# Patient Record
Sex: FEMALE | Race: WHITE | NOT HISPANIC OR LATINO | Employment: OTHER | URBAN - METROPOLITAN AREA
[De-identification: names, ages, dates, MRNs, and addresses within clinical notes are randomized per-mention and may not be internally consistent; named-entity substitution may affect disease eponyms.]

---

## 2017-03-31 ENCOUNTER — CLINICAL SUPPORT (OUTPATIENT)
Dept: SMOKING CESSATION | Facility: CLINIC | Age: 62
End: 2017-03-31
Payer: COMMERCIAL

## 2017-03-31 DIAGNOSIS — F17.200 NICOTINE DEPENDENCE: Primary | ICD-10-CM

## 2017-03-31 PROCEDURE — 99407 BEHAV CHNG SMOKING > 10 MIN: CPT | Mod: S$GLB,,,

## 2017-09-26 ENCOUNTER — TELEPHONE (OUTPATIENT)
Dept: PRIMARY CARE CLINIC | Facility: CLINIC | Age: 62
End: 2017-09-26

## 2017-09-26 NOTE — TELEPHONE ENCOUNTER
----- Message from Justus Santa sent at 9/26/2017  8:13 AM CDT -----  Contact: Magdalena Nichols would like to be seen today for congestion and cough. She has been treating with over the counter medication since last Monday. Please call [phone] (home)   Thanks!

## 2017-09-26 NOTE — TELEPHONE ENCOUNTER
Spoke with patient, states she has had cold symptoms for 1 week and has been treating it with OTC meds. Appointment made for 9/27.

## 2017-10-05 ENCOUNTER — CLINICAL SUPPORT (OUTPATIENT)
Dept: SMOKING CESSATION | Facility: CLINIC | Age: 62
End: 2017-10-05
Payer: COMMERCIAL

## 2017-10-05 ENCOUNTER — OFFICE VISIT (OUTPATIENT)
Dept: PRIMARY CARE CLINIC | Facility: CLINIC | Age: 62
End: 2017-10-05
Payer: COMMERCIAL

## 2017-10-05 VITALS
HEIGHT: 63 IN | DIASTOLIC BLOOD PRESSURE: 89 MMHG | BODY MASS INDEX: 38.09 KG/M2 | WEIGHT: 215 LBS | SYSTOLIC BLOOD PRESSURE: 150 MMHG | HEART RATE: 75 BPM | TEMPERATURE: 98 F | OXYGEN SATURATION: 96 % | RESPIRATION RATE: 18 BRPM

## 2017-10-05 DIAGNOSIS — E78.5 HYPERLIPIDEMIA, UNSPECIFIED HYPERLIPIDEMIA TYPE: ICD-10-CM

## 2017-10-05 DIAGNOSIS — G54.2 CERVICAL NEUROPATHY: ICD-10-CM

## 2017-10-05 DIAGNOSIS — L40.9 PSORIASIS: ICD-10-CM

## 2017-10-05 DIAGNOSIS — J01.00 ACUTE MAXILLARY SINUSITIS, RECURRENCE NOT SPECIFIED: Primary | ICD-10-CM

## 2017-10-05 DIAGNOSIS — J40 BRONCHITIS: ICD-10-CM

## 2017-10-05 DIAGNOSIS — I10 HYPERTENSION, UNSPECIFIED TYPE: ICD-10-CM

## 2017-10-05 DIAGNOSIS — F17.200 NICOTINE DEPENDENCE: Primary | ICD-10-CM

## 2017-10-05 DIAGNOSIS — E66.9 OBESITY, UNSPECIFIED CLASSIFICATION, UNSPECIFIED OBESITY TYPE, UNSPECIFIED WHETHER SERIOUS COMORBIDITY PRESENT: ICD-10-CM

## 2017-10-05 DIAGNOSIS — J44.9 CHRONIC OBSTRUCTIVE PULMONARY DISEASE, UNSPECIFIED COPD TYPE: ICD-10-CM

## 2017-10-05 PROCEDURE — 99213 OFFICE O/P EST LOW 20 MIN: CPT | Mod: 25,S$GLB,, | Performed by: FAMILY MEDICINE

## 2017-10-05 PROCEDURE — 96372 THER/PROPH/DIAG INJ SC/IM: CPT | Mod: S$GLB,,, | Performed by: FAMILY MEDICINE

## 2017-10-05 PROCEDURE — 99999 PR PBB SHADOW E&M-EST. PATIENT-LVL IV: CPT | Mod: PBBFAC,,, | Performed by: FAMILY MEDICINE

## 2017-10-05 PROCEDURE — 99407 BEHAV CHNG SMOKING > 10 MIN: CPT | Mod: S$GLB,,, | Performed by: INTERNAL MEDICINE

## 2017-10-05 RX ORDER — BUDESONIDE AND FORMOTEROL FUMARATE DIHYDRATE 160; 4.5 UG/1; UG/1
2 AEROSOL RESPIRATORY (INHALATION) 2 TIMES DAILY
Qty: 1 INHALER | Refills: 5 | Status: SHIPPED | OUTPATIENT
Start: 2017-10-05 | End: 2017-10-13

## 2017-10-05 RX ORDER — PROMETHAZINE HYDROCHLORIDE AND CODEINE PHOSPHATE 6.25; 1 MG/5ML; MG/5ML
5 SOLUTION ORAL EVERY 6 HOURS PRN
Qty: 180 ML | Refills: 0 | Status: SHIPPED | OUTPATIENT
Start: 2017-10-05 | End: 2017-10-13

## 2017-10-05 RX ORDER — METHYLPREDNISOLONE 4 MG/1
TABLET ORAL
Qty: 1 PACKAGE | Refills: 0 | Status: SHIPPED | OUTPATIENT
Start: 2017-10-05 | End: 2017-10-13

## 2017-10-05 RX ORDER — PROMETHAZINE HYDROCHLORIDE AND CODEINE PHOSPHATE 6.25; 1 MG/5ML; MG/5ML
5 SOLUTION ORAL EVERY 6 HOURS PRN
Qty: 180 ML | Refills: 0 | Status: SHIPPED | OUTPATIENT
Start: 2017-10-05 | End: 2017-10-05 | Stop reason: SDUPTHER

## 2017-10-05 RX ORDER — LISINOPRIL 10 MG/1
10 TABLET ORAL DAILY
Qty: 90 TABLET | Refills: 1 | Status: SHIPPED | OUTPATIENT
Start: 2017-10-05 | End: 2018-05-14 | Stop reason: SDUPTHER

## 2017-10-05 RX ORDER — LEVOFLOXACIN 500 MG/1
500 TABLET, FILM COATED ORAL DAILY
Qty: 10 TABLET | Refills: 0 | Status: SHIPPED | OUTPATIENT
Start: 2017-10-05 | End: 2017-10-13

## 2017-10-05 RX ORDER — AMLODIPINE BESYLATE 5 MG/1
5 TABLET ORAL DAILY
Qty: 90 TABLET | Refills: 1 | Status: SHIPPED | OUTPATIENT
Start: 2017-10-05 | End: 2018-05-14 | Stop reason: SDUPTHER

## 2017-10-05 RX ORDER — BETAMETHASONE SODIUM PHOSPHATE AND BETAMETHASONE ACETATE 3; 3 MG/ML; MG/ML
12 INJECTION, SUSPENSION INTRA-ARTICULAR; INTRALESIONAL; INTRAMUSCULAR; SOFT TISSUE
Status: COMPLETED | OUTPATIENT
Start: 2017-10-05 | End: 2017-10-05

## 2017-10-05 RX ADMIN — BETAMETHASONE SODIUM PHOSPHATE AND BETAMETHASONE ACETATE 12 MG: 3; 3 INJECTION, SUSPENSION INTRA-ARTICULAR; INTRALESIONAL; INTRAMUSCULAR; SOFT TISSUE at 04:10

## 2017-10-05 NOTE — PROGRESS NOTES
Subjective:       Patient ID: Magdalena Acevedo is a 62 y.o. female.    Chief Complaint: No chief complaint on file.    HPI: 61 yo WF in for cold no fever, +runny/stuffy nose, + sore th, + cough, + phlegm green. + ashtma , No P, Tb. + wheezing has Pro Air. No smoking since Aug Wheezing gets deep occas so bad like the death rattle . Worse as lays down at night . + COPD   ROS:  Skin: + psoriasis,no  eczema, skin cancer  HEENT: No headache, ocular pain, blurred vision, diplopia, epistaxis, +hoarseness +change in voice, no  thyroid trouble  Lung: No pneumonia, +asthma, no Tb, +wheezing,+ SOB,  Heart: No chest pain, ankle edema, palpitations, MI, mendy murmur,+hypertension,no  hyperlipidemia  Abdomen: No nausea, vomiting, diarrhea, constipation, ulcers, hepatitis, gallbladder disease, melena, hematochezia, hematemesis  : no UTI, renal disease, stones  GYN last PAP 43 yrs ago Last mammogram age 43   MS: no fractures, O/A, lupus, rheumatoid, gout  Neuro: No dizziness, LOC, seizures   No diabetes, no anemia, no anxiety, no depression   , one child Work retired Lives alone     Objective:   Physical Exam:  General: Well nourished, well developed, no acute distress  Skin: No lesions  HEENT: Eyes PERRLA, EOM intact, nose cl d/c, throat + eryuth +1/4   NECK: Supple, no bruits, No JVD, no nodes  Lungs: coarse cough  no rales, +rhonchi, +wheezing  Heart: Regular rate and rhythm, no murmurs, gallops, or rubs  Abdomen: flat, bowel sounds positive, no tenderness, or organomegaly  MS: Range of motion and muscle strength intact  Neuro: Alert, CN intact, oriented X 3  Extremities: No cyanosis, clubbing, or edema         Assessment:       1. Acute maxillary sinusitis, recurrence not specified    2. Bronchitis    3. Chronic obstructive pulmonary disease, unspecified COPD type    4. Hypertension, unspecified type    5. Obesity, unspecified classification, unspecified obesity type, unspecified whether serious comorbidity present     6. Psoriasis    7. Hyperlipidemia, unspecified hyperlipidemia type    8. Cervical neuropathy        Plan:       Acute maxillary sinusitis, recurrence not specified    Bronchitis  -     X-Ray Chest PA And Lateral; Future; Expected date: 10/05/2017    Chronic obstructive pulmonary disease, unspecified COPD type    Hypertension, unspecified type    Obesity, unspecified classification, unspecified obesity type, unspecified whether serious comorbidity present    Psoriasis    Hyperlipidemia, unspecified hyperlipidemia type    Cervical neuropathy  -     X-Ray Cervical Spine Complete 5 view; Future; Expected date: 10/05/2017    Other orders  -     amlodipine (NORVASC) 5 MG tablet; Take 1 tablet (5 mg total) by mouth once daily.  Dispense: 90 tablet; Refill: 1  -     lisinopril 10 MG tablet; Take 1 tablet (10 mg total) by mouth once daily.  Dispense: 90 tablet; Refill: 1  -     betamethasone acetate-betamethasone sodium phosphate injection 12 mg; Inject 2 mLs (12 mg total) into the muscle one time.  -     levoFLOXacin (LEVAQUIN) 500 MG tablet; Take 1 tablet (500 mg total) by mouth once daily.  Dispense: 10 tablet; Refill: 0  -     methylPREDNISolone (MEDROL DOSEPACK) 4 mg tablet; use as directed  Dispense: 1 Package; Refill: 0  -     Discontinue: promethazine-codeine 6.25-10 mg/5 ml (PHENERGAN WITH CODEINE) 6.25-10 mg/5 mL syrup; Take 5 mLs by mouth every 6 (six) hours as needed for Cough.  Dispense: 180 mL; Refill: 0  -     budesonide-formoterol 160-4.5 mcg (SYMBICORT) 160-4.5 mcg/actuation HFAA; Inhale 2 puffs into the lungs 2 (two) times daily. Controller  Dispense: 1 Inhaler; Refill: 5  -     promethazine-codeine 6.25-10 mg/5 ml (PHENERGAN WITH CODEINE) 6.25-10 mg/5 mL syrup; Take 5 mLs by mouth every 6 (six) hours as needed for Cough.  Dispense: 180 mL; Refill: 0         71

## 2017-10-05 NOTE — PROGRESS NOTES
Celestone 12mg IM given left gluteal w/aseptic technique. No adverse reaction noted.  Voiced no complaints.

## 2017-10-06 ENCOUNTER — TELEPHONE (OUTPATIENT)
Dept: PRIMARY CARE CLINIC | Facility: CLINIC | Age: 62
End: 2017-10-06

## 2017-10-06 ENCOUNTER — NURSE TRIAGE (OUTPATIENT)
Dept: ADMINISTRATIVE | Facility: CLINIC | Age: 62
End: 2017-10-06

## 2017-10-06 NOTE — TELEPHONE ENCOUNTER
2nd call today  ED not an option today no one to bring her and nothing will be done. pt concerned about weather. MD rec to go to ED. MD thought it was not related to steroids. HA starts at back base of neck to top of skull. Rated HA 10. Taken aleve no help. BP checked lower than yest. 145/78. Denies stroke sx. Reiterated need for ED due to severe HA. Pt states ED not an option and thanks anyway. Declined ambulance. Pt notified of on call MDs not prescribing pain meds. Spoke with Stewart NOLEN states that pt may need CT of head/ x ray of neck. Steroid shot would not cause level 10 HA or pain from base of neck to top of head. Reiterate need for ED. Pt notified. Call back with questions.     Reason for Disposition   Patient sounds very sick or weak to the triager    Protocols used: ST HEADACHE-A-AH

## 2017-10-06 NOTE — TELEPHONE ENCOUNTER
"Steroid injection yest in behind for sinus infection. severe constant HA since - started about an hour after OV. Tried aleve with no help.     Reason for Disposition   [1] SEVERE headache (e.g., excruciating) AND [2] "worst headache" of life    Answer Assessment - Initial Assessment Questions  1. LOCATION: "Where does it hurt?"      HA rated 20, /? ,   2. ONSET: "When did the headache start?" (Minutes, hours or days)      yest afternoon   3. PATTERN: "Does the pain come and go, or has it been constant since it started?"     Steady   4. SEVERITY: "How bad is the pain?" and "What does it keep you from doing?"  (e.g., Scale 1-10; mild, moderate, or severe)    - MILD (1-3): doesn't interfere with normal activities     - MODERATE (4-7): interferes with normal activities or awakens from sleep     - SEVERE (8-10): excruciating pain, unable to do any normal activities       20   5. RECURRENT SYMPTOM: "Have you ever had headaches before?" If so, ask: "When was the last time?" and "What happened that time?"     No   6. CAUSE: "What do you think is causing the headache?"     Steroid med   7. MIGRAINE: "Have you been diagnosed with migraine headaches?" If so, ask: "Is this headache similar?"      No   8. HEAD INJURY: "Has there been any recent injury to the head?"      No   9. OTHER SYMPTOMS: "Do you have any other symptoms?" (fever, stiff neck, eye pain, sore throat, cold symptoms)     No    Protocols used: ST HEADACHE-A-AH    Urgent message sent to office to call pt. rec ED due to severe pain per protocol. Call back with questions.   "

## 2017-10-06 NOTE — TELEPHONE ENCOUNTER
Spoke with patient regarding her headache, stated it started shortly after getting her steroid shot. Notes indicate that she was triaged by nurse and was recommended to go to Er. Spoke with MD regarding patient and condition. Instructed that patient should go to ER as instructed. Patient informed and understands the information provided. No further issues discussed.

## 2017-10-06 NOTE — TELEPHONE ENCOUNTER
----- Message from Ruby Ramires sent at 10/6/2017  3:25 PM CDT -----  Contact: Patient  Magdalena, patient [phone], Calling about have a headache after getting the steriod injection in the office yesterday, headache came about an hour later and has not gone away. IM not showing nurse on line. Transferred patient to [facility] on Call Nurse. Please advise. Thanks.

## 2017-10-13 ENCOUNTER — OFFICE VISIT (OUTPATIENT)
Dept: PRIMARY CARE CLINIC | Facility: CLINIC | Age: 62
End: 2017-10-13
Payer: COMMERCIAL

## 2017-10-13 VITALS
SYSTOLIC BLOOD PRESSURE: 129 MMHG | OXYGEN SATURATION: 98 % | HEART RATE: 82 BPM | BODY MASS INDEX: 37.74 KG/M2 | TEMPERATURE: 98 F | WEIGHT: 213 LBS | DIASTOLIC BLOOD PRESSURE: 79 MMHG | HEIGHT: 63 IN | RESPIRATION RATE: 18 BRPM

## 2017-10-13 DIAGNOSIS — R51.9 NONINTRACTABLE HEADACHE, UNSPECIFIED CHRONICITY PATTERN, UNSPECIFIED HEADACHE TYPE: ICD-10-CM

## 2017-10-13 DIAGNOSIS — L40.9 PSORIASIS: ICD-10-CM

## 2017-10-13 DIAGNOSIS — M50.30 DDD (DEGENERATIVE DISC DISEASE), CERVICAL: Primary | ICD-10-CM

## 2017-10-13 DIAGNOSIS — E66.9 OBESITY, UNSPECIFIED CLASSIFICATION, UNSPECIFIED OBESITY TYPE, UNSPECIFIED WHETHER SERIOUS COMORBIDITY PRESENT: ICD-10-CM

## 2017-10-13 DIAGNOSIS — I10 HYPERTENSION, UNSPECIFIED TYPE: ICD-10-CM

## 2017-10-13 PROCEDURE — 99213 OFFICE O/P EST LOW 20 MIN: CPT | Mod: S$GLB,,, | Performed by: FAMILY MEDICINE

## 2017-10-13 PROCEDURE — 99999 PR PBB SHADOW E&M-EST. PATIENT-LVL IV: CPT | Mod: PBBFAC,,, | Performed by: FAMILY MEDICINE

## 2017-10-13 RX ORDER — DICLOFENAC SODIUM 75 MG/1
TABLET, DELAYED RELEASE ORAL
Qty: 60 TABLET | Refills: 5 | Status: SHIPPED | OUTPATIENT
Start: 2017-10-13 | End: 2018-05-14

## 2017-10-13 RX ORDER — BUTALBITAL, ACETAMINOPHEN AND CAFFEINE 50; 325; 40 MG/1; MG/1; MG/1
TABLET ORAL
Qty: 30 TABLET | Refills: 0 | Status: SHIPPED | OUTPATIENT
Start: 2017-10-13 | End: 2018-05-14

## 2017-10-13 NOTE — PROGRESS NOTES
Subjective:       Patient ID: Magdalena Acevedo is a 62 y.o. female.    Chief Complaint: Follow-up    HPI: 63 yo WF in for follow up had cold still with congestion -- bit better Pharmacy would not fill inhalers-- due inhaler. Psoriasis better steroids help. Has not seen Derm x 15 yrs tried enbral only steroid pack helped.        Pt with HA back neck to top of head--was suppose go ER HA so bad --no ride. At night if lays on left side pain right neck if on right side no pain    [id number] - [place], [place] - [address]  [address] [facility] [place] [address]  Phone: [phone] Fax: [phone]   ROS:  Skin:+ psoriasis, eczema, skin cancer  HEENT: + headache, ocular pain, blurred vision, diplopia, epistaxis, hoarseness change in voice, thyroid trouble  Lung: No pneumonia, asthma, Tb, wheezing, SOB,+ copd  Heart: No chest pain, ankle edema, palpitations, MI, mendy murmur, +hypertension, no hyperlipidemia  Abdomen: No nausea, vomiting, diarrhea, constipation, ulcers, hepatitis, gallbladder disease, melena, hematochezia, hematemesis  : no UTI, renal disease, stones  MS: no fractures, O/A, lupus, rheumatoid, gout  Neuro: No dizziness, LOC, seizures   No diabetes, no anemia, no  anxiety, no depression     Objective:   Physical Exam:  General: Well nourished, well developed, no acute distress Overweight   Skin: No lesions  HEENT: Eyes PERRLA, EOM intact, nose patent, throat non-erythematous   NECK: Supple, no bruits, No JVD, no nodes  Lungs: Clear, no rales, rhonchi, wheezing  Heart: Regular rate and rhythm, no murmurs, gallops, or rubs  Abdomen: flat, bowel sounds positive, no tenderness, or organomegaly  MS: Range of motion and muscle strength intact  Neuro: Alert, CN intact, oriented X 3  Extremities: No cyanosis, clubbing, or edema         Assessment:       1. DDD (degenerative disc disease), cervical    2. Nonintractable headache, unspecified chronicity pattern, unspecified headache type    3. Psoriasis     4. Obesity, unspecified classification, unspecified obesity type, unspecified whether serious comorbidity present    5. Hypertension, unspecified type        Plan:       DDD (degenerative disc disease), cervical    Nonintractable headache, unspecified chronicity pattern, unspecified headache type    Psoriasis    Obesity, unspecified classification, unspecified obesity type, unspecified whether serious comorbidity present    Hypertension, unspecified type

## 2017-10-13 NOTE — PATIENT INSTRUCTIONS
MRI cervical spine due DDD c4/5,C5-6, C6-7  Moist heat ,theragesic, ROM exercises  Fioricet fpor HA   Diclofenac for neck pain 75 mg bid,   See DR Davila or Dr Alvarez   Exercise decrease weight   Do HA diary , if HA stays or worse MRI brain

## 2017-11-03 ENCOUNTER — TELEPHONE (OUTPATIENT)
Dept: FAMILY MEDICINE | Facility: CLINIC | Age: 62
End: 2017-11-03

## 2017-11-03 ENCOUNTER — TELEPHONE (OUTPATIENT)
Dept: PRIMARY CARE CLINIC | Facility: CLINIC | Age: 62
End: 2017-11-03

## 2017-11-03 NOTE — TELEPHONE ENCOUNTER
Phone room transferred patient to me explaining she has been trying to get her MRI results since last Thursday. Called naz at [facility] explained the situation. Patient was ask to give the number where she can be reached and I would have the nurse over at [facility] to call her back with he results today. The number  [phone] was the number patient left for a return call. Naz Johansen LPN was notified of patient's concerns and will be giving her a call today with results. Patient has verbal understanding of this.

## 2017-11-03 NOTE — TELEPHONE ENCOUNTER
----- Message from Berta Prescott sent at 11/1/2017  9:38 AM CDT -----  Contact: Patient  Magdalena, patient [phone], Fourth time calling for MRI results, done on 10/23/2017. Please advise. Thanks.

## 2017-11-08 ENCOUNTER — TELEPHONE (OUTPATIENT)
Dept: PRIMARY CARE CLINIC | Facility: CLINIC | Age: 62
End: 2017-11-08

## 2017-11-08 NOTE — TELEPHONE ENCOUNTER
----- Message from Aakash Orozco sent at 11/8/2017 12:00 PM CST -----  Contact: Self [phone]  Patient needs to be seen today for headache, neck pain,fever. Thanks!

## 2017-11-09 ENCOUNTER — OFFICE VISIT (OUTPATIENT)
Dept: PRIMARY CARE CLINIC | Facility: CLINIC | Age: 62
End: 2017-11-09
Payer: COMMERCIAL

## 2017-11-09 VITALS
WEIGHT: 215 LBS | HEIGHT: 63 IN | SYSTOLIC BLOOD PRESSURE: 126 MMHG | TEMPERATURE: 99 F | HEART RATE: 97 BPM | OXYGEN SATURATION: 97 % | BODY MASS INDEX: 38.09 KG/M2 | RESPIRATION RATE: 18 BRPM | DIASTOLIC BLOOD PRESSURE: 80 MMHG

## 2017-11-09 DIAGNOSIS — J40 BRONCHITIS: ICD-10-CM

## 2017-11-09 DIAGNOSIS — L40.9 PSORIASIS: ICD-10-CM

## 2017-11-09 DIAGNOSIS — I10 HYPERTENSION, UNSPECIFIED TYPE: ICD-10-CM

## 2017-11-09 DIAGNOSIS — R51.9 CHRONIC INTRACTABLE HEADACHE, UNSPECIFIED HEADACHE TYPE: Primary | ICD-10-CM

## 2017-11-09 DIAGNOSIS — M50.30 DDD (DEGENERATIVE DISC DISEASE), CERVICAL: ICD-10-CM

## 2017-11-09 DIAGNOSIS — E66.9 OBESITY, UNSPECIFIED CLASSIFICATION, UNSPECIFIED OBESITY TYPE, UNSPECIFIED WHETHER SERIOUS COMORBIDITY PRESENT: ICD-10-CM

## 2017-11-09 DIAGNOSIS — G89.29 CHRONIC INTRACTABLE HEADACHE, UNSPECIFIED HEADACHE TYPE: Primary | ICD-10-CM

## 2017-11-09 DIAGNOSIS — R30.0 DYSURIA: ICD-10-CM

## 2017-11-09 LAB
BILIRUB SERPL-MCNC: ABNORMAL MG/DL
BLOOD URINE, POC: 50
COLOR, POC UA: ABNORMAL
GLUCOSE UR QL STRIP: ABNORMAL
KETONES UR QL STRIP: ABNORMAL
LEUKOCYTE ESTERASE URINE, POC: 2
NITRITE, POC UA: ABNORMAL
PH, POC UA: 5
PROTEIN, POC: 2
SPECIFIC GRAVITY, POC UA: 1.02
UROBILINOGEN, POC UA: ABNORMAL

## 2017-11-09 PROCEDURE — 99999 PR PBB SHADOW E&M-EST. PATIENT-LVL IV: CPT | Mod: PBBFAC,,, | Performed by: FAMILY MEDICINE

## 2017-11-09 PROCEDURE — 81002 URINALYSIS NONAUTO W/O SCOPE: CPT | Mod: S$GLB,,, | Performed by: FAMILY MEDICINE

## 2017-11-09 PROCEDURE — 99213 OFFICE O/P EST LOW 20 MIN: CPT | Mod: 25,S$GLB,, | Performed by: FAMILY MEDICINE

## 2017-11-09 RX ORDER — LEVOFLOXACIN 500 MG/1
500 TABLET, FILM COATED ORAL DAILY
Qty: 7 TABLET | Refills: 0 | Status: SHIPPED | OUTPATIENT
Start: 2017-11-09 | End: 2017-11-16

## 2017-11-09 RX ORDER — ALBUTEROL SULFATE 90 UG/1
2 AEROSOL, METERED RESPIRATORY (INHALATION) EVERY 6 HOURS PRN
COMMUNITY
End: 2018-05-14

## 2017-11-09 RX ORDER — HYDROCODONE BITARTRATE AND ACETAMINOPHEN 5; 325 MG/1; MG/1
1 TABLET ORAL EVERY 6 HOURS PRN
Qty: 30 TABLET | Refills: 0 | Status: SHIPPED | OUTPATIENT
Start: 2017-11-09 | End: 2018-05-14

## 2017-11-09 RX ORDER — PROMETHAZINE HYDROCHLORIDE AND CODEINE PHOSPHATE 6.25; 1 MG/5ML; MG/5ML
5 SOLUTION ORAL EVERY 6 HOURS PRN
Qty: 180 ML | Refills: 0 | Status: SHIPPED | OUTPATIENT
Start: 2017-11-09 | End: 2018-05-14

## 2017-11-09 NOTE — PROGRESS NOTES
Subjective:       Patient ID: Magdalena Acevedo is a 62 y.o. female.    Chief Complaint: Headache and Urinary Tract Infection    HPI: 62-year-old white female --complaining of headache, neck pain,, left shoulder pain to the left deltoid area--- headache never leave him back of neck to the top of the head.  Has appointment Dr. Scherer neurologist on 11-20-17 .  Quality of pain throbbing , severity when severe 7-8 out of 10, if not severe 4-5 out of 10 never goes away.  Tried different pillows.  If his head up and down her side to side pain only in the left cervical area and left upper trapezius does radiate into the entire head       Patient feels like UTI coming on--+ frequency + urgency + retention, sample looks like some blood, + odor in a.m.  Last UTI few months ROS:  Skin: +sporiasis, noeczema, skin cancer  HEENT: + headache, + glasses-- ocular pain, blurred vision, diplopia, occasional epistaxis with blowing no,no hoarseness change in voice, thyroid trouble  Lung: No pneumonia, asthma, Tb, wheezing, SOB, no smoking  Heart: No chest pain, ankle edema, palpitations, MI, mendy murmur, +hypertension blood pressure running okay at home, no  hyperlipidemia  Abdomen: No nausea, vomiting, diarrhea, constipation, ulcers, hepatitis, melena, hematochezia, hematemesis status post cholecystectomy   : no UTI, renal disease, stones   MS: no fractures, O/A, lupus, rheumatoid, gout  Neuro: No dizziness, LOC, seizures   No diabetes, no anemia, no anxiety, no depression     Objective:   Physical Exam:  General: Well nourished, well developed, no acute distress + obese   Skin: Large number of psoriatic lesions   HEENT: Eyes PERRLA, EOM intact, nose patent, throat non-erythematous ears TM clear NECK: Supple, no bruits, No JVD;, no nodes  Lungs: Clear, no rales, rhonchi, wheezing coarse cough with green phlegm   Heart: Regular rate and rhythm, no murmurs, gallops, or rubs  Abdomen: flat, bowel sounds positive, no tenderness,  or organomegaly  MS: Pain left side of the neck see one through C7 left upper trapezius to the left shoulder to the end of the deltoid radiates to the top of the head --frontal area.  Neuro: Alert, CN intact, oriented X 3 slight swaying on Romberg , swaying on heel toe   Extremities: No cyanosis, clubbing, or edema         Assessment:       1. Chronic intractable headache, unspecified headache type    2. Hypertension, unspecified type    3. Psoriasis    4. Obesity, unspecified classification, unspecified obesity type, unspecified whether serious comorbidity present    5. DDD (degenerative disc disease), cervical    6. Bronchitis    7. Dysuria        Plan:       Chronic intractable headache, unspecified headache type    Hypertension, unspecified type    Psoriasis    Obesity, unspecified classification, unspecified obesity type, unspecified whether serious comorbidity present    DDD (degenerative disc disease), cervical    Bronchitis    Dysuria  -     POCT URINE DIPSTICK WITHOUT MICROSCOPE

## 2017-11-09 NOTE — PATIENT INSTRUCTIONS
Patient with headache appears to be musculoskeletal--starts in the neck cervical spine radiates up the back of the head to the floor frontal area bilaterally in the left shoulder into the upper trapezius and deltoid area MRI of the cervical spine done on diclofenac no relief Complaining of intractable headaches constant but waxes and wanes on Fioricet no relief  UA  Bronchitis coughed up green phlegm during exam  Due to appendectomy staxis/bronchitis/possible UTI-Levaquin 500 daily ×10 days Norco for pain Phenergan and codeine for cough

## 2018-05-14 ENCOUNTER — OFFICE VISIT (OUTPATIENT)
Dept: PRIMARY CARE CLINIC | Facility: CLINIC | Age: 63
End: 2018-05-14
Payer: COMMERCIAL

## 2018-05-14 VITALS
RESPIRATION RATE: 18 BRPM | OXYGEN SATURATION: 92 % | HEIGHT: 63 IN | DIASTOLIC BLOOD PRESSURE: 83 MMHG | BODY MASS INDEX: 37.8 KG/M2 | HEART RATE: 83 BPM | SYSTOLIC BLOOD PRESSURE: 138 MMHG | WEIGHT: 213.31 LBS | TEMPERATURE: 99 F

## 2018-05-14 DIAGNOSIS — R51.9 CHRONIC INTRACTABLE HEADACHE, UNSPECIFIED HEADACHE TYPE: ICD-10-CM

## 2018-05-14 DIAGNOSIS — L40.9 PSORIASIS: ICD-10-CM

## 2018-05-14 DIAGNOSIS — I10 HYPERTENSION, UNSPECIFIED TYPE: Primary | ICD-10-CM

## 2018-05-14 DIAGNOSIS — J32.9 SINUSITIS, UNSPECIFIED CHRONICITY, UNSPECIFIED LOCATION: ICD-10-CM

## 2018-05-14 DIAGNOSIS — R45.4 ANGER: ICD-10-CM

## 2018-05-14 DIAGNOSIS — G89.29 CHRONIC INTRACTABLE HEADACHE, UNSPECIFIED HEADACHE TYPE: ICD-10-CM

## 2018-05-14 DIAGNOSIS — E66.3 OVERWEIGHT: ICD-10-CM

## 2018-05-14 DIAGNOSIS — J40 BRONCHITIS: ICD-10-CM

## 2018-05-14 DIAGNOSIS — R32 URINARY INCONTINENCE, UNSPECIFIED TYPE: ICD-10-CM

## 2018-05-14 DIAGNOSIS — Z12.31 VISIT FOR SCREENING MAMMOGRAM: ICD-10-CM

## 2018-05-14 DIAGNOSIS — M50.30 DDD (DEGENERATIVE DISC DISEASE), CERVICAL: ICD-10-CM

## 2018-05-14 PROBLEM — Z86.718 HISTORY OF DVT (DEEP VEIN THROMBOSIS): Status: RESOLVED | Noted: 2018-05-14 | Resolved: 2018-05-14

## 2018-05-14 PROBLEM — E11.9 TYPE 2 DIABETES MELLITUS WITHOUT COMPLICATION, WITHOUT LONG-TERM CURRENT USE OF INSULIN: Status: RESOLVED | Noted: 2018-05-14 | Resolved: 2018-05-14

## 2018-05-14 PROBLEM — E66.9 OBESITY: Status: RESOLVED | Noted: 2017-10-13 | Resolved: 2018-05-14

## 2018-05-14 PROBLEM — E11.9 TYPE 2 DIABETES MELLITUS WITHOUT COMPLICATION, WITHOUT LONG-TERM CURRENT USE OF INSULIN: Status: ACTIVE | Noted: 2018-05-14

## 2018-05-14 PROBLEM — Z86.718 HISTORY OF DVT (DEEP VEIN THROMBOSIS): Status: ACTIVE | Noted: 2018-05-14

## 2018-05-14 PROCEDURE — 96372 THER/PROPH/DIAG INJ SC/IM: CPT | Mod: S$GLB,,, | Performed by: FAMILY MEDICINE

## 2018-05-14 PROCEDURE — 99999 PR PBB SHADOW E&M-EST. PATIENT-LVL V: CPT | Mod: PBBFAC,,, | Performed by: FAMILY MEDICINE

## 2018-05-14 PROCEDURE — 99213 OFFICE O/P EST LOW 20 MIN: CPT | Mod: 25,S$GLB,, | Performed by: FAMILY MEDICINE

## 2018-05-14 RX ORDER — LISINOPRIL 10 MG/1
10 TABLET ORAL DAILY
Qty: 90 TABLET | Refills: 3 | Status: SHIPPED | OUTPATIENT
Start: 2018-05-14 | End: 2018-10-16 | Stop reason: SDUPTHER

## 2018-05-14 RX ORDER — AMLODIPINE BESYLATE 5 MG/1
5 TABLET ORAL DAILY
Qty: 90 TABLET | Refills: 3 | Status: SHIPPED | OUTPATIENT
Start: 2018-05-14 | End: 2018-10-16 | Stop reason: SDUPTHER

## 2018-05-14 RX ORDER — CEFTRIAXONE 1 G/1
1 INJECTION, POWDER, FOR SOLUTION INTRAMUSCULAR; INTRAVENOUS
Status: CANCELLED | OUTPATIENT
Start: 2018-05-14 | End: 2018-05-14

## 2018-05-14 RX ADMIN — CEFTRIAXONE 1 G: 1 INJECTION, POWDER, FOR SOLUTION INTRAMUSCULAR; INTRAVENOUS at 06:05

## 2018-05-14 RX ADMIN — BETAMETHASONE SODIUM PHOSPHATE AND BETAMETHASONE ACETATE 12 MG: 3; 3 INJECTION, SUSPENSION INTRA-ARTICULAR; INTRALESIONAL; INTRAMUSCULAR; SOFT TISSUE at 06:05

## 2018-05-14 NOTE — PROGRESS NOTES
Patient ID by name and . Allergies verified. Celestone 12 mg IM injection given in right ventrogluteal and Ceftriaxone 1 gram IM injection given in left ventrogluteal using aseptic technique. Aspirated with no blood noted. Patient tolerated well. Given per physicians order. No adverse reactions noted.

## 2018-05-14 NOTE — PROGRESS NOTES
Subjective:       Patient ID: Magdalena Acevedo is a 63 y.o. female.    Chief Complaint: URI and Medication Refill    HPI: 63-year-old white female sick for 2 weeks.  No fever, + runny nose, no sore throat, + cough, + green phlegm.  No pneumonia asthma TB.  No smoking    ROS:  Skin: + psoriasis, no eczema, skin cancer  HEENT: No headache--was getting headaches states that they're better, ocular pain, blurred vision, diplopia, epistaxis, hoarseness change in voice, thyroid trouble  Lung: No pneumonia, asthma, Tb, wheezing, SOB, no smoking history of bronchitis  Heart: No chest pain, ankle edema, palpitations, MI, mendy murmur, +hypertension,no  hyperlipidemia  Abdomen: No nausea, vomiting, diarrhea, constipation, ulcers, hepatitis, gallbladder disease, melena, hematochezia, hematemesis had loose bowel movements for 3 days last week  : no UTI, renal disease, stones + urinary incontinence with coughing  GYN is been years since last Pap smear mammogram  MS: no fractures, O/A, lupus, rheumatoid, gout + DDD cervical spine  Neuro: No dizziness, LOC, seizures   No diabetes, no anemia, no anxiety, no depression upset scans angry easily    Objective:   Physical Exam:  General: Well nourished, well developed, no acute distress + overweight  Skin: + Dry scaly skin especially on the legs scalp arms and back-psoriasis  HEENT: Eyes PERRLA, EOM intact, nose clear discharge, throat erythema +1 over 4  NECK: Supple, no bruits, No JVD, no nodes  Lungs: Clear, no rales, rhonchi, wheezing + coarse cough + wheezing  Heart: Regular rate and rhythm, no murmurs, gallops, or rubs  Abdomen: flat, bowel sounds positive, no tenderness, or organomegaly  MS: Range of motion and muscle strength intact  Neuro: Alert, CN intact, oriented X 3  Extremities: No cyanosis, clubbing, or edema         Assessment:       1. Hypertension, unspecified type    2. Bronchitis    3. Psoriasis    4. Overweight    5. Chronic intractable headache, unspecified  headache type    6. Sinusitis, unspecified chronicity, unspecified location    7. Anger    8. Urinary incontinence, unspecified type    9. DDD (degenerative disc disease), cervical    10. Visit for screening mammogram        Plan:       Hypertension, unspecified type  -     Cancel: CBC auto differential; Future; Expected date: 05/14/2018  -     Cancel: Comprehensive metabolic panel; Future; Expected date: 05/14/2018  -     Cancel: Lipid panel; Future; Expected date: 05/14/2018  -     Cancel: POCT EKG 12-LEAD (NOT FOR [facility] USE)  -     Cancel: POCT occult blood stool  -     X-Ray Chest PA And Lateral; Future; Expected date: 05/14/2018  -     Sedimentation rate, manual; Future; Expected date: 05/14/2018  -     POCT urine dipstick without microscope  -     T4, free; Future; Expected date: 05/14/2018  -     TSH; Future; Expected date: 05/14/2018  -     CBC auto differential; Future; Expected date: 05/15/2018  -     Comprehensive metabolic panel; Future; Expected date: 05/15/2018  -     Lipid panel; Future; Expected date: 05/15/2018  -     POCT EKG 12-LEAD (NOT FOR [facility] USE)  -     POCT occult blood stool    Bronchitis    Psoriasis  -     Ambulatory referral to Dermatology    Overweight    Chronic intractable headache, unspecified headache type    Sinusitis, unspecified chronicity, unspecified location    Anger  -     Ambulatory referral to Psychiatry    Urinary incontinence, unspecified type    DDD (degenerative disc disease), cervical    Visit for screening mammogram  -     Mammo Digital Screening Bilat without CA; Future; Expected date: 05/29/2018    Other orders  -     amLODIPine (NORVASC) 5 MG tablet; Take 1 tablet (5 mg total) by mouth once daily.  Dispense: 90 tablet; Refill: 3  -     lisinopril 10 MG tablet; Take 1 tablet (10 mg total) by mouth once daily.  Dispense: 90 tablet; Refill: 3  -     Cancel: Hemoglobin A1c; Future; Expected date: 05/14/2018  -     Cancel: cefTRIAXone injection 1 g; Inject 1 g into  the muscle one time.  -     betamethasone acetate-betamethasone sodium phosphate injection 12 mg; Inject 2 mLs (12 mg total) into the muscle one time.  -     betamethasone acetate-betamethasone sodium phosphate injection 12 mg; Inject 2 mLs (12 mg total) into the muscle one time.  -     levoFLOXacin (LEVAQUIN) 500 MG tablet; Take 1 tablet (500 mg total) by mouth once daily.  Dispense: 10 tablet; Refill: 0  -     methylPREDNISolone (MEDROL DOSEPACK) 4 mg tablet; use as directed  Dispense: 1 Package; Refill: 0  -     promethazine-codeine 6.25-10 mg/5 ml (PHENERGAN WITH CODEINE) 6.25-10 mg/5 mL syrup; Take 5 mLs by mouth every 6 (six) hours as needed for Cough.  Dispense: 180 mL; Refill: 0  -     cefTRIAXone injection 1 g; Inject 1 g into the muscle one time.  -     sertraline (ZOLOFT) 25 MG tablet; Take 1 tablet (25 mg total) by mouth once daily.  Dispense: 30 tablet; Refill: 5  -     LORazepam (ATIVAN) 1 MG tablet; 1/2 po bid or 1 po q d prn pain  Dispense: 30 tablet; Refill: 1      patient needs routine labs CBC CMP lipid T4 TSH UA chest x-ray EKG and stool guaiac  Needs mammogram and Pap smear  Treated cold with Levaquin/Celestone/Medrol/Phenergan With Codeine/Rocephin--patient had abdominal cramps with Augmentin no ALLERGIC reaction has taken Keflex before--- then 1 inhaler  Due to being angry treated with Zoloft 25 daily Ativan half by mouth twice a day or 1 by mouth daily, [facility] due to anger management  needs to see Dr. Alvarez for psoriasis  Has urinary incontinence may need Ditropan but at this point we'll treat him for situations first and see how patient does

## 2018-05-15 PROBLEM — R45.4 ANGER: Status: ACTIVE | Noted: 2018-05-15

## 2018-05-15 PROBLEM — R32 URINARY INCONTINENCE: Status: ACTIVE | Noted: 2018-05-15

## 2018-05-15 RX ORDER — CEFTRIAXONE 1 G/1
1 INJECTION, POWDER, FOR SOLUTION INTRAMUSCULAR; INTRAVENOUS
Status: COMPLETED | OUTPATIENT
Start: 2018-05-15 | End: 2018-05-14

## 2018-05-15 RX ORDER — PROMETHAZINE HYDROCHLORIDE AND CODEINE PHOSPHATE 6.25; 1 MG/5ML; MG/5ML
5 SOLUTION ORAL EVERY 6 HOURS PRN
Qty: 180 ML | Refills: 0 | Status: SHIPPED | OUTPATIENT
Start: 2018-05-15 | End: 2018-10-16

## 2018-05-15 RX ORDER — METHYLPREDNISOLONE 4 MG/1
TABLET ORAL
Qty: 1 PACKAGE | Refills: 0 | Status: SHIPPED | OUTPATIENT
Start: 2018-05-15 | End: 2018-06-05

## 2018-05-15 RX ORDER — LORAZEPAM 1 MG/1
TABLET ORAL
Qty: 30 TABLET | Refills: 1 | Status: SHIPPED | OUTPATIENT
Start: 2018-05-15 | End: 2018-10-16 | Stop reason: SDUPTHER

## 2018-05-15 RX ORDER — LEVOFLOXACIN 500 MG/1
500 TABLET, FILM COATED ORAL DAILY
Qty: 10 TABLET | Refills: 0 | Status: SHIPPED | OUTPATIENT
Start: 2018-05-15 | End: 2018-05-25

## 2018-05-15 RX ORDER — BETAMETHASONE SODIUM PHOSPHATE AND BETAMETHASONE ACETATE 3; 3 MG/ML; MG/ML
12 INJECTION, SUSPENSION INTRA-ARTICULAR; INTRALESIONAL; INTRAMUSCULAR; SOFT TISSUE
Status: DISCONTINUED | OUTPATIENT
Start: 2018-05-15 | End: 2018-10-16

## 2018-05-15 RX ORDER — BETAMETHASONE SODIUM PHOSPHATE AND BETAMETHASONE ACETATE 3; 3 MG/ML; MG/ML
12 INJECTION, SUSPENSION INTRA-ARTICULAR; INTRALESIONAL; INTRAMUSCULAR; SOFT TISSUE
Status: COMPLETED | OUTPATIENT
Start: 2018-05-15 | End: 2018-05-14

## 2018-05-15 RX ORDER — SERTRALINE HYDROCHLORIDE 25 MG/1
25 TABLET, FILM COATED ORAL DAILY
Qty: 30 TABLET | Refills: 5 | Status: SHIPPED | OUTPATIENT
Start: 2018-05-15 | End: 2018-10-16 | Stop reason: SDUPTHER

## 2018-05-16 ENCOUNTER — TELEPHONE (OUTPATIENT)
Dept: PRIMARY CARE CLINIC | Facility: CLINIC | Age: 63
End: 2018-05-16

## 2018-05-16 NOTE — TELEPHONE ENCOUNTER
----- Message from Dereje BRAXTON Frisard sent at 5/16/2018 10:50 AM CDT -----  Contact: same  Patient called in and stated she spoke to nurse yesterday 5/15/18 and she was supposed to send over lab orders to the [facility] in [place] & was even going to call them.  Patient is at [facility] location but no orders have been received.    Patient call back number is [phone].  Fax number for [facility] is [phone]

## 2018-05-17 LAB
ALBUMIN SERPL-MCNC: 4.5 G/DL (ref 3.6–5.1)
ALBUMIN/GLOB SERPL: 1.7 (CALC) (ref 1–2.5)
ALP SERPL-CCNC: 95 U/L (ref 33–130)
ALT SERPL-CCNC: 19 U/L (ref 6–29)
AST SERPL-CCNC: 12 U/L (ref 10–35)
BASOPHILS # BLD AUTO: 111 CELLS/UL (ref 0–200)
BASOPHILS NFR BLD AUTO: 0.6 %
BILIRUB SERPL-MCNC: 0.6 MG/DL (ref 0.2–1.2)
BUN SERPL-MCNC: 20 MG/DL (ref 7–25)
BUN/CREAT SERPL: ABNORMAL (CALC) (ref 6–22)
CALCIUM SERPL-MCNC: 9.6 MG/DL (ref 8.6–10.4)
CHLORIDE SERPL-SCNC: 105 MMOL/L (ref 98–110)
CHOLEST SERPL-MCNC: 235 MG/DL
CHOLEST/HDLC SERPL: 5.7 (CALC)
CO2 SERPL-SCNC: 29 MMOL/L (ref 20–31)
CREAT SERPL-MCNC: 0.8 MG/DL (ref 0.5–0.99)
EOSINOPHIL # BLD AUTO: 19 CELLS/UL (ref 15–500)
EOSINOPHIL NFR BLD AUTO: 0.1 %
ERYTHROCYTE [DISTWIDTH] IN BLOOD BY AUTOMATED COUNT: 14.2 % (ref 11–15)
ERYTHROCYTE [SEDIMENTATION RATE] IN BLOOD BY WESTERGREN METHOD: 41 MM/H
GFR SERPL CREATININE-BSD FRML MDRD: 78 ML/MIN/1.73M2
GLOBULIN SER CALC-MCNC: 2.7 G/DL (CALC) (ref 1.9–3.7)
GLUCOSE SERPL-MCNC: 116 MG/DL (ref 65–99)
HCT VFR BLD AUTO: 45.7 % (ref 35–45)
HDLC SERPL-MCNC: 41 MG/DL
HGB BLD-MCNC: 16 G/DL (ref 11.7–15.5)
LDLC SERPL CALC-MCNC: 161 MG/DL (CALC)
LYMPHOCYTES # BLD AUTO: 2239 CELLS/UL (ref 850–3900)
LYMPHOCYTES NFR BLD AUTO: 12.1 %
MCH RBC QN AUTO: 31.7 PG (ref 27–33)
MCHC RBC AUTO-ENTMCNC: 35 G/DL (ref 32–36)
MCV RBC AUTO: 90.7 FL (ref 80–100)
MONOCYTES # BLD AUTO: 870 CELLS/UL (ref 200–950)
MONOCYTES NFR BLD AUTO: 4.7 %
NEUTROPHILS # BLD AUTO: ABNORMAL CELLS/UL (ref 1500–7800)
NEUTROPHILS NFR BLD AUTO: 82.5 %
NONHDLC SERPL-MCNC: 194 MG/DL (CALC)
PLATELET # BLD AUTO: 341 THOUSAND/UL (ref 140–400)
PMV BLD REES-ECKER: 10.5 FL (ref 7.5–12.5)
POTASSIUM SERPL-SCNC: 4.5 MMOL/L (ref 3.5–5.3)
PROT SERPL-MCNC: 7.2 G/DL (ref 6.1–8.1)
RBC # BLD AUTO: 5.04 MILLION/UL (ref 3.8–5.1)
SODIUM SERPL-SCNC: 141 MMOL/L (ref 135–146)
T4 FREE SERPL-MCNC: 1.1 NG/DL (ref 0.8–1.8)
TRIGL SERPL-MCNC: 173 MG/DL
TSH SERPL-ACNC: 0.33 MIU/L (ref 0.4–4.5)
WBC # BLD AUTO: 18.5 THOUSAND/UL (ref 3.8–10.8)

## 2018-05-22 DIAGNOSIS — Z00.00 ROUTINE ADULT HEALTH MAINTENANCE: Primary | ICD-10-CM

## 2018-05-22 PROBLEM — E78.49 OTHER HYPERLIPIDEMIA: Status: ACTIVE | Noted: 2018-05-22

## 2018-05-22 PROBLEM — R73.03 BORDERLINE DIABETES: Status: ACTIVE | Noted: 2018-05-22

## 2018-05-22 RX ORDER — ATORVASTATIN CALCIUM 10 MG/1
10 TABLET, FILM COATED ORAL NIGHTLY
Qty: 90 TABLET | Status: SHIPPED | OUTPATIENT
Start: 2018-05-22 | End: 2018-05-24 | Stop reason: SDUPTHER

## 2018-05-22 NOTE — TELEPHONE ENCOUNTER
Spoke with pt and discussed results. Pt verbalized understanding. Pended Rx to Dr William to send.

## 2018-05-22 NOTE — TELEPHONE ENCOUNTER
----- Message from Jaime William MD sent at 5/22/2018  1:28 PM CDT -----  Call pt tell TSH decreased 0.33 needs to do T4 TSH in 3 months--WBC 18.5 probably secondary to steroids--- glucose 116 borderline--cholesterol 235 better 180  better 100 triglyceride 173 better 150 HDL 41 increase with exercise start atorvastatin 10 mg 1 by mouth every afternoon stop if develops nausea or muscle cramps redo lipid in 3 months son 3 months patient needs to redo T4 TSH CBC glucose add a hemoglobin A1c and lipids see me 2 weeks later

## 2018-05-24 NOTE — TELEPHONE ENCOUNTER
----- Message from Berta Prescott sent at 5/24/2018 10:18 AM CDT -----  Contact: Patient  Type: Needs Medical Advice    Who Called:  Magdalena, patient  Symptoms (please be specific):  N/A  How long has patient had these symptoms:  N/A  Pharmacy name and phone #:    [id number] [address] [address]  Phone: [phone] Fax: [phone]    Best Call Back Number: [phone]  Additional Information: Calling because she was told some prescriptions were going to be called in for her. She has never received them. Please call her. Thanks.

## 2018-05-30 ENCOUNTER — TELEPHONE (OUTPATIENT)
Dept: PRIMARY CARE CLINIC | Facility: CLINIC | Age: 63
End: 2018-05-30

## 2018-05-30 NOTE — TELEPHONE ENCOUNTER
Spoke with patient, verbalized to patient per PCP's note patient needs to start atorvastatin 10 mg PO 1 tab q PM #30 5 refills and will monitor thyroid for now. No medication for that. Verbalized to patient that I will call in medication to pharmacy [facility]. Patient states understanding.

## 2018-05-30 NOTE — TELEPHONE ENCOUNTER
----- Message from Yas Mckeon sent at 5/30/2018 10:01 AM CDT -----  Contact: Patient  Magdalena, patient [phone] calling to speak with nurse in office, states that cholesterol and thyroid medications were supposed to be sent over to the pharmacy but they have not received anything. Please advise. Thanks.    [id number] [address] [address]  Phone: [phone] Fax: [phone]

## 2018-05-31 RX ORDER — ATORVASTATIN CALCIUM 10 MG/1
10 TABLET, FILM COATED ORAL NIGHTLY
Qty: 90 TABLET | Refills: 1 | Status: SHIPPED | OUTPATIENT
Start: 2018-05-31 | End: 2018-10-16 | Stop reason: SDUPTHER

## 2018-10-16 ENCOUNTER — OFFICE VISIT (OUTPATIENT)
Dept: PRIMARY CARE CLINIC | Facility: CLINIC | Age: 63
End: 2018-10-16
Payer: COMMERCIAL

## 2018-10-16 VITALS
RESPIRATION RATE: 18 BRPM | WEIGHT: 217 LBS | HEIGHT: 63 IN | SYSTOLIC BLOOD PRESSURE: 143 MMHG | OXYGEN SATURATION: 94 % | HEART RATE: 82 BPM | DIASTOLIC BLOOD PRESSURE: 83 MMHG | BODY MASS INDEX: 38.45 KG/M2

## 2018-10-16 DIAGNOSIS — S16.1XXD STRAIN OF NECK MUSCLE, SUBSEQUENT ENCOUNTER: Primary | ICD-10-CM

## 2018-10-16 DIAGNOSIS — L40.9 PSORIASIS: ICD-10-CM

## 2018-10-16 DIAGNOSIS — I10 HYPERTENSION, UNSPECIFIED TYPE: ICD-10-CM

## 2018-10-16 DIAGNOSIS — G89.29 CHRONIC INTRACTABLE HEADACHE, UNSPECIFIED HEADACHE TYPE: ICD-10-CM

## 2018-10-16 DIAGNOSIS — M25.511 ACUTE PAIN OF RIGHT SHOULDER: ICD-10-CM

## 2018-10-16 DIAGNOSIS — M50.30 DDD (DEGENERATIVE DISC DISEASE), CERVICAL: ICD-10-CM

## 2018-10-16 DIAGNOSIS — E66.3 OVERWEIGHT: ICD-10-CM

## 2018-10-16 DIAGNOSIS — R51.9 CHRONIC INTRACTABLE HEADACHE, UNSPECIFIED HEADACHE TYPE: ICD-10-CM

## 2018-10-16 DIAGNOSIS — R32 URINARY INCONTINENCE, UNSPECIFIED TYPE: ICD-10-CM

## 2018-10-16 DIAGNOSIS — J40 BRONCHITIS: ICD-10-CM

## 2018-10-16 DIAGNOSIS — R73.03 BORDERLINE DIABETES: ICD-10-CM

## 2018-10-16 DIAGNOSIS — R45.4 ANGER: ICD-10-CM

## 2018-10-16 PROCEDURE — 99213 OFFICE O/P EST LOW 20 MIN: CPT | Mod: S$GLB,,, | Performed by: FAMILY MEDICINE

## 2018-10-16 PROCEDURE — 99999 PR PBB SHADOW E&M-EST. PATIENT-LVL III: CPT | Mod: PBBFAC,,, | Performed by: FAMILY MEDICINE

## 2018-10-16 RX ORDER — SERTRALINE HYDROCHLORIDE 25 MG/1
25 TABLET, FILM COATED ORAL DAILY
Qty: 30 TABLET | Refills: 5 | Status: SHIPPED | OUTPATIENT
Start: 2018-10-16 | End: 2019-04-22 | Stop reason: SDUPTHER

## 2018-10-16 RX ORDER — LORAZEPAM 1 MG/1
TABLET ORAL
Qty: 30 TABLET | Refills: 1 | Status: SHIPPED | OUTPATIENT
Start: 2018-10-16 | End: 2019-07-25

## 2018-10-16 RX ORDER — PROMETHAZINE HYDROCHLORIDE AND CODEINE PHOSPHATE 6.25; 1 MG/5ML; MG/5ML
5 SOLUTION ORAL EVERY 6 HOURS PRN
Qty: 180 ML | Refills: 0 | Status: SHIPPED | OUTPATIENT
Start: 2018-10-16 | End: 2019-03-18

## 2018-10-16 RX ORDER — BETAMETHASONE SODIUM PHOSPHATE AND BETAMETHASONE ACETATE 3; 3 MG/ML; MG/ML
12 INJECTION, SUSPENSION INTRA-ARTICULAR; INTRALESIONAL; INTRAMUSCULAR; SOFT TISSUE
Status: DISCONTINUED | OUTPATIENT
Start: 2018-10-16 | End: 2019-03-18

## 2018-10-16 RX ORDER — DICLOFENAC SODIUM 75 MG/1
75 TABLET, DELAYED RELEASE ORAL 2 TIMES DAILY
Qty: 60 TABLET | Refills: 5 | Status: SHIPPED | OUTPATIENT
Start: 2018-10-16 | End: 2019-04-22 | Stop reason: SDUPTHER

## 2018-10-16 RX ORDER — AZITHROMYCIN 250 MG/1
TABLET, FILM COATED ORAL
Qty: 6 TABLET | Refills: 0 | Status: SHIPPED | OUTPATIENT
Start: 2018-10-16 | End: 2018-10-20

## 2018-10-16 RX ORDER — DICLOFENAC SODIUM 75 MG/1
75 TABLET, DELAYED RELEASE ORAL 2 TIMES DAILY
COMMUNITY
End: 2018-10-16 | Stop reason: SDUPTHER

## 2018-10-16 RX ORDER — TRAMADOL HYDROCHLORIDE 50 MG/1
50 TABLET ORAL EVERY 6 HOURS PRN
Qty: 30 TABLET | Refills: 0 | Status: SHIPPED | OUTPATIENT
Start: 2018-10-16 | End: 2018-10-26

## 2018-10-16 RX ORDER — ATORVASTATIN CALCIUM 10 MG/1
10 TABLET, FILM COATED ORAL NIGHTLY
Qty: 90 TABLET | Refills: 1 | Status: SHIPPED | OUTPATIENT
Start: 2018-10-16 | End: 2019-04-23 | Stop reason: SDUPTHER

## 2018-10-16 RX ORDER — METHYLPREDNISOLONE 4 MG/1
TABLET ORAL
Qty: 1 PACKAGE | Refills: 0 | Status: SHIPPED | OUTPATIENT
Start: 2018-10-16 | End: 2019-03-18

## 2018-10-16 RX ORDER — OXYBUTYNIN CHLORIDE 5 MG/1
TABLET, EXTENDED RELEASE ORAL
Qty: 30 TABLET | Refills: 5 | Status: SHIPPED | OUTPATIENT
Start: 2018-10-16 | End: 2019-03-18

## 2018-10-16 RX ORDER — AMLODIPINE BESYLATE 5 MG/1
5 TABLET ORAL DAILY
Qty: 90 TABLET | Refills: 3 | Status: SHIPPED | OUTPATIENT
Start: 2018-10-16 | End: 2019-04-22 | Stop reason: SDUPTHER

## 2018-10-16 RX ORDER — LISINOPRIL 10 MG/1
10 TABLET ORAL DAILY
Qty: 90 TABLET | Refills: 3 | Status: SHIPPED | OUTPATIENT
Start: 2018-10-16 | End: 2019-04-22 | Stop reason: SDUPTHER

## 2018-10-16 NOTE — PROGRESS NOTES
Subjective:       Patient ID: Magdalena Acevedo is a 63 y.o. female.    Chief Complaint: Medication Refill    HPI: 63-year-old white female in for medication refill--having pain in the right side of the neck and right shoulder--patient ran out of NSAIDs has been using over-the-counter Advil.  Pain when not taking her medication.  Pain with turning head side to side or up and down--shoulder hurts especially with raising arm overhead or reaching up.  No trauma, no excessive activity.  Unemployed.  No lupus rheumatoid gout.  Patient has a history of DDD cervical spine  ROS:  Skin: + psoriasis--not as bad is normal at this time, no eczema, skin cancer  HEENT: No headache,  ocular pain, blurred vision, diplopia, epistaxis, hoarseness change in voice, thyroid trouble  Lung: No pneumonia, asthma, Tb, wheezing, SOB, no smoking history of bronchitis  Heart: No chest pain, ankle edema, palpitations, MI, mendy murmur, +hypertension,no  Hyperlipidemia--has arm cuff at home but not taking it very often  Abdomen: No nausea, vomiting, diarrhea, constipation, ulcers, hepatitis, gallbladder disease, melena, hematochezia, hematemesis   : no UTI, renal disease, stones + urinary incontinence with coughing  GYN is been years since last Pap smear mammogram  MS: no fractures, O/A, lupus, rheumatoid, gout + DDD cervical spine --see history of present illness  Neuro: No dizziness, LOC, seizures   No diabetes, no anemia, no anxiety, no depression upset gets angry patient is on Zoloft but not using lorazepam or Ativan  [place], 1 child, unemployed,, grandson stays with patient in every other week great grand children stay with patient    Objective:   Physical Exam:  General: Well nourished, well developed, no acute distress + overweight  Skin: + Dry scaly skin especially on the legs scalp arms and back-psoriasis  HEENT: Eyes PERRLA, EOM intact, nose clear discharge, throat erythema +1 over 4  NECK: Supple, no bruits, No JVD, no  nodes  Lungs: Clear, no rales, rhonchi, wheezing + coarse cough + wheezing  Heart: Regular rate and rhythm, no murmurs, gallops, or rubs  Abdomen: flat, bowel sounds positive, no tenderness, or organomegaly  MS: Range of motion and muscle strength intact  Neuro: Alert, CN intact, oriented X 3  Extremities: No cyanosis, clubbing, or edema         Assessment:       1. Strain of neck muscle, subsequent encounter    2. Acute pain of right shoulder    3. Urinary incontinence, unspecified type    4. Overweight    5. Borderline diabetes    6. Bronchitis    7. Hypertension, unspecified type    8. Psoriasis    9. Anger    10. Chronic intractable headache, unspecified headache type    11. DDD (degenerative disc disease), cervical        Plan:       Strain of neck muscle, subsequent encounter    Acute pain of right shoulder    Urinary incontinence, unspecified type    Overweight    Borderline diabetes    Bronchitis    Hypertension, unspecified type    Psoriasis    Anger    Chronic intractable headache, unspecified headache type    DDD (degenerative disc disease), cervical    Other orders  -     amLODIPine (NORVASC) 5 MG tablet; Take 1 tablet (5 mg total) by mouth once daily.  Dispense: 90 tablet; Refill: 3  -     atorvastatin (LIPITOR) 10 MG tablet; Take 1 tablet (10 mg total) by mouth every evening.  Dispense: 90 tablet; Refill: 1  -     diclofenac (VOLTAREN) 75 MG EC tablet; Take 1 tablet (75 mg total) by mouth 2 (two) times daily.  Dispense: 60 tablet; Refill: 5  -     lisinopril 10 MG tablet; Take 1 tablet (10 mg total) by mouth once daily.  Dispense: 90 tablet; Refill: 3  -     LORazepam (ATIVAN) 1 MG tablet; 1/2 po bid or 1 po q d prn pain  Dispense: 30 tablet; Refill: 1  -     sertraline (ZOLOFT) 25 MG tablet; Take 1 tablet (25 mg total) by mouth once daily.  Dispense: 30 tablet; Refill: 5  -     betamethasone acetate-betamethasone sodium phosphate injection 12 mg; Inject 2 mLs (12 mg total) into the muscle one time.  -      methylPREDNISolone (MEDROL DOSEPACK) 4 mg tablet; use as directed  Dispense: 1 Package; Refill: 0  -     traMADol (ULTRAM) 50 mg tablet; Take 1 tablet (50 mg total) by mouth every 6 (six) hours as needed.  Dispense: 30 tablet; Refill: 0      Moist heat/range of motion/Theragesic  Celestone, Medrol then back to diclofenac 75 b.i.d.   Needs mammogram and Pap smear  Ultram for brakethur pain  Zithromax for sinus with celestone and medrol and phenergan with codiene   Ditropan for urinary incont   Doing fairly well with depression with Zoloft takes Ativan once in a blue moon for anxiety  Urinary incontinence trial of Ditropan

## 2019-03-18 ENCOUNTER — OFFICE VISIT (OUTPATIENT)
Dept: PRIMARY CARE CLINIC | Facility: CLINIC | Age: 64
End: 2019-03-18
Payer: COMMERCIAL

## 2019-03-18 VITALS
RESPIRATION RATE: 18 BRPM | DIASTOLIC BLOOD PRESSURE: 87 MMHG | HEART RATE: 87 BPM | WEIGHT: 220 LBS | OXYGEN SATURATION: 95 % | BODY MASS INDEX: 38.98 KG/M2 | SYSTOLIC BLOOD PRESSURE: 134 MMHG | TEMPERATURE: 99 F | HEIGHT: 63 IN

## 2019-03-18 DIAGNOSIS — M50.30 DDD (DEGENERATIVE DISC DISEASE), CERVICAL: ICD-10-CM

## 2019-03-18 DIAGNOSIS — Z11.59 ENCOUNTER FOR HEPATITIS C SCREENING TEST FOR LOW RISK PATIENT: ICD-10-CM

## 2019-03-18 DIAGNOSIS — J32.9 SINUSITIS, UNSPECIFIED CHRONICITY, UNSPECIFIED LOCATION: Primary | ICD-10-CM

## 2019-03-18 DIAGNOSIS — R45.4 ANGER: ICD-10-CM

## 2019-03-18 DIAGNOSIS — J40 BRONCHITIS: ICD-10-CM

## 2019-03-18 DIAGNOSIS — L40.9 PSORIASIS: ICD-10-CM

## 2019-03-18 DIAGNOSIS — J98.01 BRONCHOSPASM: ICD-10-CM

## 2019-03-18 DIAGNOSIS — I10 HYPERTENSION, UNSPECIFIED TYPE: ICD-10-CM

## 2019-03-18 DIAGNOSIS — R32 URINARY INCONTINENCE, UNSPECIFIED TYPE: ICD-10-CM

## 2019-03-18 DIAGNOSIS — R73.03 BORDERLINE DIABETES: ICD-10-CM

## 2019-03-18 DIAGNOSIS — E78.49 OTHER HYPERLIPIDEMIA: ICD-10-CM

## 2019-03-18 DIAGNOSIS — E66.3 OVERWEIGHT: ICD-10-CM

## 2019-03-18 PROCEDURE — 96372 THER/PROPH/DIAG INJ SC/IM: CPT | Mod: S$GLB,,, | Performed by: FAMILY MEDICINE

## 2019-03-18 PROCEDURE — 99214 OFFICE O/P EST MOD 30 MIN: CPT | Mod: 25,S$GLB,, | Performed by: FAMILY MEDICINE

## 2019-03-18 PROCEDURE — 99214 PR OFFICE/OUTPT VISIT, EST, LEVL IV, 30-39 MIN: ICD-10-PCS | Mod: 25,S$GLB,, | Performed by: FAMILY MEDICINE

## 2019-03-18 PROCEDURE — 94640 PR INHAL RX, AIRWAY OBST/DX SPUTUM INDUCT: ICD-10-PCS | Mod: 59,S$GLB,, | Performed by: FAMILY MEDICINE

## 2019-03-18 PROCEDURE — 96372 PR INJECTION,THERAP/PROPH/DIAG2ST, IM OR SUBCUT: ICD-10-PCS | Mod: S$GLB,,, | Performed by: FAMILY MEDICINE

## 2019-03-18 PROCEDURE — 94640 AIRWAY INHALATION TREATMENT: CPT | Mod: 59,S$GLB,, | Performed by: FAMILY MEDICINE

## 2019-03-18 PROCEDURE — 99999 PR PBB SHADOW E&M-EST. PATIENT-LVL IV: CPT | Mod: PBBFAC,,, | Performed by: FAMILY MEDICINE

## 2019-03-18 PROCEDURE — 99999 PR PBB SHADOW E&M-EST. PATIENT-LVL IV: ICD-10-PCS | Mod: PBBFAC,,, | Performed by: FAMILY MEDICINE

## 2019-03-18 RX ORDER — PROMETHAZINE HYDROCHLORIDE AND CODEINE PHOSPHATE 6.25; 1 MG/5ML; MG/5ML
5 SOLUTION ORAL EVERY 6 HOURS PRN
Qty: 180 ML | Refills: 0 | Status: SHIPPED | OUTPATIENT
Start: 2019-03-18 | End: 2019-07-25 | Stop reason: ALTCHOICE

## 2019-03-18 RX ORDER — METHYLPREDNISOLONE 4 MG/1
TABLET ORAL
Qty: 1 PACKAGE | Refills: 0 | Status: SHIPPED | OUTPATIENT
Start: 2019-03-18 | End: 2019-07-25 | Stop reason: ALTCHOICE

## 2019-03-18 RX ORDER — TRIAMCINOLONE ACETONIDE 40 MG/ML
40 INJECTION, SUSPENSION INTRA-ARTICULAR; INTRAMUSCULAR
Status: COMPLETED | OUTPATIENT
Start: 2019-03-18 | End: 2019-03-18

## 2019-03-18 RX ORDER — LEVOFLOXACIN 500 MG/1
500 TABLET, FILM COATED ORAL DAILY
Qty: 10 TABLET | Refills: 0 | Status: SHIPPED | OUTPATIENT
Start: 2019-03-18 | End: 2019-03-28

## 2019-03-18 RX ORDER — LINCOMYCIN HYDROCHLORIDE 300 MG/ML
600 INJECTION, SOLUTION INTRAMUSCULAR; INTRAVENOUS; SUBCONJUNCTIVAL
Status: COMPLETED | OUTPATIENT
Start: 2019-03-18 | End: 2019-03-18

## 2019-03-18 RX ORDER — IPRATROPIUM BROMIDE AND ALBUTEROL SULFATE 2.5; .5 MG/3ML; MG/3ML
3 SOLUTION RESPIRATORY (INHALATION)
Status: COMPLETED | OUTPATIENT
Start: 2019-03-18 | End: 2019-03-18

## 2019-03-18 RX ADMIN — LINCOMYCIN HYDROCHLORIDE 600 MG: 300 INJECTION, SOLUTION INTRAMUSCULAR; INTRAVENOUS; SUBCONJUNCTIVAL at 01:03

## 2019-03-18 RX ADMIN — IPRATROPIUM BROMIDE AND ALBUTEROL SULFATE 3 ML: 2.5; .5 SOLUTION RESPIRATORY (INHALATION) at 01:03

## 2019-03-18 RX ADMIN — TRIAMCINOLONE ACETONIDE 40 MG: 40 INJECTION, SUSPENSION INTRA-ARTICULAR; INTRAMUSCULAR at 01:03

## 2019-03-18 NOTE — PROGRESS NOTES
Subjective:       Patient ID: Magdalena Acevedo is a 64 y.o. female.    Chief Complaint: Cough (congestion wheezing )    HPI:  64-year-old white female in for cold-cough congestion wheezing x1 week---+low-grade fever, +runny nose, no sore throat, +cough, +phlegm-green--copious, +wheeze no history pneumonia TB +asthma history of bronchitis no smoking   ROS:  Skin: + psoriasis--okay right now usually on the ankles and feet legs, no eczema, skin cancer  HEENT: No headache,  ocular pain, blurred vision, diplopia, epistaxis, +hoarseness + change in voice,  No thyroid trouble  Lung: No pneumonia, asthma, Tb, wheezing, SOB, no smoking history of bronchitis  Heart: No chest pain, ankle edema, palpitations, MI, mendy murmur, +hypertension,+  Hyperlipidemia--has arm cuff at home but not taking it very often--no stent bypass arrhythmia  Abdomen: No nausea, vomiting, diarrhea, constipation, ulcers, hepatitis, gallbladder disease, melena, hematochezia, hematemesis   : no UTI, renal disease, stones + urinary incontinence with coughing  GYN last mammogram over 20 years Pap smear over 20 years  MS: no fractures, O/A, lupus, rheumatoid, gout + DDD cervical spine OK now as long as on diclofenaci  Neuro: No dizziness, LOC, seizures   No diabetes, no anemia, no anxiety, no depression upset gets angry patient is on Zoloft but not using lorazepam or Ativan  [place], 1 child, unemployed,, grandson stays with patient in every other week great grand children stay with patient    Objective:   Physical Exam:  General: Well nourished, well developed, no acute distress + overweight  Skin: + Dry scaly skin especially on the legs scalp arms and back-psoriasis  HEENT: Eyes PERRLA, EOM intact, nose clear discharge, throat erythema +1/ 4 ears TMs clear  NECK: Supple, no bruits, No JVD, no nodes  Lungs: Clear, no rales, rhonchi, wheezing + coarse cough + wheezing slight rales at the bases bilaterally-mi clear with coughing  Heart: Regular rate  and rhythm, no murmurs, gallops, or rubs  Abdomen: flat, bowel sounds positive, no tenderness, or organomegaly  MS: Range of motion and muscle strength intact  Neuro: Alert, CN intact, oriented X 3  Extremities: No cyanosis, clubbing, or edema         Assessment:       1. Sinusitis, unspecified chronicity, unspecified location    2. Bronchitis    3. Bronchospasm    4. Hypertension, unspecified type    5. DDD (degenerative disc disease), cervical    6. Anger    7. Psoriasis    8. Other hyperlipidemia    9. Urinary incontinence, unspecified type    10. Overweight    11. Borderline diabetes        Plan:       Sinusitis, unspecified chronicity, unspecified location    Bronchitis    Bronchospasm    Hypertension, unspecified type    DDD (degenerative disc disease), cervical    Anger    Psoriasis    Other hyperlipidemia    Urinary incontinence, unspecified type    Overweight    Borderline diabetes      sinusitis/bronchitis/bronchospasm/early pneumonitis--length of/Kenalog/Levaquin/Medrol/Phenergan with codeine/albuterol nebulizer/Symbicort or Breo are Advair   Chest x-ray knee now rule out pneumonia  DuoNeb nebulizer treatment now  If wheezing shortness of breath get worse go to the emergency room for possible admission treatment with Zofran  Once well -- Needs routine labs 2 weeks after office steroids CBCs CMP lipids T4 TSH hepatitis CU/  A EKG  Needs mammogram and Pap smear--does not want this time  Needs colonoscopy tetanus shot once well  Doing fairly well with depression with Zoloft takes Ativan once in a blue moon for anxiety

## 2019-03-18 NOTE — PROGRESS NOTES
2 patient identifiers used, Lincocin 600mg IM administered to right hip using aseptic technique, no bleeding to site, tolerated well, bandage applied. Kenalog 40mg IM administered to left hip using aseptic techinque, no bleeding to site, tolerated well, bandage applied. Duoneb 1 vial via nebulzer completed, auscultated expiratory wheezing to all lung fields, SpO2 96%, results reported to Dr William.

## 2019-03-22 RX ORDER — ALBUTEROL SULFATE 0.63 MG/3ML
0.63 SOLUTION RESPIRATORY (INHALATION) EVERY 6 HOURS PRN
Qty: 1 BOX | Refills: 0 | Status: SHIPPED | OUTPATIENT
Start: 2019-03-22 | End: 2019-10-22 | Stop reason: SDUPTHER

## 2019-04-22 NOTE — TELEPHONE ENCOUNTER
----- Message from Berta Prescott sent at 4/22/2019 10:46 AM CDT -----  Contact: Patient  Type:  RX Refill Request     Who Called:  Magdalena, patient  Refill or New Rx:  Refill  RX Name and Strength:  diclofenac (VOLTAREN) 75 MG EC tablet  How is the patient currently taking it? (ex. 1XDay):  Take 1 tablet (75 mg total) by mouth 2 (two) times daily  Is this a 30 day or 90 day RX:  30  Preferred Pharmacy with phone number:    [id number] - [facility] [address]  [address] [facility] [address]  Phone: [phone] Fax: [phone]  Local or Mail Order:  Local  Ordering Provider:  Dr Stewart Kelly Call Back Number:  [phone]  Additional Information:  Please see second request.    Type:  RX Refill Request    Refill or New Rx:  Refill  RX Name and Strength:  lisinopril 10 MG tablet  How is the patient currently taking it? (ex. 1XDay):  Take 1 tablet (10 mg total) by mouth once daily.  Is this a 30 day or 90 day RX:  90  Additional Information:  Please see third request.    Type:  RX Refill Request    Refill or New Rx:  Refill  RX Name and Strength:  amLODIPine (NORVASC) 5 MG tablet  How is the patient currently taking it? (ex. 1XDay):  Take 1 tablet (5 mg total) by mouth once daily  Is this a 30 day or 90 day RX:  90  Additional Information:  Please see fourth request.    Type:  RX Refill Request    Refill or New Rx:  Refill  RX Name and Strength:  sertraline (ZOLOFT) 25 MG tablet  How is the patient currently taking it? (ex. 1XDay):   Take 1 tablet (25 mg total) by mouth once daily  Is this a 30 day or 90 day RX:  320  Additional Information:  Please advise. Thanks.

## 2019-04-23 NOTE — TELEPHONE ENCOUNTER
----- Message from Alec Pantoja sent at 4/23/2019  8:58 AM CDT -----  Type:  RX Refill Request    Who Called:  Patient  Refill or New Rx:  Refill  RX Name and Strength:  atorvastatin (LIPITOR) 10 MG tablet       How is the patient currently taking it? (ex. 1XDay):  1XDay  Is this a 30 day or 90 day RX:  90  Preferred Pharmacy with phone number:    [id number] [address] [address]  Phone: [phone] Fax: [phone]      Local or Mail Order:  Local   Ordering Provider:  Stewart Kelly Call Back Number:  [phone]  Additional Information:

## 2019-04-24 RX ORDER — LISINOPRIL 10 MG/1
10 TABLET ORAL DAILY
Qty: 90 TABLET | Refills: 3 | Status: SHIPPED | OUTPATIENT
Start: 2019-04-24 | End: 2019-07-25 | Stop reason: SDUPTHER

## 2019-04-24 RX ORDER — ATORVASTATIN CALCIUM 10 MG/1
10 TABLET, FILM COATED ORAL NIGHTLY
Qty: 90 TABLET | Refills: 0 | Status: SHIPPED | OUTPATIENT
Start: 2019-04-24 | End: 2019-07-25 | Stop reason: SDUPTHER

## 2019-04-24 RX ORDER — DICLOFENAC SODIUM 75 MG/1
75 TABLET, DELAYED RELEASE ORAL 2 TIMES DAILY
Qty: 60 TABLET | Refills: 2 | Status: SHIPPED | OUTPATIENT
Start: 2019-04-24 | End: 2019-07-25 | Stop reason: SDUPTHER

## 2019-04-24 RX ORDER — SERTRALINE HYDROCHLORIDE 25 MG/1
25 TABLET, FILM COATED ORAL DAILY
Qty: 30 TABLET | Refills: 2 | Status: SHIPPED | OUTPATIENT
Start: 2019-04-24 | End: 2019-07-25 | Stop reason: SDUPTHER

## 2019-04-24 RX ORDER — AMLODIPINE BESYLATE 5 MG/1
5 TABLET ORAL DAILY
Qty: 90 TABLET | Refills: 0 | Status: SHIPPED | OUTPATIENT
Start: 2019-04-24 | End: 2019-07-25 | Stop reason: SDUPTHER

## 2019-04-24 NOTE — TELEPHONE ENCOUNTER
Influenza (Flu) Vaccine: Care Instructions  Your Care Instructions  Influenza (flu) is an infection in the lungs and breathing passages. It is caused by the influenza virus. There are different strains, or types, of the flu virus every year. The flu comes on quickly. It can cause a cough, stuffy nose, fever, chills, tiredness, and aches and pains. These symptoms may last up to 10 days. The flu can make you feel very sick, but most of the time it doesn't lead to other problems. But it can cause serious problems in people who are older or who have a long-term illness, such as heart disease or diabetes. You can help prevent the flu by getting a flu vaccine every year, as soon as it is available. You cannot get the flu from the vaccine. The vaccine prevents most cases of the flu. But even when the vaccine doesn't prevent the flu, it can make symptoms less severe and reduce the chance of problems from the flu. Sometimes, young children and people who have an immune system problem may have a slight fever or muscle aches or pains 6 to 12 hours after getting the shot. These symptoms usually last 1 or 2 days. Follow-up care is a key part of your treatment and safety. Be sure to make and go to all appointments, and call your doctor if you are having problems. It's also a good idea to know your test results and keep a list of the medicines you take. Who should get the flu vaccine? Everyone age 7 months or older should get a flu vaccine each year. It lowers the chance of getting and spreading the flu. The vaccine is very important for people who are at high risk for getting other health problems from the flu. This includes:  · Anyone 48years of age or older. · People who live in a long-term care center, such as a nursing home. · All children 6 months through 25years of age. · Adults and children 6 months and older who have long-term heart or lung problems, such as asthma.   · Adults and children 6 months and older Patient with hyperlipidemia borderline diabetes Needs lab q.6 months last lab May 2018 almost 1 year ago needs CBCs CMP lipid hemoglobin A1c stool guaiac UA chest x-ray EKG next month see me 2 weeks later OK 3 months refills give time to come and get seen and get lab   who needed medical care or were in a hospital during the past year because of diabetes, chronic kidney disease, or a weak immune system (including HIV or AIDS). · Women who will be pregnant during the flu season. · People who have any condition that can make it hard to breathe or swallow (such as a brain injury or muscle disorders). · People who can give the flu to others who are at high risk for problems from the flu. This includes all health care workers and close contacts of people age 72 or older. Who should not get the flu vaccine? The person who gives the vaccine may tell you not to get it if you:  · Have a severe allergy to eggs or any part of the vaccine. · Have had a severe reaction to a flu vaccine in the past.  · Have had Guillain-Barré syndrome (GBS). · Are sick with a fever. (Get the vaccine when symptoms are gone.)  How can you care for yourself at home? · If you or your child has a sore arm or a slight fever after the shot, take an over-the-counter pain medicine, such as acetaminophen (Tylenol) or ibuprofen (Advil, Motrin). Read and follow all instructions on the label. Do not give aspirin to anyone younger than 20. It has been linked to Reye syndrome, a serious illness. · Do not take two or more pain medicines at the same time unless the doctor told you to. Many pain medicines have acetaminophen, which is Tylenol. Too much acetaminophen (Tylenol) can be harmful. When should you call for help? Call 911 anytime you think you may need emergency care. For example, call if after getting the flu vaccine:  · You have symptoms of a severe reaction to the flu vaccine. Symptoms of a severe reaction may include:  ¨ Severe difficulty breathing. ¨ Sudden raised, red areas (hives) all over your body. ¨ Severe lightheadedness. Call your doctor now or seek immediate medical care if after getting the flu vaccine:  · You think you are having a reaction to the flu vaccine, such as a new fever.   Watch closely for changes in your health, and be sure to contact your doctor if you have any problems. Where can you learn more? Go to http://brittany-reyna.info/. Enter D602 in the search box to learn more about \"Influenza (Flu) Vaccine: Care Instructions. \"  Current as of: August 1, 2016  Content Version: 11.3  © 6692-1043 [employer]. Care instructions adapted under license by [employer] (which disclaims liability or warranty for this information). If you have questions about a medical condition or this instruction, always ask your healthcare professional. [address] any warranty or liability for your use of this information. If you have any questions regarding [employer], you may call [employer] support at [phone].

## 2019-05-31 DIAGNOSIS — Z12.11 COLON CANCER SCREENING: ICD-10-CM

## 2019-05-31 DIAGNOSIS — Z12.39 BREAST CANCER SCREENING: ICD-10-CM

## 2019-07-25 ENCOUNTER — OFFICE VISIT (OUTPATIENT)
Dept: PRIMARY CARE CLINIC | Facility: CLINIC | Age: 64
End: 2019-07-25
Payer: COMMERCIAL

## 2019-07-25 VITALS
WEIGHT: 227 LBS | TEMPERATURE: 97 F | DIASTOLIC BLOOD PRESSURE: 83 MMHG | HEIGHT: 63 IN | HEART RATE: 85 BPM | RESPIRATION RATE: 19 BRPM | BODY MASS INDEX: 40.22 KG/M2 | SYSTOLIC BLOOD PRESSURE: 140 MMHG | OXYGEN SATURATION: 92 %

## 2019-07-25 DIAGNOSIS — M50.30 DDD (DEGENERATIVE DISC DISEASE), CERVICAL: ICD-10-CM

## 2019-07-25 DIAGNOSIS — J40 BRONCHITIS: ICD-10-CM

## 2019-07-25 DIAGNOSIS — L40.9 PSORIASIS: ICD-10-CM

## 2019-07-25 DIAGNOSIS — G89.29 CHRONIC INTRACTABLE HEADACHE, UNSPECIFIED HEADACHE TYPE: ICD-10-CM

## 2019-07-25 DIAGNOSIS — R73.03 BORDERLINE DIABETES: ICD-10-CM

## 2019-07-25 DIAGNOSIS — I10 HYPERTENSION, UNSPECIFIED TYPE: ICD-10-CM

## 2019-07-25 DIAGNOSIS — E66.3 OVERWEIGHT: ICD-10-CM

## 2019-07-25 DIAGNOSIS — S46.912A STRAIN OF LEFT SHOULDER, INITIAL ENCOUNTER: Primary | ICD-10-CM

## 2019-07-25 DIAGNOSIS — E78.49 OTHER HYPERLIPIDEMIA: ICD-10-CM

## 2019-07-25 DIAGNOSIS — M62.9 MUSCULOSKELETAL DISORDER INVOLVING UPPER TRAPEZIUS MUSCLE: ICD-10-CM

## 2019-07-25 DIAGNOSIS — R51.9 CHRONIC INTRACTABLE HEADACHE, UNSPECIFIED HEADACHE TYPE: ICD-10-CM

## 2019-07-25 DIAGNOSIS — R32 URINARY INCONTINENCE, UNSPECIFIED TYPE: ICD-10-CM

## 2019-07-25 PROCEDURE — 90714 TD VACC NO PRESV 7 YRS+ IM: CPT | Mod: S$GLB,,, | Performed by: FAMILY MEDICINE

## 2019-07-25 PROCEDURE — 90471 IMMUNIZATION ADMIN: CPT | Mod: S$GLB,,, | Performed by: FAMILY MEDICINE

## 2019-07-25 PROCEDURE — 99999 PR PBB SHADOW E&M-EST. PATIENT-LVL IV: ICD-10-PCS | Mod: PBBFAC,,, | Performed by: FAMILY MEDICINE

## 2019-07-25 PROCEDURE — 96372 THER/PROPH/DIAG INJ SC/IM: CPT | Mod: 59,S$GLB,, | Performed by: FAMILY MEDICINE

## 2019-07-25 PROCEDURE — 90471 TD VACCINE GREATER THAN OR EQUAL TO 7YO PRESERVATIVE FREE IM: ICD-10-PCS | Mod: S$GLB,,, | Performed by: FAMILY MEDICINE

## 2019-07-25 PROCEDURE — 90714 TD VACCINE GREATER THAN OR EQUAL TO 7YO PRESERVATIVE FREE IM: ICD-10-PCS | Mod: S$GLB,,, | Performed by: FAMILY MEDICINE

## 2019-07-25 PROCEDURE — 99999 PR PBB SHADOW E&M-EST. PATIENT-LVL IV: CPT | Mod: PBBFAC,,, | Performed by: FAMILY MEDICINE

## 2019-07-25 PROCEDURE — 99213 PR OFFICE/OUTPT VISIT, EST, LEVL III, 20-29 MIN: ICD-10-PCS | Mod: 25,S$GLB,, | Performed by: FAMILY MEDICINE

## 2019-07-25 PROCEDURE — 99213 OFFICE O/P EST LOW 20 MIN: CPT | Mod: 25,S$GLB,, | Performed by: FAMILY MEDICINE

## 2019-07-25 PROCEDURE — 96372 PR INJECTION,THERAP/PROPH/DIAG2ST, IM OR SUBCUT: ICD-10-PCS | Mod: 59,S$GLB,, | Performed by: FAMILY MEDICINE

## 2019-07-25 RX ORDER — SERTRALINE HYDROCHLORIDE 25 MG/1
25 TABLET, FILM COATED ORAL DAILY
Qty: 90 TABLET | Refills: 1 | Status: SHIPPED | OUTPATIENT
Start: 2019-07-25 | End: 2020-02-04 | Stop reason: SDUPTHER

## 2019-07-25 RX ORDER — DICLOFENAC SODIUM 75 MG/1
75 TABLET, DELAYED RELEASE ORAL 2 TIMES DAILY
Qty: 180 TABLET | Refills: 1 | Status: SHIPPED | OUTPATIENT
Start: 2019-07-25 | End: 2020-02-04 | Stop reason: SDUPTHER

## 2019-07-25 RX ORDER — AMLODIPINE BESYLATE 5 MG/1
5 TABLET ORAL DAILY
Qty: 90 TABLET | Refills: 1 | Status: SHIPPED | OUTPATIENT
Start: 2019-07-25 | End: 2020-02-04 | Stop reason: SDUPTHER

## 2019-07-25 RX ORDER — METHYLPREDNISOLONE 4 MG/1
TABLET ORAL
Qty: 1 PACKAGE | Refills: 0 | Status: SHIPPED | OUTPATIENT
Start: 2019-07-25 | End: 2019-11-18

## 2019-07-25 RX ORDER — ATORVASTATIN CALCIUM 10 MG/1
10 TABLET, FILM COATED ORAL NIGHTLY
Qty: 90 TABLET | Refills: 1 | Status: SHIPPED | OUTPATIENT
Start: 2019-07-25 | End: 2020-02-04 | Stop reason: SDUPTHER

## 2019-07-25 RX ORDER — LISINOPRIL 10 MG/1
10 TABLET ORAL DAILY
Qty: 90 TABLET | Refills: 1 | Status: SHIPPED | OUTPATIENT
Start: 2019-07-25 | End: 2020-02-04 | Stop reason: SDUPTHER

## 2019-07-25 RX ORDER — TRIAMCINOLONE ACETONIDE 40 MG/ML
40 INJECTION, SUSPENSION INTRA-ARTICULAR; INTRAMUSCULAR ONCE
Status: COMPLETED | OUTPATIENT
Start: 2019-07-25 | End: 2019-07-25

## 2019-07-25 RX ORDER — HYDROCODONE BITARTRATE AND ACETAMINOPHEN 5; 325 MG/1; MG/1
1 TABLET ORAL EVERY 6 HOURS PRN
Qty: 30 TABLET | Refills: 0 | Status: ON HOLD | OUTPATIENT
Start: 2019-07-25 | End: 2020-03-10 | Stop reason: HOSPADM

## 2019-07-25 RX ORDER — IBUPROFEN 600 MG/1
TABLET ORAL
Qty: 100 TABLET | Refills: 5 | Status: SHIPPED | OUTPATIENT
Start: 2019-07-25 | End: 2019-11-18 | Stop reason: SDUPTHER

## 2019-07-25 RX ADMIN — TRIAMCINOLONE ACETONIDE 40 MG: 40 INJECTION, SUSPENSION INTRA-ARTICULAR; INTRAMUSCULAR at 11:07

## 2019-07-25 NOTE — PROGRESS NOTES
Subjective:       Patient ID: Magdalena Acevedo is a 64 y.o. female.    Chief Complaint: Shoulder Pain (left) and Medication Refill    HPI:  64-year-old white female in for left shoulder pain and refills       Left shoulder pain started 3 weeks ago--no trauma--no excessive activity--no prior shoulder problems--no lupus rheumatoid gout--no fracture--osteoarthritis.  Shoulder hurts all the time--pain we gets the shoulder to about 90° able raise it totally overhead but extremely painful, pain with rotation--pain at the left side of the neck to the upper trapezius to the left scapula to the deltoid area.      ROS:  Skin: + psoriasis--okay right now usually on the ankles and feet legs, no eczema, skin cancer  HEENT: + headache, no  ocular pain, blurred vision, diplopia, epistaxis,hoarseness change in voice,  No thyroid trouble  Lung: No pneumonia, asthma, Tb, wheezing, SOB, no smoking history of bronchitis/COPD  Heart: No chest pain, ankle edema, palpitations, MI, mendy murmur, +hypertension blood pressure 140/83,+  Hyperlipidemia---no stent bypass arrhythmia  Abdomen: No nausea, vomiting, diarrhea, constipation, ulcers, hepatitis, gallbladder disease, melena, hematochezia, hematemesis   : no UTI, renal disease, stones + urinary incontinence with coughing  GYN last mammogram over 20 years Pap smear over 20 years  MS: no fractures, O/A, lupus, rheumatoid, gout left shoulder pain see history of present illness + DDD cervical spine OK   Neuro: No dizziness, LOC, seizures   No diabetes, no anemia, no anxiety, no depression upset gets angry patient is on Zoloft but not using lorazepam or Ativan  [place], 1 child, unemployed,, grandson stays with patient in every other week great grand children stay with patient    Objective:   Physical Exam:  General: Well nourished, well developed, no acute distress + overweight  Skin: + Dry scaly skin especially on the legs scalp arms and back-psoriasis  HEENT: Eyes PERRLA, EOM intact,  nose clear , throat nonerythema ears TMs clear  NECK: Supple, no bruits, No JVD, no nodes  Lungs: Clear, no rales, rhonchi, wheezing    Heart: Regular rate and rhythm, no murmurs, gallops, or rubs  Abdomen: flat, bowel sounds positive, no tenderness, or organomegaly  MS:  Tenderness especially left acromioclavicular joint and especially the left upper trapezius just distal to the cervical spine in the upper trapezius area does involve some of the upper scapular area and deltoid areas but mainly the left AC joint and upper trapezius extremely tender on palpation pain with abduction abduction of the arm pain with raising arm to 90° extremely painful raising arm overhead from 90°.  Pain with opposition forearm flexion extension but in the shoulder area hand grasp intact opposition thumb index thumb 5th digit-    Neuro: Alert, CN intact, oriented X 3  Extremities: No cyanosis, clubbing, or edema         Assessment:       1. Strain of left shoulder, initial encounter    2. Musculoskeletal disorder involving upper trapezius muscle    3. Psoriasis    4. Bronchitis    5. Hypertension, unspecified type    6. Other hyperlipidemia    7. Urinary incontinence, unspecified type    8. Overweight    9. Borderline diabetes    10. Chronic intractable headache, unspecified headache type    11. DDD (degenerative disc disease), cervical        Plan:       Strain of left shoulder, initial encounter  -     Ambulatory consult to Physical Therapy  -     X-Ray Shoulder Trauma 3 view Left; Future; Expected date: 07/25/2019    Musculoskeletal disorder involving upper trapezius muscle  -     Ambulatory consult to Physical Therapy  -     X-Ray Shoulder Trauma 3 view Left; Future; Expected date: 07/25/2019    Psoriasis    Bronchitis    Hypertension, unspecified type    Other hyperlipidemia    Urinary incontinence, unspecified type    Overweight    Borderline diabetes    Chronic intractable headache, unspecified headache type    DDD (degenerative  disc disease), cervical    Other orders  -     amLODIPine (NORVASC) 5 MG tablet; Take 1 tablet (5 mg total) by mouth once daily.  Dispense: 90 tablet; Refill: 1  -     atorvastatin (LIPITOR) 10 MG tablet; Take 1 tablet (10 mg total) by mouth every evening.  Dispense: 90 tablet; Refill: 1  -     diclofenac (VOLTAREN) 75 MG EC tablet; Take 1 tablet (75 mg total) by mouth 2 (two) times daily.  Dispense: 180 tablet; Refill: 1  -     lisinopril 10 MG tablet; Take 1 tablet (10 mg total) by mouth once daily.  Dispense: 90 tablet; Refill: 1  -     sertraline (ZOLOFT) 25 MG tablet; Take 1 tablet (25 mg total) by mouth once daily.  Dispense: 90 tablet; Refill: 1  -     (In Office Administered) Td Vaccine - Preservative Free  -     triamcinolone acetonide injection 40 mg  -     methylPREDNISolone (MEDROL DOSEPACK) 4 mg tablet; use as directed  Dispense: 1 Package; Refill: 0  -     ibuprofen (ADVIL,MOTRIN) 600 MG tablet; After Medrol Dosepak complete start ibuprofen 600 mg 1 p.o. Q 6-8 hours p.r.n. pain no other NSAIDs be sure on no blood thinners  Dispense: 100 tablet; Refill: 5  -     HYDROcodone-acetaminophen (NORCO) 5-325 mg per tablet; Take 1 tablet by mouth every 6 (six) hours as needed.  Dispense: 30 tablet; Refill: 0      left shoulder pain--especially sore in the left acromioclavicular joint left upper trapezius--but also extends to the supraspinatus area of the scapula and deltoid area of the left upper arm---Kenalog/Medrol--ibuprofen/Flexeril/Norco  Physical therapy--ultrasonic treatment/Moist he/range of motion exercise  Moist heat/theragesic or Tiger balm/range of motion exercise  Once well patient needs exercise decrease weight  Once well -- Needs routine labs 2 weeks after office steroids CBCs CMP lipids T4 TSH hepatitis C U/a stool guaiac  Health maintenance needs hepatitis C/tetanus/mammogram/Pap smea/colonoscopy

## 2019-07-25 NOTE — PROGRESS NOTES
Verified pt ID using name and . Verified allergies. Administered TD in right deltoid and 40 mg kenalog in left VG per physician order using aseptic technique. Aspirated and no blood return noted. Pt tolerated well with no adverse reactions noted.

## 2019-10-22 RX ORDER — ALBUTEROL SULFATE 0.63 MG/3ML
0.63 SOLUTION RESPIRATORY (INHALATION) EVERY 6 HOURS PRN
Qty: 1 BOX | Refills: 2 | Status: SHIPPED | OUTPATIENT
Start: 2019-10-22 | End: 2020-02-04 | Stop reason: SDUPTHER

## 2019-10-22 NOTE — TELEPHONE ENCOUNTER
----- Message from Berta Prescott sent at 10/22/2019  8:35 AM CDT -----  Contact: Patient  Type:  RX Refill Request    Who Called:  Magdalena, patient  Refill or New Rx:  Refill  RX Name and Strength:  albuterol (PROAIR HFA) 90 mcg/actuation inhaler   How is the patient currently taking it? (ex. 1XDay):  Inhale 2 puffs into the lungs every 6 (six) hours as needed for Wheezing.  Is this a 30 day or 90 day RX:  ?  Preferred Pharmacy with phone number:    [id number] [address] [address]  Phone: [phone] Fax: [phone]  Local or Mail Order:  Local  Ordering Provider:  Dr Stewart Kelly Call Back Number:  [phone]  Additional Information:  Please advise. Thanks.

## 2019-10-23 NOTE — TELEPHONE ENCOUNTER
Call tell pt last lab May 2018 -- with HLP and borderline DM needs lab q 6 mo Needs lab CBC,CMP,lipid, HgBA1C T4,TSH, U/A if over 2-3 yrs add Chest Xray and EKG see me 2 weekslater

## 2019-10-23 NOTE — TELEPHONE ENCOUNTER
Spoke with patient, requesting a RX refill on her albuterol ProAir 90 mcg inhaler. Looks like prescription for Albuterol nebulizer solution was sent to patient's pharmacy. Will call in inhaler. Patient notified and verbalized understanding.

## 2019-10-23 NOTE — TELEPHONE ENCOUNTER
RX for Albuterol (ProAir) inhaler 90 mcg 2 puffs q 4-6 hours PRN wheezing or SOB disp 1 inhaler with 5 refills called into [facility].

## 2019-10-23 NOTE — TELEPHONE ENCOUNTER
----- Message from Lu Wheeler sent at 10/23/2019 12:09 PM CDT -----  Contact: Patient [phone]  Patient is requesting a call about getting a Rx for her inhaler.    Please call and advise.    Thank You

## 2019-10-24 ENCOUNTER — TELEPHONE (OUTPATIENT)
Dept: PRIMARY CARE CLINIC | Facility: CLINIC | Age: 64
End: 2019-10-24

## 2019-10-24 NOTE — TELEPHONE ENCOUNTER
----- Message from Prem Baires sent at 10/24/2019 12:10 PM CDT -----  Contact: Self [phone]  Patient is calling to follow up medicaiton

## 2019-10-24 NOTE — TELEPHONE ENCOUNTER
Called patient states never received inhaler called pharmacy states now has order off vm and are filling the rx called patient notified states understanding

## 2019-11-18 ENCOUNTER — OFFICE VISIT (OUTPATIENT)
Dept: PRIMARY CARE CLINIC | Facility: CLINIC | Age: 64
End: 2019-11-18
Payer: COMMERCIAL

## 2019-11-18 VITALS
RESPIRATION RATE: 16 BRPM | HEIGHT: 63 IN | OXYGEN SATURATION: 97 % | SYSTOLIC BLOOD PRESSURE: 138 MMHG | TEMPERATURE: 98 F | WEIGHT: 235 LBS | BODY MASS INDEX: 41.64 KG/M2 | DIASTOLIC BLOOD PRESSURE: 86 MMHG | HEART RATE: 89 BPM

## 2019-11-18 DIAGNOSIS — R32 URINARY INCONTINENCE, UNSPECIFIED TYPE: ICD-10-CM

## 2019-11-18 DIAGNOSIS — E66.3 OVERWEIGHT: ICD-10-CM

## 2019-11-18 DIAGNOSIS — E78.49 OTHER HYPERLIPIDEMIA: ICD-10-CM

## 2019-11-18 DIAGNOSIS — S46.912D STRAIN OF LEFT SHOULDER, SUBSEQUENT ENCOUNTER: ICD-10-CM

## 2019-11-18 DIAGNOSIS — I10 HYPERTENSION, UNSPECIFIED TYPE: ICD-10-CM

## 2019-11-18 DIAGNOSIS — R73.03 BORDERLINE DIABETES: ICD-10-CM

## 2019-11-18 DIAGNOSIS — M77.12 LEFT TENNIS ELBOW: ICD-10-CM

## 2019-11-18 DIAGNOSIS — M50.30 DDD (DEGENERATIVE DISC DISEASE), CERVICAL: Primary | ICD-10-CM

## 2019-11-18 DIAGNOSIS — L40.9 PSORIASIS: ICD-10-CM

## 2019-11-18 PROBLEM — S46.912A LEFT SHOULDER STRAIN: Status: ACTIVE | Noted: 2019-11-18

## 2019-11-18 PROCEDURE — 96372 THER/PROPH/DIAG INJ SC/IM: CPT | Mod: S$GLB,,, | Performed by: FAMILY MEDICINE

## 2019-11-18 PROCEDURE — 99213 PR OFFICE/OUTPT VISIT, EST, LEVL III, 20-29 MIN: ICD-10-PCS | Mod: 25,S$GLB,, | Performed by: FAMILY MEDICINE

## 2019-11-18 PROCEDURE — 99999 PR PBB SHADOW E&M-EST. PATIENT-LVL IV: CPT | Mod: PBBFAC,,, | Performed by: FAMILY MEDICINE

## 2019-11-18 PROCEDURE — 96372 PR INJECTION,THERAP/PROPH/DIAG2ST, IM OR SUBCUT: ICD-10-PCS | Mod: S$GLB,,, | Performed by: FAMILY MEDICINE

## 2019-11-18 PROCEDURE — 99999 PR PBB SHADOW E&M-EST. PATIENT-LVL IV: ICD-10-PCS | Mod: PBBFAC,,, | Performed by: FAMILY MEDICINE

## 2019-11-18 PROCEDURE — 99213 OFFICE O/P EST LOW 20 MIN: CPT | Mod: 25,S$GLB,, | Performed by: FAMILY MEDICINE

## 2019-11-18 RX ORDER — CYCLOBENZAPRINE HCL 10 MG
10 TABLET ORAL 3 TIMES DAILY PRN
Qty: 30 TABLET | Refills: 5 | Status: SHIPPED | OUTPATIENT
Start: 2019-11-18 | End: 2019-11-28

## 2019-11-18 RX ORDER — IBUPROFEN 600 MG/1
TABLET ORAL
Qty: 100 TABLET | Refills: 5 | Status: SHIPPED | OUTPATIENT
Start: 2019-11-18 | End: 2020-02-04 | Stop reason: SDUPTHER

## 2019-11-18 RX ORDER — METHYLPREDNISOLONE 4 MG/1
TABLET ORAL
Qty: 1 PACKAGE | Refills: 0 | Status: SHIPPED | OUTPATIENT
Start: 2019-11-18 | End: 2020-02-04

## 2019-11-18 RX ORDER — IBUPROFEN 600 MG/1
600 TABLET ORAL EVERY 6 HOURS PRN
Qty: 100 TABLET | Refills: 5 | Status: SHIPPED | OUTPATIENT
Start: 2019-11-18 | End: 2021-04-05

## 2019-11-18 RX ORDER — TRIAMCINOLONE ACETONIDE 40 MG/ML
40 INJECTION, SUSPENSION INTRA-ARTICULAR; INTRAMUSCULAR ONCE
Status: COMPLETED | OUTPATIENT
Start: 2019-11-18 | End: 2019-11-18

## 2019-11-18 RX ADMIN — TRIAMCINOLONE ACETONIDE 40 MG: 40 INJECTION, SUSPENSION INTRA-ARTICULAR; INTRAMUSCULAR at 02:11

## 2019-11-18 NOTE — PROGRESS NOTES
Verified pt ID using name and . Allergies reviewed with patient. Administered Kenalog 40 mg in right VG per physician order using aseptic technique. Aspirated and no blood return noted. Pt tolerated well with no adverse reactions noted.

## 2019-11-18 NOTE — PROGRESS NOTES
"  Subjective:       Patient ID: Magdalena Acevedo is a 64 y.o. female.    Chief Complaint: Elbow Pain (x4 weeks pt stated she has a "knot" on L elbow that is causing sharp pains)    HPI:  64-year-old female in for left elbow pain--started 1 month ago---, no trauma no excessive activity--pain mainly in the left lateral epicondyle of the elbow--sore all the time--occasionally flares even sitting and watching TV--stabbing pain--like a hot ice pick.  No lupus rheumatoid gout--no fractures--no osteoarthritis.       History of shoulder pain left--forever---never saw orthopedist--never had MRI--waiting until February when patient gets Medicare and [employer].   Last time seen patient was referred to physical therapy for left shoulder but insurance refused to pay.     ROS:  Skin: + psoriasis--okay right now usually on the ankles and feet legs, no eczema, skin cancer  HEENT: +occas  headache, no  ocular pain, blurred vision, diplopia, epistaxis,hoarseness change in voice,  No thyroid trouble  Lung: No pneumonia, asthma, Tb, wheezing, SOB, no smoking history of bronchitis/COPD  Heart: No chest pain, ankle edema, palpitations, MI, mendy murmur, +hypertension blood pressure,+  Hyperlipidemia---no stent bypass arrhythmia  Abdomen: No nausea, vomiting, diarrhea, constipation, ulcers, hepatitis, gallbladder disease, melena, hematochezia, hematemesis   : no UTI, renal disease, stones + urinary incontinence with coughing  GYN last mammogram over 20 years Pap smear over 20 years  MS: no fractures, O/A, lupus, rheumatoid, gout left shoulder pain see history of present illness + DDD cervical spine OK   Neuro: No dizziness, LOC, seizures   No diabetes, no anemia, +anxiety, + depression upset gets angry patient is on Zoloft but not using lorazepam or Ativan  [place], 1 child, unemployed,, grandson stays with patient in every other week great grand children stay with patient    Objective:   Physical Exam:  General: Well nourished, " well developed, no acute distress + overweight  Skin: + Dry scaly skin especially on the legs scalp arms and back-psoriasis  HEENT: Eyes PERRLA, EOM intact, nose clear , throat nonerythema ears TMs clear  NECK: Supple, no bruits, No JVD, no nodes  Lungs: Clear, no rales, rhonchi, wheezing    Heart: Regular rate and rhythm, no murmurs, gallops, or rubs  Abdomen: flat, bowel sounds positive, no tenderness, or organomegaly  MS:  Tenderness especially left acromioclavicular joint and especially the left upper trapezius just distal to the cervical spine in the upper trapezius area does involve some of the upper scapular area and deltoid areas but mainly the left AC joint and upper trapezius extremely tender on palpation pain with abduction abduction of the arm pain with raising arm to 90° extremely painful raising arm overhead from 90°.  Pain with opposition forearm flexion extension but in the shoulder area hand grasp intact opposition thumb index thumb 5th digit-    Neuro: Alert, CN intact, oriented X 3  Extremities: No cyanosis, clubbing, or edema         Assessment:       1. DDD (degenerative disc disease), cervical    2. Left tennis elbow    3. Strain of left shoulder, subsequent encounter    4. Psoriasis    5. Hypertension, unspecified type    6. Other hyperlipidemia    7. Urinary incontinence, unspecified type    8. Borderline diabetes    9. Overweight        Plan:       DDD (degenerative disc disease), cervical    Left tennis elbow  -     X-Ray Elbow Complete 3 view Left; Future; Expected date: 11/18/2019    Strain of left shoulder, subsequent encounter    Psoriasis    Hypertension, unspecified type    Other hyperlipidemia    Urinary incontinence, unspecified type    Borderline diabetes    Overweight    Other orders  -     triamcinolone acetonide injection 40 mg  -     methylPREDNISolone (MEDROL DOSEPACK) 4 mg tablet; use as directed  Dispense: 1 Package; Refill: 0  -     ibuprofen (ADVIL,MOTRIN) 600 MG tablet;  Take 1 tablet (600 mg total) by mouth every 6 (six) hours as needed.  Dispense: 100 tablet; Refill: 5  -     ibuprofen (ADVIL,MOTRIN) 600 MG tablet; After Medrol Dosepak complete start ibuprofen 600 mg 1 p.o. Q 6-8 hours p.r.n. pain no other NSAIDs be sure on no blood thinners  Dispense: 100 tablet; Refill: 5  -     cyclobenzaprine (FLEXERIL) 10 MG tablet; Take 1 tablet (10 mg total) by mouth 3 (three) times daily as needed.  Dispense: 30 tablet; Refill: 5        Left elbow---tennis elbow--lateral epicondylitis--pain most severe with palpation of the lateral epicondyle or supination of the left arm with opposition extremely painful pain also in the left shoulder with abduction abduction raising arm overhead.  Kenalog/Medrol dyes-ibuprofen/Ultram/Flexeril--patient told if not better with these medications could inject the left lateral epicondyle directly but usually need an x-ray 1st if insurance will pay may try an injected anyway  Physical therapy--patient would benefit from physical therapy but insurance will not pay for  Moist heat/theragesic or Tiger balm/range of motion exercise  Once well patient needs exercise decrease weight  Patient waiting to be 65 so can get into a different insurance plan--at that point agrees to do routine lab CBCs CMP lipids T4 TSH stool guaiac UA chest x-ray EKG is physical-get x-ray of the left elbow--can get referrals to physical therapy and see an orthopedist and can see a dermatologist for her psoriasis  Health maintenance needs hepatitis C/tetanus/mammogram/Pap smea/colonoscopy flu shot

## 2019-12-13 ENCOUNTER — PATIENT OUTREACH (OUTPATIENT)
Dept: ADMINISTRATIVE | Facility: OTHER | Age: 64
End: 2019-12-13

## 2020-02-04 ENCOUNTER — OFFICE VISIT (OUTPATIENT)
Dept: PRIMARY CARE CLINIC | Facility: CLINIC | Age: 65
End: 2020-02-04
Payer: MEDICARE

## 2020-02-04 VITALS
TEMPERATURE: 99 F | DIASTOLIC BLOOD PRESSURE: 74 MMHG | WEIGHT: 240.31 LBS | OXYGEN SATURATION: 98 % | BODY MASS INDEX: 42.58 KG/M2 | HEART RATE: 103 BPM | RESPIRATION RATE: 16 BRPM | SYSTOLIC BLOOD PRESSURE: 128 MMHG | HEIGHT: 63 IN

## 2020-02-04 DIAGNOSIS — E78.49 OTHER HYPERLIPIDEMIA: ICD-10-CM

## 2020-02-04 DIAGNOSIS — S46.912D STRAIN OF LEFT SHOULDER, SUBSEQUENT ENCOUNTER: ICD-10-CM

## 2020-02-04 DIAGNOSIS — E66.3 OVERWEIGHT: ICD-10-CM

## 2020-02-04 DIAGNOSIS — L40.9 PSORIASIS: ICD-10-CM

## 2020-02-04 DIAGNOSIS — R73.03 BORDERLINE DIABETES: ICD-10-CM

## 2020-02-04 DIAGNOSIS — R10.12 LEFT UPPER QUADRANT PAIN: ICD-10-CM

## 2020-02-04 DIAGNOSIS — I10 HYPERTENSION, UNSPECIFIED TYPE: Primary | ICD-10-CM

## 2020-02-04 PROCEDURE — 99214 OFFICE O/P EST MOD 30 MIN: CPT | Mod: S$GLB,,, | Performed by: FAMILY MEDICINE

## 2020-02-04 PROCEDURE — 3074F SYST BP LT 130 MM HG: CPT | Mod: CPTII,S$GLB,, | Performed by: FAMILY MEDICINE

## 2020-02-04 PROCEDURE — 3078F DIAST BP <80 MM HG: CPT | Mod: CPTII,S$GLB,, | Performed by: FAMILY MEDICINE

## 2020-02-04 PROCEDURE — 99999 PR PBB SHADOW E&M-EST. PATIENT-LVL III: ICD-10-PCS | Mod: PBBFAC,,, | Performed by: FAMILY MEDICINE

## 2020-02-04 PROCEDURE — 93005 EKG 12-LEAD: ICD-10-PCS | Mod: S$GLB,,, | Performed by: FAMILY MEDICINE

## 2020-02-04 PROCEDURE — 93010 EKG 12-LEAD: ICD-10-PCS | Mod: S$GLB,,, | Performed by: INTERNAL MEDICINE

## 2020-02-04 PROCEDURE — 99214 PR OFFICE/OUTPT VISIT, EST, LEVL IV, 30-39 MIN: ICD-10-PCS | Mod: S$GLB,,, | Performed by: FAMILY MEDICINE

## 2020-02-04 PROCEDURE — 3078F PR MOST RECENT DIASTOLIC BLOOD PRESSURE < 80 MM HG: ICD-10-PCS | Mod: CPTII,S$GLB,, | Performed by: FAMILY MEDICINE

## 2020-02-04 PROCEDURE — 3008F PR BODY MASS INDEX (BMI) DOCUMENTED: ICD-10-PCS | Mod: CPTII,S$GLB,, | Performed by: FAMILY MEDICINE

## 2020-02-04 PROCEDURE — 3008F BODY MASS INDEX DOCD: CPT | Mod: CPTII,S$GLB,, | Performed by: FAMILY MEDICINE

## 2020-02-04 PROCEDURE — 93010 ELECTROCARDIOGRAM REPORT: CPT | Mod: S$GLB,,, | Performed by: INTERNAL MEDICINE

## 2020-02-04 PROCEDURE — 99499 UNLISTED E&M SERVICE: CPT | Mod: S$GLB,,, | Performed by: FAMILY MEDICINE

## 2020-02-04 PROCEDURE — 93005 ELECTROCARDIOGRAM TRACING: CPT | Mod: S$GLB,,, | Performed by: FAMILY MEDICINE

## 2020-02-04 PROCEDURE — 99999 PR PBB SHADOW E&M-EST. PATIENT-LVL III: CPT | Mod: PBBFAC,,, | Performed by: FAMILY MEDICINE

## 2020-02-04 PROCEDURE — 99499 RISK ADDL DX/OHS AUDIT: ICD-10-PCS | Mod: S$GLB,,, | Performed by: FAMILY MEDICINE

## 2020-02-04 PROCEDURE — 3074F PR MOST RECENT SYSTOLIC BLOOD PRESSURE < 130 MM HG: ICD-10-PCS | Mod: CPTII,S$GLB,, | Performed by: FAMILY MEDICINE

## 2020-02-04 RX ORDER — DICLOFENAC SODIUM 75 MG/1
75 TABLET, DELAYED RELEASE ORAL 2 TIMES DAILY
Qty: 180 TABLET | Refills: 1 | Status: SHIPPED | OUTPATIENT
Start: 2020-02-04 | End: 2020-06-04 | Stop reason: SDUPTHER

## 2020-02-04 RX ORDER — AMLODIPINE BESYLATE 5 MG/1
5 TABLET ORAL DAILY
Qty: 90 TABLET | Refills: 1 | Status: SHIPPED | OUTPATIENT
Start: 2020-02-04 | End: 2020-06-04 | Stop reason: SDUPTHER

## 2020-02-04 RX ORDER — LISINOPRIL 10 MG/1
10 TABLET ORAL DAILY
Qty: 90 TABLET | Refills: 1 | Status: SHIPPED | OUTPATIENT
Start: 2020-02-04 | End: 2020-03-19 | Stop reason: SDUPTHER

## 2020-02-04 RX ORDER — SERTRALINE HYDROCHLORIDE 25 MG/1
25 TABLET, FILM COATED ORAL DAILY
Qty: 90 TABLET | Refills: 1 | Status: SHIPPED | OUTPATIENT
Start: 2020-02-04 | End: 2020-06-04 | Stop reason: SDUPTHER

## 2020-02-04 RX ORDER — ATORVASTATIN CALCIUM 10 MG/1
10 TABLET, FILM COATED ORAL NIGHTLY
Qty: 90 TABLET | Refills: 1 | Status: SHIPPED | OUTPATIENT
Start: 2020-02-04 | End: 2020-06-04 | Stop reason: SDUPTHER

## 2020-02-04 RX ORDER — ALBUTEROL SULFATE 0.63 MG/3ML
0.63 SOLUTION RESPIRATORY (INHALATION) EVERY 6 HOURS PRN
Qty: 1 BOX | Refills: 2 | Status: ON HOLD | OUTPATIENT
Start: 2020-02-04 | End: 2020-03-10 | Stop reason: SDUPTHER

## 2020-02-04 NOTE — PROGRESS NOTES
Subjective:       Patient ID: Magdalena Acevedo is a 64 y.o. female.    Chief Complaint: Abdominal Pain (Left side x2 weeks)    HPI:  64-year-old female patient in for medication refills abdominal---pain wears a pain pain in the left upper quadrant--patient caught a coughing spell--felt something pop--feels a knot in the left lower chest wall area between the anterior axillary line in midclavicular line somewhat hard approximately 78 9th rib area.  No nausea vomiting diarrhea constipation---no ulcers hepatitis status post cholecystectomy--no melena hematochezia hematemesis.        Patient was pretty sick a few weeks ago.  No pneumonia asthma TB history of COPD had laryngitis or about 3 weeks.  Patient states feels like it is under her ribs.      Especially sore twist a certain way a knot supple ordered hurts badly.     ROS:  Skin: + psoriasis--okay right now usually on the ankles and feet legs, no eczema, skin cancer  HEENT: +occas  headache, no  ocular pain, blurred vision, diplopia, epistaxis,hoarseness change in voice,  No thyroid trouble  Lung: No pneumonia, asthma, Tb, wheezing, SOB, no smoking history of bronchitis/COPD  Heart: No chest pain, ankle edema, palpitations, MI, mendy murmur, +hypertension blood pressure,+  Hyperlipidemia---no stent bypass arrhythmia  Abdomen: No nausea, vomiting, diarrhea, constipation, ulcers, hepatitis,  melena, hematochezia, hematemesis history cholecystectomy  : no UTI, renal disease, stones + urinary incontinence with coughing  GYN last mammogram over 20 years Pap smear over 20 years  MS: no fractures, O/A, lupus, rheumatoid, gout left shoulder pain see history of present illness + DDD cervical spine OK   Neuro: No dizziness, LOC, seizures   No diabetes, no anemia, +anxiety, + depression upset gets angry patient is on Zoloft but not using lorazepam or Ativan  [place], 1 child, unemployed,, grandson stays with patient in every other week great grand children stay with  patient    Objective:   Physical Exam:  General: Well nourished, well developed, no acute distress + overweight  Skin: + Dry scaly skin especially on the legs scalp arms and back-psoriasis  HEENT: Eyes PERRLA, EOM intact, nose clear , throat nonerythema ears TMs clear  NECK: Supple, no bruits, No JVD, no nodes  Lungs: Clear, no rales, rhonchi, wheezing    Heart: Regular rate and rhythm, no murmurs, gallops, or rubs  Abdomen: flat, bowel sounds positive, no tenderness, or organomegaly tenderness especially in the left costal angle anterior axillary line to mid clavicular line just below the costal angle tender on palpation no organomegaly or rebound  MS:  Tenderness especially left acromioclavicular joint and especially the left upper trapezius --no significant change    Neuro: Alert, CN intact, oriented X 3  Extremities: No cyanosis, clubbing, or edema         Assessment:       1. Hypertension, unspecified type    2. Left upper quadrant pain    3. Other hyperlipidemia    4. Psoriasis    5. Borderline diabetes    6. Overweight    7. Strain of left shoulder, subsequent encounter        Plan:       Hypertension, unspecified type  -     CBC auto differential; Future; Expected date: 02/04/2020  -     Comprehensive metabolic panel; Future; Expected date: 02/04/2020  -     EKG 12-lead; Future  -     Fecal Immunochemical Test (iFOBT); Future; Expected date: 02/04/2020  -     X-Ray Chest PA And Lateral; Future; Expected date: 02/04/2020  -     POCT urine dipstick without microscope  -     T4, free; Future; Expected date: 02/04/2020  -     TSH; Future; Expected date: 02/04/2020    Left upper quadrant pain  -     CT Abdomen Pelvis W Wo Contrast; Future; Expected date: 02/04/2020    Other hyperlipidemia  -     Lipid panel; Future; Expected date: 02/04/2020    Psoriasis    Borderline diabetes  -     Hemoglobin A1c; Future; Expected date: 02/04/2020    Overweight    Strain of left shoulder, subsequent encounter    Other orders  -      diclofenac (VOLTAREN) 75 MG EC tablet; Take 1 tablet (75 mg total) by mouth 2 (two) times daily.  Dispense: 180 tablet; Refill: 1  -     lisinopril 10 MG tablet; Take 1 tablet (10 mg total) by mouth once daily.  Dispense: 90 tablet; Refill: 1  -     amLODIPine (NORVASC) 5 MG tablet; Take 1 tablet (5 mg total) by mouth once daily.  Dispense: 90 tablet; Refill: 1  -     sertraline (ZOLOFT) 25 MG tablet; Take 1 tablet (25 mg total) by mouth once daily.  Dispense: 90 tablet; Refill: 1  -     atorvastatin (LIPITOR) 10 MG tablet; Take 1 tablet (10 mg total) by mouth every evening.  Dispense: 90 tablet; Refill: 1  -     albuterol (ACCUNEB) 0.63 mg/3 mL Nebu; Take 3 mLs (0.63 mg total) by nebulization every 6 (six) hours as needed. Rescue  Dispense: 1 Box; Refill: 2        Left elbow---tennis elbow--lateral epicondylitis--pain most severe with palpation of the lateral epicondyle or supination of the left arm with opposition extremely painful pain also in the left shoulder with abduction abduction raising arm overhead.  Kenalog/Medrol dyes-ibuprofen/Ultram/Flexeril--patient told if not better with these medications could inject the left lateral epicondyle directly but usually need an x-ray 1st if insurance will pay may try an injected anyway  Physical therapy--patient would benefit from physical therapy but insurance will not pay for  Moist heat/theragesic or Tiger balm/range of motion exercise  Once well patient needs exercise decrease weight  Patient waiting to be 65 so can get into a different insurance plan--at that point agrees to do routine lab CBCs CMP lipids T4 TSH stool guaiac UA chest x-ray EKG is physical-get x-ray of the left elbow--can get referrals to physical therapy and see an orthopedist and can see a dermatologist for her psoriasis  Health maintenance needs hepatitis C/tetanus/mammogram/Pap smea/colonoscopy flu shot

## 2020-02-06 ENCOUNTER — CLINICAL SUPPORT (OUTPATIENT)
Dept: PRIMARY CARE CLINIC | Facility: CLINIC | Age: 65
End: 2020-02-06
Payer: MEDICARE

## 2020-02-06 DIAGNOSIS — R73.03 BORDERLINE DIABETES: ICD-10-CM

## 2020-02-06 DIAGNOSIS — E78.49 OTHER HYPERLIPIDEMIA: ICD-10-CM

## 2020-02-06 DIAGNOSIS — I10 HYPERTENSION, UNSPECIFIED TYPE: ICD-10-CM

## 2020-02-06 DIAGNOSIS — Z11.59 ENCOUNTER FOR HEPATITIS C SCREENING TEST FOR LOW RISK PATIENT: ICD-10-CM

## 2020-02-06 LAB
ALBUMIN SERPL BCP-MCNC: 4.3 G/DL (ref 3.5–5.2)
ALP SERPL-CCNC: 99 U/L (ref 38–126)
ALT SERPL W/O P-5'-P-CCNC: 32 U/L (ref 14–54)
ANION GAP SERPL CALC-SCNC: 10 MMOL/L (ref 8–16)
AST SERPL-CCNC: 22 U/L (ref 15–41)
BASOPHILS # BLD AUTO: 0.1 K/UL (ref 0–0.2)
BASOPHILS NFR BLD: 1.2 % (ref 0–1.9)
BILIRUB SERPL-MCNC: 0.7 MG/DL (ref 0.3–1.2)
BUN SERPL-MCNC: 14 MG/DL (ref 8–23)
CALCIUM SERPL-MCNC: 9.5 MG/DL (ref 8.6–10)
CHLORIDE SERPL-SCNC: 100 MMOL/L (ref 101–111)
CHOLEST SERPL-MCNC: 160 MG/DL (ref 80–200)
CHOLEST/HDLC SERPL: 3.7 {RATIO} (ref 2–5)
CO2 SERPL-SCNC: 29 MMOL/L (ref 23–29)
CREAT SERPL-MCNC: 0.9 MG/DL (ref 0.5–1.4)
DIFFERENTIAL METHOD: ABNORMAL
EOSINOPHIL # BLD AUTO: 0.3 K/UL (ref 0–0.5)
EOSINOPHIL NFR BLD: 2.5 % (ref 0–8)
ERYTHROCYTE [DISTWIDTH] IN BLOOD BY AUTOMATED COUNT: 15.1 % (ref 11.5–14.5)
EST. GFR  (AFRICAN AMERICAN): >60 ML/MIN/1.73 M^2
EST. GFR  (NON AFRICAN AMERICAN): >60 ML/MIN/1.73 M^2
ESTIMATED AVG GLUCOSE: 174 MG/DL (ref 68–131)
GLUCOSE SERPL-MCNC: 172 MG/DL (ref 74–118)
HBA1C MFR BLD HPLC: 7.7 % (ref 4–5.6)
HCT VFR BLD AUTO: 45.8 % (ref 37–48.5)
HDLC SERPL-MCNC: 43 MG/DL (ref 40–75)
HDLC SERPL: 26.9 % (ref 20–50)
HGB BLD-MCNC: 15.3 G/DL (ref 12–16)
LDLC SERPL CALC-MCNC: 69 MG/DL
LYMPHOCYTES # BLD AUTO: 2.9 K/UL (ref 1–4.8)
LYMPHOCYTES NFR BLD: 26.1 % (ref 18–48)
MCH RBC QN AUTO: 30.9 PG (ref 27–31)
MCHC RBC AUTO-ENTMCNC: 33.4 G/DL (ref 32–36)
MCV RBC AUTO: 93 FL (ref 82–98)
MONOCYTES # BLD AUTO: 0.9 K/UL (ref 0.3–1)
MONOCYTES NFR BLD: 8.6 % (ref 4–15)
NEUTROPHILS # BLD AUTO: 6.7 K/UL (ref 1.8–7.7)
NEUTROPHILS NFR BLD: 61.6 % (ref 38–73)
NONHDLC SERPL-MCNC: 117 MG/DL
PLATELET # BLD AUTO: 277 K/UL (ref 150–350)
PMV BLD AUTO: 8.3 FL (ref 9.2–12.9)
POTASSIUM SERPL-SCNC: 4.4 MMOL/L (ref 3.5–5.1)
PROT SERPL-MCNC: 7.5 G/DL (ref 6–8.4)
RBC # BLD AUTO: 4.94 M/UL (ref 4–5.4)
SODIUM SERPL-SCNC: 139 MMOL/L (ref 136–145)
T4 FREE SERPL-MCNC: 0.72 NG/DL (ref 0.61–1.12)
TRIGL SERPL-MCNC: 240 MG/DL (ref 30–150)
TSH SERPL DL<=0.005 MIU/L-ACNC: 1.72 UIU/ML (ref 0.45–5.33)
WBC # BLD AUTO: 10.9 K/UL (ref 3.9–12.7)

## 2020-02-06 PROCEDURE — 84443 ASSAY THYROID STIM HORMONE: CPT

## 2020-02-06 PROCEDURE — 80061 LIPID PANEL: CPT

## 2020-02-06 PROCEDURE — 84439 ASSAY OF FREE THYROXINE: CPT

## 2020-02-06 PROCEDURE — 80053 COMPREHEN METABOLIC PANEL: CPT

## 2020-02-06 PROCEDURE — 85025 COMPLETE CBC W/AUTO DIFF WBC: CPT

## 2020-02-06 PROCEDURE — 36415 COLL VENOUS BLD VENIPUNCTURE: CPT | Mod: S$GLB,,, | Performed by: FAMILY MEDICINE

## 2020-02-06 PROCEDURE — 36415 PR COLLECTION VENOUS BLOOD,VENIPUNCTURE: ICD-10-PCS | Mod: S$GLB,,, | Performed by: FAMILY MEDICINE

## 2020-02-06 PROCEDURE — 83036 HEMOGLOBIN GLYCOSYLATED A1C: CPT

## 2020-02-06 PROCEDURE — 86803 HEPATITIS C AB TEST: CPT

## 2020-02-07 LAB — HCV AB SERPL QL IA: NEGATIVE

## 2020-02-11 DIAGNOSIS — R73.03 BORDERLINE DIABETES: Primary | ICD-10-CM

## 2020-02-11 NOTE — TELEPHONE ENCOUNTER
Patient's hgbA1C is 7.7 and glucose is 172. Patient is not on any diabetic medication. Do you want to start patient on something? Please advise.

## 2020-02-14 NOTE — TELEPHONE ENCOUNTER
Patient wants to know what her blood sugar range should be. Also, she wants to know if she should take her medication before or after checking her blood sugar. Do you have a range for the patient? Patient was also instructed not to start DM medications until after her machine and testing supplies came in

## 2020-02-14 NOTE — TELEPHONE ENCOUNTER
Patient states she received a call that Dr. William was going to order metformin and a machine, but they are waiting on approval for the machine.   In order to get the glucometer covered through [facility] the patient will need a dx of T2DM, a medical necessity form would need to be completed and faxed to [facility] with a rx for testing supplies. True Metrix Glucometer, True metrix test strips, checking BS once a day disp 100 for 90 days, and true plus lancets use as directed disp 100 for 90 days.  Patient will start the metformin. She would benefit from Diabetes Education, if Dr. William wants to put a referral in

## 2020-02-14 NOTE — TELEPHONE ENCOUNTER
----- Message from Dennis Mendez sent at 2/14/2020 10:03 AM CST -----  Contact: Pt  Patient called requesting info on what here blood glucose levels should be callback to discuss in further detail     Callback:[phone]

## 2020-02-15 RX ORDER — METFORMIN HYDROCHLORIDE 500 MG/1
500 TABLET ORAL 2 TIMES DAILY WITH MEALS
Qty: 60 TABLET | Refills: 5 | Status: SHIPPED | OUTPATIENT
Start: 2020-02-15 | End: 2020-06-05

## 2020-02-15 RX ORDER — INSULIN PUMP SYRINGE, 3 ML
EACH MISCELLANEOUS
Qty: 1 EACH | Refills: 0 | OUTPATIENT
Start: 2020-02-15 | End: 2021-02-13

## 2020-02-15 RX ORDER — LANCETS
EACH MISCELLANEOUS
Qty: 100 EACH | Refills: 11 | OUTPATIENT
Start: 2020-02-15

## 2020-02-16 NOTE — PROGRESS NOTES
Patient, Magdalena Acevedo (MRN #[id number]), presented with a recorded BMI of 42.57 kg/m^2 consistent with the definition of morbid obesity (ICD-10 E66.01). The patient's morbid obesity was monitored, evaluated, addressed and/or treated. This addendum to the medical record is made on 02/15/2020.

## 2020-02-17 ENCOUNTER — TELEPHONE (OUTPATIENT)
Dept: PRIMARY CARE CLINIC | Facility: CLINIC | Age: 65
End: 2020-02-17

## 2020-02-17 NOTE — TELEPHONE ENCOUNTER
----- Message from Elpidio Brock sent at 2/17/2020 11:45 AM CST -----  Contact: Pt [phone]  Patient is returning a phone call.  Who left a message for the patient: Jenane  Does patient know what this is regarding:  No  Comments:

## 2020-02-18 RX ORDER — LANCETS 33 GAUGE
1 EACH MISCELLANEOUS DAILY
Qty: 100 EACH | Refills: 3 | Status: SHIPPED | OUTPATIENT
Start: 2020-02-18

## 2020-02-18 RX ORDER — INSULIN PUMP SYRINGE, 3 ML
EACH MISCELLANEOUS
Qty: 1 EACH | Refills: 0 | Status: SHIPPED | OUTPATIENT
Start: 2020-02-18 | End: 2024-06-14

## 2020-02-20 ENCOUNTER — TELEPHONE (OUTPATIENT)
Dept: PRIMARY CARE CLINIC | Facility: CLINIC | Age: 65
End: 2020-02-20

## 2020-02-20 DIAGNOSIS — E11.9 TYPE 2 DIABETES MELLITUS WITHOUT COMPLICATION, UNSPECIFIED WHETHER LONG TERM INSULIN USE: ICD-10-CM

## 2020-02-20 NOTE — TELEPHONE ENCOUNTER
Spoke to patient. Let her know I faxed over clinicals and orders for diabetic testing supplies to [facility] and they should be contacting her soon.   Stated understanding

## 2020-02-20 NOTE — TELEPHONE ENCOUNTER
----- Message from Mahad Dykes sent at 2/20/2020 10:25 AM CST -----  Contact: self    Patient called in regards to her Diabetic supplies that was sent over to [facility] patient states that the pharmacy will not fill scripts until she speaks with her insurance company please advise

## 2020-03-02 ENCOUNTER — OFFICE VISIT (OUTPATIENT)
Dept: PRIMARY CARE CLINIC | Facility: CLINIC | Age: 65
End: 2020-03-02
Payer: MEDICARE

## 2020-03-02 VITALS
OXYGEN SATURATION: 88 % | SYSTOLIC BLOOD PRESSURE: 98 MMHG | DIASTOLIC BLOOD PRESSURE: 63 MMHG | HEART RATE: 90 BPM | WEIGHT: 230.69 LBS | BODY MASS INDEX: 40.88 KG/M2 | TEMPERATURE: 99 F | RESPIRATION RATE: 17 BRPM | HEIGHT: 63 IN

## 2020-03-02 DIAGNOSIS — J98.01 BRONCHOSPASM: ICD-10-CM

## 2020-03-02 DIAGNOSIS — G89.29 CHRONIC INTRACTABLE HEADACHE, UNSPECIFIED HEADACHE TYPE: ICD-10-CM

## 2020-03-02 DIAGNOSIS — R51.9 CHRONIC INTRACTABLE HEADACHE, UNSPECIFIED HEADACHE TYPE: ICD-10-CM

## 2020-03-02 DIAGNOSIS — E11.9 TYPE 2 DIABETES MELLITUS WITHOUT COMPLICATION, WITH LONG-TERM CURRENT USE OF INSULIN: ICD-10-CM

## 2020-03-02 DIAGNOSIS — Z79.4 TYPE 2 DIABETES MELLITUS WITHOUT COMPLICATION, WITH LONG-TERM CURRENT USE OF INSULIN: ICD-10-CM

## 2020-03-02 DIAGNOSIS — R32 URINARY INCONTINENCE, UNSPECIFIED TYPE: ICD-10-CM

## 2020-03-02 DIAGNOSIS — E78.49 OTHER HYPERLIPIDEMIA: ICD-10-CM

## 2020-03-02 DIAGNOSIS — M50.30 DDD (DEGENERATIVE DISC DISEASE), CERVICAL: ICD-10-CM

## 2020-03-02 DIAGNOSIS — J32.9 SINUSITIS, UNSPECIFIED CHRONICITY, UNSPECIFIED LOCATION: Primary | ICD-10-CM

## 2020-03-02 DIAGNOSIS — L40.9 PSORIASIS: ICD-10-CM

## 2020-03-02 DIAGNOSIS — Z13.820 ENCOUNTER FOR OSTEOPOROSIS SCREENING IN ASYMPTOMATIC POSTMENOPAUSAL PATIENT: ICD-10-CM

## 2020-03-02 DIAGNOSIS — I10 HYPERTENSION, UNSPECIFIED TYPE: ICD-10-CM

## 2020-03-02 DIAGNOSIS — J40 BRONCHITIS: ICD-10-CM

## 2020-03-02 DIAGNOSIS — Z78.0 ENCOUNTER FOR OSTEOPOROSIS SCREENING IN ASYMPTOMATIC POSTMENOPAUSAL PATIENT: ICD-10-CM

## 2020-03-02 DIAGNOSIS — E66.01 MORBID OBESITY WITH BMI OF 40.0-44.9, ADULT: ICD-10-CM

## 2020-03-02 DIAGNOSIS — R73.03 BORDERLINE DIABETES: ICD-10-CM

## 2020-03-02 PROBLEM — E66.3 OVERWEIGHT: Status: RESOLVED | Noted: 2018-05-14 | Resolved: 2020-03-02

## 2020-03-02 PROCEDURE — 99214 PR OFFICE/OUTPT VISIT, EST, LEVL IV, 30-39 MIN: ICD-10-PCS | Mod: 25,S$GLB,, | Performed by: FAMILY MEDICINE

## 2020-03-02 PROCEDURE — 93010 EKG 12-LEAD: ICD-10-PCS | Mod: S$GLB,,, | Performed by: INTERNAL MEDICINE

## 2020-03-02 PROCEDURE — 94640 PR INHAL RX, AIRWAY OBST/DX SPUTUM INDUCT: ICD-10-PCS | Mod: 59,S$GLB,, | Performed by: FAMILY MEDICINE

## 2020-03-02 PROCEDURE — 3078F PR MOST RECENT DIASTOLIC BLOOD PRESSURE < 80 MM HG: ICD-10-PCS | Mod: CPTII,S$GLB,, | Performed by: FAMILY MEDICINE

## 2020-03-02 PROCEDURE — 99999 PR PBB SHADOW E&M-EST. PATIENT-LVL V: ICD-10-PCS | Mod: PBBFAC,,, | Performed by: FAMILY MEDICINE

## 2020-03-02 PROCEDURE — 3051F PR MOST RECENT HEMOGLOBIN A1C LEVEL 7.0 - < 8.0%: ICD-10-PCS | Mod: CPTII,S$GLB,, | Performed by: FAMILY MEDICINE

## 2020-03-02 PROCEDURE — 99499 UNLISTED E&M SERVICE: CPT | Mod: S$GLB,,, | Performed by: FAMILY MEDICINE

## 2020-03-02 PROCEDURE — 3074F PR MOST RECENT SYSTOLIC BLOOD PRESSURE < 130 MM HG: ICD-10-PCS | Mod: CPTII,S$GLB,, | Performed by: FAMILY MEDICINE

## 2020-03-02 PROCEDURE — 1101F PR PT FALLS ASSESS DOC 0-1 FALLS W/OUT INJ PAST YR: ICD-10-PCS | Mod: CPTII,S$GLB,, | Performed by: FAMILY MEDICINE

## 2020-03-02 PROCEDURE — 99999 PR PBB SHADOW E&M-EST. PATIENT-LVL V: CPT | Mod: PBBFAC,,, | Performed by: FAMILY MEDICINE

## 2020-03-02 PROCEDURE — 93005 ELECTROCARDIOGRAM TRACING: CPT | Mod: S$GLB,,, | Performed by: FAMILY MEDICINE

## 2020-03-02 PROCEDURE — 99499 RISK ADDL DX/OHS AUDIT: ICD-10-PCS | Mod: S$GLB,,, | Performed by: FAMILY MEDICINE

## 2020-03-02 PROCEDURE — 3074F SYST BP LT 130 MM HG: CPT | Mod: CPTII,S$GLB,, | Performed by: FAMILY MEDICINE

## 2020-03-02 PROCEDURE — 1101F PT FALLS ASSESS-DOCD LE1/YR: CPT | Mod: CPTII,S$GLB,, | Performed by: FAMILY MEDICINE

## 2020-03-02 PROCEDURE — 3078F DIAST BP <80 MM HG: CPT | Mod: CPTII,S$GLB,, | Performed by: FAMILY MEDICINE

## 2020-03-02 PROCEDURE — 3008F PR BODY MASS INDEX (BMI) DOCUMENTED: ICD-10-PCS | Mod: CPTII,S$GLB,, | Performed by: FAMILY MEDICINE

## 2020-03-02 PROCEDURE — 96372 THER/PROPH/DIAG INJ SC/IM: CPT | Mod: S$GLB,,, | Performed by: FAMILY MEDICINE

## 2020-03-02 PROCEDURE — 93010 ELECTROCARDIOGRAM REPORT: CPT | Mod: S$GLB,,, | Performed by: INTERNAL MEDICINE

## 2020-03-02 PROCEDURE — 3051F HG A1C>EQUAL 7.0%<8.0%: CPT | Mod: CPTII,S$GLB,, | Performed by: FAMILY MEDICINE

## 2020-03-02 PROCEDURE — 93005 EKG 12-LEAD: ICD-10-PCS | Mod: S$GLB,,, | Performed by: FAMILY MEDICINE

## 2020-03-02 PROCEDURE — 3008F BODY MASS INDEX DOCD: CPT | Mod: CPTII,S$GLB,, | Performed by: FAMILY MEDICINE

## 2020-03-02 PROCEDURE — 99214 OFFICE O/P EST MOD 30 MIN: CPT | Mod: 25,S$GLB,, | Performed by: FAMILY MEDICINE

## 2020-03-02 PROCEDURE — 94640 AIRWAY INHALATION TREATMENT: CPT | Mod: 59,S$GLB,, | Performed by: FAMILY MEDICINE

## 2020-03-02 PROCEDURE — 96372 PR INJECTION,THERAP/PROPH/DIAG2ST, IM OR SUBCUT: ICD-10-PCS | Mod: S$GLB,,, | Performed by: FAMILY MEDICINE

## 2020-03-02 RX ORDER — METHYLPREDNISOLONE 4 MG/1
TABLET ORAL
Qty: 1 PACKAGE | Refills: 0 | Status: ON HOLD | OUTPATIENT
Start: 2020-03-02 | End: 2020-03-10 | Stop reason: HOSPADM

## 2020-03-02 RX ORDER — PROMETHAZINE HYDROCHLORIDE AND CODEINE PHOSPHATE 6.25; 1 MG/5ML; MG/5ML
5 SOLUTION ORAL EVERY 6 HOURS PRN
Qty: 180 ML | Refills: 0 | Status: SHIPPED | OUTPATIENT
Start: 2020-03-02 | End: 2020-07-15

## 2020-03-02 RX ORDER — LEVOFLOXACIN 500 MG/1
500 TABLET, FILM COATED ORAL DAILY
Qty: 10 TABLET | Refills: 0 | Status: ON HOLD | OUTPATIENT
Start: 2020-03-02 | End: 2020-03-10 | Stop reason: SDUPTHER

## 2020-03-02 RX ORDER — TRIAMCINOLONE ACETONIDE 40 MG/ML
40 INJECTION, SUSPENSION INTRA-ARTICULAR; INTRAMUSCULAR ONCE
Status: COMPLETED | OUTPATIENT
Start: 2020-03-02 | End: 2020-03-02

## 2020-03-02 RX ORDER — IPRATROPIUM BROMIDE AND ALBUTEROL SULFATE 2.5; .5 MG/3ML; MG/3ML
3 SOLUTION RESPIRATORY (INHALATION)
Status: COMPLETED | OUTPATIENT
Start: 2020-03-02 | End: 2020-03-02

## 2020-03-02 RX ORDER — BUDESONIDE AND FORMOTEROL FUMARATE DIHYDRATE 160; 4.5 UG/1; UG/1
2 AEROSOL RESPIRATORY (INHALATION) EVERY 12 HOURS
Qty: 10.2 G | Refills: 5 | Status: SHIPPED | OUTPATIENT
Start: 2020-03-02 | End: 2020-11-10 | Stop reason: SDUPTHER

## 2020-03-02 RX ADMIN — TRIAMCINOLONE ACETONIDE 40 MG: 40 INJECTION, SUSPENSION INTRA-ARTICULAR; INTRAMUSCULAR at 11:03

## 2020-03-02 RX ADMIN — IPRATROPIUM BROMIDE AND ALBUTEROL SULFATE 3 ML: 2.5; .5 SOLUTION RESPIRATORY (INHALATION) at 11:03

## 2020-03-02 NOTE — PROGRESS NOTES
Subjective:       Patient ID: Magdalena Acevedo is a 65 y.o. female.    Chief Complaint: Cough (congestion) and Shortness of Breath    HPI:  65-year-old female--patient sick since Saturday--during the day patient was laying down developed shortness of breath felt like someone was sitting on her chest used aerosol nebulizer machine--got better--started to call 911 because scared her --hard to breathe then.  4:00 a.m. this morning patient had a 2nd episode--so came into the office this morning.  +low-grade fever---+runny stuffy nose--no sore throat--+cough--+green phlegm.  No pneumonia asthma TB +COPD--+shortness of breath +wheezing--has albuterol and Sobia ir and nebulized.  +shortness of breath even walking a short distance      ROS:  Skin: + psoriasis--okay right now usually on the ankles and feet legs, no eczema, skin cancer  HEENT: +occas  headache, no  ocular pain, blurred vision, diplopia, epistaxis,+hoarseness +change in voice,  No thyroid trouble  Lung: No pneumonia, asthma, Tb, wheezing, SOB, no smoking history of bronchitis/COPD  Heart: No chest pain, ankle edema, palpitations, MI, mendy murmur, +hypertension blood pressure,+  Hyperlipidemia---no stent bypass arrhythmia  Abdomen: No nausea, vomiting, diarrhea, constipation, ulcers, hepatitis,  melena, hematochezia, hematemesis history cholecystectomy  : no UTI, renal disease, stones + urinary incontinence with coughing  GYN last mammogram over 20 years Pap smear over 20 years  MS: no fractures, O/A, lupus, rheumatoid, gout hx eft shoulder +DDD cervical spine  Neuro: No dizziness, LOC, seizures   + diabetes--just started metformin glucose [id number] just got the machine, no anemia, +anxiety, + depression upset gets angry   [place], 1 child, unemployed,, grandson stays with patient in every other week great grand children stay with patient    Objective:   Physical Exam:  General: Well nourished, well developed, no acute distress + overweight  Skin: + Dry  scaly skin especially on the legs scalp arms and back-psoriasis  HEENT: Eyes PERRLA, EOM intact, nose clear discharge , throat +1/4 erythema ears TMs clear  NECK: Supple, no bruits, No JVD, no nodes  Lungs: Clear, no rales, rhonchi +coarse cough +wheezing fair movement of air  Heart: Regular rate and rhythm, no murmurs, gallops, or rubs  Abdomen: flat, bowel sounds positive, no tenderness, or organomegaly   MS:  Tenderness especially left acromioclavicular joint and especially the left upper trapezius --no significant change    Neuro: Alert, CN intact, oriented X 3  Extremities: No cyanosis, clubbing, or edema         Assessment:       1. Sinusitis, unspecified chronicity, unspecified location    2. Bronchitis    3. Bronchospasm    4. Hypertension, unspecified type    5. Other hyperlipidemia    6. Psoriasis    7. DDD (degenerative disc disease), cervical    8. Chronic intractable headache, unspecified headache type    9. Borderline diabetes    10. Urinary incontinence, unspecified type    11. Morbid obesity with BMI of 40.0-44.9, adult    12. Type 2 diabetes mellitus without complication, with long-term current use of insulin    13. Encounter for osteoporosis screening in asymptomatic postmenopausal patient        Plan:       Sinusitis, unspecified chronicity, unspecified location    Bronchitis    Bronchospasm    Hypertension, unspecified type  -     EKG 12-lead; Future  -     X-Ray Chest PA And Lateral; Future; Expected date: 03/02/2020  -     Ambulatory referral/consult to Nutrition Services; Future; Expected date: 03/09/2020  -     DXA Bone Density Spine And Hip; Future; Expected date: 03/02/2020  -     CBC auto differential; Future; Expected date: 06/02/2020  -     Comprehensive metabolic panel; Future; Expected date: 06/02/2020    Other hyperlipidemia  -     Ambulatory referral/consult to Nutrition Services; Future; Expected date: 03/09/2020  -     Lipid panel; Future; Expected date:  06/02/2020    Psoriasis    DDD (degenerative disc disease), cervical    Chronic intractable headache, unspecified headache type    Borderline diabetes    Urinary incontinence, unspecified type    Morbid obesity with BMI of 40.0-44.9, adult    Type 2 diabetes mellitus without complication, with long-term current use of insulin  -     Ambulatory referral/consult to Nutrition Services; Future; Expected date: 03/09/2020  -     Hemoglobin A1c; Future; Expected date: 06/02/2020    Encounter for osteoporosis screening in asymptomatic postmenopausal patient  -     DXA Bone Density Spine And Hip; Future; Expected date: 03/02/2020    Other orders  -     albuterol-ipratropium 2.5 mg-0.5 mg/3 mL nebulizer solution 3 mL  -     triamcinolone acetonide injection 40 mg  -     methylPREDNISolone (MEDROL DOSEPACK) 4 mg tablet; use as directed  Dispense: 1 Package; Refill: 0  -     levoFLOXacin (LEVAQUIN) 500 MG tablet; Take 1 tablet (500 mg total) by mouth once daily. for 10 days  Dispense: 10 tablet; Refill: 0  -     promethazine-codeine 6.25-10 mg/5 ml (PHENERGAN WITH CODEINE) 6.25-10 mg/5 mL syrup; Take 5 mLs by mouth every 6 (six) hours as needed for Cough.  Dispense: 180 mL; Refill: 0  -     budesonide-formoterol 160-4.5 mcg (SYMBICORT) 160-4.5 mcg/actuation HFAA; Inhale 2 puffs into the lungs every 12 (twelve) hours. Controller  Dispense: 10.2 g; Refill: 5        Elif Rodriguez -- 1800 calorie ADA low-sodium low-fat weight reduction---needs to help patient with use of her glucometer--has been running-[phone]-needs to learn sliding scale insulin  Bronchitis/bronchospasm---Kenalog/Medrol/Levaquin/Phenergan with codeine/albuterol nebulizer solution--had Symbicort---give treatment DuoNeb now  Chest x-ray EKG now  History psoriasis--continue seeing dermatologist  HTN HLP---blood pressure well controlled 102/70 needs routine labs q.6 months  Status post cholecystectomy/urinary incontinence/DDD lumbar spine and shoulder pain  Last  lab glucose 172 hemoglobin A1c 7.7--redo lab in 6 months CBCs CMP lipids hemoglobin A1c  Health main ft exam eye exam HIV Pap smear mammogram bone density shingles:  Lung screen pneumococcal vaccine  If worse pulmonary wise with increased shortness of breath or increase wheezing or if high glucose to emergency room for possible admission for IV antibiotics IV Solu-Medrol aerosol treatments monitoring

## 2020-03-02 NOTE — PROGRESS NOTES
Patient identified by name and date of birth,  Allergies reviewed, nebulizer inhalation treatment administered, injection administered by aseptic technique, tolerated well by pt.

## 2020-03-04 PROBLEM — D72.828 NEUTROPHILIA: Status: ACTIVE | Noted: 2020-03-04

## 2020-03-04 PROBLEM — R91.1 PULMONARY NODULE: Status: ACTIVE | Noted: 2020-03-04

## 2020-03-04 PROBLEM — D72.9 NEUTROPHILIA: Status: ACTIVE | Noted: 2020-03-04

## 2020-03-05 ENCOUNTER — OFFICE VISIT (OUTPATIENT)
Dept: PRIMARY CARE CLINIC | Facility: CLINIC | Age: 65
End: 2020-03-05
Payer: MEDICARE

## 2020-03-05 ENCOUNTER — PATIENT OUTREACH (OUTPATIENT)
Dept: ADMINISTRATIVE | Facility: HOSPITAL | Age: 65
End: 2020-03-05

## 2020-03-05 VITALS
BODY MASS INDEX: 41.33 KG/M2 | SYSTOLIC BLOOD PRESSURE: 138 MMHG | RESPIRATION RATE: 20 BRPM | WEIGHT: 233.25 LBS | TEMPERATURE: 98 F | HEART RATE: 92 BPM | HEIGHT: 63 IN | OXYGEN SATURATION: 93 % | DIASTOLIC BLOOD PRESSURE: 84 MMHG

## 2020-03-05 DIAGNOSIS — N18.30 CHRONIC RENAL INSUFFICIENCY, STAGE III (MODERATE): ICD-10-CM

## 2020-03-05 DIAGNOSIS — M50.30 DDD (DEGENERATIVE DISC DISEASE), CERVICAL: ICD-10-CM

## 2020-03-05 DIAGNOSIS — J40 BRONCHITIS: Primary | ICD-10-CM

## 2020-03-05 DIAGNOSIS — E66.01 MORBID OBESITY WITH BMI OF 40.0-44.9, ADULT: ICD-10-CM

## 2020-03-05 DIAGNOSIS — G89.29 CHRONIC INTRACTABLE HEADACHE, UNSPECIFIED HEADACHE TYPE: ICD-10-CM

## 2020-03-05 DIAGNOSIS — J44.1 COPD WITH ACUTE EXACERBATION: ICD-10-CM

## 2020-03-05 DIAGNOSIS — L40.9 PSORIASIS: ICD-10-CM

## 2020-03-05 DIAGNOSIS — R91.1 PULMONARY NODULE: ICD-10-CM

## 2020-03-05 DIAGNOSIS — R51.9 CHRONIC INTRACTABLE HEADACHE, UNSPECIFIED HEADACHE TYPE: ICD-10-CM

## 2020-03-05 DIAGNOSIS — D72.9 NEUTROPHILIA: ICD-10-CM

## 2020-03-05 DIAGNOSIS — E11.9 TYPE 2 DIABETES MELLITUS WITHOUT COMPLICATION, WITHOUT LONG-TERM CURRENT USE OF INSULIN: ICD-10-CM

## 2020-03-05 DIAGNOSIS — I10 HYPERTENSION, UNSPECIFIED TYPE: ICD-10-CM

## 2020-03-05 PROBLEM — R73.03 BORDERLINE DIABETES: Status: RESOLVED | Noted: 2018-05-22 | Resolved: 2020-03-05

## 2020-03-05 PROCEDURE — 99499 RISK ADDL DX/OHS AUDIT: ICD-10-PCS | Mod: S$GLB,,, | Performed by: FAMILY MEDICINE

## 2020-03-05 PROCEDURE — 3079F PR MOST RECENT DIASTOLIC BLOOD PRESSURE 80-89 MM HG: ICD-10-PCS | Mod: CPTII,S$GLB,, | Performed by: FAMILY MEDICINE

## 2020-03-05 PROCEDURE — 1101F PR PT FALLS ASSESS DOC 0-1 FALLS W/OUT INJ PAST YR: ICD-10-PCS | Mod: CPTII,S$GLB,, | Performed by: FAMILY MEDICINE

## 2020-03-05 PROCEDURE — 99499 UNLISTED E&M SERVICE: CPT | Mod: S$GLB,,, | Performed by: FAMILY MEDICINE

## 2020-03-05 PROCEDURE — 3075F PR MOST RECENT SYSTOLIC BLOOD PRESS GE 130-139MM HG: ICD-10-PCS | Mod: CPTII,S$GLB,, | Performed by: FAMILY MEDICINE

## 2020-03-05 PROCEDURE — 3008F BODY MASS INDEX DOCD: CPT | Mod: CPTII,S$GLB,, | Performed by: FAMILY MEDICINE

## 2020-03-05 PROCEDURE — 3052F PR MOST RECENT HEMOGLOBIN A1C LEVEL 8.0 - < 9.0%: ICD-10-PCS | Mod: CPTII,S$GLB,, | Performed by: FAMILY MEDICINE

## 2020-03-05 PROCEDURE — 3052F HG A1C>EQUAL 8.0%<EQUAL 9.0%: CPT | Mod: CPTII,S$GLB,, | Performed by: FAMILY MEDICINE

## 2020-03-05 PROCEDURE — 1101F PT FALLS ASSESS-DOCD LE1/YR: CPT | Mod: CPTII,S$GLB,, | Performed by: FAMILY MEDICINE

## 2020-03-05 PROCEDURE — 3075F SYST BP GE 130 - 139MM HG: CPT | Mod: CPTII,S$GLB,, | Performed by: FAMILY MEDICINE

## 2020-03-05 PROCEDURE — 99214 OFFICE O/P EST MOD 30 MIN: CPT | Mod: S$GLB,,, | Performed by: FAMILY MEDICINE

## 2020-03-05 PROCEDURE — 99999 PR PBB SHADOW E&M-EST. PATIENT-LVL IV: ICD-10-PCS | Mod: PBBFAC,,, | Performed by: FAMILY MEDICINE

## 2020-03-05 PROCEDURE — 99214 PR OFFICE/OUTPT VISIT, EST, LEVL IV, 30-39 MIN: ICD-10-PCS | Mod: S$GLB,,, | Performed by: FAMILY MEDICINE

## 2020-03-05 PROCEDURE — 3008F PR BODY MASS INDEX (BMI) DOCUMENTED: ICD-10-PCS | Mod: CPTII,S$GLB,, | Performed by: FAMILY MEDICINE

## 2020-03-05 PROCEDURE — 3079F DIAST BP 80-89 MM HG: CPT | Mod: CPTII,S$GLB,, | Performed by: FAMILY MEDICINE

## 2020-03-05 PROCEDURE — 99999 PR PBB SHADOW E&M-EST. PATIENT-LVL IV: CPT | Mod: PBBFAC,,, | Performed by: FAMILY MEDICINE

## 2020-03-05 NOTE — PROGRESS NOTES
Subjective:       Patient ID: Magdalena Acevedo is a 65 y.o. female.    Chief Complaint: Cough (wheezing)    HPI:  65-year-old female--recently seen for bronchitis/asthma/COPD---treated with Levaquin/Kenalog/Medrol/cough syrup/aerosol nebulizer solution/Symbicort---lab done prior to the steroid injection showed white count to be 18.2---chief chest x-ray showed poorly defined ovoid nodular density level of the left 4th rib suggested a CT scan---glucose 149 hemoglobin A1c 8.1 glomerular filtration rate 39-45.      Patient was called this morning while nurse was speak to the patient could hear audible wheezing patient told to come back in--pulse ox 93--patient with audible wheezing--tachypnea respiratory rate 20---shortness of breath--slight tremor.       Office visit 03/02/2020 64-year-old female patient in for medication refills abdominal---pain wears a pain pain in the left upper quadrant--patient caught a coughing spell--felt something pop--feels a knot in the left lower chest wall area between the anterior axillary line in midclavicular line somewhat hard approximately 78 9th rib area.  No nausea vomiting diarrhea constipation---no ulcers hepatitis status post cholecystectomy--no melena hematochezia hematemesis.        Patient was pretty sick a few weeks ago.  No pneumonia asthma TB history of COPD had laryngitis or about 3 weeks.  Patient states feels like it is under her ribs.      Especially sore twist a certain way a knot supple ordered hurts badly.     ROS:  Skin: + psoriasis--okay right now usually on the ankles and feet legs, no eczema, skin cancer  HEENT: +occas  headache, no  ocular pain, blurred vision, diplopia, epistaxis,hoarseness change in voice,  No thyroid trouble  Lung: No pneumonia, asthma, Tb, wheezing, SOB, no smoking history of bronchitis/COPD  Heart: No chest pain, ankle edema, palpitations, MI, mendy murmur, +hypertension blood pressure,+  Hyperlipidemia---no stent bypass  arrhythmia  Abdomen: No nausea, vomiting, diarrhea, constipation, ulcers, hepatitis,  melena, hematochezia, hematemesis history cholecystectomy  : no UTI, renal disease, stones + urinary incontinence with coughing  GYN last mammogram over 20 years Pap smear over 20 years  MS: no fractures, O/A, lupus, rheumatoid, gout left shoulder pain see history of present illness + DDD cervical spine OK   Neuro: No dizziness, LOC, seizures   No diabetes, no anemia, +anxiety, + depression upset gets angry patient is on Zoloft but not using lorazepam or Ativan  [place], 1 child, unemployed,, grandson stays with patient in every other week great grand children stay with patient    Objective:   Physical Exam:  General: Well nourished, well developed, no acute distress + morbid obesity  Skin: + Dry scaly skin especially on the legs scalp arms and back-psoriasis  HEENT: Eyes PERRLA, EOM intact, nose clear , throat nonerythema ears TMs clear  NECK: Supple, no bruits, No JVD, no nodes  Lungs: Clear, no rales, rhonchi, +wheezing +coarse cough +tachypneic    Heart: Regular rate and rhythm, no murmurs, gallops, or rubs  Abdomen: flat, bowel sounds positive, no tenderness, or organomegaly tenderness especially in the left costal angle anterior axillary line to mid clavicular line just below the costal angle tender on palpation no organomegaly or rebound  MS:  Tenderness especially left acromioclavicular joint and especially the left upper trapezius --no significant change    Neuro: Alert, CN intact, oriented X 3  Extremities: No cyanosis, clubbing, or edema         Assessment:       1. Bronchitis    2. COPD with acute exacerbation    3. Pulmonary nodule    4. Type 2 diabetes mellitus without complication, without long-term current use of insulin    5. Neutrophilia    6. Chronic renal insufficiency, stage III (moderate)    7. Chronic intractable headache, unspecified headache type    8. Morbid obesity with BMI of 40.0-44.9, adult    9.  Psoriasis    10. Hypertension, unspecified type    11. DDD (degenerative disc disease), cervical        Plan:       Bronchitis    COPD with acute exacerbation    Pulmonary nodule    Type 2 diabetes mellitus without complication, without long-term current use of insulin    Neutrophilia    Chronic renal insufficiency, stage III (moderate)    Chronic intractable headache, unspecified headache type    Morbid obesity with BMI of 40.0-44.9, adult    Psoriasis    Hypertension, unspecified type    DDD (degenerative disc disease), cervical        Bronchitis/COPD/bronchospasm-audible wheezing/to kidney---got Kenalog injection--taking Levaquin taking Medrol Dosepak taking Phenergan with codeine--no better--patient with audible wheezing told needs to go the emergency room for aerosol nebulizer treatment--IV Solu-Medrol--for probable admission for IV Zosyn--IV Solu-Medrol--aerosol solution--Symbicort--and monitoring glucose due to steroids--infection--stress--to try and control glucose during treatment process--also morbidly obese--pulse ox 93--respiratory rate twin  WBC 18.2 chest x-ray poorly defined ovoid nodular density near the left ribs--needs CT scan of the chest  In the emergency room should repeat labs CBCs CMP chest x-ray during hospital admission get CT scan of the chest  Also noted to have chronic renal insufficiency heart rate 39-45 may need to be off Glucophage  To emergency room for workup and possible admission---did consider Rocephin Solu-Medrol --but in light of [place] [id number] prior to steroid treatment--abnormal chest x-ray--wheezing--tachypnea feel patient should probably be admitted

## 2020-03-05 NOTE — PROGRESS NOTES
Immunizations reviewed. [facility] reviewed. Care Everywhere reviewed. [facility] Provider Portal reviewed. [facility] reviewed w/ no recent results yielded. Per chart review- patient recommended to go to ER for evaluation.  Pre-visit chart review completed.

## 2020-03-06 PROBLEM — E11.65 TYPE 2 DIABETES MELLITUS WITH HYPERGLYCEMIA, WITHOUT LONG-TERM CURRENT USE OF INSULIN: Status: ACTIVE | Noted: 2020-03-05

## 2020-03-06 PROBLEM — J96.01 ACUTE HYPOXEMIC RESPIRATORY FAILURE: Status: ACTIVE | Noted: 2020-03-06

## 2020-03-09 PROBLEM — R93.89 ABNORMAL CHEST CT: Status: ACTIVE | Noted: 2020-03-05

## 2020-03-09 PROBLEM — Z87.891 HISTORY OF SMOKING GREATER THAN 50 PACK YEARS: Status: ACTIVE | Noted: 2020-03-09

## 2020-03-19 ENCOUNTER — OFFICE VISIT (OUTPATIENT)
Dept: PRIMARY CARE CLINIC | Facility: CLINIC | Age: 65
End: 2020-03-19
Payer: MEDICARE

## 2020-03-19 VITALS
BODY MASS INDEX: 41.97 KG/M2 | SYSTOLIC BLOOD PRESSURE: 130 MMHG | HEART RATE: 107 BPM | WEIGHT: 228.06 LBS | OXYGEN SATURATION: 97 % | DIASTOLIC BLOOD PRESSURE: 80 MMHG | TEMPERATURE: 98 F | RESPIRATION RATE: 18 BRPM | HEIGHT: 62 IN

## 2020-03-19 DIAGNOSIS — Z12.11 ENCOUNTER FOR SCREENING COLONOSCOPY FOR NON-HIGH-RISK PATIENT: ICD-10-CM

## 2020-03-19 DIAGNOSIS — M50.30 DDD (DEGENERATIVE DISC DISEASE), CERVICAL: ICD-10-CM

## 2020-03-19 DIAGNOSIS — Z13.820 ENCOUNTER FOR OSTEOPOROSIS SCREENING IN ASYMPTOMATIC POSTMENOPAUSAL PATIENT: ICD-10-CM

## 2020-03-19 DIAGNOSIS — E66.01 MORBID OBESITY WITH BMI OF 40.0-44.9, ADULT: ICD-10-CM

## 2020-03-19 DIAGNOSIS — L40.9 PSORIASIS: ICD-10-CM

## 2020-03-19 DIAGNOSIS — R32 URINARY INCONTINENCE, UNSPECIFIED TYPE: ICD-10-CM

## 2020-03-19 DIAGNOSIS — J44.1 COPD EXACERBATION: Primary | ICD-10-CM

## 2020-03-19 DIAGNOSIS — I10 HYPERTENSION, UNSPECIFIED TYPE: ICD-10-CM

## 2020-03-19 DIAGNOSIS — L30.4 INTERTRIGO: ICD-10-CM

## 2020-03-19 DIAGNOSIS — Z78.0 ENCOUNTER FOR OSTEOPOROSIS SCREENING IN ASYMPTOMATIC POSTMENOPAUSAL PATIENT: ICD-10-CM

## 2020-03-19 DIAGNOSIS — R91.1 PULMONARY NODULE: ICD-10-CM

## 2020-03-19 DIAGNOSIS — E11.9 TYPE 2 DIABETES MELLITUS WITHOUT COMPLICATION, WITHOUT LONG-TERM CURRENT USE OF INSULIN: ICD-10-CM

## 2020-03-19 PROCEDURE — 3075F SYST BP GE 130 - 139MM HG: CPT | Mod: CPTII,S$GLB,, | Performed by: FAMILY MEDICINE

## 2020-03-19 PROCEDURE — 1101F PR PT FALLS ASSESS DOC 0-1 FALLS W/OUT INJ PAST YR: ICD-10-PCS | Mod: CPTII,S$GLB,, | Performed by: FAMILY MEDICINE

## 2020-03-19 PROCEDURE — 1101F PT FALLS ASSESS-DOCD LE1/YR: CPT | Mod: CPTII,S$GLB,, | Performed by: FAMILY MEDICINE

## 2020-03-19 PROCEDURE — 99214 OFFICE O/P EST MOD 30 MIN: CPT | Mod: S$GLB,,, | Performed by: FAMILY MEDICINE

## 2020-03-19 PROCEDURE — 99214 PR OFFICE/OUTPT VISIT, EST, LEVL IV, 30-39 MIN: ICD-10-PCS | Mod: S$GLB,,, | Performed by: FAMILY MEDICINE

## 2020-03-19 PROCEDURE — 3079F DIAST BP 80-89 MM HG: CPT | Mod: CPTII,S$GLB,, | Performed by: FAMILY MEDICINE

## 2020-03-19 PROCEDURE — 3079F PR MOST RECENT DIASTOLIC BLOOD PRESSURE 80-89 MM HG: ICD-10-PCS | Mod: CPTII,S$GLB,, | Performed by: FAMILY MEDICINE

## 2020-03-19 PROCEDURE — 3008F BODY MASS INDEX DOCD: CPT | Mod: CPTII,S$GLB,, | Performed by: FAMILY MEDICINE

## 2020-03-19 PROCEDURE — 3052F HG A1C>EQUAL 8.0%<EQUAL 9.0%: CPT | Mod: CPTII,S$GLB,, | Performed by: FAMILY MEDICINE

## 2020-03-19 PROCEDURE — 99499 RISK ADDL DX/OHS AUDIT: ICD-10-PCS | Mod: S$GLB,,, | Performed by: FAMILY MEDICINE

## 2020-03-19 PROCEDURE — 3008F PR BODY MASS INDEX (BMI) DOCUMENTED: ICD-10-PCS | Mod: CPTII,S$GLB,, | Performed by: FAMILY MEDICINE

## 2020-03-19 PROCEDURE — 3075F PR MOST RECENT SYSTOLIC BLOOD PRESS GE 130-139MM HG: ICD-10-PCS | Mod: CPTII,S$GLB,, | Performed by: FAMILY MEDICINE

## 2020-03-19 PROCEDURE — 3052F PR MOST RECENT HEMOGLOBIN A1C LEVEL 8.0 - < 9.0%: ICD-10-PCS | Mod: CPTII,S$GLB,, | Performed by: FAMILY MEDICINE

## 2020-03-19 PROCEDURE — 99999 PR PBB SHADOW E&M-EST. PATIENT-LVL V: ICD-10-PCS | Mod: PBBFAC,,, | Performed by: FAMILY MEDICINE

## 2020-03-19 PROCEDURE — 99999 PR PBB SHADOW E&M-EST. PATIENT-LVL V: CPT | Mod: PBBFAC,,, | Performed by: FAMILY MEDICINE

## 2020-03-19 PROCEDURE — 99499 UNLISTED E&M SERVICE: CPT | Mod: S$GLB,,, | Performed by: FAMILY MEDICINE

## 2020-03-19 RX ORDER — LISINOPRIL 10 MG/1
10 TABLET ORAL DAILY
Qty: 90 TABLET | Refills: 1 | Status: SHIPPED | OUTPATIENT
Start: 2020-03-19 | End: 2020-06-04 | Stop reason: SDUPTHER

## 2020-03-19 RX ORDER — DOXYCYCLINE 100 MG/1
100 CAPSULE ORAL DAILY
Qty: 10 CAPSULE | Refills: 0 | Status: SHIPPED | OUTPATIENT
Start: 2020-03-19 | End: 2020-03-29

## 2020-03-19 RX ORDER — NYSTATIN 100000 [USP'U]/ML
5 SUSPENSION ORAL 4 TIMES DAILY
Qty: 200 ML | Refills: 0 | Status: SHIPPED | OUTPATIENT
Start: 2020-03-19 | End: 2020-03-29

## 2020-03-19 RX ORDER — FLUCONAZOLE 150 MG/1
TABLET ORAL
Qty: 3 TABLET | Refills: 0 | Status: SHIPPED | OUTPATIENT
Start: 2020-03-19 | End: 2020-07-15

## 2020-03-19 RX ORDER — CLOTRIMAZOLE AND BETAMETHASONE DIPROPIONATE 10; .64 MG/G; MG/G
CREAM TOPICAL 2 TIMES DAILY
Qty: 45 G | Refills: 1 | Status: SHIPPED | OUTPATIENT
Start: 2020-03-19 | End: 2020-07-15

## 2020-03-19 NOTE — PROGRESS NOTES
Subjective:       Patient ID: Magdalena Acevedo is a 65 y.o. female.    Chief Complaint: Hospital Follow Up    HPI:  65-year-old female--03/05/2020 patient was sent to the emergency room +COPD with acute exacerbation--admitted txed with antibiotics, steroids, breathing tx.  Kept in the hospital 4-5 days.  Patient feels much better now than she did--still coughing up some thick green mucus.  Treated with Levaquin prednisone Tessalon Perles nebulizer machine inhaler or albuterol inhaler and Symbicort        Low-grade fever--no stuffy or runny nose--slight sore throat--+cough--+phlegm-green.  +COPD no pneumonia TB          ROS:  Skin: + psoriasis--okay right now usually on the ankles and fee  HEENT: +occas  headache, no  ocular pain, blurred vision, diplopia, epistaxis,+hoarseness +change in voice,  No thyroid trouble   Lung: No pneumonia, asthma, Tb, wheezing, SOB, no smoking history of bronchitis/COPD--recent hospital admission see history of present  Heart: No chest pain, ankle edema, palpitations, MI, mendy murmur, +hypertension blood pressure,+  Hyperlipidemia---no stent bypass arrhythmia  Abdomen: No nausea, vomiting, diarrhea, constipation, ulcers, hepatitis,  melena, hematochezia, hematemesis history cholecystectomy  : no UTI, renal disease, stones + urinary incontinence with coughing  GYN last mammogram over 20 years Pap smear over 20 years  MS: no fractures, O/A, lupus, rheumatoid, gout left shoulder pain see history of present illness + DDD cervical spine OK   Neuro: No dizziness, LOC, seizures   +diabetes Glu 160 now off steroids , no anemia, +anxiety, + depression upset gets angry patient is on Zoloft but not using lorazepam or Ativan  , 1 child, unemployed,, grandson stays with patient in every other week great grand children stay with patient    Objective:   Physical Exam:  General: Well nourished, well developed, no acute distress + morbid obesity  Skin: + Dry scaly skin especially on the  legs scalp arms and back-psoriasis --history vaginal yeast  HEENT: Eyes PERRLA, EOM intact, nose clear , throat nonerythema ears TMs clear  NECK: Supple, no bruits, No JVD, no nodes  Lungs: Clear, no rales, rhonchi, nowheezing +coarse cough   Heart: Regular rate and rhythm, no murmurs, gallops, or rubs  Abdomen: flat, bowel sounds positive, no tenderness, or organomegaly   MS:  Tenderness especially left acromioclavicular joint and especially the left upper trapezius --no significant change    Neuro: Alert, CN intact, oriented X 3  Extremities: No cyanosis, clubbing, or edema         Assessment:       1. COPD exacerbation    2. Pulmonary nodule    3. Psoriasis    4. Intertrigo    5. DDD (degenerative disc disease), cervical    6. Hypertension, unspecified type    7. Urinary incontinence, unspecified type    8. Morbid obesity with BMI of 40.0-44.9, adult    9. Encounter for screening colonoscopy for non-high-risk patient    10. Encounter for osteoporosis screening in asymptomatic postmenopausal patient    11. Type 2 diabetes mellitus without complication, without long-term current use of insulin        Plan:       COPD exacerbation    Pulmonary nodule    Psoriasis    Intertrigo    DDD (degenerative disc disease), cervical    Hypertension, unspecified type    Urinary incontinence, unspecified type    Morbid obesity with BMI of 40.0-44.9, adult    Encounter for screening colonoscopy for non-high-risk patient  -     Ambulatory referral/consult to General Surgery; Future; Expected date: 03/26/2020    Encounter for osteoporosis screening in asymptomatic postmenopausal patient  -     DXA Bone Density Spine And Hip; Future; Expected date: 03/19/2020    Type 2 diabetes mellitus without complication, without long-term current use of insulin  -     Foot Exam Performed  -     Ambulatory referral/consult to Ophthalmology; Future; Expected date: 03/26/2020    Other orders  -     lisinopriL 10 MG tablet; Take 1 tablet (10 mg  total) by mouth once daily.  Dispense: 90 tablet; Refill: 1  -     doxycycline (VIBRAMYCIN) 100 MG Cap; Take 1 capsule (100 mg total) by mouth once daily. for 10 days  Dispense: 10 capsule; Refill: 0  -     nystatin (MYCOSTATIN) 100,000 unit/mL suspension; Take 5 mLs (500,000 Units total) by mouth 4 (four) times daily. for 10 days  Dispense: 200 mL; Refill: 0  -     fluconazole (DIFLUCAN) 150 MG Tab; One p.o. once a week x3  Dispense: 3 tablet; Refill: 0  -     clotrimazole-betamethasone 1-0.05% (LOTRISONE) cream; Apply topically 2 (two) times daily.  Dispense: 45 g; Refill: 1        Bronchitis/COPD/bronchospasm-patient recently admitted to the hospital 03/05/2020--treated with IV antibiotics IV steroids nebulizer treatments--discharged home on Levaquin/Tessalon Perles/prednisone/albuterol nebulizer treatment/albuterol inhaler/Symbicort--patient states feels better  Still with phlegm will treat with doxycycline 1 p.o. q.d. times 10 days--call if gets diarrhea  Intertrigo--with some gale vaginal--yeast--labial area to rectal area----given Lotrisone cream for topic use b.i.d.--nystatin 1 tsp gargle swish and swallow q.6 hours--Diflucan 150 once a week x3  Psoriasis continue cream  Hypertension hyperlipidemia urinary incontinence DDD cervical spine anxiety depression  Stable  Diabetes needs to monitor diabetes  Routine labs CBCs CMP lipids hemoglobin A1c chest x-ray n 3 months  Health maintenance ft exam eye exam HIV mammogram bone density shingles:  Pneumococcal vaccine  Exercise size as tolerated try to get ideal body weight Needs to be on 1800 calorie ADA low-sodium low-fat diet  Needs to check glucose daily--glucose 93023 excellent glucose 01/21/2050 good greater than 180 poor Needs lab in 3 months bring in glucose checks and can adjust diabetic medications at that time

## 2020-03-23 ENCOUNTER — TELEPHONE (OUTPATIENT)
Dept: PRIMARY CARE CLINIC | Facility: CLINIC | Age: 65
End: 2020-03-23

## 2020-03-24 ENCOUNTER — TELEPHONE (OUTPATIENT)
Dept: DIABETES | Facility: CLINIC | Age: 65
End: 2020-03-24

## 2020-03-24 ENCOUNTER — CLINICAL SUPPORT (OUTPATIENT)
Dept: DIABETES | Facility: CLINIC | Age: 65
End: 2020-03-24
Payer: MEDICARE

## 2020-03-24 VITALS — BODY MASS INDEX: 41.94 KG/M2 | HEIGHT: 62 IN | WEIGHT: 227.94 LBS

## 2020-03-24 DIAGNOSIS — E11.65 TYPE 2 DIABETES MELLITUS WITH HYPERGLYCEMIA, WITHOUT LONG-TERM CURRENT USE OF INSULIN: Primary | ICD-10-CM

## 2020-03-24 DIAGNOSIS — E78.49 OTHER HYPERLIPIDEMIA: ICD-10-CM

## 2020-03-24 DIAGNOSIS — I10 HYPERTENSION, UNSPECIFIED TYPE: ICD-10-CM

## 2020-03-24 DIAGNOSIS — Z79.4 TYPE 2 DIABETES MELLITUS WITHOUT COMPLICATION, WITH LONG-TERM CURRENT USE OF INSULIN: Primary | ICD-10-CM

## 2020-03-24 DIAGNOSIS — E11.9 TYPE 2 DIABETES MELLITUS WITHOUT COMPLICATION, WITH LONG-TERM CURRENT USE OF INSULIN: Primary | ICD-10-CM

## 2020-03-24 PROCEDURE — G0108 PR DIAB MANAGE TRN  PER INDIV: ICD-10-PCS | Mod: S$GLB,,, | Performed by: DIETITIAN, REGISTERED

## 2020-03-24 PROCEDURE — G0108 DIAB MANAGE TRN  PER INDIV: HCPCS | Mod: S$GLB,,, | Performed by: DIETITIAN, REGISTERED

## 2020-03-24 PROCEDURE — 99999 PR PBB SHADOW E&M-EST. PATIENT-LVL III: CPT | Mod: PBBFAC,,, | Performed by: DIETITIAN, REGISTERED

## 2020-03-24 PROCEDURE — 99999 PR PBB SHADOW E&M-EST. PATIENT-LVL III: ICD-10-PCS | Mod: PBBFAC,,, | Performed by: DIETITIAN, REGISTERED

## 2020-03-24 NOTE — PROGRESS NOTES
Diabetes Education  Author: Ly Rodriguez RD, CDE  Date: 3/24/2020    Diabetes Care Management Summary  Diabetes Education Record Assessment/Progress: Initial  Current Diabetes Risk Level: Low     Diabetes Care Specialist Telehealth Visit Note    This visit was conducted using Telehealth/Telephonic Technology.  It was in the patient's best interest to receive diabetes self-management education and support services in this format to prevent the exposure to COVID-19.          Diabetes Type  Diabetes Type : Type II    Diabetes History  Diabetes Diagnosis: 0-1 year  Current Treatment: Oral Medication, Diet  Reviewed Problem List with Patient: Yes    Health Maintenance was reviewed today with patient. Discussed with patient importance of routine eye exams, foot exams/foot care, blood work (i.e.: A1c, microalbumin, and lipid), dental visits, yearly flu vaccine, and pneumonia vaccine as indicated by PCP. Patient verbalized understanding.     Health Maintenance Topics with due status: Not Due       Topic Last Completion Date    TETANUS VACCINE 07/25/2019    Lipid Panel 03/02/2020    LDCT Lung Screen 03/05/2020    Hemoglobin A1c 03/07/2020    Low Dose Statin 03/19/2020    Foot Exam 03/19/2020     Health Maintenance Due   Topic Date Due    Eye Exam  02/28/1965    Mammogram  02/28/1995    DEXA SCAN  02/28/1995    Colonoscopy  02/28/2005    Pneumococcal Vaccine (65+ High/Highest Risk) (1 of 2 - PCV13) 02/28/2020       Nutrition  Meal Planning: eats out seldom, 3 meals per day  What type of sweetener do you use?: Splenda  What type of beverages do you drink?: diet soda/tea(diet peach tea or diet green tea)  Meal Plan 24 Hour Recall - Breakfast: 2 slices toast with metformin because it give patient terrible heartburn and a headache  Meal Plan 24 Hour Recall - Lunch: wings and jambalaya  Meal Plan 24 Hour Recall - Dinner: red eans and rice  Meal Plan 24 Hour Recall - Snack: sucks on peppermints because of the heartburn from  the metformin    Monitoring   Monitoring: Other  Self Monitoring : true metrix testing TID BS ranging from 142-249 before lunch is the highest  Blood Glucose Logs: Yes  Do you use a personal continuous glucose monitor?: No  In the last month, how often have you had a low blood sugar reaction?: never  What are your symptoms of low blood sugar?: able to recall shaky and dizzy  How do you treat low blood sugar?: able to recall eat a few peppermint  Can you tell when your blood sugar is too high?: no  How do you treat high blood sugar?: take metformins, able to recall exercise and drinking water helps    Exercise   Exercise Type: none  Frequency: Never    Current Diabetes Treatment   Current Treatment: Oral Medication, Diet    Social History  Preferred Learning Method: Face to Face  Primary Support: Self, Family  Educational Level: High School  Smoking Status: Ex Smoker  Alcohol Use: Never            DDS-2 Score  ( > 3 = SIGNIFICANT DISTRESS): 2                   Barriers to Change  Barriers to Change: None  Learning Challenges : None    Readiness to Learn   Readiness to Learn : Acceptance    Cultural Influences  Cultural Influences: No    Diabetes Education Assessment/Progress  Diabetes Disease Process (diabetes disease process and treatment options): Discussion, Instructed, Individual Session, Comprehends Key Points, Written Materials Provided, Needs Review  Nutrition (Incorporating nutritional management into one's lifestyle): Comprehends Key Points, Discussion, Individual Session, Instructed, Written Materials Provided, Needs Review  Physical Activity (incorporating physical activity into one's lifestyle): Comprehends Key Points, Discussion, Instructed, Individual Session, Written Materials Provided, Needs Review  Medications (states correct name, dose, onset, peak, duration, side effects & timing of meds): Comprehends Key Points, Discussion, Instructed, Individual Session, Written Materials Provided  Monitoring  (monitoring blood glucose/other parameters & using results): Comprehends Key Points, Discussion, Instructed, Individual Session, Written Materials Provided  Acute Complications (preventing, detecting, and treating acute complications): Comprehends Key Points, Discussion, Instructed, Individual Session, Written Materials Provided  Chronic Complications (preventing, detecting, and treating chronic complications): Comprehends Key Points, Discussion, Individual Session, Needs Review, Written Materials Provided  Clinical (diabetes, other pertinent medical history, and relevant comorbidities reviewed during visit): Discussion, Individual Session, Instructed, Written Materials Provided, Needs Review  Cognitive (knowledge of self-management skills, functional health literacy): Discussion, Individual Session, Written Materials Provided, Needs Instruction  Psychosocial (emotional response to diabetes): Discussion, Individual Session, Written Materials Provided, Needs Instruction  Diabetes Distress and Support Systems: Discussion, Individual Session, Written Materials Provided, Needs Instruction  Behavioral (readiness for change, lifestyle practices, self-care behaviors): Comprehends Key Points, Instructed, Discussion, Individual Session, Written Materials Provided, Needs Review  Patient educated on what is DM, T1DM, T2DM, risk factors, managing DM, DM diet, carbohydrate counting, meal planning, reading a food label, healthy snack options, benefits of physical activity, diabetes care schedule, foot care guidelines, diabetes and retinopathy screening, s/s hypo and hyperglycemia, long/short term complication of uncontrolled DM, importance of compliance with treatment plan, how to use a glucometer, reviewed understanding diabetes distress, medications for treating DM, their mechanism of action and possible side effects, reviewed current level and goal level for HgbA1c, blood glucose, microalbumin, and lipids. Patient provided  with written literature, diabetes management resources and support, DM Management program contact information.    Goals  Patient has selected/evaluated goals during today's session: Yes, selected  Healthy Eating: Set  Start Date: 03/24/20  Target Date: 04/24/20         Diabetes Care Plan/Intervention  Education Plan/Intervention: Individual Follow-Up DSMT    Diabetes Meal Plan  Restrictions: Low Fat, Low Sodium, Restricted Carbohydrate  Calories: 1500  Carbohydrate Per Meal: 30-45g  Carbohydrate Per Snack : 7-15g  Fat: 42  Protein: 113    Today's Self-Management Care Plan was developed with the patient's input and is based on barriers identified during today's assessment.    The long and short-term goals in the care plan were written with the patient/caregiver's input. The patient has agreed to work toward these goals to improve her overall diabetes control.      The patient received a copy of today's self-management plan and verbalized understanding of the care plan, goals, and all of today's instructions.      The patient was encouraged to communicate with her physician and care team regarding her condition(s) and treatment.  I provided the patient with my contact information today and encouraged her to contact me via phone or patient portal as needed.     Education Units of Time   Time Spent: 45 min

## 2020-03-24 NOTE — TELEPHONE ENCOUNTER
Patient is experiencing severe heartburn and headache when taking metformin, just as heartburn starts to subside it is time to take metformin again and the heartburn starts all over. Patient is having to constantly suck on peppermints to help with the heartburn. Can we change medication

## 2020-03-26 RX ORDER — GLIPIZIDE 5 MG/1
TABLET ORAL
Qty: 60 TABLET | Refills: 5 | Status: SHIPPED | OUTPATIENT
Start: 2020-03-26 | End: 2020-06-04 | Stop reason: SDUPTHER

## 2020-03-26 NOTE — TELEPHONE ENCOUNTER
Call pt tell stop metformin--- needs start glipizide 5 mg 1 po qd if  Glucose stays high can inrease to 1 AM and 1 PM Call in #[phone] 5 refills Be siure pt not on insulin Needs redo lab 3 mo CBC,CMP,lipid HGBA1C see m,e 2 weeks later

## 2020-03-27 NOTE — TELEPHONE ENCOUNTER
----- Message from Berta Prescott sent at 3/27/2020  8:22 AM CDT -----  Contact: Patient  Type: Needs Medical Advice    Who Called:  Magdalena patient  Symptoms (please be specific):  Heartburn  How long has patient had these symptoms:  A couple of days  Pharmacy name and phone #:    [id number] - [place], [place] - [address] [address]  Phone: [phone] Fax: [phone]  Best Call Back Number: [phone]  Additional Information: Calling to ask for a prescription for the heartburn. Does not want to start the new diabetic medication until she gets rid of the heartburn. Please advise. Thanks.

## 2020-03-28 RX ORDER — OMEPRAZOLE 40 MG/1
40 CAPSULE, DELAYED RELEASE ORAL DAILY
Qty: 30 CAPSULE | Refills: 5 | Status: SHIPPED | OUTPATIENT
Start: 2020-03-28 | End: 2020-11-10 | Stop reason: SDUPTHER

## 2020-04-17 ENCOUNTER — TELEPHONE (OUTPATIENT)
Dept: PULMONOLOGY | Facility: CLINIC | Age: 65
End: 2020-04-17

## 2020-04-17 NOTE — TELEPHONE ENCOUNTER
I called Mrs Acevedo who was in [facility] in early March for COPD exacerbation and was suppose to see Dr Prakash in clinic for follow up.I attempted to explain  Virtual Visits to Mrs Acevedo but she interrupted me saying she does not want one, nor does she want to come to clinic when it reopens. Lu Sheffield LPN.

## 2020-04-30 ENCOUNTER — TELEPHONE (OUTPATIENT)
Dept: PRIMARY CARE CLINIC | Facility: CLINIC | Age: 65
End: 2020-04-30

## 2020-05-21 ENCOUNTER — PATIENT OUTREACH (OUTPATIENT)
Dept: ADMINISTRATIVE | Facility: HOSPITAL | Age: 65
End: 2020-05-21

## 2020-05-21 NOTE — PROGRESS NOTES
Immunizations reviewed. [facility] reviewed. Care Everywhere reviewed. [facility] Provider Portal reviewed. Attempted to contact patient to discuss/schedule overdue HM. Patient advised she is at the store right now and to contact her at a later time. Call was ended.

## 2020-06-04 ENCOUNTER — CLINICAL SUPPORT (OUTPATIENT)
Dept: PRIMARY CARE CLINIC | Facility: CLINIC | Age: 65
End: 2020-06-04
Payer: MEDICARE

## 2020-06-04 DIAGNOSIS — E11.65 TYPE 2 DIABETES MELLITUS WITH HYPERGLYCEMIA, WITHOUT LONG-TERM CURRENT USE OF INSULIN: ICD-10-CM

## 2020-06-04 LAB
ALBUMIN SERPL BCP-MCNC: 4.4 G/DL (ref 3.5–5.2)
ALP SERPL-CCNC: 79 U/L (ref 38–126)
ALT SERPL W/O P-5'-P-CCNC: 26 U/L (ref 14–54)
ANION GAP SERPL CALC-SCNC: 12 MMOL/L (ref 8–16)
AST SERPL-CCNC: 19 U/L (ref 15–41)
BASOPHILS # BLD AUTO: 0.1 K/UL (ref 0–0.2)
BASOPHILS NFR BLD: 0.8 % (ref 0–1.9)
BILIRUB SERPL-MCNC: 0.5 MG/DL (ref 0.3–1.2)
BUN SERPL-MCNC: 16 MG/DL (ref 8–23)
CALCIUM SERPL-MCNC: 9 MG/DL (ref 8.6–10)
CHLORIDE SERPL-SCNC: 103 MMOL/L (ref 101–111)
CHOLEST SERPL-MCNC: 179 MG/DL (ref 80–200)
CHOLEST/HDLC SERPL: 4.7 {RATIO} (ref 2–5)
CO2 SERPL-SCNC: 26 MMOL/L (ref 23–29)
CREAT SERPL-MCNC: 0.8 MG/DL (ref 0.5–1.4)
DIFFERENTIAL METHOD: ABNORMAL
EOSINOPHIL # BLD AUTO: 0.3 K/UL (ref 0–0.5)
EOSINOPHIL NFR BLD: 2.5 % (ref 0–8)
ERYTHROCYTE [DISTWIDTH] IN BLOOD BY AUTOMATED COUNT: 14.9 % (ref 11.5–14.5)
EST. GFR  (AFRICAN AMERICAN): >60 ML/MIN/1.73 M^2
EST. GFR  (NON AFRICAN AMERICAN): >60 ML/MIN/1.73 M^2
ESTIMATED AVG GLUCOSE: 134 MG/DL (ref 68–131)
GLUCOSE SERPL-MCNC: 142 MG/DL (ref 74–118)
HBA1C MFR BLD HPLC: 6.3 % (ref 4–5.6)
HCT VFR BLD AUTO: 44.3 % (ref 37–48.5)
HDLC SERPL-MCNC: 38 MG/DL (ref 40–75)
HDLC SERPL: 21.2 % (ref 20–50)
HGB BLD-MCNC: 14.7 G/DL (ref 12–16)
LDLC SERPL CALC-MCNC: 106 MG/DL
LYMPHOCYTES # BLD AUTO: 2.6 K/UL (ref 1–4.8)
LYMPHOCYTES NFR BLD: 21 % (ref 18–48)
MCH RBC QN AUTO: 30.4 PG (ref 27–31)
MCHC RBC AUTO-ENTMCNC: 33.2 G/DL (ref 32–36)
MCV RBC AUTO: 92 FL (ref 82–98)
MONOCYTES # BLD AUTO: 1 K/UL (ref 0.3–1)
MONOCYTES NFR BLD: 7.8 % (ref 4–15)
NEUTROPHILS # BLD AUTO: 8.4 K/UL (ref 1.8–7.7)
NEUTROPHILS NFR BLD: 67.9 % (ref 38–73)
NONHDLC SERPL-MCNC: 141 MG/DL
PLATELET # BLD AUTO: 302 K/UL (ref 150–350)
PMV BLD AUTO: 8.6 FL (ref 9.2–12.9)
POTASSIUM SERPL-SCNC: 4.1 MMOL/L (ref 3.5–5.1)
PROT SERPL-MCNC: 7.7 G/DL (ref 6–8.4)
RBC # BLD AUTO: 4.83 M/UL (ref 4–5.4)
SODIUM SERPL-SCNC: 141 MMOL/L (ref 136–145)
TRIGL SERPL-MCNC: 176 MG/DL (ref 30–150)
WBC # BLD AUTO: 12.4 K/UL (ref 3.9–12.7)

## 2020-06-04 PROCEDURE — 36415 COLL VENOUS BLD VENIPUNCTURE: CPT | Mod: S$GLB,,, | Performed by: FAMILY MEDICINE

## 2020-06-04 PROCEDURE — 80053 COMPREHEN METABOLIC PANEL: CPT

## 2020-06-04 PROCEDURE — 36415 PR COLLECTION VENOUS BLOOD,VENIPUNCTURE: ICD-10-PCS | Mod: S$GLB,,, | Performed by: FAMILY MEDICINE

## 2020-06-04 PROCEDURE — 80061 LIPID PANEL: CPT

## 2020-06-04 PROCEDURE — 83036 HEMOGLOBIN GLYCOSYLATED A1C: CPT

## 2020-06-04 PROCEDURE — 85025 COMPLETE CBC W/AUTO DIFF WBC: CPT

## 2020-06-04 RX ORDER — OMEPRAZOLE 40 MG/1
40 CAPSULE, DELAYED RELEASE ORAL DAILY
Qty: 30 CAPSULE | Refills: 5 | Status: CANCELLED | OUTPATIENT
Start: 2020-06-04 | End: 2021-06-04

## 2020-06-04 RX ORDER — METFORMIN HYDROCHLORIDE 500 MG/1
500 TABLET ORAL 2 TIMES DAILY WITH MEALS
Qty: 60 TABLET | Refills: 5 | Status: CANCELLED | OUTPATIENT
Start: 2020-06-04 | End: 2021-06-04

## 2020-06-04 RX ORDER — BUDESONIDE AND FORMOTEROL FUMARATE DIHYDRATE 160; 4.5 UG/1; UG/1
2 AEROSOL RESPIRATORY (INHALATION) EVERY 12 HOURS
Qty: 10.2 G | Refills: 5 | Status: CANCELLED | OUTPATIENT
Start: 2020-06-04 | End: 2021-06-04

## 2020-06-04 NOTE — PROGRESS NOTES
Subjective:       Patient ID: Magdalena Acevedo is a 65 y.o. female.    Chief Complaint: No chief complaint on file.    HPI:  65-year-old female--patient calling for lab results--eating well---+BM---ambulation gets too short of breath   Lab   chemistry glucose 142 E hemoglobin A1c 6.3 is excellent triglyceride 176 HDL 38 can increase that with exercise  better of 100       Patient had chest x-ray EKG in March both within normal limits does have some shortness of breath that is improved with inhalers---ProAir in Symbicort      ROS:  Skin: + psoriasis--states off the charts--usually just elbows a below-knee on doubt--now off back anterior chest arms face--needs to see dermatologist  HEENT: no  headache, no  ocular pain, blurred vision, diplopia, epistaxis,+hoarseness change in voice,  No thyroid trouble on hand his to means due to a sinus strip that makes patient cough   Lung: No pneumonia, asthma, Tb, wheezing, SOB, no smoking history of bronchitis/COPD--recent hospital admission see history of present  Heart: No chest pain, + ankle edema in the afternoon no palpitations, MI, mendy murmur, +hypertension blood pressure,+  Hyperlipidemia---no stent bypass arrhythmia  Abdomen: No nausea, vomiting, diarrhea, constipation, ulcers, hepatitis,  melena, hematochezia, hematemesis history cholecystectomy  : no UTI, renal disease, stones + urinary incontinence with coughing  GYN last mammogram over 20 years Pap smear over 20 years  MS: no fractures, O/A, lupus, rheumatoid, gout left shoulder pain  + DDD cervical spine X up now on  Neuro: No dizziness, LOC, seizures   +diabetes Glu 160 now off steroids , no anemia, +anxiety, + depression upset gets angry patient is on Zoloft but not using lorazepam or Ativan doing okay   , 1 child, unemployed,, grandson stays with patient in every other week great grand children stay with patient    Objective:   Physical Exam:  No physical exam was done this was a virtual  visit  General: Well nourished, well developed, no acute distress + morbid obesity  Skin: + Dry scaly skin especially on the legs scalp arms and back-psoriasis --history vaginal yeast  HEENT: Eyes PERRLA, EOM intact, nose clear , throat nonerythema ears TMs clear  NECK: Supple, no bruits, No JVD, no nodes  Lungs: Clear, no rales, rhonchi, nowheezing +coarse cough   Heart: Regular rate and rhythm, no murmurs, gallops, or rubs  Abdomen: flat, bowel sounds positive, no tenderness, or organomegaly   MS:  Tenderness especially left acromioclavicular joint and especially the left upper trapezius --no significant change    Neuro: Alert, CN intact, oriented X 3  Extremities: No cyanosis, clubbing, or edema         Assessment:       1. Hypertension, unspecified type    2. Borderline diabetes    3. Other hyperlipidemia    4. Type 2 diabetes mellitus with hyperglycemia, without long-term current use of insulin    5. Morbid obesity with BMI of 40.0-44.9, adult    6. Urinary incontinence, unspecified type    7. COPD exacerbation    8. Psoriasis    9. DDD (degenerative disc disease), cervical    10. Visit for screening mammogram    11. Encounter for screening colonoscopy    12. Encounter for osteoporosis screening in asymptomatic postmenopausal patient    13. HIV screening declined    14. Encounter for screening for human immunodeficiency virus (HIV)         Plan:       Hypertension, unspecified type  -     CBC auto differential; Future; Expected date: 12/05/2020  -     Comprehensive metabolic panel; Future; Expected date: 12/05/2020  -     TSH; Future; Expected date: 12/05/2020  -     T4, free; Future; Expected date: 12/05/2020    Borderline diabetes    Other hyperlipidemia  -     Lipid Panel; Future; Expected date: 12/05/2020    Type 2 diabetes mellitus with hyperglycemia, without long-term current use of insulin  -     Hemoglobin A1C; Future; Expected date: 12/05/2020    Morbid obesity with BMI of 40.0-44.9, adult    Urinary  incontinence, unspecified type    COPD exacerbation    Psoriasis  -     Ambulatory referral/consult to Dermatology; Future; Expected date: 06/12/2020    DDD (degenerative disc disease), cervical    Visit for screening mammogram  -     Mammo Digital Screening Bilat without CA; Future; Expected date: 06/19/2020    Encounter for screening colonoscopy  -     Cancel: Ambulatory referral/consult to General Surgery; Future; Expected date: 06/12/2020    Encounter for osteoporosis screening in asymptomatic postmenopausal patient  -     DXA Bone Density Spine And Hip; Future; Expected date: 06/05/2020    HIV screening declined  -     HIV 1/2 Ag/Ab (4th Gen); Future; Expected date: 06/05/2020    Encounter for screening for human immunodeficiency virus (HIV)   -     HIV 1/2 Ag/Ab (4th Gen); Future; Expected date: 06/05/2020    Other orders  -     sertraline (ZOLOFT) 25 MG tablet; Take 1 tablet (25 mg total) by mouth once daily.  Dispense: 90 tablet; Refill: 1  -     lisinopriL 10 MG tablet; Take 1 tablet (10 mg total) by mouth once daily.  Dispense: 90 tablet; Refill: 1  -     glipiZIDE (GLUCOTROL) 5 MG tablet; Stop metformin take glipizide 1 p.o. Q.a.m. If glucose remains high after 1 week can increase to 1 p.o. B.i.d.  Dispense: 180 tablet; Refill: 1  -     diclofenac (VOLTAREN) 75 MG EC tablet; Take 1 tablet (75 mg total) by mouth 2 (two) times daily.  Dispense: 180 tablet; Refill: 1  -     atorvastatin (LIPITOR) 10 MG tablet; Take 1 tablet (10 mg total) by mouth every evening.  Dispense: 90 tablet; Refill: 1  -     amLODIPine (NORVASC) 5 MG tablet; Take 1 tablet (5 mg total) by mouth once daily.  Dispense: 90 tablet; Refill: 1  -     fluticasone propionate (FLONASE) 50 mcg/actuation nasal spray; 2 sprays (100 mcg total) by Each Nostril route once daily.  Dispense: 16 g; Refill: 5  -     furosemide (LASIX) 40 MG tablet; One p.o. q.a.m. p.r.n. ankle with edema only  Dispense: 30 tablet; Refill: 5  -     potassium chloride  (MICRO-K) 10 MEQ CpSR; One p.o. q.a.m. with Lasix only  Dispense: 30 capsule; Refill: 5         Diabetes lab reviewed glucose 142 glucose 100-120 excellent 120-150 very good greater than 180 poor patient 142 is very good hemoglobin A1c 6.3 6-6.5 excellent, 6.5-7 very good greater than 8 poor patient is excellent continue glipizide not taking metformin  Hypertension/hyperlipidemia 1800 calorie ADA low-sodium low-fat diet/exercise as tolerated/try to maintain ideal body weight/refill of Norvasc for hypertension needs to have arm blood pressure cuff check blood pressure daily blood pressure needs to be 140/90 or less and greater than 90/61 Lipitor for cholesterol  Psoriasis--had an exacerbation usually on the elbows and lower legs now 1 anterior trunk face needs to see Dr. Davila [facility]  Shortness of breath with some peripheral edema will start on Lasix 41 p.o. q.a.m. p.r.n. edema with Micro-K 1 p.o. q.a.m. with Lasix only  Chronic sinusitis will treat with Flonase has over-the-counter antihistamine  Urinary incontinence same  Cervical DDD doing well  Anxiety depression stable  Call by [employer] told needs mammogram colonoscopy  Needs routine labs q.6 months CBCs CMP lipids hemoglobin A1c T4 TSH  In 6 months health maintenance eye exam HIV mammogram DEXA scan colonoscopy pneumococcal  Established Patient - Audio Only Telehealth Visit     The patient location is:  Home  The chief complaint leading to consultation is:  Lab in refills  Visit type: Virtual visit with audio only (telephone)  Total time spent with patient:  25 min       The reason for the audio only service rather than synchronous audio and video virtual visit was related to technical difficulties or patient preference/necessity.     Each patient to whom I provide medical services by telemedicine is:  (1) informed of the relationship between the physician and patient and the respective role of any other health care provider with respect to  management of the patient; and (2) notified that they may decline to receive medical services by telemedicine and may withdraw from such care at any time. Patient verbally consented to receive this service via voice-only telephone call.       HPI:  See history of present illness above     Assessment and plan:  See plan above                        This service was not originating from a related E/M service provided within the previous 7 days nor will  to an E/M service or procedure within the next 24 hours or my soonest available appointment.  Prevailing standard of care was able to be met in this audio-only visit.

## 2020-06-05 ENCOUNTER — TELEPHONE (OUTPATIENT)
Dept: SURGERY | Facility: CLINIC | Age: 65
End: 2020-06-05

## 2020-06-05 ENCOUNTER — OFFICE VISIT (OUTPATIENT)
Dept: PRIMARY CARE CLINIC | Facility: CLINIC | Age: 65
End: 2020-06-05
Payer: MEDICARE

## 2020-06-05 DIAGNOSIS — Z13.820 ENCOUNTER FOR OSTEOPOROSIS SCREENING IN ASYMPTOMATIC POSTMENOPAUSAL PATIENT: ICD-10-CM

## 2020-06-05 DIAGNOSIS — L40.9 PSORIASIS: ICD-10-CM

## 2020-06-05 DIAGNOSIS — R73.03 BORDERLINE DIABETES: ICD-10-CM

## 2020-06-05 DIAGNOSIS — Z11.4 ENCOUNTER FOR SCREENING FOR HUMAN IMMUNODEFICIENCY VIRUS (HIV): ICD-10-CM

## 2020-06-05 DIAGNOSIS — Z12.11 SCREENING FOR COLON CANCER: Primary | ICD-10-CM

## 2020-06-05 DIAGNOSIS — Z53.20 HIV SCREENING DECLINED: ICD-10-CM

## 2020-06-05 DIAGNOSIS — Z12.11 ENCOUNTER FOR SCREENING COLONOSCOPY: ICD-10-CM

## 2020-06-05 DIAGNOSIS — I10 HYPERTENSION, UNSPECIFIED TYPE: Primary | ICD-10-CM

## 2020-06-05 DIAGNOSIS — E11.65 TYPE 2 DIABETES MELLITUS WITH HYPERGLYCEMIA, WITHOUT LONG-TERM CURRENT USE OF INSULIN: ICD-10-CM

## 2020-06-05 DIAGNOSIS — E66.01 MORBID OBESITY WITH BMI OF 40.0-44.9, ADULT: ICD-10-CM

## 2020-06-05 DIAGNOSIS — R32 URINARY INCONTINENCE, UNSPECIFIED TYPE: ICD-10-CM

## 2020-06-05 DIAGNOSIS — Z12.31 VISIT FOR SCREENING MAMMOGRAM: ICD-10-CM

## 2020-06-05 DIAGNOSIS — E78.49 OTHER HYPERLIPIDEMIA: ICD-10-CM

## 2020-06-05 DIAGNOSIS — M50.30 DDD (DEGENERATIVE DISC DISEASE), CERVICAL: ICD-10-CM

## 2020-06-05 DIAGNOSIS — Z78.0 ENCOUNTER FOR OSTEOPOROSIS SCREENING IN ASYMPTOMATIC POSTMENOPAUSAL PATIENT: ICD-10-CM

## 2020-06-05 DIAGNOSIS — J44.1 COPD EXACERBATION: ICD-10-CM

## 2020-06-05 PROCEDURE — 99443 PR PHYSICIAN TELEPHONE EVALUATION 21-30 MIN: ICD-10-PCS | Mod: 95,,, | Performed by: FAMILY MEDICINE

## 2020-06-05 PROCEDURE — 1101F PT FALLS ASSESS-DOCD LE1/YR: CPT | Mod: CPTII,95,, | Performed by: FAMILY MEDICINE

## 2020-06-05 PROCEDURE — 3044F PR MOST RECENT HEMOGLOBIN A1C LEVEL <7.0%: ICD-10-PCS | Mod: CPTII,95,, | Performed by: FAMILY MEDICINE

## 2020-06-05 PROCEDURE — 99443 PR PHYSICIAN TELEPHONE EVALUATION 21-30 MIN: CPT | Mod: 95,,, | Performed by: FAMILY MEDICINE

## 2020-06-05 PROCEDURE — 3044F HG A1C LEVEL LT 7.0%: CPT | Mod: CPTII,95,, | Performed by: FAMILY MEDICINE

## 2020-06-05 PROCEDURE — 1101F PR PT FALLS ASSESS DOC 0-1 FALLS W/OUT INJ PAST YR: ICD-10-PCS | Mod: CPTII,95,, | Performed by: FAMILY MEDICINE

## 2020-06-05 RX ORDER — FUROSEMIDE 40 MG/1
TABLET ORAL
Qty: 30 TABLET | Refills: 5 | Status: SHIPPED | OUTPATIENT
Start: 2020-06-05 | End: 2020-11-10 | Stop reason: SDUPTHER

## 2020-06-05 RX ORDER — POTASSIUM CHLORIDE 750 MG/1
CAPSULE, EXTENDED RELEASE ORAL
Qty: 30 CAPSULE | Refills: 5 | Status: SHIPPED | OUTPATIENT
Start: 2020-06-05 | End: 2020-11-10 | Stop reason: SDUPTHER

## 2020-06-05 RX ORDER — SODIUM CHLORIDE, SODIUM LACTATE, POTASSIUM CHLORIDE, CALCIUM CHLORIDE 600; 310; 30; 20 MG/100ML; MG/100ML; MG/100ML; MG/100ML
INJECTION, SOLUTION INTRAVENOUS CONTINUOUS
Status: CANCELLED | OUTPATIENT
Start: 2020-06-05

## 2020-06-05 RX ORDER — GLIPIZIDE 5 MG/1
TABLET ORAL
Qty: 180 TABLET | Refills: 1 | Status: SHIPPED | OUTPATIENT
Start: 2020-06-05 | End: 2020-11-10 | Stop reason: SDUPTHER

## 2020-06-05 RX ORDER — SERTRALINE HYDROCHLORIDE 25 MG/1
25 TABLET, FILM COATED ORAL DAILY
Qty: 90 TABLET | Refills: 1 | Status: SHIPPED | OUTPATIENT
Start: 2020-06-05 | End: 2020-11-10 | Stop reason: SDUPTHER

## 2020-06-05 RX ORDER — AMLODIPINE BESYLATE 5 MG/1
5 TABLET ORAL DAILY
Qty: 90 TABLET | Refills: 1 | Status: SHIPPED | OUTPATIENT
Start: 2020-06-05 | End: 2020-11-10 | Stop reason: SDUPTHER

## 2020-06-05 RX ORDER — DICLOFENAC SODIUM 75 MG/1
75 TABLET, DELAYED RELEASE ORAL 2 TIMES DAILY
Qty: 180 TABLET | Refills: 1 | Status: SHIPPED | OUTPATIENT
Start: 2020-06-05 | End: 2020-11-10 | Stop reason: SDUPTHER

## 2020-06-05 RX ORDER — POLYETHYLENE GLYCOL 3350, SODIUM SULFATE ANHYDROUS, SODIUM BICARBONATE, SODIUM CHLORIDE, POTASSIUM CHLORIDE 236; 22.74; 6.74; 5.86; 2.97 G/4L; G/4L; G/4L; G/4L; G/4L
4 POWDER, FOR SOLUTION ORAL ONCE
Qty: 4000 ML | Refills: 0 | Status: SHIPPED | OUTPATIENT
Start: 2020-06-05 | End: 2020-06-05

## 2020-06-05 RX ORDER — ATORVASTATIN CALCIUM 10 MG/1
10 TABLET, FILM COATED ORAL NIGHTLY
Qty: 90 TABLET | Refills: 1 | Status: SHIPPED | OUTPATIENT
Start: 2020-06-05 | End: 2020-11-10 | Stop reason: SDUPTHER

## 2020-06-05 RX ORDER — SODIUM CHLORIDE 0.9 % (FLUSH) 0.9 %
3 SYRINGE (ML) INJECTION
Status: CANCELLED | OUTPATIENT
Start: 2020-06-05

## 2020-06-05 RX ORDER — LISINOPRIL 10 MG/1
10 TABLET ORAL DAILY
Qty: 90 TABLET | Refills: 1 | Status: SHIPPED | OUTPATIENT
Start: 2020-06-05 | End: 2020-11-10 | Stop reason: SDUPTHER

## 2020-06-05 RX ORDER — FLUTICASONE PROPIONATE 50 MCG
2 SPRAY, SUSPENSION (ML) NASAL DAILY
Qty: 16 G | Refills: 5 | Status: SHIPPED | OUTPATIENT
Start: 2020-06-05 | End: 2021-07-06 | Stop reason: SDUPTHER

## 2020-06-05 NOTE — TELEPHONE ENCOUNTER
----- Message from Elizabeth Tenorio LPN sent at 6/5/2020 12:29 PM CDT -----  Regarding: Colonoscopy   Please contact patient in regards to colonoscopy referral. NINFA Roger

## 2020-06-05 NOTE — TELEPHONE ENCOUNTER
Called patient at all available numbers in reference to a referral to  Ambulatory Colorectal Surgery for colon cancer screening, appointment completede

## 2020-06-05 NOTE — TELEPHONE ENCOUNTER
Called patient in reference to a referral to Colorectal Surgery for colon cancer screening. Patient verbally consented to a Colonoscopy and requested to be scheduled for a Colonoscopy on 07/20/2020. Patient was advised a designated  is required on the day of the Colonoscopy to drive the patient home and the  must be at least. 18 years old.Colonoscopy Prep instructions were thoroughly explained and discussed with the patient. Patient acknowledges understanding Prep instructions. Patient was advised the Colonoscopy Prep instructions discussed and explained on the phone , are being mailed out to the patient's address on file. Patient's address on file was verified with the patient for accuracy of mailing. Patient's medications on file was reviewed with the patient for accuracy of information. Patient denies taking  any other medications other than those listed and verified on medication profile.Patient was explained the Colonoscopy will be performed here at [facility]. Patient was further explained the Pre-Op will call one day prior to the procedure to discuss Pre-Op instructions and what time to report for the Colonoscopy. The patient was given the opportunity to ask any questions about the Colonoscopy. No further issues were discussed.

## 2020-06-11 ENCOUNTER — HOSPITAL ENCOUNTER (OUTPATIENT)
Dept: RADIOLOGY | Facility: HOSPITAL | Age: 65
Discharge: HOME | End: 2020-06-11
Attending: FAMILY MEDICINE
Payer: MEDICARE

## 2020-06-11 DIAGNOSIS — Z12.31 VISIT FOR SCREENING MAMMOGRAM: ICD-10-CM

## 2020-06-11 PROCEDURE — 77063 MAMMO DIGITAL SCREENING BILAT WITH TOMOSYNTHESIS_CAD: ICD-10-PCS | Mod: 26,,, | Performed by: RADIOLOGY

## 2020-06-11 PROCEDURE — 77067 SCR MAMMO BI INCL CAD: CPT | Mod: 26,,, | Performed by: RADIOLOGY

## 2020-06-11 PROCEDURE — 77067 MAMMO DIGITAL SCREENING BILAT WITH TOMOSYNTHESIS_CAD: ICD-10-PCS | Mod: 26,,, | Performed by: RADIOLOGY

## 2020-06-11 PROCEDURE — 77063 BREAST TOMOSYNTHESIS BI: CPT | Mod: 26,,, | Performed by: RADIOLOGY

## 2020-06-11 PROCEDURE — 77067 SCR MAMMO BI INCL CAD: CPT | Mod: TC,PO

## 2020-06-15 ENCOUNTER — TELEPHONE (OUTPATIENT)
Dept: ENDOSCOPY | Facility: HOSPITAL | Age: 65
End: 2020-06-15

## 2020-06-15 NOTE — TELEPHONE ENCOUNTER
----- Message from Korin Kulkarni sent at 6/5/2020 10:35 AM CDT -----  Contact: [phone]  Dr. Jaime William has put in a referral for patient to be scheduled with colorectal surgery. Can you assist in scheduling patient.     Encounter for screening colonoscopy [Z12.11]

## 2020-06-17 ENCOUNTER — TELEPHONE (OUTPATIENT)
Dept: ENDOSCOPY | Facility: HOSPITAL | Age: 65
End: 2020-06-17

## 2020-06-17 NOTE — TELEPHONE ENCOUNTER
Contacted Pt to schedule colonoscopy, Pt is currently scheduled at [facility] for 7/20/20. Pt given [facility] contact number for any further questions regarding scheduled colonoscopy. Pt verbalized understanding.

## 2020-06-18 ENCOUNTER — TELEPHONE (OUTPATIENT)
Dept: PRIMARY CARE CLINIC | Facility: CLINIC | Age: 65
End: 2020-06-18

## 2020-06-18 NOTE — TELEPHONE ENCOUNTER
----- Message from Berta Prescott sent at 6/18/2020 12:15 PM CDT -----  Contact: Patient  Type: Needs Medical Advice    Who Called:  Magdalena patient  Symptoms (please be specific):  N/A  How long has patient had these symptoms:  N?A  Pharmacy name and phone #:  N/A  Best Call Back Number: [phone]  Additional Information: Calling to get a copy of her labs from last week. Please call her. Thanks.

## 2020-07-06 ENCOUNTER — OFFICE VISIT (OUTPATIENT)
Dept: PRIMARY CARE CLINIC | Facility: CLINIC | Age: 65
End: 2020-07-06
Payer: MEDICARE

## 2020-07-06 VITALS
DIASTOLIC BLOOD PRESSURE: 74 MMHG | HEIGHT: 62 IN | WEIGHT: 230.5 LBS | HEART RATE: 89 BPM | SYSTOLIC BLOOD PRESSURE: 118 MMHG | RESPIRATION RATE: 18 BRPM | BODY MASS INDEX: 42.42 KG/M2 | OXYGEN SATURATION: 98 %

## 2020-07-06 DIAGNOSIS — E11.65 TYPE 2 DIABETES MELLITUS WITH HYPERGLYCEMIA, WITHOUT LONG-TERM CURRENT USE OF INSULIN: ICD-10-CM

## 2020-07-06 DIAGNOSIS — R76.8 HEPATITIS B CORE ANTIBODY POSITIVE: ICD-10-CM

## 2020-07-06 DIAGNOSIS — E66.01 MORBID OBESITY WITH BMI OF 40.0-44.9, ADULT: ICD-10-CM

## 2020-07-06 DIAGNOSIS — G25.81 RESTLESS LEGS SYNDROME: ICD-10-CM

## 2020-07-06 DIAGNOSIS — L40.9 PSORIASIS: Primary | ICD-10-CM

## 2020-07-06 DIAGNOSIS — J44.1 COPD EXACERBATION: ICD-10-CM

## 2020-07-06 DIAGNOSIS — I10 HYPERTENSION, UNSPECIFIED TYPE: ICD-10-CM

## 2020-07-06 PROCEDURE — 1101F PT FALLS ASSESS-DOCD LE1/YR: CPT | Mod: CPTII,S$GLB,, | Performed by: FAMILY MEDICINE

## 2020-07-06 PROCEDURE — 3044F PR MOST RECENT HEMOGLOBIN A1C LEVEL <7.0%: ICD-10-PCS | Mod: CPTII,S$GLB,, | Performed by: FAMILY MEDICINE

## 2020-07-06 PROCEDURE — 99213 OFFICE O/P EST LOW 20 MIN: CPT | Mod: S$GLB,,, | Performed by: FAMILY MEDICINE

## 2020-07-06 PROCEDURE — 3074F PR MOST RECENT SYSTOLIC BLOOD PRESSURE < 130 MM HG: ICD-10-PCS | Mod: CPTII,S$GLB,, | Performed by: FAMILY MEDICINE

## 2020-07-06 PROCEDURE — 99999 PR PBB SHADOW E&M-EST. PATIENT-LVL IV: ICD-10-PCS | Mod: PBBFAC,,, | Performed by: FAMILY MEDICINE

## 2020-07-06 PROCEDURE — 3078F DIAST BP <80 MM HG: CPT | Mod: CPTII,S$GLB,, | Performed by: FAMILY MEDICINE

## 2020-07-06 PROCEDURE — 3044F HG A1C LEVEL LT 7.0%: CPT | Mod: CPTII,S$GLB,, | Performed by: FAMILY MEDICINE

## 2020-07-06 PROCEDURE — 1101F PR PT FALLS ASSESS DOC 0-1 FALLS W/OUT INJ PAST YR: ICD-10-PCS | Mod: CPTII,S$GLB,, | Performed by: FAMILY MEDICINE

## 2020-07-06 PROCEDURE — 3074F SYST BP LT 130 MM HG: CPT | Mod: CPTII,S$GLB,, | Performed by: FAMILY MEDICINE

## 2020-07-06 PROCEDURE — 99999 PR PBB SHADOW E&M-EST. PATIENT-LVL IV: CPT | Mod: PBBFAC,,, | Performed by: FAMILY MEDICINE

## 2020-07-06 PROCEDURE — 3078F PR MOST RECENT DIASTOLIC BLOOD PRESSURE < 80 MM HG: ICD-10-PCS | Mod: CPTII,S$GLB,, | Performed by: FAMILY MEDICINE

## 2020-07-06 PROCEDURE — 3008F BODY MASS INDEX DOCD: CPT | Mod: CPTII,S$GLB,, | Performed by: FAMILY MEDICINE

## 2020-07-06 PROCEDURE — 99213 PR OFFICE/OUTPT VISIT, EST, LEVL III, 20-29 MIN: ICD-10-PCS | Mod: S$GLB,,, | Performed by: FAMILY MEDICINE

## 2020-07-06 PROCEDURE — 3008F PR BODY MASS INDEX (BMI) DOCUMENTED: ICD-10-PCS | Mod: CPTII,S$GLB,, | Performed by: FAMILY MEDICINE

## 2020-07-06 RX ORDER — GABAPENTIN 300 MG/1
CAPSULE ORAL
Qty: 90 CAPSULE | Refills: 5 | Status: SHIPPED | OUTPATIENT
Start: 2020-07-06 | End: 2020-07-15

## 2020-07-06 NOTE — PROGRESS NOTES
Subjective:       Patient ID: Magdalena Acevedo is a 65 y.o. female.    Chief Complaint: HPI:  65-year-old female--patient was seen by dermatologist--wants to give patient an immunosuppressive drug Cosentix--hepatitis screen was positive for hepatitis-B core antibody--will give hepatitis-B shots now in 6 months--recheck titers--see if patient develops hepatitis B surface antibody--if not would probably not take this medication minutes.         ROS:  Skin: + psoriasis--states off the charts--usually just elbows a below-knee on doubt--now off back anterior chest arms face--needs to see dermatologist  HEENT: no  headache, no  ocular pain, blurred vision, diplopia, epistaxis,+hoarseness change in voice,  No thyroid trouble on hand his to means due to a sinus strip that makes patient cough   Lung: No pneumonia, asthma, Tb, wheezing, SOB, no smoking history of bronchitis/COPD--recent hospital admission see history of present  Heart: No chest pain, + ankle edema in the afternoon no palpitations, MI, mendy murmur, +hypertension blood pressure,+  Hyperlipidemia---no stent bypass arrhythmia  Abdomen: No nausea, vomiting, diarrhea, constipation, ulcers, hepatitis,  melena, hematochezia, hematemesis history cholecystectomy  : no UTI, renal disease, stones + urinary incontinence with coughing  GYN last mammogram over 20 years Pap smear over 20 years  MS: no fractures, O/A, lupus, rheumatoid, gout left shoulder pain  + DDD cervical spine X up now on  Neuro: No dizziness, LOC, seizures   +diabetes Glu 160 now off steroids , no anemia, +anxiety, + depression upset gets angry patient is on Zoloft but not using lorazepam or Ativan doing okay   , 1 child, unemployed,, grandson stays with patient in every other week great grand children stay with patient    Objective:   Physical Exam:  No physical exam was done this was a virtual visit  General: Well nourished, well developed, no acute distress + morbid obesity  Skin: + Dry  scaly skin especially on the legs scalp arms and back-psoriasis --history vaginal yeast  HEENT: Eyes PERRLA, EOM intact, nose clear , throat nonerythema ears TMs clear  NECK: Supple, no bruits, No JVD, no nodes  Lungs: Clear, no rales, rhonchi, nowheezing +coarse cough   Heart: Regular rate and rhythm, no murmurs, gallops, or rubs  Abdomen: flat, bowel sounds positive, no tenderness, or organomegaly   MS:  Tenderness especially left acromioclavicular joint and especially the left upper trapezius --no significant change    Neuro: Alert, CN intact, oriented X 3  Extremities: No cyanosis, clubbing, or edema         Assessment:       1. Psoriasis    2. Hepatitis B core antibody positive    3. Restless legs syndrome    4. COPD exacerbation    5. Hypertension, unspecified type    6. Type 2 diabetes mellitus with hyperglycemia, without long-term current use of insulin    7. Morbid obesity with BMI of 40.0-44.9, adult        Plan:       Psoriasis    Hepatitis B core antibody positive    Restless legs syndrome    COPD exacerbation    Hypertension, unspecified type    Type 2 diabetes mellitus with hyperglycemia, without long-term current use of insulin    Morbid obesity with BMI of 40.0-44.9, adult         Psoriasis---workup to treat withCosentrix---positive hepatitis B core antibody---will discuss with hepatologist if patient able be treated with this medication doubt for or patient would benefit from hepatitis B vaccine or hepatitis B treatment  Restless leg syndrome gabapentin 300 mg p.o. b.i.d. increase to t.i.d. if needed   Diabetes lab reviewed glucose 142 glucose 100-120 excellent 120-150 very good greater than 180 poor patient 142 is very good hemoglobin A1c 6.3 6-6.5 excellent, 6.5-7 very good greater than 8 poor patient is excellent continue glipizide not taking metformin  Hypertension/hyperlipidemia 1800 calorie ADA low-sodium low-fat diet/exercise as tolerated/try to maintain ideal body weight/refill of Norvasc  for hypertension needs to have arm blood pressure cuff check blood pressure daily blood pressure needs to be 140/90 or less and greater than 90/61 Lipitor for cholesterol  Psoriasis--had an exacerbation usually on the elbows and lower legs now 1 anterior trunk face needs to see Dr. Davila Dermatology  Shortness of breath with some peripheral edema will start on Lasix 41 p.o. q.a.m. p.r.n. edema with Micro-K 1 p.o. q.a.m. with Lasix only  Chronic sinusitis will treat with Flonase has over-the-counter antihistamine  Urinary incontinence same  Cervical DDD doing well  Anxiety depression stable  Call by [facility] told needs mammogram colonoscopy  Needs routine labs q.6 months CBCs CMP lipids hemoglobin A1c in 6 months  In 6 months health maintenance eye exam HIV mammogram DEXA scan colonoscopy pneumococcal

## 2020-07-15 ENCOUNTER — TELEPHONE (OUTPATIENT)
Dept: SURGERY | Facility: CLINIC | Age: 65
End: 2020-07-15

## 2020-07-15 NOTE — TELEPHONE ENCOUNTER
Placed a follow up call to patient related to upcoming Colonoscopy procedure. Patient acknowledges receiving Colonoscopy Prep instructions in the mail. Colonoscopy Prep instructions were summarized with the patient.Patient acknowledges understanding Prep instructions, including the last date and time to consume solid foods, to drink the entire prep as instructed, the last date and time to drink, not being on any blood thinners, the last date and time to stop taking aspirin or aspirin containing products, and a designated  is needed on the day of the procedure, who must be at least 18 years old.  The patient further acknowledge understanding, if you have not fasted as instructed in preparation and/or have not consumed the entire prep as ordered for the Colonoscopy, and not passing clear liquids only, there is a chance your procedure will not be able to be completed, and depending on your insurance you may be financially responsible for repeat procedure copayment. No further issues were discussed.

## 2020-08-03 ENCOUNTER — TELEPHONE (OUTPATIENT)
Dept: GASTROENTEROLOGY | Facility: CLINIC | Age: 65
End: 2020-08-03

## 2020-08-03 NOTE — TELEPHONE ENCOUNTER
----- Message from Anny Acevedo MD sent at 8/1/2020  6:20 AM CDT -----  Tubular adenoma, 5 year recall colonoscopy

## 2020-11-10 ENCOUNTER — OFFICE VISIT (OUTPATIENT)
Dept: PRIMARY CARE CLINIC | Facility: CLINIC | Age: 65
End: 2020-11-10
Payer: MEDICARE

## 2020-11-10 VITALS
OXYGEN SATURATION: 97 % | DIASTOLIC BLOOD PRESSURE: 70 MMHG | BODY MASS INDEX: 41.97 KG/M2 | WEIGHT: 228.06 LBS | RESPIRATION RATE: 18 BRPM | HEIGHT: 62 IN | HEART RATE: 87 BPM | SYSTOLIC BLOOD PRESSURE: 126 MMHG

## 2020-11-10 DIAGNOSIS — E11.9 TYPE 2 DIABETES MELLITUS WITHOUT COMPLICATION, WITH LONG-TERM CURRENT USE OF INSULIN: ICD-10-CM

## 2020-11-10 DIAGNOSIS — Z23 NEED FOR VACCINATION: ICD-10-CM

## 2020-11-10 DIAGNOSIS — H00.15 CHALAZION OF LEFT LOWER EYELID: Primary | ICD-10-CM

## 2020-11-10 DIAGNOSIS — R53.1 WEAKNESS: ICD-10-CM

## 2020-11-10 DIAGNOSIS — Z00.00 HEALTH CARE MAINTENANCE: ICD-10-CM

## 2020-11-10 DIAGNOSIS — R60.9 EDEMA, UNSPECIFIED TYPE: ICD-10-CM

## 2020-11-10 DIAGNOSIS — J44.9 CHRONIC OBSTRUCTIVE PULMONARY DISEASE, UNSPECIFIED COPD TYPE: ICD-10-CM

## 2020-11-10 DIAGNOSIS — E78.5 HYPERLIPIDEMIA, UNSPECIFIED HYPERLIPIDEMIA TYPE: ICD-10-CM

## 2020-11-10 DIAGNOSIS — Z79.4 TYPE 2 DIABETES MELLITUS WITHOUT COMPLICATION, WITH LONG-TERM CURRENT USE OF INSULIN: ICD-10-CM

## 2020-11-10 DIAGNOSIS — N18.30 CHRONIC RENAL IMPAIRMENT, STAGE 3 (MODERATE), UNSPECIFIED WHETHER STAGE 3A OR 3B CKD: ICD-10-CM

## 2020-11-10 DIAGNOSIS — I10 HYPERTENSION, UNSPECIFIED TYPE: ICD-10-CM

## 2020-11-10 DIAGNOSIS — K21.9 GASTROESOPHAGEAL REFLUX DISEASE, UNSPECIFIED WHETHER ESOPHAGITIS PRESENT: ICD-10-CM

## 2020-11-10 DIAGNOSIS — F32.A DEPRESSION, UNSPECIFIED DEPRESSION TYPE: ICD-10-CM

## 2020-11-10 DIAGNOSIS — G89.29 CHRONIC INTRACTABLE HEADACHE, UNSPECIFIED HEADACHE TYPE: ICD-10-CM

## 2020-11-10 DIAGNOSIS — R51.9 CHRONIC INTRACTABLE HEADACHE, UNSPECIFIED HEADACHE TYPE: ICD-10-CM

## 2020-11-10 PROCEDURE — 90694 FLU VACCINE - QUADRIVALENT - ADJUVANTED: ICD-10-PCS | Mod: S$GLB,,, | Performed by: STUDENT IN AN ORGANIZED HEALTH CARE EDUCATION/TRAINING PROGRAM

## 2020-11-10 PROCEDURE — 3008F BODY MASS INDEX DOCD: CPT | Mod: CPTII,S$GLB,, | Performed by: STUDENT IN AN ORGANIZED HEALTH CARE EDUCATION/TRAINING PROGRAM

## 2020-11-10 PROCEDURE — 3008F PR BODY MASS INDEX (BMI) DOCUMENTED: ICD-10-PCS | Mod: CPTII,S$GLB,, | Performed by: STUDENT IN AN ORGANIZED HEALTH CARE EDUCATION/TRAINING PROGRAM

## 2020-11-10 PROCEDURE — 99999 PR PBB SHADOW E&M-EST. PATIENT-LVL IV: CPT | Mod: PBBFAC,,, | Performed by: STUDENT IN AN ORGANIZED HEALTH CARE EDUCATION/TRAINING PROGRAM

## 2020-11-10 PROCEDURE — G0008 ADMIN INFLUENZA VIRUS VAC: HCPCS | Mod: S$GLB,,, | Performed by: STUDENT IN AN ORGANIZED HEALTH CARE EDUCATION/TRAINING PROGRAM

## 2020-11-10 PROCEDURE — 90694 VACC AIIV4 NO PRSRV 0.5ML IM: CPT | Mod: S$GLB,,, | Performed by: STUDENT IN AN ORGANIZED HEALTH CARE EDUCATION/TRAINING PROGRAM

## 2020-11-10 PROCEDURE — 1101F PR PT FALLS ASSESS DOC 0-1 FALLS W/OUT INJ PAST YR: ICD-10-PCS | Mod: CPTII,S$GLB,, | Performed by: STUDENT IN AN ORGANIZED HEALTH CARE EDUCATION/TRAINING PROGRAM

## 2020-11-10 PROCEDURE — 99215 PR OFFICE/OUTPT VISIT, EST, LEVL V, 40-54 MIN: ICD-10-PCS | Mod: 25,S$GLB,, | Performed by: STUDENT IN AN ORGANIZED HEALTH CARE EDUCATION/TRAINING PROGRAM

## 2020-11-10 PROCEDURE — 3074F PR MOST RECENT SYSTOLIC BLOOD PRESSURE < 130 MM HG: ICD-10-PCS | Mod: CPTII,S$GLB,, | Performed by: STUDENT IN AN ORGANIZED HEALTH CARE EDUCATION/TRAINING PROGRAM

## 2020-11-10 PROCEDURE — G0008 FLU VACCINE - QUADRIVALENT - ADJUVANTED: ICD-10-PCS | Mod: S$GLB,,, | Performed by: STUDENT IN AN ORGANIZED HEALTH CARE EDUCATION/TRAINING PROGRAM

## 2020-11-10 PROCEDURE — G0009 ADMIN PNEUMOCOCCAL VACCINE: HCPCS | Mod: S$GLB,,, | Performed by: STUDENT IN AN ORGANIZED HEALTH CARE EDUCATION/TRAINING PROGRAM

## 2020-11-10 PROCEDURE — G0009 PNEUMOCOCCAL CONJUGATE VACCINE 13-VALENT LESS THAN 5YO & GREATER THAN: ICD-10-PCS | Mod: S$GLB,,, | Performed by: STUDENT IN AN ORGANIZED HEALTH CARE EDUCATION/TRAINING PROGRAM

## 2020-11-10 PROCEDURE — 99999 PR PBB SHADOW E&M-EST. PATIENT-LVL IV: ICD-10-PCS | Mod: PBBFAC,,, | Performed by: STUDENT IN AN ORGANIZED HEALTH CARE EDUCATION/TRAINING PROGRAM

## 2020-11-10 PROCEDURE — 3078F PR MOST RECENT DIASTOLIC BLOOD PRESSURE < 80 MM HG: ICD-10-PCS | Mod: CPTII,S$GLB,, | Performed by: STUDENT IN AN ORGANIZED HEALTH CARE EDUCATION/TRAINING PROGRAM

## 2020-11-10 PROCEDURE — 1101F PT FALLS ASSESS-DOCD LE1/YR: CPT | Mod: CPTII,S$GLB,, | Performed by: STUDENT IN AN ORGANIZED HEALTH CARE EDUCATION/TRAINING PROGRAM

## 2020-11-10 PROCEDURE — 3044F PR MOST RECENT HEMOGLOBIN A1C LEVEL <7.0%: ICD-10-PCS | Mod: CPTII,S$GLB,, | Performed by: STUDENT IN AN ORGANIZED HEALTH CARE EDUCATION/TRAINING PROGRAM

## 2020-11-10 PROCEDURE — 99215 OFFICE O/P EST HI 40 MIN: CPT | Mod: 25,S$GLB,, | Performed by: STUDENT IN AN ORGANIZED HEALTH CARE EDUCATION/TRAINING PROGRAM

## 2020-11-10 PROCEDURE — 3078F DIAST BP <80 MM HG: CPT | Mod: CPTII,S$GLB,, | Performed by: STUDENT IN AN ORGANIZED HEALTH CARE EDUCATION/TRAINING PROGRAM

## 2020-11-10 PROCEDURE — 90670 PNEUMOCOCCAL CONJUGATE VACCINE 13-VALENT LESS THAN 5YO & GREATER THAN: ICD-10-PCS | Mod: S$GLB,,, | Performed by: STUDENT IN AN ORGANIZED HEALTH CARE EDUCATION/TRAINING PROGRAM

## 2020-11-10 PROCEDURE — 3044F HG A1C LEVEL LT 7.0%: CPT | Mod: CPTII,S$GLB,, | Performed by: STUDENT IN AN ORGANIZED HEALTH CARE EDUCATION/TRAINING PROGRAM

## 2020-11-10 PROCEDURE — 90670 PCV13 VACCINE IM: CPT | Mod: S$GLB,,, | Performed by: STUDENT IN AN ORGANIZED HEALTH CARE EDUCATION/TRAINING PROGRAM

## 2020-11-10 PROCEDURE — 3074F SYST BP LT 130 MM HG: CPT | Mod: CPTII,S$GLB,, | Performed by: STUDENT IN AN ORGANIZED HEALTH CARE EDUCATION/TRAINING PROGRAM

## 2020-11-10 RX ORDER — DICLOFENAC SODIUM 75 MG/1
75 TABLET, DELAYED RELEASE ORAL 2 TIMES DAILY
Qty: 180 TABLET | Refills: 1 | Status: SHIPPED | OUTPATIENT
Start: 2020-11-10 | End: 2021-04-05 | Stop reason: SDUPTHER

## 2020-11-10 RX ORDER — LISINOPRIL 10 MG/1
10 TABLET ORAL DAILY
Qty: 90 TABLET | Refills: 1 | Status: SHIPPED | OUTPATIENT
Start: 2020-11-10 | End: 2021-04-05 | Stop reason: SDUPTHER

## 2020-11-10 RX ORDER — ERYTHROMYCIN 5 MG/G
OINTMENT OPHTHALMIC 2 TIMES DAILY
Qty: 1 TUBE | Refills: 0 | Status: SHIPPED | OUTPATIENT
Start: 2020-11-10 | End: 2021-05-27

## 2020-11-10 RX ORDER — ATORVASTATIN CALCIUM 20 MG/1
20 TABLET, FILM COATED ORAL NIGHTLY
Qty: 90 TABLET | Refills: 3 | Status: SHIPPED | OUTPATIENT
Start: 2020-11-10 | End: 2021-04-05 | Stop reason: SDUPTHER

## 2020-11-10 RX ORDER — SERTRALINE HYDROCHLORIDE 25 MG/1
25 TABLET, FILM COATED ORAL DAILY
Qty: 90 TABLET | Refills: 1 | Status: SHIPPED | OUTPATIENT
Start: 2020-11-10 | End: 2021-04-05 | Stop reason: SDUPTHER

## 2020-11-10 RX ORDER — POTASSIUM CHLORIDE 750 MG/1
CAPSULE, EXTENDED RELEASE ORAL
Qty: 90 CAPSULE | Refills: 1 | Status: SHIPPED | OUTPATIENT
Start: 2020-11-10 | End: 2021-04-05 | Stop reason: SDUPTHER

## 2020-11-10 RX ORDER — GABAPENTIN 300 MG/1
1 CAPSULE ORAL NIGHTLY
COMMUNITY
Start: 2020-11-07 | End: 2021-09-27

## 2020-11-10 RX ORDER — BUDESONIDE AND FORMOTEROL FUMARATE DIHYDRATE 160; 4.5 UG/1; UG/1
2 AEROSOL RESPIRATORY (INHALATION) EVERY 12 HOURS
Qty: 10.2 G | Refills: 5 | Status: SHIPPED | OUTPATIENT
Start: 2020-11-10 | End: 2020-11-14

## 2020-11-10 RX ORDER — AMLODIPINE BESYLATE 5 MG/1
5 TABLET ORAL DAILY
Qty: 90 TABLET | Refills: 1 | Status: SHIPPED | OUTPATIENT
Start: 2020-11-10 | End: 2021-04-05 | Stop reason: SDUPTHER

## 2020-11-10 RX ORDER — OMEPRAZOLE 40 MG/1
40 CAPSULE, DELAYED RELEASE ORAL DAILY
Qty: 90 CAPSULE | Refills: 1 | Status: SHIPPED | OUTPATIENT
Start: 2020-11-10 | End: 2021-05-27

## 2020-11-10 RX ORDER — FUROSEMIDE 40 MG/1
TABLET ORAL
Qty: 90 TABLET | Refills: 1 | Status: SHIPPED | OUTPATIENT
Start: 2020-11-10 | End: 2021-04-05 | Stop reason: SDUPTHER

## 2020-11-10 RX ORDER — GLIPIZIDE 5 MG/1
TABLET ORAL
Qty: 180 TABLET | Refills: 1 | Status: SHIPPED | OUTPATIENT
Start: 2020-11-10 | End: 2021-03-23 | Stop reason: SDUPTHER

## 2020-11-10 NOTE — PROGRESS NOTES
"  Subjective:           Patient ID: Magdalena Acevedo is a 65 y.o. female who presents today with a chief complaint of pain to the left eye.    Chief Complaint:   Eye Pain (left eye redness and pain) and Medication Refill      History of Present Illness:    Magdalena is a 66yo female who present for left eye issue.    States Saturday her left eye was sore, then on waking Sunday has pus in her left eye and located a pus pocket to the inside of the lower lids.  Has pressure to her face around the eye, but not specific sinus pain.  Has been having a post-nasal drip 2/2 the eye inflammation.     Has been having some blurry vision.   No fever or chills.  No change to hearing.     States that she would be interested in an electric scooter to address her difficulty getting long distances.  She would like a letter to provide to insurance.         Review of Systems   Constitutional: Negative for chills and fever.   HENT: Positive for postnasal drip and sinus pressure. Negative for congestion, nosebleeds, rhinorrhea and sinus pain.    Eyes: Positive for pain, discharge, redness, itching and visual disturbance. Negative for photophobia.   Respiratory: Positive for shortness of breath. Negative for wheezing.    Cardiovascular: Negative for chest pain, palpitations and leg swelling.   Gastrointestinal: Negative for constipation, diarrhea, nausea and vomiting.   Genitourinary: Positive for frequency (on diuretic). Negative for difficulty urinating and dysuria.   Musculoskeletal: Positive for gait problem. Negative for arthralgias, back pain and joint swelling.   Skin: Negative for wound.   Neurological: Positive for headaches.   Psychiatric/Behavioral: The patient is nervous/anxious.            Objective:        Vitals:    11/10/20 1258   BP: 126/70   BP Location: Right arm   Patient Position: Sitting   BP Method: Large (Manual)   Pulse: 87   Resp: 18   SpO2: 97%   Weight: 103.4 kg (228 lb 1.1 oz)   Height: 5' 2" (1.575 m) "       Body mass index is 41.71 kg/m².      Physical Exam  Constitutional:       General: She is not in acute distress.     Appearance: Normal appearance.      Comments: As per BMI.  Antoligic gait.   HENT:      Head: Normocephalic.      Right Ear: Tympanic membrane and external ear normal.      Left Ear: Tympanic membrane and external ear normal.      Nose: Rhinorrhea present.      Mouth/Throat:      Mouth: Mucous membranes are moist.   Eyes:      General: Scleral icterus present.      Extraocular Movements: Extraocular movements intact.      Pupils: Pupils are equal, round, and reactive to light.      Comments: Left eyelid with 2mm yellow pustule to lower catha.   Cardiovascular:      Rate and Rhythm: Normal rate and regular rhythm.      Heart sounds: No murmur. No gallop.    Pulmonary:      Effort: Pulmonary effort is normal. No respiratory distress.      Breath sounds: Normal breath sounds.   Abdominal:      General: Bowel sounds are normal. There is no distension.      Palpations: Abdomen is soft.   Musculoskeletal:         General: No swelling or deformity.      Right lower leg: No edema.      Left lower leg: No edema.   Lymphadenopathy:      Cervical: No cervical adenopathy.   Skin:     General: Skin is warm.      Capillary Refill: Capillary refill takes less than 2 seconds.      Coloration: Skin is not jaundiced.   Neurological:      General: No focal deficit present.      Mental Status: She is alert and oriented to person, place, and time.      Motor: No weakness.   Psychiatric:         Mood and Affect: Mood normal.             Lab Results   Component Value Date     06/04/2020    K 4.1 06/04/2020     06/04/2020    CO2 26 06/04/2020    BUN 16 06/04/2020    CREATININE 0.8 06/04/2020    ANIONGAP 12 06/04/2020     Lab Results   Component Value Date    HGBA1C 6.3 (H) 06/04/2020     Lab Results   Component Value Date    BNP 62 03/07/2020       Lab Results   Component Value Date    WBC 12.40 06/04/2020     HGB 14.7 06/04/2020    HCT 44.3 06/04/2020    HCT 41 03/05/2020     06/04/2020    GRAN 8.4 (H) 06/04/2020    GRAN 67.9 06/04/2020     Lab Results   Component Value Date    CHOL 179 06/04/2020    HDL 38 (L) 06/04/2020    LDLCALC 106 (H) 06/04/2020    TRIG 176 (H) 06/04/2020          Current Outpatient Medications:     albuterol (ACCUNEB) 0.63 mg/3 mL Nebu, Take 3 mLs (0.63 mg total) by nebulization every 6 (six) hours as needed. Rescue, Disp: 1 Box, Rfl: 2    amLODIPine (NORVASC) 5 MG tablet, Take 1 tablet (5 mg total) by mouth once daily., Disp: 90 tablet, Rfl: 1    atorvastatin (LIPITOR) 20 MG tablet, Take 1 tablet (20 mg total) by mouth every evening., Disp: 90 tablet, Rfl: 3    budesonide-formoterol 160-4.5 mcg (SYMBICORT) 160-4.5 mcg/actuation HFAA, Inhale 2 puffs into the lungs every 12 (twelve) hours. Controller, Disp: 10.2 g, Rfl: 5    diclofenac (VOLTAREN) 75 MG EC tablet, Take 1 tablet (75 mg total) by mouth 2 (two) times daily., Disp: 180 tablet, Rfl: 1    fluticasone propionate (FLONASE) 50 mcg/actuation nasal spray, 2 sprays (100 mcg total) by Each Nostril route once daily., Disp: 16 g, Rfl: 5    furosemide (LASIX) 40 MG tablet, One p.o. q.a.m. p.r.n. ankle with edema only, Disp: 90 tablet, Rfl: 1    gabapentin (NEURONTIN) 300 MG capsule, Take 1 capsule by mouth every evening., Disp: , Rfl:     glipiZIDE (GLUCOTROL) 5 MG tablet, Stop metformin take glipizide 1 p.o. Q.a.m. If glucose remains high after 1 week can increase to 1 p.o. B.i.d., Disp: 180 tablet, Rfl: 1    ibuprofen (ADVIL,MOTRIN) 600 MG tablet, Take 1 tablet (600 mg total) by mouth every 6 (six) hours as needed., Disp: 100 tablet, Rfl: 5    lisinopriL 10 MG tablet, Take 1 tablet (10 mg total) by mouth once daily., Disp: 90 tablet, Rfl: 1    omeprazole (PRILOSEC) 40 MG capsule, Take 1 capsule (40 mg total) by mouth once daily., Disp: 90 capsule, Rfl: 1    potassium chloride (MICRO-K) 10 MEQ CpSR, One p.o. q.a.m. with  Lasix only, Disp: 90 capsule, Rfl: 1    PROAIR HFA 90 mcg/actuation inhaler, INHALE TWO PUFFS BY MOUTH EVERY 4 TO 6 HOURS AS NEEDED FOR WHEEZING/SHORTNESS OF BREATH., Disp: 18 g, Rfl: 5    sertraline (ZOLOFT) 25 MG tablet, Take 1 tablet (25 mg total) by mouth once daily., Disp: 90 tablet, Rfl: 1    blood sugar diagnostic (TRUE METRIX GLUCOSE TEST STRIP) Strp, 1 strip by Misc.(Non-Drug; Combo Route) route once daily., Disp: 100 strip, Rfl: 3    blood sugar diagnostic Strp, To check BG 2 times daily, to use with insurance preferred meter, Disp: 100 strip, Rfl: 11    blood-glucose meter (TRUE METRIX AIR GLUCOSE METER) kit, Use as instructed, Disp: 1 each, Rfl: 0    blood-glucose meter kit, To check BG 2 times daily, to use with insurance preferred meter, Disp: 1 each, Rfl: 0    erythromycin (ROMYCIN) ophthalmic ointment, Place into the left eye 2 (two) times daily., Disp: 1 Tube, Rfl: 0    lancets 33 gauge Misc, 1 lancet by Misc.(Non-Drug; Combo Route) route once daily., Disp: 100 each, Rfl: 3    lancets Misc, To check BG 2 times daily, to use with insurance preferred meter, Disp: 100 each, Rfl: 11     Outpatient Encounter Medications as of 11/10/2020   Medication Sig Dispense Refill    albuterol (ACCUNEB) 0.63 mg/3 mL Nebu Take 3 mLs (0.63 mg total) by nebulization every 6 (six) hours as needed. Rescue 1 Box 2    amLODIPine (NORVASC) 5 MG tablet Take 1 tablet (5 mg total) by mouth once daily. 90 tablet 1    atorvastatin (LIPITOR) 20 MG tablet Take 1 tablet (20 mg total) by mouth every evening. 90 tablet 3    budesonide-formoterol 160-4.5 mcg (SYMBICORT) 160-4.5 mcg/actuation HFAA Inhale 2 puffs into the lungs every 12 (twelve) hours. Controller 10.2 g 5    diclofenac (VOLTAREN) 75 MG EC tablet Take 1 tablet (75 mg total) by mouth 2 (two) times daily. 180 tablet 1    fluticasone propionate (FLONASE) 50 mcg/actuation nasal spray 2 sprays (100 mcg total) by Each Nostril route once daily. 16 g 5     furosemide (LASIX) 40 MG tablet One p.o. q.a.m. p.r.n. ankle with edema only 90 tablet 1    gabapentin (NEURONTIN) 300 MG capsule Take 1 capsule by mouth every evening.      glipiZIDE (GLUCOTROL) 5 MG tablet Stop metformin take glipizide 1 p.o. Q.a.m. If glucose remains high after 1 week can increase to 1 p.o. B.i.d. 180 tablet 1    ibuprofen (ADVIL,MOTRIN) 600 MG tablet Take 1 tablet (600 mg total) by mouth every 6 (six) hours as needed. 100 tablet 5    lisinopriL 10 MG tablet Take 1 tablet (10 mg total) by mouth once daily. 90 tablet 1    omeprazole (PRILOSEC) 40 MG capsule Take 1 capsule (40 mg total) by mouth once daily. 90 capsule 1    potassium chloride (MICRO-K) 10 MEQ CpSR One p.o. q.a.m. with Lasix only 90 capsule 1    PROAIR HFA 90 mcg/actuation inhaler INHALE TWO PUFFS BY MOUTH EVERY 4 TO 6 HOURS AS NEEDED FOR WHEEZING/SHORTNESS OF BREATH. 18 g 5    sertraline (ZOLOFT) 25 MG tablet Take 1 tablet (25 mg total) by mouth once daily. 90 tablet 1    [DISCONTINUED] amLODIPine (NORVASC) 5 MG tablet Take 1 tablet (5 mg total) by mouth once daily. 90 tablet 1    [DISCONTINUED] atorvastatin (LIPITOR) 10 MG tablet Take 1 tablet (10 mg total) by mouth every evening. 90 tablet 1    [DISCONTINUED] budesonide-formoterol 160-4.5 mcg (SYMBICORT) 160-4.5 mcg/actuation HFAA Inhale 2 puffs into the lungs every 12 (twelve) hours. Controller 10.2 g 5    [DISCONTINUED] diclofenac (VOLTAREN) 75 MG EC tablet Take 1 tablet (75 mg total) by mouth 2 (two) times daily. 180 tablet 1    [DISCONTINUED] furosemide (LASIX) 40 MG tablet One p.o. q.a.m. p.r.n. ankle with edema only 30 tablet 5    [DISCONTINUED] glipiZIDE (GLUCOTROL) 5 MG tablet Stop metformin take glipizide 1 p.o. Q.a.m. If glucose remains high after 1 week can increase to 1 p.o. B.i.d. 180 tablet 1    [DISCONTINUED] lisinopriL 10 MG tablet Take 1 tablet (10 mg total) by mouth once daily. 90 tablet 1    [DISCONTINUED] omeprazole (PRILOSEC) 40 MG capsule Take  1 capsule (40 mg total) by mouth once daily. 30 capsule 5    [DISCONTINUED] potassium chloride (MICRO-K) 10 MEQ CpSR One p.o. q.a.m. with Lasix only 30 capsule 5    [DISCONTINUED] sertraline (ZOLOFT) 25 MG tablet Take 1 tablet (25 mg total) by mouth once daily. 90 tablet 1    blood sugar diagnostic (TRUE METRIX GLUCOSE TEST STRIP) Strp 1 strip by Misc.(Non-Drug; Combo Route) route once daily. 100 strip 3    blood sugar diagnostic Strp To check BG 2 times daily, to use with insurance preferred meter 100 strip 11    blood-glucose meter (TRUE METRIX AIR GLUCOSE METER) kit Use as instructed 1 each 0    blood-glucose meter kit To check BG 2 times daily, to use with insurance preferred meter 1 each 0    erythromycin (ROMYCIN) ophthalmic ointment Place into the left eye 2 (two) times daily. 1 Tube 0    lancets 33 gauge Misc 1 lancet by Misc.(Non-Drug; Combo Route) route once daily. 100 each 3    lancets Misc To check BG 2 times daily, to use with insurance preferred meter 100 each 11     No facility-administered encounter medications on file as of 11/10/2020.           Assessment:       1. Chalazion of left lower eyelid    2. Type 2 diabetes mellitus without complication, with long-term current use of insulin    3. Hypertension, unspecified type    4. Hyperlipidemia, unspecified hyperlipidemia type    5. Chronic obstructive pulmonary disease, unspecified COPD type    6. Chronic renal impairment, stage 3 (moderate), unspecified whether stage 3a or 3b CKD    7. Chronic intractable headache, unspecified headache type    8. Edema, unspecified type    9. Gastroesophageal reflux disease, unspecified whether esophagitis present    10. Depression, unspecified depression type    11. Health care maintenance    12. Need for vaccination    13. Weakness           Plan:       Chalazion of left lower eyelid  -     erythromycin (ROMYCIN) ophthalmic ointment; Place into the left eye 2 (two) times daily.  Dispense: 1 Tube; Refill:  0   - urge patient to continue use of warm compresses to eye.   - gave patient abdominal ointment to use for infection.  Return to clinic if is not improved in 1 week.    Type 2 diabetes mellitus without complication, with long-term current use of insulin  -     potassium chloride (MICRO-K) 10 MEQ CpSR; One p.o. q.a.m. with Lasix only  Dispense: 90 capsule; Refill: 1  -     glipiZIDE (GLUCOTROL) 5 MG tablet; Stop metformin take glipizide 1 p.o. Q.a.m. If glucose remains high after 1 week can increase to 1 p.o. B.i.d.  Dispense: 180 tablet; Refill: 1  -     furosemide (LASIX) 40 MG tablet; One p.o. q.a.m. p.r.n. ankle with edema only  Dispense: 90 tablet; Refill: 1   - patient currently controlling diabetes with oral medications.  States she is doing well with this.  Has had refills provided today for glipizide Lasix and potassium.    Hypertension, unspecified type  -     lisinopriL 10 MG tablet; Take 1 tablet (10 mg total) by mouth once daily.  Dispense: 90 tablet; Refill: 1  -     atorvastatin (LIPITOR) 20 MG tablet; Take 1 tablet (20 mg total) by mouth every evening.  Dispense: 90 tablet; Refill: 3  -     amLODIPine (NORVASC) 5 MG tablet; Take 1 tablet (5 mg total) by mouth once daily.  Dispense: 90 tablet; Refill: 1    Hyperlipidemia, unspecified hyperlipidemia type    Chronic obstructive pulmonary disease, unspecified COPD type  -     budesonide-formoterol 160-4.5 mcg (SYMBICORT) 160-4.5 mcg/actuation HFAA; Inhale 2 puffs into the lungs every 12 (twelve) hours. Controller  Dispense: 10.2 g; Refill: 5   - lungs were clear to exam with short inspiration.  Has history of COPD.  Refilling Symbicort at this time.    Chronic renal impairment, stage 3 (moderate), unspecified whether stage 3a or 3b CKD    Chronic intractable headache, unspecified headache type  -     diclofenac (VOLTAREN) 75 MG EC tablet; Take 1 tablet (75 mg total) by mouth 2 (two) times daily.  Dispense: 180 tablet; Refill: 1   - patient using  Voltaren for chronic headaches.  States this MR control continue with this time.    Edema, unspecified type  -     furosemide (LASIX) 40 MG tablet; One p.o. q.a.m. p.r.n. ankle with edema only  Dispense: 90 tablet; Refill: 1   - edema appears controlled at this time will continue on current dose of Lasix.      Gastroesophageal reflux disease, unspecified whether esophagitis present  -     potassium chloride (MICRO-K) 10 MEQ CpSR; One p.o. q.a.m. with Lasix only  Dispense: 90 capsule; Refill: 1  -     omeprazole (PRILOSEC) 40 MG capsule; Take 1 capsule (40 mg total) by mouth once daily.  Dispense: 90 capsule; Refill: 1   - history of chronic GERD.  Treating with Prilosec per PCP source.  Patient states has been controlled on this dosage.  Recent labs were within normal limits.  No changes at this time refills were provided.    Depression, unspecified depression type  -     sertraline (ZOLOFT) 25 MG tablet; Take 1 tablet (25 mg total) by mouth once daily.  Dispense: 90 tablet; Refill: 1   - patient stable in 25 mg of sertraline.  Denies need for adjustment of dosage.  States her mood has been stable she has been able to engage with her family has interests that are current.   - 6 month refill provided    Health care maintenance  -     Influenza (FLUAD) - Quadrivalent (Adjuvanted) *Preferred* (65+) (PF)  -     (In Office Administered) Pneumococcal Conjugate Vaccine (13 Valent) (IM)    Need for vaccination  -     Influenza (FLUAD) - Quadrivalent (Adjuvanted) *Preferred* (65+) (PF)  -     (In Office Administered) Pneumococcal Conjugate Vaccine (13 Valent) (IM)   - patient getting flu and pneumonia vaccines today.    Weakness   - patient requesting scooter for her weakness and help with activities with her family when out about.   - she was encouraged to remain active at home.  Encouraged to progressive walking.  Discussed possibility of going to PT.   - informed patient that she should speak to her PCP about possibility  of a scooter but that would be preferred if she engage with physical therapy or consider use of wheelchair to maintain her strength.

## 2020-11-10 NOTE — PATIENT INSTRUCTIONS
Chalazion    A chalazion is a blocked, swollen oil gland in the eyelid. The eyelids have oil glands that lubricate the inside of the lids. If a gland becomes blocked, the oil builds up and causes the skin to swell.  A chalazion can take several weeks to grow. It can vary in size. It may appear on the inside or outside of the lid. In most cases, it occurs on the upper lid. The skin may be a normal color or may be red. A chalazion is usually not painful, but it can cause discomfort, tenderness, sensitivity to light, eye discharge, and increased tearing.  A chalazion usually lasts from a few weeks to a month. It often goes away on its own. A chalazion can be mistaken for a sty (infection of an oil gland) because they both appear on the eyelid.  Why a chalazion forms  Its often unclear why a chalazion appears. However, a chalazion can develop when you have any of the following conditions:  · Chronic blepharitis, when eyelids become irritated  · Acne rosacea  · Seborrhea  · Tuberculosis  · Viral infection  Home care  If your healthcare provider finds that a chalazion is infected, he or she may prescribe an antibiotic drop or ointment. Use the medicine as directed.  · Wash your hands carefully with soap and warm water before and after caring for your eye(s). This is to help prevent infection.  · Apply a warm, moist towel or compress for 10 to 15 minutes, 3 to 4 times a day. This will reduce the swelling and soften the hardened oils blocking the duct.  · Massage the area gently after applying the warm compress to help drain the chalazion. Or follow the directions given by your healthcare provider.  · Do not try to pop or squeeze the chalazion.  · Do not wear eye makeup until the chalazion has healed, or follow your healthcare providers directions.  · Do not wear contact lenses until the chalazion has healed, or follow your healthcare providers directions.  · Once a day, with eyes closed, clean your eyelids with baby  shampoo or a moist eyelid cleansing wipe. This is to help reduce clogging of the duct, as well as help prevent a chalazion from returning. Ask your health care provider about products to clean your eyelids.  Follow-up care  Follow up with your health care provider, or as advised. If the chalazion does not heal in 4 weeks, you may be referred to a healthcare provider who specializes in eye care (an optometrist or ophthalmologist) for further evaluation and treatment. You may also be referred to an eye specialist if you have a large chalazion.  When to seek medical advice  Call your health care provider right away if any of these occur:  · Chalazion returns to the same area repeatedly  · Existing symptoms (such as pain, warmth, redness, and drainage) get worse  · New symptoms appear, such as eye pain, warmth or redness around the eye, eye drainage, or both the upper and lower lids of the same eye swell  · You have visual changes or blurred vision  · You have a headache that persists  · You have a fever of 100.4ºF (38ºC) or higher, or as directed by your healthcare provider  Date Last Reviewed: 10/9/2015  © 0502-3330 [employer]. [address] [place] [address], [place] [address]. All rights reserved. This information is not intended as a substitute for professional medical care. Always follow your healthcare professional's instructions.

## 2020-11-14 DIAGNOSIS — J44.9 CHRONIC OBSTRUCTIVE PULMONARY DISEASE, UNSPECIFIED COPD TYPE: ICD-10-CM

## 2020-11-14 RX ORDER — BUDESONIDE AND FORMOTEROL FUMARATE DIHYDRATE 160; 4.5 UG/1; UG/1
AEROSOL RESPIRATORY (INHALATION)
Qty: 11 G | Refills: 5 | Status: SHIPPED | OUTPATIENT
Start: 2020-11-14 | End: 2021-06-22

## 2020-11-15 NOTE — TELEPHONE ENCOUNTER
Call tell pt with DM and HLP needs lab q 6 mo lastlab June so lab due Dec -- Needs CBC,CMP,lipid HGbA1C sere me 2 weeks later

## 2020-11-17 ENCOUNTER — OFFICE VISIT (OUTPATIENT)
Dept: PRIMARY CARE CLINIC | Facility: CLINIC | Age: 65
End: 2020-11-17
Payer: MEDICARE

## 2020-11-17 ENCOUNTER — TELEPHONE (OUTPATIENT)
Dept: PRIMARY CARE CLINIC | Facility: CLINIC | Age: 65
End: 2020-11-17

## 2020-11-17 VITALS
DIASTOLIC BLOOD PRESSURE: 76 MMHG | HEIGHT: 62 IN | HEART RATE: 89 BPM | SYSTOLIC BLOOD PRESSURE: 130 MMHG | OXYGEN SATURATION: 98 % | BODY MASS INDEX: 41.57 KG/M2 | RESPIRATION RATE: 17 BRPM | WEIGHT: 225.88 LBS

## 2020-11-17 DIAGNOSIS — J32.9 SINUSITIS, UNSPECIFIED CHRONICITY, UNSPECIFIED LOCATION: Primary | ICD-10-CM

## 2020-11-17 DIAGNOSIS — E11.65 TYPE 2 DIABETES MELLITUS WITH HYPERGLYCEMIA, WITHOUT LONG-TERM CURRENT USE OF INSULIN: ICD-10-CM

## 2020-11-17 DIAGNOSIS — I10 HYPERTENSION, UNSPECIFIED TYPE: ICD-10-CM

## 2020-11-17 DIAGNOSIS — R32 URINARY INCONTINENCE, UNSPECIFIED TYPE: ICD-10-CM

## 2020-11-17 DIAGNOSIS — L40.9 PSORIASIS: ICD-10-CM

## 2020-11-17 DIAGNOSIS — F32.A DEPRESSION, UNSPECIFIED DEPRESSION TYPE: ICD-10-CM

## 2020-11-17 DIAGNOSIS — E78.5 HYPERLIPIDEMIA, UNSPECIFIED HYPERLIPIDEMIA TYPE: ICD-10-CM

## 2020-11-17 DIAGNOSIS — J40 BRONCHITIS: ICD-10-CM

## 2020-11-17 DIAGNOSIS — E11.9 TYPE 2 DIABETES MELLITUS WITHOUT COMPLICATION, WITHOUT LONG-TERM CURRENT USE OF INSULIN: ICD-10-CM

## 2020-11-17 DIAGNOSIS — E66.01 MORBID OBESITY WITH BMI OF 40.0-44.9, ADULT: ICD-10-CM

## 2020-11-17 DIAGNOSIS — R45.4 ANGER: ICD-10-CM

## 2020-11-17 DIAGNOSIS — Z11.4 ENCOUNTER FOR SCREENING FOR HIV: ICD-10-CM

## 2020-11-17 DIAGNOSIS — K21.9 GASTROESOPHAGEAL REFLUX DISEASE, UNSPECIFIED WHETHER ESOPHAGITIS PRESENT: ICD-10-CM

## 2020-11-17 DIAGNOSIS — R60.9 EDEMA, UNSPECIFIED TYPE: ICD-10-CM

## 2020-11-17 PROCEDURE — 99999 PR PBB SHADOW E&M-EST. PATIENT-LVL IV: ICD-10-PCS | Mod: PBBFAC,,, | Performed by: FAMILY MEDICINE

## 2020-11-17 PROCEDURE — 1101F PT FALLS ASSESS-DOCD LE1/YR: CPT | Mod: CPTII,S$GLB,, | Performed by: FAMILY MEDICINE

## 2020-11-17 PROCEDURE — 3008F PR BODY MASS INDEX (BMI) DOCUMENTED: ICD-10-PCS | Mod: CPTII,S$GLB,, | Performed by: FAMILY MEDICINE

## 2020-11-17 PROCEDURE — 3288F PR FALLS RISK ASSESSMENT DOCUMENTED: ICD-10-PCS | Mod: CPTII,S$GLB,, | Performed by: FAMILY MEDICINE

## 2020-11-17 PROCEDURE — 1101F PR PT FALLS ASSESS DOC 0-1 FALLS W/OUT INJ PAST YR: ICD-10-PCS | Mod: CPTII,S$GLB,, | Performed by: FAMILY MEDICINE

## 2020-11-17 PROCEDURE — 99214 PR OFFICE/OUTPT VISIT, EST, LEVL IV, 30-39 MIN: ICD-10-PCS | Mod: 25,S$GLB,, | Performed by: FAMILY MEDICINE

## 2020-11-17 PROCEDURE — 99999 PR PBB SHADOW E&M-EST. PATIENT-LVL IV: CPT | Mod: PBBFAC,,, | Performed by: FAMILY MEDICINE

## 2020-11-17 PROCEDURE — 96372 PR INJECTION,THERAP/PROPH/DIAG2ST, IM OR SUBCUT: ICD-10-PCS | Mod: S$GLB,,, | Performed by: FAMILY MEDICINE

## 2020-11-17 PROCEDURE — 3008F BODY MASS INDEX DOCD: CPT | Mod: CPTII,S$GLB,, | Performed by: FAMILY MEDICINE

## 2020-11-17 PROCEDURE — 96372 THER/PROPH/DIAG INJ SC/IM: CPT | Mod: S$GLB,,, | Performed by: FAMILY MEDICINE

## 2020-11-17 PROCEDURE — 3288F FALL RISK ASSESSMENT DOCD: CPT | Mod: CPTII,S$GLB,, | Performed by: FAMILY MEDICINE

## 2020-11-17 PROCEDURE — 99214 OFFICE O/P EST MOD 30 MIN: CPT | Mod: 25,S$GLB,, | Performed by: FAMILY MEDICINE

## 2020-11-17 RX ORDER — TRIAMCINOLONE ACETONIDE 40 MG/ML
40 INJECTION, SUSPENSION INTRA-ARTICULAR; INTRAMUSCULAR ONCE
Status: COMPLETED | OUTPATIENT
Start: 2020-11-17 | End: 2020-11-17

## 2020-11-17 RX ORDER — AZITHROMYCIN 250 MG/1
TABLET, FILM COATED ORAL
Qty: 6 TABLET | Refills: 0 | Status: SHIPPED | OUTPATIENT
Start: 2020-11-17 | End: 2020-11-21

## 2020-11-17 RX ORDER — PROMETHAZINE HYDROCHLORIDE AND CODEINE PHOSPHATE 6.25; 1 MG/5ML; MG/5ML
5 SOLUTION ORAL EVERY 6 HOURS PRN
Qty: 180 ML | Refills: 0 | Status: SHIPPED | OUTPATIENT
Start: 2020-11-17 | End: 2021-04-05

## 2020-11-17 RX ORDER — METHYLPREDNISOLONE 4 MG/1
TABLET ORAL
Qty: 1 PACKAGE | Refills: 0 | Status: SHIPPED | OUTPATIENT
Start: 2020-11-17 | End: 2021-04-05

## 2020-11-17 RX ADMIN — TRIAMCINOLONE ACETONIDE 40 MG: 40 INJECTION, SUSPENSION INTRA-ARTICULAR; INTRAMUSCULAR at 12:11

## 2020-11-17 NOTE — TELEPHONE ENCOUNTER
----- Message from James Rizzo sent at 11/17/2020  1:55 PM CST -----  Regarding: Ophthalmology  Patient said she had eye exam about 2 months ago, don't need to see doctor.

## 2020-11-23 RX ORDER — NYSTATIN 100000 [USP'U]/ML
SUSPENSION ORAL
Qty: 200 ML | Refills: 0 | Status: SHIPPED | OUTPATIENT
Start: 2020-11-23 | End: 2021-04-05

## 2020-11-23 NOTE — TELEPHONE ENCOUNTER
Received call from patient that she has thrush. Per PCP patient can start Nystatin 5 mL q 6 hours gargle swish and swallow disp 200 mL 0 refills. RX phoned into pharmacy. Please sign pended phoned in RX.

## 2021-03-23 DIAGNOSIS — Z79.4 TYPE 2 DIABETES MELLITUS WITHOUT COMPLICATION, WITH LONG-TERM CURRENT USE OF INSULIN: ICD-10-CM

## 2021-03-23 DIAGNOSIS — E11.9 TYPE 2 DIABETES MELLITUS WITHOUT COMPLICATION, WITH LONG-TERM CURRENT USE OF INSULIN: ICD-10-CM

## 2021-03-24 RX ORDER — GLIPIZIDE 5 MG/1
TABLET ORAL
Qty: 180 TABLET | Refills: 1 | Status: SHIPPED | OUTPATIENT
Start: 2021-03-24 | End: 2021-03-30

## 2021-03-25 ENCOUNTER — TELEPHONE (OUTPATIENT)
Dept: PRIMARY CARE CLINIC | Facility: CLINIC | Age: 66
End: 2021-03-25

## 2021-03-30 DIAGNOSIS — E11.65 TYPE 2 DIABETES MELLITUS WITH HYPERGLYCEMIA, WITHOUT LONG-TERM CURRENT USE OF INSULIN: Primary | ICD-10-CM

## 2021-03-30 RX ORDER — GLIPIZIDE 10 MG/1
10 TABLET ORAL
Qty: 90 TABLET | Refills: 3 | Status: SHIPPED | OUTPATIENT
Start: 2021-03-30 | End: 2022-04-12

## 2021-03-31 ENCOUNTER — TELEPHONE (OUTPATIENT)
Dept: PRIMARY CARE CLINIC | Facility: CLINIC | Age: 66
End: 2021-03-31

## 2021-04-05 ENCOUNTER — OFFICE VISIT (OUTPATIENT)
Dept: PRIMARY CARE CLINIC | Facility: CLINIC | Age: 66
End: 2021-04-05
Payer: MEDICARE

## 2021-04-05 VITALS
HEART RATE: 85 BPM | BODY MASS INDEX: 40.8 KG/M2 | RESPIRATION RATE: 18 BRPM | TEMPERATURE: 98 F | HEIGHT: 63 IN | DIASTOLIC BLOOD PRESSURE: 74 MMHG | OXYGEN SATURATION: 97 % | SYSTOLIC BLOOD PRESSURE: 118 MMHG | WEIGHT: 230.25 LBS

## 2021-04-05 DIAGNOSIS — R25.1 TREMOR: Primary | ICD-10-CM

## 2021-04-05 DIAGNOSIS — Z87.891 HISTORY OF SMOKING GREATER THAN 50 PACK YEARS: ICD-10-CM

## 2021-04-05 DIAGNOSIS — E11.9 TYPE 2 DIABETES MELLITUS WITHOUT COMPLICATION, WITH LONG-TERM CURRENT USE OF INSULIN: ICD-10-CM

## 2021-04-05 DIAGNOSIS — K21.9 GASTROESOPHAGEAL REFLUX DISEASE, UNSPECIFIED WHETHER ESOPHAGITIS PRESENT: ICD-10-CM

## 2021-04-05 DIAGNOSIS — M50.30 DDD (DEGENERATIVE DISC DISEASE), CERVICAL: ICD-10-CM

## 2021-04-05 DIAGNOSIS — G25.81 RESTLESS LEGS SYNDROME: ICD-10-CM

## 2021-04-05 DIAGNOSIS — Z79.4 TYPE 2 DIABETES MELLITUS WITHOUT COMPLICATION, WITH LONG-TERM CURRENT USE OF INSULIN: ICD-10-CM

## 2021-04-05 DIAGNOSIS — E66.01 MORBID OBESITY WITH BMI OF 40.0-44.9, ADULT: ICD-10-CM

## 2021-04-05 DIAGNOSIS — R25.2 MUSCLE CRAMPS: ICD-10-CM

## 2021-04-05 DIAGNOSIS — G89.29 CHRONIC INTRACTABLE HEADACHE, UNSPECIFIED HEADACHE TYPE: ICD-10-CM

## 2021-04-05 DIAGNOSIS — I10 HYPERTENSION, UNSPECIFIED TYPE: ICD-10-CM

## 2021-04-05 DIAGNOSIS — F32.A DEPRESSION, UNSPECIFIED DEPRESSION TYPE: ICD-10-CM

## 2021-04-05 DIAGNOSIS — L40.9 PSORIASIS: ICD-10-CM

## 2021-04-05 DIAGNOSIS — S46.912D STRAIN OF LEFT SHOULDER, SUBSEQUENT ENCOUNTER: ICD-10-CM

## 2021-04-05 DIAGNOSIS — N18.30 CHRONIC RENAL IMPAIRMENT, STAGE 3 (MODERATE), UNSPECIFIED WHETHER STAGE 3A OR 3B CKD: ICD-10-CM

## 2021-04-05 DIAGNOSIS — R25.2 NOCTURNAL MUSCLE CRAMPS: ICD-10-CM

## 2021-04-05 DIAGNOSIS — R51.9 CHRONIC INTRACTABLE HEADACHE, UNSPECIFIED HEADACHE TYPE: ICD-10-CM

## 2021-04-05 DIAGNOSIS — E11.65 TYPE 2 DIABETES MELLITUS WITH HYPERGLYCEMIA, WITHOUT LONG-TERM CURRENT USE OF INSULIN: ICD-10-CM

## 2021-04-05 DIAGNOSIS — J44.1 COPD EXACERBATION: ICD-10-CM

## 2021-04-05 PROBLEM — M77.12 LEFT TENNIS ELBOW: Status: RESOLVED | Noted: 2019-11-18 | Resolved: 2021-04-05

## 2021-04-05 PROBLEM — R10.12 LEFT UPPER QUADRANT PAIN: Status: RESOLVED | Noted: 2020-02-04 | Resolved: 2021-04-05

## 2021-04-05 PROCEDURE — 1159F MED LIST DOCD IN RCRD: CPT | Mod: S$GLB,,, | Performed by: FAMILY MEDICINE

## 2021-04-05 PROCEDURE — 3008F PR BODY MASS INDEX (BMI) DOCUMENTED: ICD-10-PCS | Mod: CPTII,S$GLB,, | Performed by: FAMILY MEDICINE

## 2021-04-05 PROCEDURE — 3008F BODY MASS INDEX DOCD: CPT | Mod: CPTII,S$GLB,, | Performed by: FAMILY MEDICINE

## 2021-04-05 PROCEDURE — 99999 PR PBB SHADOW E&M-EST. PATIENT-LVL IV: CPT | Mod: PBBFAC,,, | Performed by: FAMILY MEDICINE

## 2021-04-05 PROCEDURE — 3074F PR MOST RECENT SYSTOLIC BLOOD PRESSURE < 130 MM HG: ICD-10-PCS | Mod: CPTII,S$GLB,, | Performed by: FAMILY MEDICINE

## 2021-04-05 PROCEDURE — 99499 RISK ADDL DX/OHS AUDIT: ICD-10-PCS | Mod: S$GLB,,, | Performed by: FAMILY MEDICINE

## 2021-04-05 PROCEDURE — 1126F AMNT PAIN NOTED NONE PRSNT: CPT | Mod: S$GLB,,, | Performed by: FAMILY MEDICINE

## 2021-04-05 PROCEDURE — 1126F PR PAIN SEVERITY QUANTIFIED, NO PAIN PRESENT: ICD-10-PCS | Mod: S$GLB,,, | Performed by: FAMILY MEDICINE

## 2021-04-05 PROCEDURE — 3044F PR MOST RECENT HEMOGLOBIN A1C LEVEL <7.0%: ICD-10-PCS | Mod: CPTII,S$GLB,, | Performed by: FAMILY MEDICINE

## 2021-04-05 PROCEDURE — 3074F SYST BP LT 130 MM HG: CPT | Mod: CPTII,S$GLB,, | Performed by: FAMILY MEDICINE

## 2021-04-05 PROCEDURE — 99214 OFFICE O/P EST MOD 30 MIN: CPT | Mod: S$GLB,,, | Performed by: FAMILY MEDICINE

## 2021-04-05 PROCEDURE — 3288F PR FALLS RISK ASSESSMENT DOCUMENTED: ICD-10-PCS | Mod: CPTII,S$GLB,, | Performed by: FAMILY MEDICINE

## 2021-04-05 PROCEDURE — 99999 PR PBB SHADOW E&M-EST. PATIENT-LVL IV: ICD-10-PCS | Mod: PBBFAC,,, | Performed by: FAMILY MEDICINE

## 2021-04-05 PROCEDURE — 3078F DIAST BP <80 MM HG: CPT | Mod: CPTII,S$GLB,, | Performed by: FAMILY MEDICINE

## 2021-04-05 PROCEDURE — 1101F PT FALLS ASSESS-DOCD LE1/YR: CPT | Mod: CPTII,S$GLB,, | Performed by: FAMILY MEDICINE

## 2021-04-05 PROCEDURE — 99499 UNLISTED E&M SERVICE: CPT | Mod: S$GLB,,, | Performed by: FAMILY MEDICINE

## 2021-04-05 PROCEDURE — 99214 PR OFFICE/OUTPT VISIT, EST, LEVL IV, 30-39 MIN: ICD-10-PCS | Mod: S$GLB,,, | Performed by: FAMILY MEDICINE

## 2021-04-05 PROCEDURE — 1101F PR PT FALLS ASSESS DOC 0-1 FALLS W/OUT INJ PAST YR: ICD-10-PCS | Mod: CPTII,S$GLB,, | Performed by: FAMILY MEDICINE

## 2021-04-05 PROCEDURE — 3044F HG A1C LEVEL LT 7.0%: CPT | Mod: CPTII,S$GLB,, | Performed by: FAMILY MEDICINE

## 2021-04-05 PROCEDURE — 3078F PR MOST RECENT DIASTOLIC BLOOD PRESSURE < 80 MM HG: ICD-10-PCS | Mod: CPTII,S$GLB,, | Performed by: FAMILY MEDICINE

## 2021-04-05 PROCEDURE — 1159F PR MEDICATION LIST DOCUMENTED IN MEDICAL RECORD: ICD-10-PCS | Mod: S$GLB,,, | Performed by: FAMILY MEDICINE

## 2021-04-05 PROCEDURE — 3288F FALL RISK ASSESSMENT DOCD: CPT | Mod: CPTII,S$GLB,, | Performed by: FAMILY MEDICINE

## 2021-04-05 RX ORDER — SERTRALINE HYDROCHLORIDE 25 MG/1
25 TABLET, FILM COATED ORAL DAILY
Qty: 90 TABLET | Refills: 1 | Status: SHIPPED | OUTPATIENT
Start: 2021-04-05 | End: 2021-10-06 | Stop reason: SDUPTHER

## 2021-04-05 RX ORDER — LISINOPRIL 10 MG/1
10 TABLET ORAL DAILY
Qty: 90 TABLET | Refills: 3 | Status: SHIPPED | OUTPATIENT
Start: 2021-04-05 | End: 2022-04-11

## 2021-04-05 RX ORDER — POTASSIUM CHLORIDE 750 MG/1
CAPSULE, EXTENDED RELEASE ORAL
Qty: 90 CAPSULE | Refills: 3 | Status: SHIPPED | OUTPATIENT
Start: 2021-04-05 | End: 2022-05-03

## 2021-04-05 RX ORDER — ATORVASTATIN CALCIUM 20 MG/1
20 TABLET, FILM COATED ORAL NIGHTLY
Qty: 90 TABLET | Refills: 3 | Status: SHIPPED | OUTPATIENT
Start: 2021-04-05 | End: 2022-07-20

## 2021-04-05 RX ORDER — FUROSEMIDE 40 MG/1
TABLET ORAL
Qty: 90 TABLET | Refills: 3 | Status: SHIPPED | OUTPATIENT
Start: 2021-04-05 | End: 2022-05-03

## 2021-04-05 RX ORDER — DICLOFENAC SODIUM 75 MG/1
75 TABLET, DELAYED RELEASE ORAL 2 TIMES DAILY
Qty: 180 TABLET | Refills: 1 | Status: SHIPPED | OUTPATIENT
Start: 2021-04-05 | End: 2021-10-06 | Stop reason: SDUPTHER

## 2021-04-05 RX ORDER — TIZANIDINE 4 MG/1
TABLET ORAL
Qty: 30 TABLET | Refills: 5 | Status: SHIPPED | OUTPATIENT
Start: 2021-04-05 | End: 2021-09-27

## 2021-04-05 RX ORDER — AMLODIPINE BESYLATE 5 MG/1
5 TABLET ORAL DAILY
Qty: 90 TABLET | Refills: 3 | Status: SHIPPED | OUTPATIENT
Start: 2021-04-05 | End: 2022-04-11

## 2021-04-06 ENCOUNTER — TELEPHONE (OUTPATIENT)
Dept: NEUROLOGY | Facility: CLINIC | Age: 66
End: 2021-04-06

## 2021-05-03 ENCOUNTER — TELEPHONE (OUTPATIENT)
Dept: NEUROLOGY | Facility: CLINIC | Age: 66
End: 2021-05-03

## 2021-05-04 DIAGNOSIS — E11.9 TYPE 2 DIABETES MELLITUS WITHOUT COMPLICATION: ICD-10-CM

## 2021-05-24 LAB
LEFT EYE DM RETINOPATHY: NEGATIVE
RIGHT EYE DM RETINOPATHY: NEGATIVE

## 2021-05-27 ENCOUNTER — OFFICE VISIT (OUTPATIENT)
Dept: NEUROLOGY | Facility: CLINIC | Age: 66
End: 2021-05-27
Payer: MEDICARE

## 2021-05-27 ENCOUNTER — LAB VISIT (OUTPATIENT)
Dept: LAB | Facility: HOSPITAL | Age: 66
End: 2021-05-27
Payer: MEDICARE

## 2021-05-27 VITALS
HEART RATE: 79 BPM | SYSTOLIC BLOOD PRESSURE: 122 MMHG | BODY MASS INDEX: 41.8 KG/M2 | DIASTOLIC BLOOD PRESSURE: 80 MMHG | WEIGHT: 232.25 LBS

## 2021-05-27 DIAGNOSIS — G62.9 PERIPHERAL POLYNEUROPATHY: ICD-10-CM

## 2021-05-27 DIAGNOSIS — E53.8 B12 DEFICIENCY: ICD-10-CM

## 2021-05-27 DIAGNOSIS — R25.1 TREMOR: Primary | ICD-10-CM

## 2021-05-27 DIAGNOSIS — R25.1 TREMOR: ICD-10-CM

## 2021-05-27 DIAGNOSIS — J44.9 CHRONIC OBSTRUCTIVE PULMONARY DISEASE, UNSPECIFIED COPD TYPE: ICD-10-CM

## 2021-05-27 DIAGNOSIS — G25.81 RESTLESS LEGS SYNDROME: ICD-10-CM

## 2021-05-27 LAB
TSH SERPL DL<=0.005 MIU/L-ACNC: 0.83 UIU/ML (ref 0.4–4)
VIT B12 SERPL-MCNC: 354 PG/ML (ref 210–950)

## 2021-05-27 PROCEDURE — 1126F PR PAIN SEVERITY QUANTIFIED, NO PAIN PRESENT: ICD-10-PCS | Mod: S$GLB,,, | Performed by: PHYSICIAN ASSISTANT

## 2021-05-27 PROCEDURE — 1100F PR PT FALLS ASSESS DOC 2+ FALLS/FALL W/INJURY/YR: ICD-10-PCS | Mod: CPTII,S$GLB,, | Performed by: PHYSICIAN ASSISTANT

## 2021-05-27 PROCEDURE — 82607 VITAMIN B-12: CPT | Performed by: PHYSICIAN ASSISTANT

## 2021-05-27 PROCEDURE — 1159F MED LIST DOCD IN RCRD: CPT | Mod: S$GLB,,, | Performed by: PHYSICIAN ASSISTANT

## 2021-05-27 PROCEDURE — 99499 RISK ADDL DX/OHS AUDIT: ICD-10-PCS | Mod: S$GLB,,, | Performed by: PHYSICIAN ASSISTANT

## 2021-05-27 PROCEDURE — 84443 ASSAY THYROID STIM HORMONE: CPT | Performed by: PHYSICIAN ASSISTANT

## 2021-05-27 PROCEDURE — 3008F PR BODY MASS INDEX (BMI) DOCUMENTED: ICD-10-PCS | Mod: CPTII,S$GLB,, | Performed by: PHYSICIAN ASSISTANT

## 2021-05-27 PROCEDURE — 1100F PTFALLS ASSESS-DOCD GE2>/YR: CPT | Mod: CPTII,S$GLB,, | Performed by: PHYSICIAN ASSISTANT

## 2021-05-27 PROCEDURE — 99999 PR PBB SHADOW E&M-EST. PATIENT-LVL III: ICD-10-PCS | Mod: PBBFAC,,, | Performed by: PHYSICIAN ASSISTANT

## 2021-05-27 PROCEDURE — 99204 PR OFFICE/OUTPT VISIT, NEW, LEVL IV, 45-59 MIN: ICD-10-PCS | Mod: S$GLB,,, | Performed by: PHYSICIAN ASSISTANT

## 2021-05-27 PROCEDURE — 99499 UNLISTED E&M SERVICE: CPT | Mod: S$GLB,,, | Performed by: PHYSICIAN ASSISTANT

## 2021-05-27 PROCEDURE — 1159F PR MEDICATION LIST DOCUMENTED IN MEDICAL RECORD: ICD-10-PCS | Mod: S$GLB,,, | Performed by: PHYSICIAN ASSISTANT

## 2021-05-27 PROCEDURE — 1126F AMNT PAIN NOTED NONE PRSNT: CPT | Mod: S$GLB,,, | Performed by: PHYSICIAN ASSISTANT

## 2021-05-27 PROCEDURE — 3008F BODY MASS INDEX DOCD: CPT | Mod: CPTII,S$GLB,, | Performed by: PHYSICIAN ASSISTANT

## 2021-05-27 PROCEDURE — 99204 OFFICE O/P NEW MOD 45 MIN: CPT | Mod: S$GLB,,, | Performed by: PHYSICIAN ASSISTANT

## 2021-05-27 PROCEDURE — 3288F FALL RISK ASSESSMENT DOCD: CPT | Mod: CPTII,S$GLB,, | Performed by: PHYSICIAN ASSISTANT

## 2021-05-27 PROCEDURE — 86592 SYPHILIS TEST NON-TREP QUAL: CPT | Performed by: PHYSICIAN ASSISTANT

## 2021-05-27 PROCEDURE — 36415 COLL VENOUS BLD VENIPUNCTURE: CPT | Performed by: PHYSICIAN ASSISTANT

## 2021-05-27 PROCEDURE — 3288F PR FALLS RISK ASSESSMENT DOCUMENTED: ICD-10-PCS | Mod: CPTII,S$GLB,, | Performed by: PHYSICIAN ASSISTANT

## 2021-05-27 PROCEDURE — 99999 PR PBB SHADOW E&M-EST. PATIENT-LVL III: CPT | Mod: PBBFAC,,, | Performed by: PHYSICIAN ASSISTANT

## 2021-05-27 PROCEDURE — 84425 ASSAY OF VITAMIN B-1: CPT | Performed by: PHYSICIAN ASSISTANT

## 2021-05-27 RX ORDER — PRIMIDONE 50 MG/1
50 TABLET ORAL NIGHTLY
Qty: 30 TABLET | Refills: 11 | Status: SHIPPED | OUTPATIENT
Start: 2021-05-27 | End: 2021-07-06

## 2021-05-28 LAB — RPR SER QL: NORMAL

## 2021-06-02 ENCOUNTER — PATIENT OUTREACH (OUTPATIENT)
Dept: ADMINISTRATIVE | Facility: HOSPITAL | Age: 66
End: 2021-06-02

## 2021-06-02 LAB — VIT B1 BLD-MCNC: 51 UG/L (ref 38–122)

## 2021-06-22 DIAGNOSIS — J44.9 CHRONIC OBSTRUCTIVE PULMONARY DISEASE, UNSPECIFIED COPD TYPE: ICD-10-CM

## 2021-06-22 RX ORDER — BUDESONIDE AND FORMOTEROL FUMARATE DIHYDRATE 160; 4.5 UG/1; UG/1
AEROSOL RESPIRATORY (INHALATION)
Qty: 11 G | Refills: 5 | Status: SHIPPED | OUTPATIENT
Start: 2021-06-22 | End: 2021-12-08 | Stop reason: ALTCHOICE

## 2021-07-01 ENCOUNTER — PATIENT MESSAGE (OUTPATIENT)
Dept: ADMINISTRATIVE | Facility: OTHER | Age: 66
End: 2021-07-01

## 2021-07-06 ENCOUNTER — PATIENT MESSAGE (OUTPATIENT)
Dept: ADMINISTRATIVE | Facility: HOSPITAL | Age: 66
End: 2021-07-06

## 2021-07-06 ENCOUNTER — OFFICE VISIT (OUTPATIENT)
Dept: PRIMARY CARE CLINIC | Facility: CLINIC | Age: 66
End: 2021-07-06
Payer: MEDICARE

## 2021-07-06 ENCOUNTER — TELEPHONE (OUTPATIENT)
Dept: PRIMARY CARE CLINIC | Facility: CLINIC | Age: 66
End: 2021-07-06

## 2021-07-06 VITALS
RESPIRATION RATE: 18 BRPM | BODY MASS INDEX: 41.05 KG/M2 | OXYGEN SATURATION: 97 % | SYSTOLIC BLOOD PRESSURE: 124 MMHG | HEIGHT: 63 IN | DIASTOLIC BLOOD PRESSURE: 78 MMHG | HEART RATE: 81 BPM | WEIGHT: 231.69 LBS

## 2021-07-06 DIAGNOSIS — M50.30 DDD (DEGENERATIVE DISC DISEASE), CERVICAL: ICD-10-CM

## 2021-07-06 DIAGNOSIS — G25.81 RESTLESS LEGS SYNDROME: ICD-10-CM

## 2021-07-06 DIAGNOSIS — J01.81 OTHER ACUTE RECURRENT SINUSITIS: ICD-10-CM
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
## 2021-07-06 DIAGNOSIS — R29.898 WEAKNESS OF BOTH LOWER EXTREMITIES: ICD-10-CM

## 2021-07-06 DIAGNOSIS — F32.A DEPRESSION, UNSPECIFIED DEPRESSION TYPE: ICD-10-CM

## 2021-07-06 DIAGNOSIS — G62.9 PERIPHERAL POLYNEUROPATHY: ICD-10-CM

## 2021-07-06 DIAGNOSIS — L40.9 PSORIASIS: ICD-10-CM

## 2021-07-06 DIAGNOSIS — R25.1 TREMOR: ICD-10-CM

## 2021-07-06 DIAGNOSIS — R45.4 ANGER: ICD-10-CM

## 2021-07-06 DIAGNOSIS — J44.9 CHRONIC OBSTRUCTIVE PULMONARY DISEASE, UNSPECIFIED COPD TYPE: Primary | ICD-10-CM

## 2021-07-06 PROBLEM — R29.6 FALLS: Status: ACTIVE | Noted: 2021-07-06
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
## 2021-07-06 PROCEDURE — 99214 PR OFFICE/OUTPT VISIT, EST, LEVL IV, 30-39 MIN: ICD-10-PCS | Mod: S$GLB,,, | Performed by: FAMILY MEDICINE

## 2021-07-06 PROCEDURE — 1100F PR PT FALLS ASSESS DOC 2+ FALLS/FALL W/INJURY/YR: ICD-10-PCS | Mod: CPTII,S$GLB,, | Performed by: FAMILY MEDICINE

## 2021-07-06 PROCEDURE — 1159F PR MEDICATION LIST DOCUMENTED IN MEDICAL RECORD: ICD-10-PCS | Mod: S$GLB,,, | Performed by: FAMILY MEDICINE

## 2021-07-06 PROCEDURE — 99214 OFFICE O/P EST MOD 30 MIN: CPT | Mod: S$GLB,,, | Performed by: FAMILY MEDICINE

## 2021-07-06 PROCEDURE — 3008F BODY MASS INDEX DOCD: CPT | Mod: CPTII,S$GLB,, | Performed by: FAMILY MEDICINE

## 2021-07-06 PROCEDURE — 1159F MED LIST DOCD IN RCRD: CPT | Mod: S$GLB,,, | Performed by: FAMILY MEDICINE

## 2021-07-06 PROCEDURE — 99999 PR PBB SHADOW E&M-EST. PATIENT-LVL IV: ICD-10-PCS | Mod: PBBFAC,,, | Performed by: FAMILY MEDICINE

## 2021-07-06 PROCEDURE — 3008F PR BODY MASS INDEX (BMI) DOCUMENTED: ICD-10-PCS | Mod: CPTII,S$GLB,, | Performed by: FAMILY MEDICINE

## 2021-07-06 PROCEDURE — 3288F FALL RISK ASSESSMENT DOCD: CPT | Mod: CPTII,S$GLB,, | Performed by: FAMILY MEDICINE

## 2021-07-06 PROCEDURE — 1100F PTFALLS ASSESS-DOCD GE2>/YR: CPT | Mod: CPTII,S$GLB,, | Performed by: FAMILY MEDICINE

## 2021-07-06 PROCEDURE — 99999 PR PBB SHADOW E&M-EST. PATIENT-LVL IV: CPT | Mod: PBBFAC,,, | Performed by: FAMILY MEDICINE

## 2021-07-06 PROCEDURE — 1126F AMNT PAIN NOTED NONE PRSNT: CPT | Mod: S$GLB,,, | Performed by: FAMILY MEDICINE

## 2021-07-06 PROCEDURE — 3288F PR FALLS RISK ASSESSMENT DOCUMENTED: ICD-10-PCS | Mod: CPTII,S$GLB,, | Performed by: FAMILY MEDICINE

## 2021-07-06 PROCEDURE — 99499 RISK ADDL DX/OHS AUDIT: ICD-10-PCS | Mod: S$GLB,,, | Performed by: FAMILY MEDICINE

## 2021-07-06 PROCEDURE — 99499 UNLISTED E&M SERVICE: CPT | Mod: S$GLB,,, | Performed by: FAMILY MEDICINE

## 2021-07-06 PROCEDURE — 1126F PR PAIN SEVERITY QUANTIFIED, NO PAIN PRESENT: ICD-10-PCS | Mod: S$GLB,,, | Performed by: FAMILY MEDICINE

## 2021-07-06 RX ORDER — FLUTICASONE PROPIONATE 50 MCG
2 SPRAY, SUSPENSION (ML) NASAL DAILY
Qty: 16 G | Refills: 5 | Status: SHIPPED | OUTPATIENT
Start: 2021-07-06 | End: 2021-10-06 | Stop reason: SDUPTHER

## 2021-07-06 RX ORDER — DIPHENHYDRAMINE HCL 25 MG
25 CAPSULE ORAL 2 TIMES DAILY
COMMUNITY
End: 2022-04-11

## 2021-07-06 RX ORDER — AZITHROMYCIN 250 MG/1
TABLET, FILM COATED ORAL
Qty: 6 TABLET | Refills: 0 | Status: SHIPPED | OUTPATIENT
Start: 2021-07-06 | End: 2021-07-10

## 2021-07-13 ENCOUNTER — PROCEDURE VISIT (OUTPATIENT)
Dept: NEUROLOGY | Facility: CLINIC | Age: 66
End: 2021-07-13
Payer: MEDICARE

## 2021-07-13 DIAGNOSIS — R29.898 WEAKNESS OF BOTH LOWER EXTREMITIES: ICD-10-CM

## 2021-07-13 DIAGNOSIS — G25.81 RESTLESS LEGS SYNDROME: ICD-10-CM
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
## 2021-07-13 DIAGNOSIS — M50.30 DDD (DEGENERATIVE DISC DISEASE), CERVICAL: ICD-10-CM

## 2021-07-13 DIAGNOSIS — G62.9 PERIPHERAL POLYNEUROPATHY: ICD-10-CM

## 2021-07-13 PROCEDURE — 95911 PR NERVE CONDUCTION STUDY; 9-10 STUDIES: ICD-10-PCS | Mod: S$GLB,,, | Performed by: PSYCHIATRY & NEUROLOGY

## 2021-07-13 PROCEDURE — 95886 PR EMG COMPLETE, W/ NERVE CONDUCTION STUDIES, 5+ MUSCLES: ICD-10-PCS | Mod: S$GLB,,, | Performed by: PSYCHIATRY & NEUROLOGY

## 2021-07-13 PROCEDURE — 95911 NRV CNDJ TEST 9-10 STUDIES: CPT | Mod: S$GLB,,, | Performed by: PSYCHIATRY & NEUROLOGY

## 2021-07-13 PROCEDURE — 95886 MUSC TEST DONE W/N TEST COMP: CPT | Mod: S$GLB,,, | Performed by: PSYCHIATRY & NEUROLOGY

## 2021-07-14 DIAGNOSIS — Z12.31 VISIT FOR SCREENING MAMMOGRAM: Primary | ICD-10-CM

## 2021-07-29 ENCOUNTER — HOSPITAL ENCOUNTER (OUTPATIENT)
Dept: RADIOLOGY | Facility: HOSPITAL | Age: 66
Discharge: HOME | End: 2021-07-29
Attending: FAMILY MEDICINE
Payer: MEDICARE

## 2021-07-29 VITALS — WEIGHT: 231 LBS | BODY MASS INDEX: 42.51 KG/M2 | HEIGHT: 62 IN

## 2021-07-29 DIAGNOSIS — Z12.31 VISIT FOR SCREENING MAMMOGRAM: ICD-10-CM

## 2021-07-29 PROCEDURE — 77067 MAMMO DIGITAL SCREENING BILAT WITH TOMO: ICD-10-PCS | Mod: 26,,, | Performed by: RADIOLOGY

## 2021-07-29 PROCEDURE — 77063 BREAST TOMOSYNTHESIS BI: CPT | Mod: 26,,, | Performed by: RADIOLOGY

## 2021-07-29 PROCEDURE — 77067 SCR MAMMO BI INCL CAD: CPT | Mod: 26,,, | Performed by: RADIOLOGY

## 2021-07-29 PROCEDURE — 77067 SCR MAMMO BI INCL CAD: CPT | Mod: TC

## 2021-07-29 PROCEDURE — 77063 MAMMO DIGITAL SCREENING BILAT WITH TOMO: ICD-10-PCS | Mod: 26,,, | Performed by: RADIOLOGY

## 2021-08-11 ENCOUNTER — TELEPHONE (OUTPATIENT)
Dept: PRIMARY CARE CLINIC | Facility: CLINIC | Age: 66
End: 2021-08-11

## 2021-08-13 ENCOUNTER — TELEPHONE (OUTPATIENT)
Dept: PRIMARY CARE CLINIC | Facility: CLINIC | Age: 66
End: 2021-08-13

## 2021-08-13 ENCOUNTER — OFFICE VISIT (OUTPATIENT)
Dept: PRIMARY CARE CLINIC | Facility: CLINIC | Age: 66
End: 2021-08-13
Payer: MEDICARE

## 2021-08-13 DIAGNOSIS — L40.9 PSORIASIS: ICD-10-CM

## 2021-08-13 DIAGNOSIS — F32.A DEPRESSION, UNSPECIFIED DEPRESSION TYPE: ICD-10-CM

## 2021-08-13 DIAGNOSIS — E78.5 HYPERLIPIDEMIA, UNSPECIFIED HYPERLIPIDEMIA TYPE: ICD-10-CM

## 2021-08-13 DIAGNOSIS — R32 URINARY INCONTINENCE, UNSPECIFIED TYPE: ICD-10-CM

## 2021-08-13 DIAGNOSIS — I10 HYPERTENSION, UNSPECIFIED TYPE: ICD-10-CM

## 2021-08-13 DIAGNOSIS — R25.1 TREMOR: ICD-10-CM

## 2021-08-13 DIAGNOSIS — G25.81 RESTLESS LEGS SYNDROME: ICD-10-CM

## 2021-08-13 DIAGNOSIS — M50.30 DDD (DEGENERATIVE DISC DISEASE), CERVICAL: ICD-10-CM

## 2021-08-13 DIAGNOSIS — E66.01 MORBID OBESITY WITH BMI OF 40.0-44.9, ADULT: ICD-10-CM

## 2021-08-13 DIAGNOSIS — G62.9 PERIPHERAL POLYNEUROPATHY: ICD-10-CM

## 2021-08-13 DIAGNOSIS — J40 BRONCHITIS: Primary | ICD-10-CM

## 2021-08-13 DIAGNOSIS — J44.9 CHRONIC OBSTRUCTIVE PULMONARY DISEASE, UNSPECIFIED COPD TYPE: ICD-10-CM

## 2021-08-13 DIAGNOSIS — E11.65 TYPE 2 DIABETES MELLITUS WITH HYPERGLYCEMIA, WITHOUT LONG-TERM CURRENT USE OF INSULIN: ICD-10-CM

## 2021-08-13 DIAGNOSIS — K21.9 GASTROESOPHAGEAL REFLUX DISEASE, UNSPECIFIED WHETHER ESOPHAGITIS PRESENT: ICD-10-CM

## 2021-08-13 PROCEDURE — 3044F PR MOST RECENT HEMOGLOBIN A1C LEVEL <7.0%: ICD-10-PCS | Mod: CPTII,95,, | Performed by: FAMILY MEDICINE

## 2021-08-13 PROCEDURE — 99443 PR PHYSICIAN TELEPHONE EVALUATION 21-30 MIN: ICD-10-PCS | Mod: 95,,, | Performed by: FAMILY MEDICINE

## 2021-08-13 PROCEDURE — 3044F HG A1C LEVEL LT 7.0%: CPT | Mod: CPTII,95,, | Performed by: FAMILY MEDICINE

## 2021-08-13 PROCEDURE — 99443 PR PHYSICIAN TELEPHONE EVALUATION 21-30 MIN: CPT | Mod: 95,,, | Performed by: FAMILY MEDICINE

## 2021-08-13 RX ORDER — AZITHROMYCIN 250 MG/1
TABLET, FILM COATED ORAL
Qty: 6 TABLET | Refills: 0 | Status: SHIPPED | OUTPATIENT
Start: 2021-08-13 | End: 2021-08-17

## 2021-08-13 RX ORDER — METHYLPREDNISOLONE 4 MG/1
TABLET ORAL
Qty: 1 PACKAGE | Refills: 0 | Status: SHIPPED | OUTPATIENT
Start: 2021-08-13 | End: 2021-10-06

## 2021-08-13 RX ORDER — PROMETHAZINE HYDROCHLORIDE AND CODEINE PHOSPHATE 6.25; 1 MG/5ML; MG/5ML
5 SOLUTION ORAL EVERY 6 HOURS PRN
Qty: 180 ML | Refills: 0 | Status: SHIPPED | OUTPATIENT
Start: 2021-08-13 | End: 2021-10-06

## 2021-09-03 ENCOUNTER — PATIENT MESSAGE (OUTPATIENT)
Dept: NEUROLOGY | Facility: CLINIC | Age: 66
End: 2021-09-03

## 2021-09-27 RX ORDER — GABAPENTIN 300 MG/1
CAPSULE ORAL
Qty: 90 CAPSULE | Refills: 2 | Status: SHIPPED | OUTPATIENT
Start: 2021-09-27 | End: 2022-02-07

## 2021-09-27 RX ORDER — TIZANIDINE 4 MG/1
TABLET ORAL
Qty: 30 TABLET | Refills: 5 | Status: SHIPPED | OUTPATIENT
Start: 2021-09-27 | End: 2021-10-06 | Stop reason: SDUPTHER

## 2021-10-05 ENCOUNTER — PATIENT MESSAGE (OUTPATIENT)
Dept: ADMINISTRATIVE | Facility: HOSPITAL | Age: 66
End: 2021-10-05

## 2021-10-06 ENCOUNTER — OFFICE VISIT (OUTPATIENT)
Dept: PRIMARY CARE CLINIC | Facility: CLINIC | Age: 66
End: 2021-10-06
Payer: MEDICARE

## 2021-10-06 VITALS
OXYGEN SATURATION: 96 % | HEIGHT: 62 IN | SYSTOLIC BLOOD PRESSURE: 124 MMHG | BODY MASS INDEX: 41.97 KG/M2 | HEART RATE: 88 BPM | RESPIRATION RATE: 20 BRPM | DIASTOLIC BLOOD PRESSURE: 82 MMHG | WEIGHT: 228.06 LBS

## 2021-10-06 DIAGNOSIS — E78.5 HYPERLIPIDEMIA, UNSPECIFIED HYPERLIPIDEMIA TYPE: ICD-10-CM

## 2021-10-06 DIAGNOSIS — K21.9 GASTROESOPHAGEAL REFLUX DISEASE, UNSPECIFIED WHETHER ESOPHAGITIS PRESENT: ICD-10-CM

## 2021-10-06 DIAGNOSIS — R51.9 CHRONIC INTRACTABLE HEADACHE, UNSPECIFIED HEADACHE TYPE: ICD-10-CM

## 2021-10-06 DIAGNOSIS — J44.9 CHRONIC OBSTRUCTIVE PULMONARY DISEASE, UNSPECIFIED COPD TYPE: ICD-10-CM

## 2021-10-06 DIAGNOSIS — R91.1 PULMONARY NODULE: ICD-10-CM

## 2021-10-06 DIAGNOSIS — E11.65 TYPE 2 DIABETES MELLITUS WITH HYPERGLYCEMIA, WITHOUT LONG-TERM CURRENT USE OF INSULIN: ICD-10-CM

## 2021-10-06 DIAGNOSIS — L40.9 PSORIASIS: ICD-10-CM

## 2021-10-06 DIAGNOSIS — F32.A DEPRESSION, UNSPECIFIED DEPRESSION TYPE: ICD-10-CM

## 2021-10-06 DIAGNOSIS — J40 BRONCHITIS: Primary | ICD-10-CM

## 2021-10-06 DIAGNOSIS — R32 URINARY INCONTINENCE, UNSPECIFIED TYPE: ICD-10-CM

## 2021-10-06 DIAGNOSIS — J32.9 CHRONIC SINUSITIS, UNSPECIFIED LOCATION: ICD-10-CM

## 2021-10-06 DIAGNOSIS — E66.01 MORBID OBESITY WITH BMI OF 40.0-44.9, ADULT: ICD-10-CM

## 2021-10-06 DIAGNOSIS — R60.9 EDEMA, UNSPECIFIED TYPE: ICD-10-CM

## 2021-10-06 DIAGNOSIS — I10 HYPERTENSION, UNSPECIFIED TYPE: ICD-10-CM

## 2021-10-06 DIAGNOSIS — Z23 NEED FOR VACCINATION: ICD-10-CM

## 2021-10-06 DIAGNOSIS — G89.29 CHRONIC INTRACTABLE HEADACHE, UNSPECIFIED HEADACHE TYPE: ICD-10-CM

## 2021-10-06 PROCEDURE — 3074F PR MOST RECENT SYSTOLIC BLOOD PRESSURE < 130 MM HG: ICD-10-PCS | Mod: CPTII,S$GLB,, | Performed by: FAMILY MEDICINE

## 2021-10-06 PROCEDURE — 1159F MED LIST DOCD IN RCRD: CPT | Mod: CPTII,S$GLB,, | Performed by: FAMILY MEDICINE

## 2021-10-06 PROCEDURE — 90694 VACC AIIV4 NO PRSRV 0.5ML IM: CPT | Mod: S$GLB,,, | Performed by: FAMILY MEDICINE

## 2021-10-06 PROCEDURE — 99214 OFFICE O/P EST MOD 30 MIN: CPT | Mod: 25,S$GLB,, | Performed by: FAMILY MEDICINE

## 2021-10-06 PROCEDURE — 1159F PR MEDICATION LIST DOCUMENTED IN MEDICAL RECORD: ICD-10-PCS | Mod: CPTII,S$GLB,, | Performed by: FAMILY MEDICINE

## 2021-10-06 PROCEDURE — 1126F AMNT PAIN NOTED NONE PRSNT: CPT | Mod: CPTII,S$GLB,, | Performed by: FAMILY MEDICINE

## 2021-10-06 PROCEDURE — G0009 ADMIN PNEUMOCOCCAL VACCINE: HCPCS | Mod: S$GLB,,, | Performed by: FAMILY MEDICINE

## 2021-10-06 PROCEDURE — 3288F PR FALLS RISK ASSESSMENT DOCUMENTED: ICD-10-PCS | Mod: CPTII,S$GLB,, | Performed by: FAMILY MEDICINE

## 2021-10-06 PROCEDURE — 99499 UNLISTED E&M SERVICE: CPT | Mod: S$GLB,,, | Performed by: FAMILY MEDICINE

## 2021-10-06 PROCEDURE — 96372 PR INJECTION,THERAP/PROPH/DIAG2ST, IM OR SUBCUT: ICD-10-PCS | Mod: 59,S$GLB,, | Performed by: FAMILY MEDICINE

## 2021-10-06 PROCEDURE — 99499 RISK ADDL DX/OHS AUDIT: ICD-10-PCS | Mod: S$GLB,,, | Performed by: FAMILY MEDICINE

## 2021-10-06 PROCEDURE — 1100F PTFALLS ASSESS-DOCD GE2>/YR: CPT | Mod: CPTII,S$GLB,, | Performed by: FAMILY MEDICINE

## 2021-10-06 PROCEDURE — 4010F PR ACE/ARB THEARPY RXD/TAKEN: ICD-10-PCS | Mod: CPTII,S$GLB,, | Performed by: FAMILY MEDICINE

## 2021-10-06 PROCEDURE — 4010F ACE/ARB THERAPY RXD/TAKEN: CPT | Mod: CPTII,S$GLB,, | Performed by: FAMILY MEDICINE

## 2021-10-06 PROCEDURE — 1100F PR PT FALLS ASSESS DOC 2+ FALLS/FALL W/INJURY/YR: ICD-10-PCS | Mod: CPTII,S$GLB,, | Performed by: FAMILY MEDICINE

## 2021-10-06 PROCEDURE — 96372 THER/PROPH/DIAG INJ SC/IM: CPT | Mod: 59,S$GLB,, | Performed by: FAMILY MEDICINE

## 2021-10-06 PROCEDURE — 3079F PR MOST RECENT DIASTOLIC BLOOD PRESSURE 80-89 MM HG: ICD-10-PCS | Mod: CPTII,S$GLB,, | Performed by: FAMILY MEDICINE

## 2021-10-06 PROCEDURE — 3288F FALL RISK ASSESSMENT DOCD: CPT | Mod: CPTII,S$GLB,, | Performed by: FAMILY MEDICINE

## 2021-10-06 PROCEDURE — 3074F SYST BP LT 130 MM HG: CPT | Mod: CPTII,S$GLB,, | Performed by: FAMILY MEDICINE

## 2021-10-06 PROCEDURE — 1126F PR PAIN SEVERITY QUANTIFIED, NO PAIN PRESENT: ICD-10-PCS | Mod: CPTII,S$GLB,, | Performed by: FAMILY MEDICINE

## 2021-10-06 PROCEDURE — 99999 PR PBB SHADOW E&M-EST. PATIENT-LVL V: ICD-10-PCS | Mod: PBBFAC,,, | Performed by: FAMILY MEDICINE

## 2021-10-06 PROCEDURE — G0009 FLU VACCINE - QUADRIVALENT - ADJUVANTED: ICD-10-PCS | Mod: S$GLB,,, | Performed by: FAMILY MEDICINE

## 2021-10-06 PROCEDURE — 90732 PNEUMOCOCCAL POLYSACCHARIDE VACCINE 23-VALENT =>2YO SQ IM: ICD-10-PCS | Mod: S$GLB,,, | Performed by: FAMILY MEDICINE

## 2021-10-06 PROCEDURE — 3044F PR MOST RECENT HEMOGLOBIN A1C LEVEL <7.0%: ICD-10-PCS | Mod: CPTII,S$GLB,, | Performed by: FAMILY MEDICINE

## 2021-10-06 PROCEDURE — G0008 ADMIN INFLUENZA VIRUS VAC: HCPCS | Mod: S$GLB,,, | Performed by: FAMILY MEDICINE

## 2021-10-06 PROCEDURE — 99214 PR OFFICE/OUTPT VISIT, EST, LEVL IV, 30-39 MIN: ICD-10-PCS | Mod: 25,S$GLB,, | Performed by: FAMILY MEDICINE

## 2021-10-06 PROCEDURE — 3008F PR BODY MASS INDEX (BMI) DOCUMENTED: ICD-10-PCS | Mod: CPTII,S$GLB,, | Performed by: FAMILY MEDICINE

## 2021-10-06 PROCEDURE — 3044F HG A1C LEVEL LT 7.0%: CPT | Mod: CPTII,S$GLB,, | Performed by: FAMILY MEDICINE

## 2021-10-06 PROCEDURE — 3079F DIAST BP 80-89 MM HG: CPT | Mod: CPTII,S$GLB,, | Performed by: FAMILY MEDICINE

## 2021-10-06 PROCEDURE — 90732 PPSV23 VACC 2 YRS+ SUBQ/IM: CPT | Mod: S$GLB,,, | Performed by: FAMILY MEDICINE

## 2021-10-06 PROCEDURE — 99999 PR PBB SHADOW E&M-EST. PATIENT-LVL V: CPT | Mod: PBBFAC,,, | Performed by: FAMILY MEDICINE

## 2021-10-06 PROCEDURE — 90694 FLU VACCINE - QUADRIVALENT - ADJUVANTED: ICD-10-PCS | Mod: S$GLB,,, | Performed by: FAMILY MEDICINE

## 2021-10-06 PROCEDURE — 3008F BODY MASS INDEX DOCD: CPT | Mod: CPTII,S$GLB,, | Performed by: FAMILY MEDICINE

## 2021-10-06 PROCEDURE — G0008 PNEUMOCOCCAL POLYSACCHARIDE VACCINE 23-VALENT =>2YO SQ IM: ICD-10-PCS | Mod: S$GLB,,, | Performed by: FAMILY MEDICINE

## 2021-10-06 RX ORDER — FLUTICASONE PROPIONATE 50 MCG
2 SPRAY, SUSPENSION (ML) NASAL DAILY
Qty: 16 G | Refills: 5 | Status: SHIPPED | OUTPATIENT
Start: 2021-10-06

## 2021-10-06 RX ORDER — LORATADINE 10 MG/1
10 TABLET ORAL DAILY
Qty: 30 TABLET | Refills: 5 | Status: SHIPPED | OUTPATIENT
Start: 2021-10-06 | End: 2022-04-11

## 2021-10-06 RX ORDER — DICLOFENAC SODIUM 75 MG/1
75 TABLET, DELAYED RELEASE ORAL 2 TIMES DAILY
Qty: 180 TABLET | Refills: 1 | Status: SHIPPED | OUTPATIENT
Start: 2021-10-06 | End: 2022-01-03

## 2021-10-06 RX ORDER — LEVOFLOXACIN 500 MG/1
500 TABLET, FILM COATED ORAL DAILY
Qty: 10 TABLET | Refills: 0 | Status: SHIPPED | OUTPATIENT
Start: 2021-10-06 | End: 2021-10-16

## 2021-10-06 RX ORDER — PREDNISONE 5 MG/1
TABLET ORAL
Qty: 20 TABLET | Refills: 0 | Status: SHIPPED | OUTPATIENT
Start: 2021-10-06 | End: 2021-12-23

## 2021-10-06 RX ORDER — TIZANIDINE 4 MG/1
TABLET ORAL
Qty: 30 TABLET | Refills: 5 | Status: SHIPPED | OUTPATIENT
Start: 2021-10-06 | End: 2022-03-30

## 2021-10-06 RX ORDER — PROMETHAZINE HYDROCHLORIDE AND CODEINE PHOSPHATE 6.25; 1 MG/5ML; MG/5ML
5 SOLUTION ORAL EVERY 6 HOURS PRN
Qty: 180 ML | Refills: 0 | Status: SHIPPED | OUTPATIENT
Start: 2021-10-06 | End: 2021-12-23

## 2021-10-06 RX ORDER — TRIAMCINOLONE ACETONIDE 40 MG/ML
40 INJECTION, SUSPENSION INTRA-ARTICULAR; INTRAMUSCULAR ONCE
Status: COMPLETED | OUTPATIENT
Start: 2021-10-06 | End: 2021-10-06

## 2021-10-06 RX ORDER — SERTRALINE HYDROCHLORIDE 25 MG/1
25 TABLET, FILM COATED ORAL DAILY
Qty: 90 TABLET | Refills: 1 | Status: SHIPPED | OUTPATIENT
Start: 2021-10-06 | End: 2022-04-12

## 2021-10-06 RX ADMIN — TRIAMCINOLONE ACETONIDE 40 MG: 40 INJECTION, SUSPENSION INTRA-ARTICULAR; INTRAMUSCULAR at 10:10

## 2021-10-21 ENCOUNTER — OFFICE VISIT (OUTPATIENT)
Dept: OTOLARYNGOLOGY | Facility: CLINIC | Age: 66
End: 2021-10-21
Payer: MEDICARE

## 2021-10-21 VITALS — BODY MASS INDEX: 40.75 KG/M2 | HEIGHT: 63 IN | WEIGHT: 230 LBS

## 2021-10-21 DIAGNOSIS — J34.2 DEVIATED NASAL SEPTUM: ICD-10-CM

## 2021-10-21 DIAGNOSIS — G47.30 SLEEP-DISORDERED BREATHING: ICD-10-CM

## 2021-10-21 DIAGNOSIS — J32.8 OTHER CHRONIC SINUSITIS: ICD-10-CM

## 2021-10-21 DIAGNOSIS — J42 CHRONIC BRONCHITIS, UNSPECIFIED CHRONIC BRONCHITIS TYPE: ICD-10-CM

## 2021-10-21 DIAGNOSIS — J31.0 CHRONIC RHINITIS: Primary | ICD-10-CM

## 2021-10-21 PROCEDURE — 1126F AMNT PAIN NOTED NONE PRSNT: CPT | Mod: CPTII,S$GLB,, | Performed by: OTOLARYNGOLOGY

## 2021-10-21 PROCEDURE — 31231 NASAL/SINUS ENDOSCOPY: ICD-10-PCS | Mod: S$GLB,,, | Performed by: OTOLARYNGOLOGY

## 2021-10-21 PROCEDURE — 1160F PR REVIEW ALL MEDS BY PRESCRIBER/CLIN PHARMACIST DOCUMENTED: ICD-10-PCS | Mod: CPTII,S$GLB,, | Performed by: OTOLARYNGOLOGY

## 2021-10-21 PROCEDURE — 1101F PR PT FALLS ASSESS DOC 0-1 FALLS W/OUT INJ PAST YR: ICD-10-PCS | Mod: CPTII,S$GLB,, | Performed by: OTOLARYNGOLOGY

## 2021-10-21 PROCEDURE — 99999 PR PBB SHADOW E&M-EST. PATIENT-LVL V: CPT | Mod: PBBFAC,,, | Performed by: OTOLARYNGOLOGY

## 2021-10-21 PROCEDURE — 3008F PR BODY MASS INDEX (BMI) DOCUMENTED: ICD-10-PCS | Mod: CPTII,S$GLB,, | Performed by: OTOLARYNGOLOGY

## 2021-10-21 PROCEDURE — 4010F PR ACE/ARB THEARPY RXD/TAKEN: ICD-10-PCS | Mod: CPTII,S$GLB,, | Performed by: OTOLARYNGOLOGY

## 2021-10-21 PROCEDURE — 1126F PR PAIN SEVERITY QUANTIFIED, NO PAIN PRESENT: ICD-10-PCS | Mod: CPTII,S$GLB,, | Performed by: OTOLARYNGOLOGY

## 2021-10-21 PROCEDURE — 4010F ACE/ARB THERAPY RXD/TAKEN: CPT | Mod: CPTII,S$GLB,, | Performed by: OTOLARYNGOLOGY

## 2021-10-21 PROCEDURE — 31231 NASAL ENDOSCOPY DX: CPT | Mod: S$GLB,,, | Performed by: OTOLARYNGOLOGY

## 2021-10-21 PROCEDURE — 3288F FALL RISK ASSESSMENT DOCD: CPT | Mod: CPTII,S$GLB,, | Performed by: OTOLARYNGOLOGY

## 2021-10-21 PROCEDURE — 3044F HG A1C LEVEL LT 7.0%: CPT | Mod: CPTII,S$GLB,, | Performed by: OTOLARYNGOLOGY

## 2021-10-21 PROCEDURE — 1101F PT FALLS ASSESS-DOCD LE1/YR: CPT | Mod: CPTII,S$GLB,, | Performed by: OTOLARYNGOLOGY

## 2021-10-21 PROCEDURE — 3288F PR FALLS RISK ASSESSMENT DOCUMENTED: ICD-10-PCS | Mod: CPTII,S$GLB,, | Performed by: OTOLARYNGOLOGY

## 2021-10-21 PROCEDURE — 3044F PR MOST RECENT HEMOGLOBIN A1C LEVEL <7.0%: ICD-10-PCS | Mod: CPTII,S$GLB,, | Performed by: OTOLARYNGOLOGY

## 2021-10-21 PROCEDURE — 99204 OFFICE O/P NEW MOD 45 MIN: CPT | Mod: 25,S$GLB,, | Performed by: OTOLARYNGOLOGY

## 2021-10-21 PROCEDURE — 99999 PR PBB SHADOW E&M-EST. PATIENT-LVL V: ICD-10-PCS | Mod: PBBFAC,,, | Performed by: OTOLARYNGOLOGY

## 2021-10-21 PROCEDURE — 1160F RVW MEDS BY RX/DR IN RCRD: CPT | Mod: CPTII,S$GLB,, | Performed by: OTOLARYNGOLOGY

## 2021-10-21 PROCEDURE — 3008F BODY MASS INDEX DOCD: CPT | Mod: CPTII,S$GLB,, | Performed by: OTOLARYNGOLOGY

## 2021-10-21 PROCEDURE — 1159F MED LIST DOCD IN RCRD: CPT | Mod: CPTII,S$GLB,, | Performed by: OTOLARYNGOLOGY

## 2021-10-21 PROCEDURE — 1159F PR MEDICATION LIST DOCUMENTED IN MEDICAL RECORD: ICD-10-PCS | Mod: CPTII,S$GLB,, | Performed by: OTOLARYNGOLOGY

## 2021-10-21 PROCEDURE — 99204 PR OFFICE/OUTPT VISIT, NEW, LEVL IV, 45-59 MIN: ICD-10-PCS | Mod: 25,S$GLB,, | Performed by: OTOLARYNGOLOGY

## 2021-10-29 ENCOUNTER — PATIENT MESSAGE (OUTPATIENT)
Dept: OTOLARYNGOLOGY | Facility: CLINIC | Age: 66
End: 2021-10-29
Payer: MEDICARE

## 2021-11-09 ENCOUNTER — TELEPHONE (OUTPATIENT)
Dept: OTOLARYNGOLOGY | Facility: CLINIC | Age: 66
End: 2021-11-09
Payer: MEDICARE

## 2021-11-09 DIAGNOSIS — J34.3 NASAL TURBINATE HYPERTROPHY: ICD-10-CM

## 2021-11-09 DIAGNOSIS — J31.0 CHRONIC RHINITIS: Primary | ICD-10-CM

## 2021-11-09 DIAGNOSIS — J34.2 DEVIATED NASAL SEPTUM: ICD-10-CM

## 2021-11-09 DIAGNOSIS — J32.8 OTHER CHRONIC SINUSITIS: ICD-10-CM

## 2021-12-08 ENCOUNTER — TELEPHONE (OUTPATIENT)
Dept: PULMONOLOGY | Facility: CLINIC | Age: 66
End: 2021-12-08

## 2021-12-08 ENCOUNTER — OFFICE VISIT (OUTPATIENT)
Dept: PULMONOLOGY | Facility: CLINIC | Age: 66
End: 2021-12-08
Payer: MEDICARE

## 2021-12-08 VITALS
DIASTOLIC BLOOD PRESSURE: 62 MMHG | WEIGHT: 236 LBS | SYSTOLIC BLOOD PRESSURE: 128 MMHG | OXYGEN SATURATION: 97 % | HEART RATE: 82 BPM | BODY MASS INDEX: 41.82 KG/M2 | HEIGHT: 63 IN

## 2021-12-08 DIAGNOSIS — R91.1 PULMONARY NODULE: ICD-10-CM

## 2021-12-08 DIAGNOSIS — Z87.891 PERSONAL HISTORY OF NICOTINE DEPENDENCE: ICD-10-CM

## 2021-12-08 DIAGNOSIS — E66.01 MORBID OBESITY WITH BMI OF 40.0-44.9, ADULT: ICD-10-CM

## 2021-12-08 DIAGNOSIS — J44.9 CHRONIC OBSTRUCTIVE PULMONARY DISEASE, UNSPECIFIED COPD TYPE: Primary | ICD-10-CM

## 2021-12-08 PROCEDURE — 99214 OFFICE O/P EST MOD 30 MIN: CPT | Mod: S$GLB,,, | Performed by: NURSE PRACTITIONER

## 2021-12-08 PROCEDURE — 4010F ACE/ARB THERAPY RXD/TAKEN: CPT | Mod: CPTII,S$GLB,, | Performed by: NURSE PRACTITIONER

## 2021-12-08 PROCEDURE — 99214 PR OFFICE/OUTPT VISIT, EST, LEVL IV, 30-39 MIN: ICD-10-PCS | Mod: S$GLB,,, | Performed by: NURSE PRACTITIONER

## 2021-12-08 PROCEDURE — 4010F PR ACE/ARB THEARPY RXD/TAKEN: ICD-10-PCS | Mod: CPTII,S$GLB,, | Performed by: NURSE PRACTITIONER

## 2021-12-08 PROCEDURE — 99999 PR PBB SHADOW E&M-EST. PATIENT-LVL V: CPT | Mod: PBBFAC,,, | Performed by: NURSE PRACTITIONER

## 2021-12-08 PROCEDURE — 99999 PR PBB SHADOW E&M-EST. PATIENT-LVL V: ICD-10-PCS | Mod: PBBFAC,,, | Performed by: NURSE PRACTITIONER

## 2021-12-08 RX ORDER — ALBUTEROL SULFATE 90 UG/1
2 AEROSOL, METERED RESPIRATORY (INHALATION) EVERY 4 HOURS PRN
Qty: 18 G | Refills: 5 | Status: SHIPPED | OUTPATIENT
Start: 2021-12-08 | End: 2023-09-20 | Stop reason: SDUPTHER

## 2021-12-08 RX ORDER — BUDESONIDE, GLYCOPYRROLATE, AND FORMOTEROL FUMARATE 160; 9; 4.8 UG/1; UG/1; UG/1
2 AEROSOL, METERED RESPIRATORY (INHALATION) 2 TIMES DAILY
Qty: 10.7 G | Refills: 5 | Status: SHIPPED | OUTPATIENT
Start: 2021-12-08 | End: 2021-12-14 | Stop reason: ALTCHOICE

## 2021-12-08 RX ORDER — APREMILAST 30 MG/1
30 TABLET, FILM COATED ORAL 2 TIMES DAILY
COMMUNITY
End: 2022-04-11

## 2021-12-08 NOTE — TELEPHONE ENCOUNTER
----- Message from Yury Maier MA sent at 12/8/2021  2:43 PM CST -----  Contact: @[phone]    ----- Message -----  From: Ritesh Garcia  Sent: 12/8/2021   2:41 PM CST  To: Robert Abreu Staff    Patient requesting a return call regarding the inhaler, patient states she can't afford the out pocket cost

## 2021-12-09 NOTE — TELEPHONE ENCOUNTER
----- Message from Miriam Guzman sent at 12/9/2021 11:10 AM CST -----  Contact: Pt  Pt's requesting a call back re: cost of Rx - $160.    Confirmed patient's contact info below:  Contact Name: Magdalena Acevedo  Phone Number: [phone]

## 2021-12-09 NOTE — TELEPHONE ENCOUNTER
Spoke with patient  regarding Breztri which cost $160 patient stated can she received a cheaper medication.  I advised  I will forward this message to Laura [facility]p to review and advise.  She verbalized she understands.

## 2021-12-14 ENCOUNTER — TELEPHONE (OUTPATIENT)
Dept: PULMONOLOGY | Facility: CLINIC | Age: 66
End: 2021-12-14
Payer: MEDICARE

## 2021-12-14 DIAGNOSIS — J42 CHRONIC BRONCHITIS, UNSPECIFIED CHRONIC BRONCHITIS TYPE: Primary | ICD-10-CM

## 2021-12-16 ENCOUNTER — OFFICE VISIT (OUTPATIENT)
Dept: OTOLARYNGOLOGY | Facility: CLINIC | Age: 66
End: 2021-12-16
Payer: MEDICARE

## 2021-12-16 DIAGNOSIS — J34.3 NASAL TURBINATE HYPERTROPHY: ICD-10-CM

## 2021-12-16 DIAGNOSIS — J34.2 DEVIATED NASAL SEPTUM: Primary | ICD-10-CM

## 2021-12-16 PROCEDURE — 99214 PR OFFICE/OUTPT VISIT, EST, LEVL IV, 30-39 MIN: ICD-10-PCS | Mod: S$GLB,,, | Performed by: OTOLARYNGOLOGY

## 2021-12-16 PROCEDURE — 4010F PR ACE/ARB THEARPY RXD/TAKEN: ICD-10-PCS | Mod: CPTII,S$GLB,, | Performed by: OTOLARYNGOLOGY

## 2021-12-16 PROCEDURE — 99214 OFFICE O/P EST MOD 30 MIN: CPT | Mod: S$GLB,,, | Performed by: OTOLARYNGOLOGY

## 2021-12-16 PROCEDURE — 99999 PR PBB SHADOW E&M-EST. PATIENT-LVL II: CPT | Mod: PBBFAC,,, | Performed by: OTOLARYNGOLOGY

## 2021-12-16 PROCEDURE — 99999 PR PBB SHADOW E&M-EST. PATIENT-LVL II: ICD-10-PCS | Mod: PBBFAC,,, | Performed by: OTOLARYNGOLOGY

## 2021-12-16 PROCEDURE — 4010F ACE/ARB THERAPY RXD/TAKEN: CPT | Mod: CPTII,S$GLB,, | Performed by: OTOLARYNGOLOGY

## 2021-12-17 ENCOUNTER — PATIENT OUTREACH (OUTPATIENT)
Dept: ADMINISTRATIVE | Facility: HOSPITAL | Age: 66
End: 2021-12-17
Payer: MEDICARE

## 2021-12-22 ENCOUNTER — TELEPHONE (OUTPATIENT)
Dept: OTOLARYNGOLOGY | Facility: CLINIC | Age: 66
End: 2021-12-22
Payer: MEDICARE

## 2021-12-23 ENCOUNTER — OFFICE VISIT (OUTPATIENT)
Dept: PRIMARY CARE CLINIC | Facility: CLINIC | Age: 66
End: 2021-12-23
Payer: MEDICARE

## 2021-12-23 VITALS
BODY MASS INDEX: 42.03 KG/M2 | HEART RATE: 82 BPM | SYSTOLIC BLOOD PRESSURE: 116 MMHG | OXYGEN SATURATION: 95 % | HEIGHT: 63 IN | WEIGHT: 237.19 LBS | RESPIRATION RATE: 18 BRPM | DIASTOLIC BLOOD PRESSURE: 74 MMHG

## 2021-12-23 DIAGNOSIS — R60.9 EDEMA, UNSPECIFIED TYPE: ICD-10-CM

## 2021-12-23 DIAGNOSIS — R91.1 PULMONARY NODULE: ICD-10-CM

## 2021-12-23 DIAGNOSIS — R76.8 HEPATITIS B CORE ANTIBODY POSITIVE: ICD-10-CM

## 2021-12-23 DIAGNOSIS — H25.093 AGE-RELATED INCIPIENT CATARACT OF BOTH EYES: ICD-10-CM

## 2021-12-23 DIAGNOSIS — N18.30 CHRONIC RENAL IMPAIRMENT, STAGE 3 (MODERATE), UNSPECIFIED WHETHER STAGE 3A OR 3B CKD: ICD-10-CM

## 2021-12-23 DIAGNOSIS — J42 CHRONIC BRONCHITIS, UNSPECIFIED CHRONIC BRONCHITIS TYPE: ICD-10-CM

## 2021-12-23 DIAGNOSIS — S46.912D STRAIN OF LEFT SHOULDER, SUBSEQUENT ENCOUNTER: ICD-10-CM

## 2021-12-23 DIAGNOSIS — R45.4 ANGER: Primary | ICD-10-CM

## 2021-12-23 DIAGNOSIS — R32 URINARY INCONTINENCE, UNSPECIFIED TYPE: ICD-10-CM

## 2021-12-23 DIAGNOSIS — E78.5 HYPERLIPIDEMIA, UNSPECIFIED HYPERLIPIDEMIA TYPE: ICD-10-CM

## 2021-12-23 DIAGNOSIS — E66.01 MORBID OBESITY WITH BMI OF 40.0-44.9, ADULT: ICD-10-CM

## 2021-12-23 DIAGNOSIS — E11.65 TYPE 2 DIABETES MELLITUS WITH HYPERGLYCEMIA, WITHOUT LONG-TERM CURRENT USE OF INSULIN: ICD-10-CM

## 2021-12-23 DIAGNOSIS — K21.9 GASTROESOPHAGEAL REFLUX DISEASE, UNSPECIFIED WHETHER ESOPHAGITIS PRESENT: ICD-10-CM

## 2021-12-23 DIAGNOSIS — L40.9 PSORIASIS: ICD-10-CM

## 2021-12-23 DIAGNOSIS — F32.A DEPRESSION, UNSPECIFIED DEPRESSION TYPE: ICD-10-CM

## 2021-12-23 DIAGNOSIS — J40 BRONCHITIS: ICD-10-CM

## 2021-12-23 DIAGNOSIS — I10 HYPERTENSION, UNSPECIFIED TYPE: ICD-10-CM

## 2021-12-23 PROBLEM — H25.9 AGE-RELATED CATARACT OF BOTH EYES: Status: ACTIVE | Noted: 2021-12-23

## 2021-12-23 PROCEDURE — 99999 PR PBB SHADOW E&M-EST. PATIENT-LVL IV: CPT | Mod: PBBFAC,,, | Performed by: FAMILY MEDICINE

## 2021-12-23 PROCEDURE — 1159F PR MEDICATION LIST DOCUMENTED IN MEDICAL RECORD: ICD-10-PCS | Mod: CPTII,S$GLB,, | Performed by: FAMILY MEDICINE

## 2021-12-23 PROCEDURE — 3044F HG A1C LEVEL LT 7.0%: CPT | Mod: CPTII,S$GLB,, | Performed by: FAMILY MEDICINE

## 2021-12-23 PROCEDURE — 1101F PR PT FALLS ASSESS DOC 0-1 FALLS W/OUT INJ PAST YR: ICD-10-PCS | Mod: CPTII,S$GLB,, | Performed by: FAMILY MEDICINE

## 2021-12-23 PROCEDURE — 99214 PR OFFICE/OUTPT VISIT, EST, LEVL IV, 30-39 MIN: ICD-10-PCS | Mod: S$GLB,,, | Performed by: FAMILY MEDICINE

## 2021-12-23 PROCEDURE — 3008F BODY MASS INDEX DOCD: CPT | Mod: CPTII,S$GLB,, | Performed by: FAMILY MEDICINE

## 2021-12-23 PROCEDURE — 99214 OFFICE O/P EST MOD 30 MIN: CPT | Mod: S$GLB,,, | Performed by: FAMILY MEDICINE

## 2021-12-23 PROCEDURE — 3288F FALL RISK ASSESSMENT DOCD: CPT | Mod: CPTII,S$GLB,, | Performed by: FAMILY MEDICINE

## 2021-12-23 PROCEDURE — 3074F SYST BP LT 130 MM HG: CPT | Mod: CPTII,S$GLB,, | Performed by: FAMILY MEDICINE

## 2021-12-23 PROCEDURE — 1126F AMNT PAIN NOTED NONE PRSNT: CPT | Mod: CPTII,S$GLB,, | Performed by: FAMILY MEDICINE

## 2021-12-23 PROCEDURE — 1159F MED LIST DOCD IN RCRD: CPT | Mod: CPTII,S$GLB,, | Performed by: FAMILY MEDICINE

## 2021-12-23 PROCEDURE — 3008F PR BODY MASS INDEX (BMI) DOCUMENTED: ICD-10-PCS | Mod: CPTII,S$GLB,, | Performed by: FAMILY MEDICINE

## 2021-12-23 PROCEDURE — 3078F PR MOST RECENT DIASTOLIC BLOOD PRESSURE < 80 MM HG: ICD-10-PCS | Mod: CPTII,S$GLB,, | Performed by: FAMILY MEDICINE

## 2021-12-23 PROCEDURE — 3074F PR MOST RECENT SYSTOLIC BLOOD PRESSURE < 130 MM HG: ICD-10-PCS | Mod: CPTII,S$GLB,, | Performed by: FAMILY MEDICINE

## 2021-12-23 PROCEDURE — 3288F PR FALLS RISK ASSESSMENT DOCUMENTED: ICD-10-PCS | Mod: CPTII,S$GLB,, | Performed by: FAMILY MEDICINE

## 2021-12-23 PROCEDURE — 1101F PT FALLS ASSESS-DOCD LE1/YR: CPT | Mod: CPTII,S$GLB,, | Performed by: FAMILY MEDICINE

## 2021-12-23 PROCEDURE — 4010F ACE/ARB THERAPY RXD/TAKEN: CPT | Mod: CPTII,S$GLB,, | Performed by: FAMILY MEDICINE

## 2021-12-23 PROCEDURE — 99999 PR PBB SHADOW E&M-EST. PATIENT-LVL IV: ICD-10-PCS | Mod: PBBFAC,,, | Performed by: FAMILY MEDICINE

## 2021-12-23 PROCEDURE — 3078F DIAST BP <80 MM HG: CPT | Mod: CPTII,S$GLB,, | Performed by: FAMILY MEDICINE

## 2021-12-23 PROCEDURE — 3044F PR MOST RECENT HEMOGLOBIN A1C LEVEL <7.0%: ICD-10-PCS | Mod: CPTII,S$GLB,, | Performed by: FAMILY MEDICINE

## 2021-12-23 PROCEDURE — 4010F PR ACE/ARB THEARPY RXD/TAKEN: ICD-10-PCS | Mod: CPTII,S$GLB,, | Performed by: FAMILY MEDICINE

## 2021-12-23 PROCEDURE — 1126F PR PAIN SEVERITY QUANTIFIED, NO PAIN PRESENT: ICD-10-PCS | Mod: CPTII,S$GLB,, | Performed by: FAMILY MEDICINE

## 2021-12-28 ENCOUNTER — PATIENT MESSAGE (OUTPATIENT)
Dept: ADMINISTRATIVE | Facility: HOSPITAL | Age: 66
End: 2021-12-28
Payer: MEDICARE

## 2021-12-29 ENCOUNTER — TELEPHONE (OUTPATIENT)
Dept: PULMONOLOGY | Facility: CLINIC | Age: 66
End: 2021-12-29
Payer: MEDICARE

## 2022-01-04 DIAGNOSIS — Z01.818 PRE-OP TESTING: ICD-10-CM

## 2022-01-11 ENCOUNTER — OFFICE VISIT (OUTPATIENT)
Dept: PRIMARY CARE CLINIC | Facility: CLINIC | Age: 67
End: 2022-01-11
Payer: MEDICARE

## 2022-01-11 DIAGNOSIS — R91.1 PULMONARY NODULE: ICD-10-CM

## 2022-01-11 DIAGNOSIS — I10 HYPERTENSION, UNSPECIFIED TYPE: ICD-10-CM

## 2022-01-11 DIAGNOSIS — E78.5 HYPERLIPIDEMIA, UNSPECIFIED HYPERLIPIDEMIA TYPE: ICD-10-CM

## 2022-01-11 DIAGNOSIS — J42 CHRONIC BRONCHITIS, UNSPECIFIED CHRONIC BRONCHITIS TYPE: ICD-10-CM

## 2022-01-11 DIAGNOSIS — E66.01 MORBID OBESITY WITH BMI OF 40.0-44.9, ADULT: Primary | ICD-10-CM

## 2022-01-11 DIAGNOSIS — E11.65 TYPE 2 DIABETES MELLITUS WITH HYPERGLYCEMIA, WITHOUT LONG-TERM CURRENT USE OF INSULIN: ICD-10-CM

## 2022-01-11 DIAGNOSIS — D72.9 NEUTROPHILIA: ICD-10-CM

## 2022-01-11 DIAGNOSIS — R32 URINARY INCONTINENCE, UNSPECIFIED TYPE: ICD-10-CM

## 2022-01-11 DIAGNOSIS — K21.9 GASTROESOPHAGEAL REFLUX DISEASE, UNSPECIFIED WHETHER ESOPHAGITIS PRESENT: ICD-10-CM

## 2022-01-11 PROCEDURE — 3051F HG A1C>EQUAL 7.0%<8.0%: CPT | Mod: CPTII,95,, | Performed by: FAMILY MEDICINE

## 2022-01-11 PROCEDURE — 3060F POS MICROALBUMINURIA REV: CPT | Mod: CPTII,95,, | Performed by: FAMILY MEDICINE

## 2022-01-11 PROCEDURE — 3066F PR DOCUMENTATION OF TREATMENT FOR NEPHROPATHY: ICD-10-PCS | Mod: CPTII,95,, | Performed by: FAMILY MEDICINE

## 2022-01-11 PROCEDURE — 3060F PR POS MICROALBUMINURIA RESULT DOCUMENTED/REVIEW: ICD-10-PCS | Mod: CPTII,95,, | Performed by: FAMILY MEDICINE

## 2022-01-11 PROCEDURE — 99443 PR PHYSICIAN TELEPHONE EVALUATION 21-30 MIN: CPT | Mod: 95,,, | Performed by: FAMILY MEDICINE

## 2022-01-11 PROCEDURE — 3066F NEPHROPATHY DOC TX: CPT | Mod: CPTII,95,, | Performed by: FAMILY MEDICINE

## 2022-01-11 PROCEDURE — 99443 PR PHYSICIAN TELEPHONE EVALUATION 21-30 MIN: ICD-10-PCS | Mod: 95,,, | Performed by: FAMILY MEDICINE

## 2022-01-11 PROCEDURE — 3051F PR MOST RECENT HEMOGLOBIN A1C LEVEL 7.0 - < 8.0%: ICD-10-PCS | Mod: CPTII,95,, | Performed by: FAMILY MEDICINE

## 2022-01-11 NOTE — PROGRESS NOTES
Subjective:       Patient ID: Magdalena Acevedo is a 66 y.o. female.    Chief Complaint: HPI:  66-year-old female-patient in for refills--cramps in hands and toes--hands can just be sitting there in fingers lock up on her in very painful--night when go to bed occasionally toes curl up---spasm from toes to the ankle on the dorsum of the foot-knots.  Patient on potassium.  Occasionally when backing up car--or reaches to wipe after having a bowel movement--gets abdominal cramps.       Hx tremor difficulty walking long distances needed a scooter.Unable use wheelchair due weight and back pain       Eating well--+ BM --Ambulating car to fornt door building has rest catch breath  --walking from bedroom to den has to stop and catch her breath        ROS:  Skin: + psoriasis--better since Christopher has appoint with dermatologist tomorrow   HEENT: no  headache, no  ocular pain, blurred vision, diplopia, epistaxis,hoarseness change in voice,  No thyroid trouble    Lung: No pneumonia, asthma, Tb, wheezing, SOB, no smoking history of bronchitis/COPD--occasional bronchospasm on ProAir and Symbicort  Heart: No chest pain, no ankle edema since on Lasix,no  MI, mendy murmur, +hypertension blood pressure,+  Hyperlipidemia---no stent bypass arrhythmia  Abdomen: No nausea, vomiting, diarrhea, constipation, ulcers, hepatitis,  melena, hematochezia, hematemesis history cholecystectomy  : no UTI, renal disease, stones + urinary incontinence with coughing same   GYN last mammogram due June 2021 Pap smear over 20 years  MS: no fractures, O/A, lupus, rheumatoid, gout left shoulder pain  + DDD cervical spine X up now on  Neuro: No dizziness, LOC, seizures   +diabetes Glu 103 now off steroids , no anemia, +anxiety, + depression upset gets angry patient is on Zoloft but not using lorazepam or Ativan doing okay --anxiety trying to get money to replace root   , 1 child, unemployed,, grandson stays with patient in every other week great  grand children stay with patient    Objective:   Physical Exam:  No physical exam was done this was an audio visit  General: Well nourished, well developed, no acute distress + morbid obesity  Skin: + Dry scaly skin especially on the legs scalp arms and back-psoriasis -  HEENT: Eyes PERRLA, EOM intact, nose clear , throat nonerythema ears TMs clear  NECK: Supple, no bruits, No JVD, no nodes  Lungs: Clear, no rales, rhonchi, nowheezing   Heart: Regular rate and rhythm, no murmurs, gallops, or rubs  Abdomen: flat, bowel sounds positive, no tenderness, or organomegaly   MS:  Tenderness especially left acromioclavicular joint and especially the left upper trapezius --no significant change    Neuro: Alert, CN intact, oriented X 3 tremor of the hands bilaterally right greater than left  Extremities: No cyanosis, clubbing, or edema         Assessment:       1. Morbid obesity with BMI of 40.0-44.9, adult    2. Type 2 diabetes mellitus with hyperglycemia, without long-term current use of insulin    3. Chronic bronchitis, unspecified chronic bronchitis type    4. Pulmonary nodule    5. Hypertension, unspecified type    6. Hyperlipidemia, unspecified hyperlipidemia type    7. Urinary incontinence, unspecified type    8. Neutrophilia    9. Gastroesophageal reflux disease, unspecified whether esophagitis present        Plan:       Morbid obesity with BMI of 40.0-44.9, adult    Type 2 diabetes mellitus with hyperglycemia, without long-term current use of insulin  -     CBC Auto Differential; Future; Expected date: 07/11/2022  -     Comprehensive Metabolic Panel; Future; Expected date: 07/11/2022  -     Lipid Panel; Future; Expected date: 07/11/2022  -     Hemoglobin A1C; Future; Expected date: 07/11/2022  -     Microalbumin/Creatinine Ratio, Urine; Future; Expected date: 07/11/2022    Chronic bronchitis, unspecified chronic bronchitis type    Pulmonary nodule    Hypertension, unspecified type    Hyperlipidemia, unspecified  hyperlipidemia type    Urinary incontinence, unspecified type    Neutrophilia    Gastroesophageal reflux disease, unspecified whether esophagitis present         Main Reason for Visit--nocturnal muscle cramps--especially hands feet--pulses 2/4--drinks a lot of water will do electrolyte testing CMP magnesium CBC thyroid  COPD on albuterol and Symbicort  Hypertension/hyperlipidemia needs to be on low-sodium low-fat diet exercise as tolerated did--trying get to ideal body weight Norvasc lisinopril for blood pressure Lipitor for hyperlipidemia ankle edema  Type 2 diabetes on Glucotrol  GERD omeprazole  Anxiety depression on Zoloft  Cervical DDD/anxiety/depression may need counseling if symptoms persist/cholecystectomy  Lab patient just had lab WBC 13.8 glucose 166 better of 100-120 excellent 120-150-very good greater than 180 poor hemoglobin A1c 6.9 very good no need for changing diabetic treatment triglyceride 225 needs to exercise try to get ideal body weight may add fenofibrate if fails to decrease continue Lipitor for cholesterol redo lab in 6 months CBCs CMP lipids hemoglobin U0x--ss magnesium now   Health maintenane COVID vaccine shingles pneumococcal ft exam  Foot Exam--pulses 2/4 toenails in good shape no specific lesion            Assessment:                 Established Patient - Audio Only Telehealth Visit     The patient location is: Home  The chief complaint leading to consultation is: SOB review lab cataract surgery  Visit type: Virtual visit with audio only (telephone)  Total time spent with patient: 30 min        The reason for the audio only service rather than synchronous audio and video virtual visit was related to technical difficulties or patient preference/necessity.     Each patient to whom I provide medical services by telemedicine is:  (1) informed of the relationship between the physician and patient and the respective role of any other health care provider with respect to management of the  patient; and (2) notified that they may decline to receive medical services by telemedicine and may withdraw from such care at any time. Patient verbally consented to receive this service via voice-only telephone call.       HPI: see HPI above     Assessment and plan:  See plan above                         This service was not originating from a related E/M service provided within the previous 7 days nor will  to an E/M service or procedure within the next 24 hours or my soonest available appointment.  Prevailing standard of care was able to be met in this audio-only visit.

## 2022-01-31 ENCOUNTER — PATIENT OUTREACH (OUTPATIENT)
Dept: ADMINISTRATIVE | Facility: OTHER | Age: 67
End: 2022-01-31
Payer: MEDICARE

## 2022-01-31 NOTE — PROGRESS NOTES
LINKS immunization registry updated  Care Everywhere updated  Health Maintenance updated  Chart reviewed for overdue Proactive [facility] Encounters ([facility]) health maintenance testing (CRS, Breast Ca, Diabetic Eye Exam)   Orders entered:N/A

## 2022-02-02 ENCOUNTER — OFFICE VISIT (OUTPATIENT)
Dept: PULMONOLOGY | Facility: CLINIC | Age: 67
End: 2022-02-02
Payer: MEDICARE

## 2022-02-02 VITALS
OXYGEN SATURATION: 96 % | WEIGHT: 238.31 LBS | HEIGHT: 63 IN | HEART RATE: 88 BPM | BODY MASS INDEX: 42.23 KG/M2 | DIASTOLIC BLOOD PRESSURE: 76 MMHG | SYSTOLIC BLOOD PRESSURE: 141 MMHG

## 2022-02-02 DIAGNOSIS — J96.01 ACUTE HYPOXEMIC RESPIRATORY FAILURE: ICD-10-CM

## 2022-02-02 DIAGNOSIS — J42 CHRONIC BRONCHITIS, UNSPECIFIED CHRONIC BRONCHITIS TYPE: ICD-10-CM

## 2022-02-02 DIAGNOSIS — R91.1 PULMONARY NODULE: ICD-10-CM

## 2022-02-02 DIAGNOSIS — E66.01 MORBID OBESITY WITH BMI OF 40.0-44.9, ADULT: ICD-10-CM

## 2022-02-02 PROCEDURE — 3008F PR BODY MASS INDEX (BMI) DOCUMENTED: ICD-10-PCS | Mod: CPTII,S$GLB,, | Performed by: NURSE PRACTITIONER

## 2022-02-02 PROCEDURE — 1159F PR MEDICATION LIST DOCUMENTED IN MEDICAL RECORD: ICD-10-PCS | Mod: CPTII,S$GLB,, | Performed by: NURSE PRACTITIONER

## 2022-02-02 PROCEDURE — 99214 PR OFFICE/OUTPT VISIT, EST, LEVL IV, 30-39 MIN: ICD-10-PCS | Mod: S$GLB,,, | Performed by: NURSE PRACTITIONER

## 2022-02-02 PROCEDURE — 1100F PR PT FALLS ASSESS DOC 2+ FALLS/FALL W/INJURY/YR: ICD-10-PCS | Mod: CPTII,S$GLB,, | Performed by: NURSE PRACTITIONER

## 2022-02-02 PROCEDURE — 3008F BODY MASS INDEX DOCD: CPT | Mod: CPTII,S$GLB,, | Performed by: NURSE PRACTITIONER

## 2022-02-02 PROCEDURE — 3060F POS MICROALBUMINURIA REV: CPT | Mod: CPTII,S$GLB,, | Performed by: NURSE PRACTITIONER

## 2022-02-02 PROCEDURE — 1126F AMNT PAIN NOTED NONE PRSNT: CPT | Mod: CPTII,S$GLB,, | Performed by: NURSE PRACTITIONER

## 2022-02-02 PROCEDURE — 1159F MED LIST DOCD IN RCRD: CPT | Mod: CPTII,S$GLB,, | Performed by: NURSE PRACTITIONER

## 2022-02-02 PROCEDURE — 3077F SYST BP >= 140 MM HG: CPT | Mod: CPTII,S$GLB,, | Performed by: NURSE PRACTITIONER

## 2022-02-02 PROCEDURE — 3066F NEPHROPATHY DOC TX: CPT | Mod: CPTII,S$GLB,, | Performed by: NURSE PRACTITIONER

## 2022-02-02 PROCEDURE — 3051F HG A1C>EQUAL 7.0%<8.0%: CPT | Mod: CPTII,S$GLB,, | Performed by: NURSE PRACTITIONER

## 2022-02-02 PROCEDURE — 3060F PR POS MICROALBUMINURIA RESULT DOCUMENTED/REVIEW: ICD-10-PCS | Mod: CPTII,S$GLB,, | Performed by: NURSE PRACTITIONER

## 2022-02-02 PROCEDURE — 3051F PR MOST RECENT HEMOGLOBIN A1C LEVEL 7.0 - < 8.0%: ICD-10-PCS | Mod: CPTII,S$GLB,, | Performed by: NURSE PRACTITIONER

## 2022-02-02 PROCEDURE — 3066F PR DOCUMENTATION OF TREATMENT FOR NEPHROPATHY: ICD-10-PCS | Mod: CPTII,S$GLB,, | Performed by: NURSE PRACTITIONER

## 2022-02-02 PROCEDURE — 99999 PR PBB SHADOW E&M-EST. PATIENT-LVL V: ICD-10-PCS | Mod: PBBFAC,,, | Performed by: NURSE PRACTITIONER

## 2022-02-02 PROCEDURE — 3288F PR FALLS RISK ASSESSMENT DOCUMENTED: ICD-10-PCS | Mod: CPTII,S$GLB,, | Performed by: NURSE PRACTITIONER

## 2022-02-02 PROCEDURE — 3288F FALL RISK ASSESSMENT DOCD: CPT | Mod: CPTII,S$GLB,, | Performed by: NURSE PRACTITIONER

## 2022-02-02 PROCEDURE — 99214 OFFICE O/P EST MOD 30 MIN: CPT | Mod: S$GLB,,, | Performed by: NURSE PRACTITIONER

## 2022-02-02 PROCEDURE — 99499 UNLISTED E&M SERVICE: CPT | Mod: S$GLB,,, | Performed by: NURSE PRACTITIONER

## 2022-02-02 PROCEDURE — 3077F PR MOST RECENT SYSTOLIC BLOOD PRESSURE >= 140 MM HG: ICD-10-PCS | Mod: CPTII,S$GLB,, | Performed by: NURSE PRACTITIONER

## 2022-02-02 PROCEDURE — 3078F PR MOST RECENT DIASTOLIC BLOOD PRESSURE < 80 MM HG: ICD-10-PCS | Mod: CPTII,S$GLB,, | Performed by: NURSE PRACTITIONER

## 2022-02-02 PROCEDURE — 1160F RVW MEDS BY RX/DR IN RCRD: CPT | Mod: CPTII,S$GLB,, | Performed by: NURSE PRACTITIONER

## 2022-02-02 PROCEDURE — 1126F PR PAIN SEVERITY QUANTIFIED, NO PAIN PRESENT: ICD-10-PCS | Mod: CPTII,S$GLB,, | Performed by: NURSE PRACTITIONER

## 2022-02-02 PROCEDURE — 3078F DIAST BP <80 MM HG: CPT | Mod: CPTII,S$GLB,, | Performed by: NURSE PRACTITIONER

## 2022-02-02 PROCEDURE — 99499 RISK ADDL DX/OHS AUDIT: ICD-10-PCS | Mod: S$GLB,,, | Performed by: NURSE PRACTITIONER

## 2022-02-02 PROCEDURE — 99999 PR PBB SHADOW E&M-EST. PATIENT-LVL V: CPT | Mod: PBBFAC,,, | Performed by: NURSE PRACTITIONER

## 2022-02-02 PROCEDURE — 1100F PTFALLS ASSESS-DOCD GE2>/YR: CPT | Mod: CPTII,S$GLB,, | Performed by: NURSE PRACTITIONER

## 2022-02-02 PROCEDURE — 1160F PR REVIEW ALL MEDS BY PRESCRIBER/CLIN PHARMACIST DOCUMENTED: ICD-10-PCS | Mod: CPTII,S$GLB,, | Performed by: NURSE PRACTITIONER

## 2022-02-02 RX ORDER — KETOROLAC TROMETHAMINE 5 MG/ML
SOLUTION OPHTHALMIC
COMMUNITY
Start: 2021-12-23 | End: 2022-04-11 | Stop reason: ALTCHOICE

## 2022-02-02 RX ORDER — PREDNISOLONE ACETATE 10 MG/ML
SUSPENSION/ DROPS OPHTHALMIC
COMMUNITY
Start: 2021-12-23 | End: 2022-04-11 | Stop reason: ALTCHOICE

## 2022-02-02 RX ORDER — BUDESONIDE, GLYCOPYRROLATE, AND FORMOTEROL FUMARATE 160; 9; 4.8 UG/1; UG/1; UG/1
AEROSOL, METERED RESPIRATORY (INHALATION) 2 TIMES DAILY
COMMUNITY
Start: 2022-01-01 | End: 2022-06-27 | Stop reason: SDUPTHER

## 2022-02-02 NOTE — PROGRESS NOTES
"  Subjective:       Patient ID: Magdalena Acevedo is a 66 y.o. female.    Chief Complaint: COPD    COPD  Associated symptoms include congestion and coughing. Pertinent negatives include no chest pain, chills, fever, joint swelling or rash.      Ms. Acevedo is a 66-year-old female presenting to Pulmonary Clinic for COPD.    Patient  experiences chronic mMRC 4 symptoms of dyspnea. Tells dyspnea has become worse over the last 1 yrs. Needs to sit down while getting dress.     Notes to have moderate to severe chronic cough worse in the AM.   Explains having surgery for sinus relates issues.      Explains had 3 COPD exacerbations in the last one year; 0 hospitalizations for respiratory problems. Currently, reports taking Symbicort 2 puffs BID. Tells taking albuterol few times a day. On singular.     States, "I am stick at home because of my breathing."     Reports smoking history  Reports started smoking at 17 yrs old.   At most was smoking 2 packs a day.   Quit in 2016.     Interval hx 2/2/2022-    Ms. Acevedo presents to Pulmonary Clinic for COPD follow-up.  Additionally, she is here to discuss her test results.  Patient  experiences chronic mMRC 4 symptoms of dyspnea. Tells dyspnea has become worse over the last 1 yrs. Needs to sit down while getting dress.   Notes to have moderate to severe chronic cough worse in the AM.  Patient reports compliant with use of Breztri.   Discussed with patient needing low-dose CT screening 1 year  Discussed 6 minutes walk test was not suggestive of oxygen requirement.  Discussed Pulmonary function tests 1/25/2022.     Additional Pulmonary History:   Occupational/Environmental Exposures: Worked for [employer] for 20 yrs.   Exposure to Animals/Pets: Dog- denies resp issues with dog.   Travel History: Denies   History of exposures to TB: Denies   Family History of Lung Cancer: Mom with lung CA  Childhood history of Lung Disease: Denies     Social History     Tobacco Use   Smoking " "Status Former Smoker    Packs/day: 2.00    Years: 45.00    Pack years: 90.00    Types: Cigarettes    Quit date: 2016    Years since quittin.4   Smokeless Tobacco Never Used      Review of Systems   Constitutional: Negative for fever, chills, weight loss and night sweats.   HENT: Positive for postnasal drip and congestion. Negative for rhinorrhea and trouble swallowing.    Respiratory: Positive for cough and shortness of breath. Negative for hemoptysis, sputum production, choking, chest tightness, wheezing, dyspnea on extertion and use of rescue inhaler.    Cardiovascular: Negative for chest pain and leg swelling.   Musculoskeletal: Negative for joint swelling.   Skin: Negative for rash.   Gastrointestinal: Positive for acid reflux (Reports on occassional GERD- takes MIKE- symptoms improve. ).   Neurological: Negative for dizziness and light-headedness.   Hematological: Negative for adenopathy.   Psychiatric/Behavioral: Negative for sleep disturbance.       Reviewed allergies and medications.  Reviewed medical and surgical history.  Reviewed social and family history.  Objective:      Vitals:    22 1029   BP: (!) 141/76   BP Location: Right arm   Patient Position: Sitting   BP Method: Large (Automatic)   Pulse: 88   SpO2: 96%   Weight: 108.1 kg (238 lb 5.1 oz)   Height: 5' 3" (1.6 m)      Physical Exam   Constitutional: She is oriented to person, place, and time. She appears well-developed and well-nourished. No distress. She is obese.   Cardiovascular: Normal rate, regular rhythm and normal heart sounds.   Pulmonary/Chest: Normal expansion, effort normal and breath sounds normal. No respiratory distress. She has no wheezes. She has no rhonchi.   Abdominal: Soft.   Musculoskeletal:         General: No edema. Normal range of motion.   Neurological: She is alert and oriented to person, place, and time. Gait normal.   Skin: Skin is warm and dry. Nails show no clubbing.   Psychiatric: She has a normal " mood and affect. Her behavior is normal.   Vitals reviewed.    Personal Diagnostic Review    Personally reviewed pulmonary function test 01/25/2022 with patient in office  Personally reviewed 6 minutes test patient in office from 01/25/2022  Peronally reviewed CT of chest (LDCT) 1/24/2022 with patient in office.   Personally reviewed CT of chest March 5, 2020 with patient in office.          Assessment:       1. Acute hypoxemic respiratory failure    2. Chronic bronchitis, unspecified chronic bronchitis type    3. Morbid obesity with BMI of 40.0-44.9, adult    4. Pulmonary nodule        Outpatient Encounter Medications as of 2/2/2022   Medication Sig Dispense Refill    albuterol (PROAIR HFA) 90 mcg/actuation inhaler Inhale 2 puffs into the lungs every 4 (four) hours as needed for Wheezing or Shortness of Breath. Rescue 18 g 5    amLODIPine (NORVASC) 5 MG tablet Take 1 tablet (5 mg total) by mouth once daily. 90 tablet 3    apremilast (OTEZLA) 30 mg Tab Take 30 mg by mouth.      atorvastatin (LIPITOR) 20 MG tablet Take 1 tablet (20 mg total) by mouth every evening. 90 tablet 3    blood sugar diagnostic (TRUE METRIX GLUCOSE TEST STRIP) Strp 1 strip by Misc.(Non-Drug; Combo Route) route once daily. 100 strip 3    blood sugar diagnostic Strp To check BG 2 times daily, to use with insurance preferred meter 100 strip 11    blood-glucose meter (TRUE METRIX AIR GLUCOSE METER) kit Use as instructed 1 each 0    BREZTRI AEROSPHERE 160-9-4.8 mcg/actuation HFAA       diclofenac (VOLTAREN) 75 MG EC tablet TAKE ONE TABLET BY MOUTH TWICE A  tablet 1    diphenhydrAMINE (BENADRYL) 25 mg capsule Take 25 mg by mouth 2 (two) times a day. Pt takes PRN      fluticasone propionate (FLONASE) 50 mcg/actuation nasal spray 2 sprays (100 mcg total) by Each Nostril route once daily. (Patient taking differently: 2 sprays by Each Nostril route once daily. Pt uses PRN) 16 g 5    furosemide (LASIX) 40 MG tablet One p.o. q.a.m.  p.r.n. ankle with edema only 90 tablet 3    gabapentin (NEURONTIN) 300 MG capsule TAKE 1 CAPSULE BY MOUTH TWICE DAILY AS NEEDED FOR RESTLESS LEG SYNDROME MAY INCREASE TO 3 TIMES DAILY 90 capsule 2    glipiZIDE (GLUCOTROL) 10 MG tablet Take 1 tablet (10 mg total) by mouth daily with breakfast. 90 tablet 3    ketorolac 0.5% (ACULAR) 0.5 % Drop       lancets 33 gauge Misc 1 lancet by Misc.(Non-Drug; Combo Route) route once daily. 100 each 3    lancets Misc To check BG 2 times daily, to use with insurance preferred meter 100 each 11    lisinopriL 10 MG tablet Take 1 tablet (10 mg total) by mouth once daily. 90 tablet 3    potassium chloride (MICRO-K) 10 MEQ CpSR One p.o. q.a.m. with Lasix only 90 capsule 3    prednisoLONE acetate (PRED FORTE) 1 % DrpS       sertraline (ZOLOFT) 25 MG tablet Take 1 tablet (25 mg total) by mouth once daily. 90 tablet 1    tiZANidine (ZANAFLEX) 4 MG tablet TAKE ONE TABLET BY MOUTH EVERY NIGHT AT BEDTIME AS NEEDED FOR MUSCLE SPASMS 30 tablet 5    fluticasone-umeclidin-vilanter (TRELEGY ELLIPTA) 100-62.5-25 mcg DsDv Inhale 1 puff into the lungs once daily. Rinse mouth after use. (Patient not taking: Reported on 2/2/2022) 60 each 3    loratadine (CLARITIN) 10 mg tablet Take 1 tablet (10 mg total) by mouth once daily. (Patient not taking: Reported on 2/2/2022) 30 tablet 5     No facility-administered encounter medications on file as of 2/2/2022.     No orders of the defined types were placed in this encounter.      Plan:         Problem List Items Addressed This Visit        Pulmonary    Pulmonary nodule    Overview     Stable less than 0.4 cm pulmonary nodules.  Patient has significant smoking history.  Recommend continue with low-dose CT screening next due January 2023           Chronic obstructive pulmonary disease    Overview     Patient has significant smoking history.  Quit 2016.  Experiences chronic daily cough  Experiences MMRC 4 symptoms of dyspnea  Has required prednisone  3 times this year for COPD exacerbation denies any hospitalizations          Current Assessment & Plan     Continue  Breztri.  Discussed the rinse mouth after use  Continue using albuterol as rescue  Continue LDCT lung screening - next due 1/2023    Encouraged to follow with Sleep Medicine provider in next few weeks for sleep apnea evaluation         RESOLVED: Acute hypoxemic respiratory failure    Current Assessment & Plan     6 minutes walk test( 1/25/2022) not suggestive of oxygen qualification                Endocrine    Morbid obesity with BMI of 40.0-44.9, adult    Overview     Body mass index is 42.22 kg/m².    Discussed with pt at length about the need to work on diet and weight loss.  Have offered suggestions on approaches for this problem.  Also discussed the need to start a regular exercise program.  We discussed at length the importance of regular exercise and working at getting to a healthier weight.  All questions answered.  Pt voices understanding of these principles and hopefully will take action.                Follow up in 6 months.     This note is dictated on M*Modal word recognition program.  There are word recognition mistakes that are occasionally missed on review.

## 2022-02-02 NOTE — ASSESSMENT & PLAN NOTE
Continue  Breztri.  Discussed the rinse mouth after use  Continue using albuterol as rescue  Continue LDCT lung screening - next due 1/2023    Encouraged to follow with Sleep Medicine provider in next few weeks for sleep apnea evaluation

## 2022-02-02 NOTE — PATIENT INSTRUCTIONS
Continue  Mimi.  Discussed the rinse mouth after use  Continue using albuterol as rescue  Continue LDCT lung screening - next due 1/2023    Follow up in 6 months.

## 2022-02-03 ENCOUNTER — ANESTHESIA EVENT (OUTPATIENT)
Dept: SURGERY | Facility: OTHER | Age: 67
End: 2022-02-03
Payer: MEDICARE

## 2022-02-03 ENCOUNTER — HOSPITAL ENCOUNTER (OUTPATIENT)
Dept: PREADMISSION TESTING | Facility: OTHER | Age: 67
Discharge: HOME | End: 2022-02-03
Attending: OTOLARYNGOLOGY
Payer: MEDICARE

## 2022-02-03 VITALS
WEIGHT: 238 LBS | BODY MASS INDEX: 42.16 KG/M2 | OXYGEN SATURATION: 96 % | HEART RATE: 86 BPM | DIASTOLIC BLOOD PRESSURE: 66 MMHG | SYSTOLIC BLOOD PRESSURE: 130 MMHG | TEMPERATURE: 98 F

## 2022-02-03 RX ORDER — ACETAMINOPHEN 500 MG
1000 TABLET ORAL
Status: CANCELLED | OUTPATIENT
Start: 2022-02-03 | End: 2022-02-03

## 2022-02-03 RX ORDER — ALBUTEROL SULFATE 2.5 MG/.5ML
2.5 SOLUTION RESPIRATORY (INHALATION)
Status: CANCELLED | OUTPATIENT
Start: 2022-02-03 | End: 2022-02-03

## 2022-02-03 RX ORDER — LIDOCAINE HYDROCHLORIDE 10 MG/ML
0.5 INJECTION, SOLUTION EPIDURAL; INFILTRATION; INTRACAUDAL; PERINEURAL ONCE
Status: CANCELLED | OUTPATIENT
Start: 2022-02-03 | End: 2022-02-03

## 2022-02-03 RX ORDER — SODIUM CHLORIDE, SODIUM LACTATE, POTASSIUM CHLORIDE, CALCIUM CHLORIDE 600; 310; 30; 20 MG/100ML; MG/100ML; MG/100ML; MG/100ML
INJECTION, SOLUTION INTRAVENOUS CONTINUOUS
Status: CANCELLED | OUTPATIENT
Start: 2022-02-03

## 2022-02-03 NOTE — ANESTHESIA PREPROCEDURE EVALUATION
02/03/2022  Magdalena Acevedo is a 66 y.o., female.    Anesthesia Evaluation    I have reviewed the Patient Summary Reports.    I have reviewed the Nursing Notes.    I have reviewed the Medications.     Review of Systems  Anesthesia Hx:  Denies Family Hx of Anesthesia complications.   Denies Personal Hx of Anesthesia complications.   Social:  Former Smoker    Cardiovascular:   Exercise tolerance: poor Hypertension hyperlipidemia Ech· Mild concentric left ventricular hypertrophy.  · Normal left ventricular systolic function. The estimated ejection fraction is 60%.  · Grade I (mild) left ventricular diastolic dysfunction consistent with impaired relaxation.  · Normal right ventricular systolic function.  · Mild aortic valve stenosis.  · Aortic valve area is 1.85 cm2; peak velocity is 2.18 m/s; mean gradient is 9 mmHg.  · Mild mitral sclerosis.  · Normal central venous pressure (3 mmHg).    o 3/2020   Pulmonary:   COPD, moderate Sleep Apnea    Renal/:  Renal/ Normal     Hepatic/GI:   GERD    Musculoskeletal:   Arthritis     Neurological:   Neuromuscular Disease, Headaches   Peripheral Neuropathy    Endocrine:   Diabetes    Psych:   Psychiatric History depression          Physical Exam  General:  Morbid Obesity    Airway/Jaw/Neck:  Airway Findings: Mouth Opening: Normal Tongue: Normal  General Airway Assessment: Adult  Mallampati: II  TM Distance: Normal, at least 6 cm      Dental:  Dental Findings: Upper Dentures        Mental Status:  Mental Status Findings:  Alert and Oriented, Cooperative         Anesthesia Plan  Type of Anesthesia, risks & benefits discussed:  Anesthesia Type:  general    Patient's Preference:   Plan Factors:          Intra-op Monitoring Plan: standard ASA monitors  Intra-op Monitoring Plan Comments:   Post Op Pain Control Plan: multimodal analgesia  Post Op Pain Control Plan  Comments:     Induction:   IV  Beta Blocker:         Informed Consent: Patient understands risks and agrees with Anesthesia plan.  Questions answered. Anesthesia consent signed with patient.  ASA Score: 3     Day of Surgery Review of History & Physical:    H&P update referred to the surgeon.     Anesthesia Plan Notes: Recent labs in [facility]        Ready For Surgery From Anesthesia Perspective.

## 2022-02-03 NOTE — DISCHARGE INSTRUCTIONS
Information to Prepare you for your Surgery    PRE-ADMIT TESTING -  [phone]    [address] [facility]          Your surgery has been scheduled at [facility]. We are pleased to have the opportunity to serve you. For Further Information please call [phone].    On the day of surgery please report to the Information Desk on the 1st floor.    · CONTACT YOUR PHYSICIAN'S OFFICE THE DAY PRIOR TO YOUR SURGERY TO OBTAIN YOUR ARRIVAL TIME.     · The evening before surgery do not eat anything after 9 p.m. ( this includes hard candy, chewing gum and mints).  You may only have GATORADE, POWERADE AND WATER  from 9 p.m. until you leave your home.   DO NOT DRINK ANY LIQUIDS ON THE WAY TO THE HOSPITAL.      Why does your anesthesiologist allow you to drink Gatorade/Powerade before surgery?  Gatorade/Powerade helps to increase your comfort before surgery and to decrease your nausea after surgery. The carbohydrates in Gatorade/Powerade help reduce your body's stress response to surgery.  If you are a diabetic-drink only water prior to surgery.      Current Visitor policy(12/27/2021) - Patients may have 1 adult visitor pre and post procedure. Only 1 visitor will be allowed in the Surgical building with the patient.     SPECIAL MEDICATION INSTRUCTIONS: TAKE medications checked off by the Anesthesiologist on your Medication List.    Angiogram Patients: Take medications as instructed by your physician, including aspirin.     Surgery Patients:    If you take ASPIRIN - Your PHYSICIAN/SURGEON will need to inform you IF/OR when you need to stop taking aspirin prior to your surgery.     Do Not take any medications containing IBUPROFEN.    Do Not Wear any make-up (especially eye make-up) to surgery. Please remove any false eyelashes or eyelash extensions. If you arrive the day of surgery with makeup/eyelashes on you will be required to remove prior to surgery. (There is a risk of  corneal abrasions if eye makeup/eyelash extensions are not removed)      Leave all valuables at home.   Do Not wear any jewelry or watches, including any metal in body piercings. Jewelry must be removed prior to coming to the hospital.  There is a possibility that rings that are unable to be removed may be cut off if they are on the surgical extremity.    Please remove all hair extensions, wigs, clips and any other metal accessories/ ornaments from your hair.  These items may pose a flammable/fire risk in Surgery and must be removed.    Do not shave your surgical area at least 5 days prior to your surgery. The surgical prep will be performed at the hospital according to Infection Control regulations.    Contact Lens must be removed before surgery. Either do not wear the contact lens or bring a case and solution for storage.  Please bring a container for eyeglasses or dentures as required.  Bring any paperwork your physician has provided, such as consent forms,  history and physicals, doctor's orders, etc.   Bring comfortable clothes that are loose fitting to wear upon discharge. Take into consideration the type of surgery being performed.  Maintain your diet as advised per your physician the day prior to surgery.      Adequate rest the night before surgery is advised.   Park in the Parking lot behind the hospital or in the [facility] across the street from the parking lot. Parking is complimentary.  If you will be discharged the same day as your procedure, please arrange for a responsible adult to drive you home or to accompany you if traveling by taxi.   YOU WILL NOT BE PERMITTED TO DRIVE OR TO LEAVE THE HOSPITAL ALONE AFTER SURGERY.   If you are being discharged the same day, it is strongly recommended that you arrange for someone to remain with you for the first 24 hrs following your surgery.    The Surgeon will speak to your family/visitor after your surgery regarding the outcome of your surgery and  post op care.  The Surgeon may speak to you after your surgery, but there is a possibility you may not remember the details.  Please check with your family members regarding the conversation with the Surgeon.    We strongly recommend whoever is bringing you home be present for discharge instructions.  This will ensure a thorough understanding for your post op home care.    ALL CHILDREN MUST ALWAYS BE ACCOMPANIED BY AN ADULT.    Visitors-Refer to current Visitor policy handouts.    Thank you for your cooperation.  The Staff of [facility].                Bathing Instructions with Hibiclens     Shower the evening before and morning of your procedure with Hibiclens:   Wash your face with water and your regular face wash/soap   Apply Hibiclens directly on your skin or on a wet washcloth and wash gently. When showering: Move away from the shower stream when applying Hibiclens to avoid rinsing off too soon.   Rinse thoroughly with warm water   Do not dilute Hibiclens         Dry off as usual, do not use any deodorant, powder, body lotions, perfume, after shave or cologne.

## 2022-02-05 NOTE — TELEPHONE ENCOUNTER
No new care gaps identified.  Powered by "[employer] by [employer]. Reference number: [id number].   2/04/2022 7:25:20 PM CST

## 2022-02-07 ENCOUNTER — TELEPHONE (OUTPATIENT)
Dept: OTOLARYNGOLOGY | Facility: CLINIC | Age: 67
End: 2022-02-07
Payer: MEDICARE

## 2022-02-07 RX ORDER — GABAPENTIN 300 MG/1
CAPSULE ORAL
Qty: 90 CAPSULE | Refills: 2 | Status: SHIPPED | OUTPATIENT
Start: 2022-02-07 | End: 2022-06-28

## 2022-02-07 NOTE — TELEPHONE ENCOUNTER
Spoke with patient gave arrival time and location of surgery including instructions for the day of surgery.    Patient was given opportunity to ask any follow up questions.

## 2022-02-08 ENCOUNTER — ANESTHESIA (OUTPATIENT)
Dept: SURGERY | Facility: OTHER | Age: 67
End: 2022-02-08
Payer: MEDICARE

## 2022-02-08 ENCOUNTER — HOSPITAL ENCOUNTER (OUTPATIENT)
Facility: OTHER | Age: 67
Discharge: HOME | End: 2022-02-09
Attending: OTOLARYNGOLOGY | Admitting: OTOLARYNGOLOGY
Payer: MEDICARE

## 2022-02-08 DIAGNOSIS — J34.2 DEVIATED NASAL SEPTUM: Primary | ICD-10-CM

## 2022-02-08 LAB
POCT GLUCOSE: 131 MG/DL (ref 70–110)
POCT GLUCOSE: 139 MG/DL (ref 70–110)

## 2022-02-08 PROCEDURE — 71000039 HC RECOVERY, EACH ADD'L HOUR: Performed by: OTOLARYNGOLOGY

## 2022-02-08 PROCEDURE — 37000008 HC ANESTHESIA 1ST 15 MINUTES: Performed by: OTOLARYNGOLOGY

## 2022-02-08 PROCEDURE — 63600175 PHARM REV CODE 636 W HCPCS: Performed by: NURSE ANESTHETIST, CERTIFIED REGISTERED

## 2022-02-08 PROCEDURE — 94799 UNLISTED PULMONARY SVC/PX: CPT

## 2022-02-08 PROCEDURE — 25000003 PHARM REV CODE 250: Performed by: NURSE ANESTHETIST, CERTIFIED REGISTERED

## 2022-02-08 PROCEDURE — 71000015 HC POSTOP RECOV 1ST HR: Performed by: OTOLARYNGOLOGY

## 2022-02-08 PROCEDURE — 25000003 PHARM REV CODE 250: Performed by: OTOLARYNGOLOGY

## 2022-02-08 PROCEDURE — 63600175 PHARM REV CODE 636 W HCPCS: Performed by: ANESTHESIOLOGY

## 2022-02-08 PROCEDURE — 36000707: Performed by: OTOLARYNGOLOGY

## 2022-02-08 PROCEDURE — 27201423 OPTIME MED/SURG SUP & DEVICES STERILE SUPPLY: Performed by: OTOLARYNGOLOGY

## 2022-02-08 PROCEDURE — 25000003 PHARM REV CODE 250: Performed by: ANESTHESIOLOGY

## 2022-02-08 PROCEDURE — 25000242 PHARM REV CODE 250 ALT 637 W/ HCPCS: Performed by: ANESTHESIOLOGY

## 2022-02-08 PROCEDURE — 71000033 HC RECOVERY, INTIAL HOUR: Performed by: OTOLARYNGOLOGY

## 2022-02-08 PROCEDURE — 30140 PR EXCISION TURBINATE,SUBMUCOUS: ICD-10-PCS | Mod: 51,50,, | Performed by: OTOLARYNGOLOGY

## 2022-02-08 PROCEDURE — 63600175 PHARM REV CODE 636 W HCPCS: Performed by: OTOLARYNGOLOGY

## 2022-02-08 PROCEDURE — 30520 REPAIR OF NASAL SEPTUM: CPT | Mod: ,,, | Performed by: OTOLARYNGOLOGY

## 2022-02-08 PROCEDURE — 30520 PR REPAIR, NASAL SEPTUM: ICD-10-PCS | Mod: ,,, | Performed by: OTOLARYNGOLOGY

## 2022-02-08 PROCEDURE — 94640 AIRWAY INHALATION TREATMENT: CPT

## 2022-02-08 PROCEDURE — 37000009 HC ANESTHESIA EA ADD 15 MINS: Performed by: OTOLARYNGOLOGY

## 2022-02-08 PROCEDURE — 30140 RESECT INFERIOR TURBINATE: CPT | Mod: 51,50,, | Performed by: OTOLARYNGOLOGY

## 2022-02-08 PROCEDURE — 94761 N-INVAS EAR/PLS OXIMETRY MLT: CPT

## 2022-02-08 PROCEDURE — 82962 GLUCOSE BLOOD TEST: CPT | Performed by: OTOLARYNGOLOGY

## 2022-02-08 PROCEDURE — 36000706: Performed by: OTOLARYNGOLOGY

## 2022-02-08 PROCEDURE — 71000016 HC POSTOP RECOV ADDL HR: Performed by: OTOLARYNGOLOGY

## 2022-02-08 RX ORDER — DIPHENHYDRAMINE HYDROCHLORIDE 50 MG/ML
25 INJECTION INTRAMUSCULAR; INTRAVENOUS EVERY 6 HOURS PRN
Status: DISCONTINUED | OUTPATIENT
Start: 2022-02-08 | End: 2022-02-08

## 2022-02-08 RX ORDER — AMLODIPINE BESYLATE 5 MG/1
5 TABLET ORAL DAILY
Status: DISCONTINUED | OUTPATIENT
Start: 2022-02-09 | End: 2022-02-09 | Stop reason: HOSPADM

## 2022-02-08 RX ORDER — LIDOCAINE HCL/PF 100 MG/5ML
SYRINGE (ML) INTRAVENOUS
Status: DISCONTINUED | OUTPATIENT
Start: 2022-02-08 | End: 2022-02-08

## 2022-02-08 RX ORDER — IPRATROPIUM BROMIDE AND ALBUTEROL SULFATE 2.5; .5 MG/3ML; MG/3ML
3 SOLUTION RESPIRATORY (INHALATION) ONCE
Status: COMPLETED | OUTPATIENT
Start: 2022-02-08 | End: 2022-02-08

## 2022-02-08 RX ORDER — DEXTROMETHORPHAN HYDROBROMIDE, GUAIFENESIN 5; 100 MG/5ML; MG/5ML
650 LIQUID ORAL EVERY 8 HOURS PRN
Qty: 30 TABLET | Refills: 0 | Status: SHIPPED | OUTPATIENT
Start: 2022-02-08

## 2022-02-08 RX ORDER — SODIUM CHLORIDE 0.9 % (FLUSH) 0.9 %
3 SYRINGE (ML) INJECTION
Status: DISCONTINUED | OUTPATIENT
Start: 2022-02-08 | End: 2022-02-09 | Stop reason: HOSPADM

## 2022-02-08 RX ORDER — HYDROCODONE BITARTRATE AND ACETAMINOPHEN 5; 325 MG/1; MG/1
1 TABLET ORAL EVERY 4 HOURS PRN
Status: DISCONTINUED | OUTPATIENT
Start: 2022-02-08 | End: 2022-02-09 | Stop reason: HOSPADM

## 2022-02-08 RX ORDER — ALBUTEROL SULFATE 2.5 MG/.5ML
2.5 SOLUTION RESPIRATORY (INHALATION)
Status: COMPLETED | OUTPATIENT
Start: 2022-02-08 | End: 2022-02-08

## 2022-02-08 RX ORDER — ACETAMINOPHEN 325 MG/1
650 TABLET ORAL EVERY 4 HOURS PRN
Status: DISCONTINUED | OUTPATIENT
Start: 2022-02-08 | End: 2022-02-09 | Stop reason: HOSPADM

## 2022-02-08 RX ORDER — OXYCODONE HYDROCHLORIDE 5 MG/1
5 TABLET ORAL
Status: DISCONTINUED | OUTPATIENT
Start: 2022-02-08 | End: 2022-02-08

## 2022-02-08 RX ORDER — ONDANSETRON 2 MG/ML
INJECTION INTRAMUSCULAR; INTRAVENOUS
Status: DISCONTINUED | OUTPATIENT
Start: 2022-02-08 | End: 2022-02-08

## 2022-02-08 RX ORDER — IBUPROFEN 200 MG
16 TABLET ORAL
Status: DISCONTINUED | OUTPATIENT
Start: 2022-02-08 | End: 2022-02-09 | Stop reason: HOSPADM

## 2022-02-08 RX ORDER — MUPIROCIN 20 MG/G
OINTMENT TOPICAL
Status: DISCONTINUED | OUTPATIENT
Start: 2022-02-08 | End: 2022-02-08 | Stop reason: HOSPADM

## 2022-02-08 RX ORDER — ALBUTEROL SULFATE 2.5 MG/.5ML
2.5 SOLUTION RESPIRATORY (INHALATION) EVERY 4 HOURS PRN
Status: DISCONTINUED | OUTPATIENT
Start: 2022-02-08 | End: 2022-02-08

## 2022-02-08 RX ORDER — PROCHLORPERAZINE EDISYLATE 5 MG/ML
5 INJECTION INTRAMUSCULAR; INTRAVENOUS EVERY 30 MIN PRN
Status: DISCONTINUED | OUTPATIENT
Start: 2022-02-08 | End: 2022-02-08

## 2022-02-08 RX ORDER — PHENYLEPHRINE HYDROCHLORIDE 10 MG/ML
INJECTION INTRAVENOUS
Status: DISCONTINUED | OUTPATIENT
Start: 2022-02-08 | End: 2022-02-08

## 2022-02-08 RX ORDER — ESMOLOL HYDROCHLORIDE 10 MG/ML
INJECTION INTRAVENOUS
Status: DISCONTINUED | OUTPATIENT
Start: 2022-02-08 | End: 2022-02-08

## 2022-02-08 RX ORDER — TALC
6 POWDER (GRAM) TOPICAL NIGHTLY PRN
Status: DISCONTINUED | OUTPATIENT
Start: 2022-02-08 | End: 2022-02-09 | Stop reason: HOSPADM

## 2022-02-08 RX ORDER — MEPERIDINE HYDROCHLORIDE 25 MG/ML
12.5 INJECTION INTRAMUSCULAR; INTRAVENOUS; SUBCUTANEOUS ONCE AS NEEDED
Status: DISCONTINUED | OUTPATIENT
Start: 2022-02-08 | End: 2022-02-08

## 2022-02-08 RX ORDER — LIDOCAINE HYDROCHLORIDE 10 MG/ML
1 INJECTION, SOLUTION EPIDURAL; INFILTRATION; INTRACAUDAL; PERINEURAL ONCE
Status: DISCONTINUED | OUTPATIENT
Start: 2022-02-08 | End: 2022-02-09 | Stop reason: HOSPADM

## 2022-02-08 RX ORDER — TIZANIDINE 2 MG/1
4 TABLET ORAL ONCE AS NEEDED
Status: DISCONTINUED | OUTPATIENT
Start: 2022-02-08 | End: 2022-02-09 | Stop reason: HOSPADM

## 2022-02-08 RX ORDER — SODIUM CHLORIDE, SODIUM LACTATE, POTASSIUM CHLORIDE, CALCIUM CHLORIDE 600; 310; 30; 20 MG/100ML; MG/100ML; MG/100ML; MG/100ML
INJECTION, SOLUTION INTRAVENOUS CONTINUOUS
Status: DISCONTINUED | OUTPATIENT
Start: 2022-02-08 | End: 2022-02-08

## 2022-02-08 RX ORDER — FENTANYL CITRATE 50 UG/ML
INJECTION, SOLUTION INTRAMUSCULAR; INTRAVENOUS
Status: DISCONTINUED | OUTPATIENT
Start: 2022-02-08 | End: 2022-02-08

## 2022-02-08 RX ORDER — ROCURONIUM BROMIDE 10 MG/ML
INJECTION, SOLUTION INTRAVENOUS
Status: DISCONTINUED | OUTPATIENT
Start: 2022-02-08 | End: 2022-02-08

## 2022-02-08 RX ORDER — LIDOCAINE HYDROCHLORIDE 10 MG/ML
0.5 INJECTION, SOLUTION EPIDURAL; INFILTRATION; INTRACAUDAL; PERINEURAL ONCE
Status: DISCONTINUED | OUTPATIENT
Start: 2022-02-08 | End: 2022-02-08

## 2022-02-08 RX ORDER — ONDANSETRON 4 MG/1
4 TABLET, ORALLY DISINTEGRATING ORAL ONCE
Qty: 1 TABLET | Refills: 0 | Status: SHIPPED | OUTPATIENT
Start: 2022-02-08 | End: 2022-02-09

## 2022-02-08 RX ORDER — PROPOFOL 10 MG/ML
VIAL (ML) INTRAVENOUS
Status: DISCONTINUED | OUTPATIENT
Start: 2022-02-08 | End: 2022-02-08

## 2022-02-08 RX ORDER — HYDROMORPHONE HYDROCHLORIDE 2 MG/ML
0.4 INJECTION, SOLUTION INTRAMUSCULAR; INTRAVENOUS; SUBCUTANEOUS EVERY 5 MIN PRN
Status: DISCONTINUED | OUTPATIENT
Start: 2022-02-08 | End: 2022-02-08

## 2022-02-08 RX ORDER — INSULIN ASPART 100 [IU]/ML
0-5 INJECTION, SOLUTION INTRAVENOUS; SUBCUTANEOUS
Status: DISCONTINUED | OUTPATIENT
Start: 2022-02-08 | End: 2022-02-09 | Stop reason: HOSPADM

## 2022-02-08 RX ORDER — SODIUM CHLORIDE 0.9 % (FLUSH) 0.9 %
10 SYRINGE (ML) INJECTION
Status: DISCONTINUED | OUTPATIENT
Start: 2022-02-08 | End: 2022-02-09 | Stop reason: HOSPADM

## 2022-02-08 RX ORDER — LIDOCAINE HYDROCHLORIDE 10 MG/ML
1 INJECTION, SOLUTION EPIDURAL; INFILTRATION; INTRACAUDAL; PERINEURAL ONCE
Status: DISCONTINUED | OUTPATIENT
Start: 2022-02-08 | End: 2022-02-08

## 2022-02-08 RX ORDER — CLINDAMYCIN PHOSPHATE 900 MG/50ML
INJECTION, SOLUTION INTRAVENOUS
Status: DISCONTINUED | OUTPATIENT
Start: 2022-02-08 | End: 2022-02-08

## 2022-02-08 RX ORDER — DEXAMETHASONE SODIUM PHOSPHATE 4 MG/ML
INJECTION, SOLUTION INTRA-ARTICULAR; INTRALESIONAL; INTRAMUSCULAR; INTRAVENOUS; SOFT TISSUE
Status: DISCONTINUED | OUTPATIENT
Start: 2022-02-08 | End: 2022-02-08

## 2022-02-08 RX ORDER — LIDOCAINE HYDROCHLORIDE AND EPINEPHRINE 10; 10 MG/ML; UG/ML
INJECTION, SOLUTION INFILTRATION; PERINEURAL
Status: DISCONTINUED | OUTPATIENT
Start: 2022-02-08 | End: 2022-02-08 | Stop reason: HOSPADM

## 2022-02-08 RX ORDER — ACETAMINOPHEN 500 MG
1000 TABLET ORAL
Status: COMPLETED | OUTPATIENT
Start: 2022-02-08 | End: 2022-02-08

## 2022-02-08 RX ORDER — KETOROLAC TROMETHAMINE 30 MG/ML
15 INJECTION, SOLUTION INTRAMUSCULAR; INTRAVENOUS ONCE
Status: COMPLETED | OUTPATIENT
Start: 2022-02-08 | End: 2022-02-08

## 2022-02-08 RX ORDER — GLUCAGON 1 MG
1 KIT INJECTION
Status: DISCONTINUED | OUTPATIENT
Start: 2022-02-08 | End: 2022-02-09 | Stop reason: HOSPADM

## 2022-02-08 RX ORDER — IBUPROFEN 200 MG
24 TABLET ORAL
Status: DISCONTINUED | OUTPATIENT
Start: 2022-02-08 | End: 2022-02-09 | Stop reason: HOSPADM

## 2022-02-08 RX ORDER — ONDANSETRON 8 MG/1
8 TABLET, ORALLY DISINTEGRATING ORAL EVERY 8 HOURS PRN
Status: DISCONTINUED | OUTPATIENT
Start: 2022-02-08 | End: 2022-02-09 | Stop reason: HOSPADM

## 2022-02-08 RX ORDER — GABAPENTIN 300 MG/1
300 CAPSULE ORAL 3 TIMES DAILY
Status: DISCONTINUED | OUTPATIENT
Start: 2022-02-09 | End: 2022-02-09 | Stop reason: HOSPADM

## 2022-02-08 RX ORDER — ACETAMINOPHEN 325 MG/1
650 TABLET ORAL EVERY 8 HOURS PRN
Status: DISCONTINUED | OUTPATIENT
Start: 2022-02-08 | End: 2022-02-09 | Stop reason: HOSPADM

## 2022-02-08 RX ORDER — IBUPROFEN 600 MG/1
600 TABLET ORAL EVERY 8 HOURS PRN
Qty: 30 TABLET | Refills: 0 | Status: SHIPPED | OUTPATIENT
Start: 2022-02-08 | End: 2022-05-11

## 2022-02-08 RX ORDER — SERTRALINE HYDROCHLORIDE 25 MG/1
25 TABLET, FILM COATED ORAL DAILY
Status: DISCONTINUED | OUTPATIENT
Start: 2022-02-09 | End: 2022-02-09 | Stop reason: HOSPADM

## 2022-02-08 RX ORDER — IPRATROPIUM BROMIDE AND ALBUTEROL SULFATE 2.5; .5 MG/3ML; MG/3ML
3 SOLUTION RESPIRATORY (INHALATION) EVERY 4 HOURS PRN
Status: DISCONTINUED | OUTPATIENT
Start: 2022-02-08 | End: 2022-02-09 | Stop reason: HOSPADM

## 2022-02-08 RX ORDER — EPINEPHRINE 1 MG/ML
INJECTION, SOLUTION INTRACARDIAC; INTRAMUSCULAR; INTRAVENOUS; SUBCUTANEOUS
Status: DISCONTINUED | OUTPATIENT
Start: 2022-02-08 | End: 2022-02-08 | Stop reason: HOSPADM

## 2022-02-08 RX ADMIN — ESMOLOL HYDROCHLORIDE 10 MG: 10 INJECTION, SOLUTION INTRAVENOUS at 04:02

## 2022-02-08 RX ADMIN — CARBOXYMETHYLCELLULOSE SODIUM 1 DROP: 2.5 SOLUTION/ DROPS OPHTHALMIC at 03:02

## 2022-02-08 RX ADMIN — SODIUM CHLORIDE, SODIUM LACTATE, POTASSIUM CHLORIDE, AND CALCIUM CHLORIDE: .6; .31; .03; .02 INJECTION, SOLUTION INTRAVENOUS at 02:02

## 2022-02-08 RX ADMIN — PHENYLEPHRINE HYDROCHLORIDE 200 MCG: 10 INJECTION INTRAVENOUS at 03:02

## 2022-02-08 RX ADMIN — LIDOCAINE HYDROCHLORIDE 100 MG: 20 INJECTION, SOLUTION INTRAVENOUS at 03:02

## 2022-02-08 RX ADMIN — HYDROMORPHONE HYDROCHLORIDE 0.4 MG: 2 INJECTION INTRAMUSCULAR; INTRAVENOUS; SUBCUTANEOUS at 04:02

## 2022-02-08 RX ADMIN — ROCURONIUM BROMIDE 50 MG: 10 INJECTION, SOLUTION INTRAVENOUS at 03:02

## 2022-02-08 RX ADMIN — ACETAMINOPHEN 1000 MG: 500 TABLET, FILM COATED ORAL at 01:02

## 2022-02-08 RX ADMIN — FENTANYL CITRATE 50 MCG: 50 INJECTION, SOLUTION INTRAMUSCULAR; INTRAVENOUS at 03:02

## 2022-02-08 RX ADMIN — ESMOLOL HYDROCHLORIDE 20 MG: 10 INJECTION, SOLUTION INTRAVENOUS at 03:02

## 2022-02-08 RX ADMIN — CLINDAMYCIN PHOSPHATE 900 MG: 18 INJECTION, SOLUTION INTRAVENOUS at 03:02

## 2022-02-08 RX ADMIN — KETOROLAC TROMETHAMINE 15 MG: 30 INJECTION, SOLUTION INTRAMUSCULAR at 05:02

## 2022-02-08 RX ADMIN — ALBUTEROL SULFATE 2.5 MG: 2.5 SOLUTION RESPIRATORY (INHALATION) at 01:02

## 2022-02-08 RX ADMIN — OXYCODONE 5 MG: 5 TABLET ORAL at 04:02

## 2022-02-08 RX ADMIN — PHENYLEPHRINE HYDROCHLORIDE 200 MCG: 10 INJECTION INTRAVENOUS at 04:02

## 2022-02-08 RX ADMIN — IPRATROPIUM BROMIDE AND ALBUTEROL SULFATE 3 ML: .5; 3 SOLUTION RESPIRATORY (INHALATION) at 07:02

## 2022-02-08 RX ADMIN — SUGAMMADEX 200 MG: 100 INJECTION, SOLUTION INTRAVENOUS at 04:02

## 2022-02-08 RX ADMIN — ALBUTEROL SULFATE 2.5 MG: 2.5 SOLUTION RESPIRATORY (INHALATION) at 06:02

## 2022-02-08 RX ADMIN — DEXAMETHASONE SODIUM PHOSPHATE 8 MG: 4 INJECTION, SOLUTION INTRAMUSCULAR; INTRAVENOUS at 03:02

## 2022-02-08 RX ADMIN — PROPOFOL 200 MG: 10 INJECTION, EMULSION INTRAVENOUS at 03:02

## 2022-02-08 RX ADMIN — PHENYLEPHRINE HYDROCHLORIDE 100 MCG: 10 INJECTION INTRAVENOUS at 03:02

## 2022-02-08 RX ADMIN — GLYCOPYRROLATE 0.2 MG: 0.2 INJECTION, SOLUTION INTRAMUSCULAR; INTRAVITREAL at 03:02

## 2022-02-08 RX ADMIN — ONDANSETRON HYDROCHLORIDE 4 MG: 2 INJECTION INTRAMUSCULAR; INTRAVENOUS at 04:02

## 2022-02-08 NOTE — ANESTHESIA POSTPROCEDURE EVALUATION
Anesthesia Post Evaluation    Patient: Magdalena Acevedo    Procedure(s) Performed: Procedure(s) (LRB):  SEPTOPLASTY, NOSE (Bilateral)    Final Anesthesia Type: general      Patient location during evaluation: PACU  Patient participation: Yes- Able to Participate  Level of consciousness: awake and alert  Post-procedure vital signs: reviewed and stable  Pain management: adequate  Airway patency: patent    PONV status at discharge: No PONV  Anesthetic complications: no      Cardiovascular status: blood pressure returned to baseline and stable  Respiratory status: room air  Hydration status: euvolemic  Follow-up not needed.          Vitals Value Taken Time   /57 02/08/22 1710   Temp  02/08/22 1717   Pulse 102 02/08/22 1716   Resp 18 02/08/22 1710   SpO2 96 % 02/08/22 1716   Vitals shown include unvalidated device data.      No case tracking events are documented in the log.      Pain/Celine Score: Pain Rating Prior to Med Admin: 7 (2/8/2022  5:11 PM)

## 2022-02-08 NOTE — ANESTHESIA PROCEDURE NOTES
Intubation    Date/Time: 2/8/2022 3:20 PM  Performed by: Divya Mendoza CRNA  Authorized by: Divya Mendoza CRNA     Intubation:     Induction:  Intravenous    Intubated:  Postinduction    Mask Ventilation:  Easy with oral airway    Attempts:  1    Attempted By:  CRNA    Method of Intubation:  Video laryngoscopy    Blade:  Cid 3    Laryngeal View Grade: Grade I - full view of cords      Difficult Airway Encountered?: No      Complications:  None    Airway Device:  Oral endotracheal tube    Airway Device Size:  7.5    Style/Cuff Inflation:  Cuffed (inflated to minimal occlusive pressure)    Tube secured:  22    Secured at:  The lips    Placement Verified By:  Capnometry    Complicating Factors:  Obesity    Findings Post-Intubation:  BS equal bilateral and atraumatic/condition of teeth unchanged

## 2022-02-08 NOTE — OP NOTE
DATE OF OPERATION: 2/8/2022    SURGEON:  Karri Berrios MD     ASSISTANT SURGEON:  Donis Maza MD     OPERATION:     1. Endoscopic septoplasty.  2. Bilateral inferior turbinate reduction with submucosal resection.  3. Nasal endoscopy.     PREOPERATIVE DIAGNOSIS:      1. Deviated nasal septum.  2. Hypertrophic turbinates.        POSTOPERATIVE DIAGNOSIS:      1. Deviated nasal septum.  2. Hypertrophic turbinates.        ANESTHESIA: General.     COMPLICATIONS: None.     ESTIMATED BLOOD LOSS: 20 mL     SPECIMEN: None.     WOUND EXPECTANCY: Clean-contaminated.     FINDINGS: Leftward septal deviation.  Compound inferior turbinate hypertrophy.     INDICATIONS: Anatomic nasal obstruction, not improved with medical therapy.     I discussed the risks, benefits and alternatives of surgical correction of the septal deviation and turbinate hypertrophy with the patient as well as the expected postoperative course. I gave her the opportunity to ask questions and I answered all of them. On the morning of surgery I again met with the patient and reviewed the indications for surgery and she consented to proceed.     DESCRIPTION OF PROCEDURE: The patient was brought to the operating room and placed supine on the operating table. The patient was placed under general anesthesia and intubated. The patient was positioned with a donut under the head.  Cottonoid pledgets soaked with 4% cocaine were placed into the nasal cavity bilaterally for mucosal decongestion. The mucosa of the nasal septum was injected with 1% lidocaine with 1:100,000 parts epinephrine.  A time-out was performed to confirm the proper patient, site and procedure. The patient was prepped and draped in the usual fashion.     Surgery began with performance of submucous resection of the nasal septum. This began with additional injections, assisted by a 0-degree endoscope. Then, a hemitransfixion incision was made using a #15 blade on the left side. The  submucoperichondrial plane was identified and this was elevated using a [place] elevator. This plane was carried posteriorly to the bony-cartilaginous junction.  A Dorado elevator was used to incise the cartilage at the junction and a contralateral mucoperiosteal flap was elevated. Then, using a 0-degree endoscope, the septal pocket was visualized and there was an additional long process of cartilage along the floor causing a deviation. This was resected using a Michelle elevator and was removed.       Additional elevation of the flaps over the vomer revealed a large spur that was jutting into the middle meatus. This portion of the bone was resected top and bottom using a Sahil scissors and the spur was removed using a Elian forceps. After removal, there was a small perforation on the left side of the mucoperichondrial flap, but the contralateral flap remained intact. At this point, 10,000 units of topical thrombin mixed with epinephrine concentrated 1:1000 parts was applied to pledgets and placed between the flaps for topical hemostasis. After these pledgets were removed, there was excellent hemostasis at the septal site.       Then, under endoscopic visualization, a transseptal quilting suture of 4-0 plain gut was used to reapproximate the nasal septal flaps. Then the hemitransfixion incision was closed using 4-0 chromic gut suture in figure-of-eight fashion times two.  Repeated nasal endoscopy at this point revealed marked improvement of the septal deviation and good visualization of the vertical suspension of both middle turbinates.    Attention was then turned to the turbinates.  Additional injections were performed around the inferior turbinates and the sphenopalatine ganglion region bilaterally.  Incisions were made in the anterior head of the inferior turbinate using a #6400 blade.  A Dorado elevator was then tunnelled medially to the turbinate bone and used to segmentally outfracture and morselize the bone.   Then, a 2 mm blade on the powered tissue shaver was tunneled submucosally and used to resect soft tissue of the turbinate while overlying mucosa was preserved.   Finally, the turbinates were outfractured using a Magaña/Boies elevator.  An identical procedure was performed bilaterally.    At the conclusion of the procedure there was good hemostasis.  Mupirocin ointment was applied to the vestibule bilaterally.  A nasal dressing was applied and the drapes were taken down.  The patient was then turned back toward the anesthesiologist and awakened from anesthesia, extubated and transferred to the recovery room in stable condition.

## 2022-02-08 NOTE — TRANSFER OF CARE
"Anesthesia Transfer of Care Note    Patient: Magdalena Acevedo    Procedure(s) Performed: Procedure(s) (LRB):  SEPTOPLASTY, NOSE (Bilateral)    Patient location: PACU    Anesthesia Type: general    Transport from OR: Transported from OR on 6-10 L/min O2 by face mask with adequate spontaneous ventilation    Post pain: adequate analgesia    Post assessment: no apparent anesthetic complications and tolerated procedure well    Post vital signs: stable    Level of consciousness: awake    Nausea/Vomiting: no nausea/vomiting    Complications: none    Transfer of care protocol was followed      Last vitals:   Visit Vitals  BP (!) 185/88 (BP Location: Right arm, Patient Position: Sitting)   Pulse 72   Temp 36.7 °C (98 °F) (Oral)   Resp 18   Ht 5' 3" (1.6 m)   Wt 108 kg (238 lb)   LMP  (LMP Unknown)   SpO2 97%   Breastfeeding No   BMI 42.16 kg/m²     "

## 2022-02-08 NOTE — PATIENT INSTRUCTIONS
INSTRUCTIONS TO FOLLOW AFTER SINUS AND NASAL SURGERY  DR. McCOUL - [facility]    Office hours:  Weekdays 8:00 am to 5:00 pm.  Please call [phone] and ask to speak with his nurse or medical assistant.    After-hours & weekends:  Please call [phone] and ask to speak with the ENT resident doctor.    Your first office visit with Dr. Berrios after surgery should have been already scheduled.  If you dont know when it is, call Dr. Henley nurse or medical assistant at [phone].    Please call immediately if you have:    - Temperature of 101° F or greater  - Any unusual, painful swelling  - Any active bleeding that saturates more than a 4x4 gauze  - Any thick drainage green or yellow drainage  - Changes in vision or swelling around the eye  - Pain not relieved by your prescribed pain medication    ACTIVITY:    Sleep on your back with the head of the bead elevated, up on 2-3 pillows, or in a recliner for the first 3 to 5 days. This will help with swelling.     After surgery you may have a lot of nasal drainage. This is normal. You may breathe through your nose if youre able but avoid inhaling forcibly. Let all drainage fall on your mustache dressing and change it as needed.    You may wake up after surgery with thick white stockings on. Wear them until you are walking around more. It is important to walk around often while at home to keep your blood circulating and prevent blood clots.    If you use CPAP or BiPAP to sleep at night, you should wait at least 48 hours before resuming use. Dr. Berrios will advise you when it is safe to do this.    You may shower 24 hours after surgery.    RESTRICTED ACTIVITIES AFTER SURGERY:    DO NOT blow your nose for 2 weeks. The only exception is that you may lightly blow your nose after using the sinus rinse. If you have to sneeze or cough, do so with your mouth open.     AVOID all heavy lifting, straining or bending for 2 weeks.     AVOID any sexual activity for 2 weeks  after surgery.    AVOID semi-contact sports or vigorous exercising for 3-4 weeks. Dr. Berrios will let you know when you are cleared to resume exercise.    AVOID flying or swimming for 2 weeks.      DO NOT operate a motor vehicle or any type of heavy machinery within 24 hours of taking prescription pain medication.    DO NOT smoke or be around smokers.    AVOID irritating substances that might make you sneeze, such as dust, chalk, harsh chemicals, and allergic triggers. This might also include spicy foods.    DRESSINGS:    Change the gauze mustache dressing under your nose as needed. (If unsure what this dressing is or how to do this, ask your doctor or nurse before you leave the hospital.) You may have pinkish-red drainage for 2-3 days.    Usually there is no gauze packing placed inside the nose.  If packing is necessary, you will be informed by your surgeon.  Do not touch or pull at the packing. The packing will be removed by your doctor at your first visit after surgery.     You may also have a dissolvable stent or dissolvable sponge placed into the sinuses during surgery.  These usually do not need to be removed unless you are told otherwise by Dr. Berrios.  You may notice small fragments of these items come out of your nose in the weeks following surgery.    MEDICATIONS:    After surgery, you will be sent home with prescriptions for pain medication and an anti-nausea medication. Antibiotics are usually not necessary.    Most people need pain medication for the first few days after surgery, although a narcotic is rarely necessary. The best pain control comes from a combination of ACETAMINOPHEN (Tylenol) and IBUPROFEN (Motrin). You will be given prescriptions for these at the recommended dose. These can be alternated so that you are taking something every 2 or 3 hours.    Some people have problems with bowel movements after surgery. If you have NOT had a bowel movement 3-5 days after surgery, go to your local  pharmacy and purchase an over the counter stool softener such as COLACE. You can also ask the pharmacist for his or her recommendation. If you still do not have a bowel movement after starting the softener, please call the office.    You may be given an ointment called MUPIROCIN to use after surgery. If you are given this, use a cotton swab to apply gently inside the nostrils. Do this 2 times a day for 1 week.    You will need these over-the-counter medications after surgery:    SALINE SINUS RINSE ([employer] brand): You will use this to rinse out your nose and sinuses after surgery. Begin doing this the day after surgery, unless instructed otherwise by Dr. Berrios.  You should do this 2 times a day, following the instructions on the box.    AFRIN (regular strength): Only use if you have nasal congestion or bleeding. Use 2 times per day for 3 days, stop for 1 day, continue 2 times per day for 3 days, then stop completely.  NOTE:  You may not need to do this at all.    DIET:    Avoid hot and spicy foods for 1 week after surgery.    Begin with bland foods the evening after surgery and advance to your regular diet as tolerated. It is not necessary to take only soft food unless you are recovering from tonsil surgery.    Drink plenty of fluids (water is best).     Avoid alcoholic and caffeinated beverages for 1 week after surgery because they can cause you to become dehydrated.

## 2022-02-08 NOTE — DISCHARGE SUMMARY
[facility] - Surgery ([place])  Discharge Note  Short Stay    Procedure(s) (LRB):  SEPTOPLASTY, NOSE (Bilateral)    OUTCOME: Patient tolerated treatment/procedure well without complication and is now ready for discharge.    DISPOSITION: Home or Self Care    FINAL DIAGNOSIS:  Deviated nasal septum    FOLLOWUP: In clinic    DISCHARGE INSTRUCTIONS:  No discharge procedures on file.     TIME SPENT ON DISCHARGE: 5 minutes

## 2022-02-08 NOTE — BRIEF OP NOTE
[facility] - Surgery ([place])  Brief Operative Note    Surgery Date: 2/8/2022     Surgeon(s) and Role:     * Karri Berrios MD - Primary    Assisting Surgeon: None    Pre-op Diagnosis:  Deviated nasal septum [J34.2]  Nasal turbinate hypertrophy [J34.3]    Post-op Diagnosis:  Post-Op Diagnosis Codes:     * Deviated nasal septum [J34.2]     * Nasal turbinate hypertrophy [J34.3]    Procedure(s) (LRB):  SEPTOPLASTY, NOSE (Bilateral)    Anesthesia: General    Operative Findings: see op note    Estimated Blood Loss: * No values recorded between 2/8/2022 12:00 AM and 2/8/2022  3:00 PM *         Specimens:   Specimen (24h ago, onward)            None            Discharge Note    OUTCOME: Patient tolerated treatment/procedure well without complication and is now ready for discharge.    DISPOSITION: Home or Self Care    FINAL DIAGNOSIS:  Deviated nasal septum    FOLLOWUP: In clinic    DISCHARGE INSTRUCTIONS:  No discharge procedures on file.

## 2022-02-08 NOTE — H&P
Subjective:      Magdalena is a 66 y.o. female who comes for follow-up of nasal congestion.  Her last visit with me was on 10/21/2021.  No change, bilateral moderately severe nasal congestion, blockage, postnasal drip, discharge.  Flonase no help.    SNOT-22 score: : (P) 58  NOSE score:: (P) 80%  ETDQ-7 score:: (P) 1.4    The patient's medications, allergies, past medical, surgical, social and family histories were reviewed and updated as appropriate.    A detailed review of systems was obtained with pertinent positives as per the above HPI, and otherwise negative.        Objective:     LMP  (LMP Unknown)        Constitutional:   She appears well-developed. She is cooperative.     Head:  Normocephalic.     Nose:  Septal deviation present. No mucosal edema, rhinorrhea or polyps. No epistaxis. Turbinate hypertrophy.  Turbinates normal and no turbinate masses.  Right sinus exhibits no maxillary sinus tenderness and no frontal sinus tenderness. Left sinus exhibits no maxillary sinus tenderness and no frontal sinus tenderness.     Mouth/Throat  Oropharynx clear and moist without lesions or asymmetry. No oropharyngeal exudate or posterior oropharyngeal erythema.     Neck:  No adenopathy. Normal range of motion present.     She has no cervical adenopathy.       Procedure    None        Data Reviewed    WBC (K/uL)   Date Value   03/29/2021 13.80 (H)     Eosinophil % (%)   Date Value   03/29/2021 2.5     Eos # (K/uL)   Date Value   03/29/2021 0.3     Platelets (K/uL)   Date Value   03/29/2021 299     Glucose (mg/dL)   Date Value   03/29/2021 166 (H)     No results found for: IGE    I independently reviewed the images of the CT sinuses dated 11/28/21. Pertinent findings include clear sinsues with leftward prominent septal deviation and large inferior turbinate.      Assessment:     1. Deviated nasal septum    2. Nasal turbinate hypertrophy         Plan:     She would benefit from septoplasty and turbinate reduction for the  treatment of her condition.  I discussed the risks, benefits and alternatives to surgery with the patient, as well as the expected postoperative course.  I gave her the opportunity to ask questions and I answered all of them.  I provided relevant printed information on her condition for her to review at home.  Same-day discharge is anticipated.  She may have anesthesia triage by telephone.   The surgery will be tentatively scheduled for December 30.  She will need to return for a postoperative visit 1 week after surgery.   Follow up for surgery.

## 2022-02-09 LAB
POCT GLUCOSE: 180 MG/DL (ref 70–110)
POCT GLUCOSE: 216 MG/DL (ref 70–110)
POCT GLUCOSE: 257 MG/DL (ref 70–110)

## 2022-02-09 PROCEDURE — 25000003 PHARM REV CODE 250: Performed by: STUDENT IN AN ORGANIZED HEALTH CARE EDUCATION/TRAINING PROGRAM

## 2022-02-09 PROCEDURE — 63600175 PHARM REV CODE 636 W HCPCS: Performed by: STUDENT IN AN ORGANIZED HEALTH CARE EDUCATION/TRAINING PROGRAM

## 2022-02-09 PROCEDURE — 94761 N-INVAS EAR/PLS OXIMETRY MLT: CPT

## 2022-02-09 RX ADMIN — SERTRALINE HYDROCHLORIDE 25 MG: 25 TABLET ORAL at 08:02

## 2022-02-09 RX ADMIN — GABAPENTIN 300 MG: 300 CAPSULE ORAL at 08:02

## 2022-02-09 RX ADMIN — INSULIN ASPART 1 UNITS: 100 INJECTION, SOLUTION INTRAVENOUS; SUBCUTANEOUS at 01:02

## 2022-02-09 RX ADMIN — AMLODIPINE BESYLATE 5 MG: 5 TABLET ORAL at 08:02

## 2022-02-09 RX ADMIN — ACETAMINOPHEN 650 MG: 325 TABLET, FILM COATED ORAL at 08:02

## 2022-02-09 RX ADMIN — HYDROCODONE BITARTRATE AND ACETAMINOPHEN 1 TABLET: 5; 325 TABLET ORAL at 12:02

## 2022-02-09 NOTE — DISCHARGE SUMMARY
[facility]  Otorhinolaryngology-Head & Neck Surgery  Discharge Summary      Patient Name: Magdalena Acevedo  MRN: [id number]  Admission Date: 2/8/2022  Hospital Length of Stay: 0 days  Discharge Date and Time:  02/09/2022 8:55 AM  Attending Physician: Karri Berrios MD   Discharging Provider: Donis Maza MD  Primary Care Provider: Jamie William MD     HPI: 66F underwent septum/turbs 2/8.     Procedure(s) (LRB):  SEPTOPLASTY, NOSE (Bilateral)     Hospital Course: Had increasing o2 requirements post op. Observed overnight. Improved this morning, on room air oxygenating appropriately.     Awake, alert  EOMI  NCAT  Minimal nasal drainage  Aerating well on room air    Consults:     Significant Diagnostic Studies: Radiology: X-Ray: CXR: X-Ray Chest 1 View (CXR): No results found for this visit on 02/08/22.    Pending Diagnostic Studies:     None        Final Active Diagnoses:    Diagnosis Date Noted POA    PRINCIPAL PROBLEM:  Deviated nasal septum [J34.2] 02/08/2022 Yes      Problems Resolved During this Admission:      Discharged Condition: stable    Disposition: Home or Self Care    Follow Up:   Follow-up Information     Karri Berrios MD In 1 week.    Specialty: Otolaryngology  Contact information:  [address] [place] [address] [id number] [phone]                       Patient Instructions:      Diet Adult Regular     Lifting restrictions   Order Comments: 10 lbs     Notify your health care provider if you experience any of the following:  temperature >100.4     Notify your health care provider if you experience any of the following:  severe uncontrolled pain     Notify your health care provider if you experience any of the following:  redness, tenderness, or signs of infection (pain, swelling, redness, odor or green/yellow discharge around incision site)     No dressing needed     Medications:  Reconciled Home Medications:      Medication List      START taking these  medications    ibuprofen 600 MG tablet  Commonly known as: ADVIL,MOTRIN  Take 1 tablet (600 mg total) by mouth every 8 (eight) hours as needed for Pain.     MAPAP ARTHRITIS PAIN 650 MG Tbsr  Generic drug: acetaminophen  Take 1 tablet (650 mg total) by mouth every 8 (eight) hours as needed (pain).     ondansetron 4 MG Tbdl  Commonly known as: ZOFRAN-ODT  dissolve 1 tablet (4 mg total) by mouth once. for 1 dose        CHANGE how you take these medications    fluticasone propionate 50 mcg/actuation nasal spray  Commonly known as: FLONASE  2 sprays (100 mcg total) by Each Nostril route once daily.  What changed: additional instructions        CONTINUE taking these medications    albuterol 90 mcg/actuation inhaler  Commonly known as: PROAIR HFA  Inhale 2 puffs into the lungs every 4 (four) hours as needed for Wheezing or Shortness of Breath. Rescue     amLODIPine 5 MG tablet  Commonly known as: NORVASC  Take 1 tablet (5 mg total) by mouth once daily.     atorvastatin 20 MG tablet  Commonly known as: LIPITOR  Take 1 tablet (20 mg total) by mouth every evening.     * blood sugar diagnostic Strp  To check BG 2 times daily, to use with insurance preferred meter     * blood sugar diagnostic Strp  Commonly known as: TRUE METRIX GLUCOSE TEST STRIP  1 strip by Misc.(Non-Drug; Combo Route) route once daily.     blood-glucose meter kit  Commonly known as: TRUE METRIX AIR GLUCOSE METER  Use as instructed     ASHVIN AEROSPHERE 160-9-4.8 mcg/actuation Hfaa  Generic drug: budesonide-glycopyr-formoterol  2 (two) times a day.     diclofenac 75 MG EC tablet  Commonly known as: VOLTAREN  TAKE ONE TABLET BY MOUTH TWICE A DAY     diphenhydrAMINE 25 mg capsule  Commonly known as: BENADRYL  Take 25 mg by mouth 2 (two) times a day. Pt takes PRN     furosemide 40 MG tablet  Commonly known as: LASIX  One p.o. q.a.m. p.r.n. ankle with edema only     gabapentin 300 MG capsule  Commonly known as: NEURONTIN  TAKE 1 CAPSULE BY MOUTH TWICE DAILY AS  NEEDED FOR RESTLESS LEG SYNDROME MAY INCREASE TO 3 TIMES DAILY     glipiZIDE 10 MG tablet  Commonly known as: GLUCOTROL  Take 1 tablet (10 mg total) by mouth daily with breakfast.     ketorolac 0.5% 0.5 % Drop  Commonly known as: ACULAR     * lancets Misc  To check BG 2 times daily, to use with insurance preferred meter     * lancets 33 gauge Misc  1 lancet by Misc.(Non-Drug; Combo Route) route once daily.     lisinopriL 10 MG tablet  Take 1 tablet (10 mg total) by mouth once daily.     loratadine 10 mg tablet  Commonly known as: CLARITIN  Take 1 tablet (10 mg total) by mouth once daily.     OTEZLA 30 mg Tab  Generic drug: apremilast  Take 30 mg by mouth 2 (two) times a day.     potassium chloride 10 MEQ Cpsr  Commonly known as: MICRO-K  One p.o. q.a.m. with Lasix only     prednisoLONE acetate 1 % Drps  Commonly known as: PRED FORTE     sertraline 25 MG tablet  Commonly known as: ZOLOFT  Take 1 tablet (25 mg total) by mouth once daily.     tiZANidine 4 MG tablet  Commonly known as: ZANAFLEX  TAKE ONE TABLET BY MOUTH EVERY NIGHT AT BEDTIME AS NEEDED FOR MUSCLE SPASMS         * This list has 4 medication(s) that are the same as other medications prescribed for you. Read the directions carefully, and ask your doctor or other care provider to review them with you.                Donis Maza MD  Otorhinolaryngology-Head & Neck Surgery  [facility])

## 2022-02-09 NOTE — PLAN OF CARE
Patient in no apparent distress. Sat's  88 % on room air. Aerosol treatment given. IS done. Patient placed on venti mask 28%. . Will continue to monitor.

## 2022-02-09 NOTE — PLAN OF CARE
Contacted regarding Mrs Acevedo's increased O2 requirement postoperatively. Patient has a history of COPD but is not on O2 at home. Dr Cullen assessed the patient at beside and suggested overnight observation to wean O2 supplementation. Patient is otherwise vitally stable.     - Admit for observation  - Aggressively wean O2, parameters >88% sustained over 10 minutes  - Suspect etiology is multifactorial from COPD, likely FOZIA, and recent anesthesia. Will order CXR.   - Home meds, SSI  - Please call or page with any concerns

## 2022-02-09 NOTE — PLAN OF CARE
Patient is AAOx4. Patient reported pain during the night and medications were administered per orders. Mustache dressing, CDI. VSS on 4L venti mask. Wheezing noted on exertion. Patient up to bedside commode with stand-by assistance. Patient resting at present. Bed locked in lowest position with side rails up x 2. Call light within reach of patient. Will continue purposeful rounding.    5

## 2022-02-09 NOTE — OR NURSING
Notified Dr. Berrios about pt respiratory status. States he is not too concerned but wants some one in house to lay eyes on pt. Dr. Greenberg will be by to eval pt as soon as he can. TM

## 2022-02-09 NOTE — OR NURSING
Pt maintaining sat of 90 on RA with the occasional drop to 87-89%. Sats increase to 90-91% once told to take a deep breath. Dr. Greenberg called. Ordered duoneb and will come see pt as soon as he can,.

## 2022-02-09 NOTE — PLAN OF CARE
Patient given discharge instructions.. all questions and concerns answered. Patient son is here to pick her up. Patient has no signs of pain or distress. Patient has prescriptions and personal belongings in place.  Problem: Adult Inpatient Plan of Care  Goal: Plan of Care Review  Outcome: Met  Goal: Patient-Specific Goal (Individualized)  Outcome: Met  Goal: Absence of Hospital-Acquired Illness or Injury  Outcome: Met  Goal: Optimal Comfort and Wellbeing  Outcome: Met  Goal: Readiness for Transition of Care  Outcome: Met     Problem: Diabetes Comorbidity  Goal: Blood Glucose Level Within Targeted Range  Outcome: Met     Problem: Bariatric Environmental Safety  Goal: Safety Maintained with Care  Outcome: Met

## 2022-02-10 VITALS
RESPIRATION RATE: 18 BRPM | SYSTOLIC BLOOD PRESSURE: 163 MMHG | HEIGHT: 63 IN | HEART RATE: 75 BPM | DIASTOLIC BLOOD PRESSURE: 78 MMHG | OXYGEN SATURATION: 99 % | WEIGHT: 236.31 LBS | TEMPERATURE: 98 F | BODY MASS INDEX: 41.87 KG/M2

## 2022-02-15 ENCOUNTER — OFFICE VISIT (OUTPATIENT)
Dept: OTOLARYNGOLOGY | Facility: CLINIC | Age: 67
End: 2022-02-15
Payer: MEDICARE

## 2022-02-15 VITALS — BODY MASS INDEX: 42.17 KG/M2 | WEIGHT: 238 LBS | HEIGHT: 63 IN

## 2022-02-15 DIAGNOSIS — J34.3 NASAL TURBINATE HYPERTROPHY: ICD-10-CM

## 2022-02-15 DIAGNOSIS — J34.2 DEVIATED NASAL SEPTUM: Primary | ICD-10-CM

## 2022-02-15 PROCEDURE — 1159F PR MEDICATION LIST DOCUMENTED IN MEDICAL RECORD: ICD-10-PCS | Mod: CPTII,S$GLB,, | Performed by: OTOLARYNGOLOGY

## 2022-02-15 PROCEDURE — 3288F FALL RISK ASSESSMENT DOCD: CPT | Mod: CPTII,S$GLB,, | Performed by: OTOLARYNGOLOGY

## 2022-02-15 PROCEDURE — 3288F PR FALLS RISK ASSESSMENT DOCUMENTED: ICD-10-PCS | Mod: CPTII,S$GLB,, | Performed by: OTOLARYNGOLOGY

## 2022-02-15 PROCEDURE — 1159F MED LIST DOCD IN RCRD: CPT | Mod: CPTII,S$GLB,, | Performed by: OTOLARYNGOLOGY

## 2022-02-15 PROCEDURE — 1125F PR PAIN SEVERITY QUANTIFIED, PAIN PRESENT: ICD-10-PCS | Mod: CPTII,S$GLB,, | Performed by: OTOLARYNGOLOGY

## 2022-02-15 PROCEDURE — 3051F PR MOST RECENT HEMOGLOBIN A1C LEVEL 7.0 - < 8.0%: ICD-10-PCS | Mod: CPTII,S$GLB,, | Performed by: OTOLARYNGOLOGY

## 2022-02-15 PROCEDURE — 99024 POSTOP FOLLOW-UP VISIT: CPT | Mod: S$GLB,,, | Performed by: OTOLARYNGOLOGY

## 2022-02-15 PROCEDURE — 3060F PR POS MICROALBUMINURIA RESULT DOCUMENTED/REVIEW: ICD-10-PCS | Mod: CPTII,S$GLB,, | Performed by: OTOLARYNGOLOGY

## 2022-02-15 PROCEDURE — 3066F PR DOCUMENTATION OF TREATMENT FOR NEPHROPATHY: ICD-10-PCS | Mod: CPTII,S$GLB,, | Performed by: OTOLARYNGOLOGY

## 2022-02-15 PROCEDURE — 3066F NEPHROPATHY DOC TX: CPT | Mod: CPTII,S$GLB,, | Performed by: OTOLARYNGOLOGY

## 2022-02-15 PROCEDURE — 1160F PR REVIEW ALL MEDS BY PRESCRIBER/CLIN PHARMACIST DOCUMENTED: ICD-10-PCS | Mod: CPTII,S$GLB,, | Performed by: OTOLARYNGOLOGY

## 2022-02-15 PROCEDURE — 1125F AMNT PAIN NOTED PAIN PRSNT: CPT | Mod: CPTII,S$GLB,, | Performed by: OTOLARYNGOLOGY

## 2022-02-15 PROCEDURE — 1101F PR PT FALLS ASSESS DOC 0-1 FALLS W/OUT INJ PAST YR: ICD-10-PCS | Mod: CPTII,S$GLB,, | Performed by: OTOLARYNGOLOGY

## 2022-02-15 PROCEDURE — 4010F ACE/ARB THERAPY RXD/TAKEN: CPT | Mod: CPTII,S$GLB,, | Performed by: OTOLARYNGOLOGY

## 2022-02-15 PROCEDURE — 3060F POS MICROALBUMINURIA REV: CPT | Mod: CPTII,S$GLB,, | Performed by: OTOLARYNGOLOGY

## 2022-02-15 PROCEDURE — 1160F RVW MEDS BY RX/DR IN RCRD: CPT | Mod: CPTII,S$GLB,, | Performed by: OTOLARYNGOLOGY

## 2022-02-15 PROCEDURE — 99999 PR PBB SHADOW E&M-EST. PATIENT-LVL V: CPT | Mod: PBBFAC,,, | Performed by: OTOLARYNGOLOGY

## 2022-02-15 PROCEDURE — 4010F PR ACE/ARB THEARPY RXD/TAKEN: ICD-10-PCS | Mod: CPTII,S$GLB,, | Performed by: OTOLARYNGOLOGY

## 2022-02-15 PROCEDURE — 1101F PT FALLS ASSESS-DOCD LE1/YR: CPT | Mod: CPTII,S$GLB,, | Performed by: OTOLARYNGOLOGY

## 2022-02-15 PROCEDURE — 3051F HG A1C>EQUAL 7.0%<8.0%: CPT | Mod: CPTII,S$GLB,, | Performed by: OTOLARYNGOLOGY

## 2022-02-15 PROCEDURE — 99024 PR POST-OP FOLLOW-UP VISIT: ICD-10-PCS | Mod: S$GLB,,, | Performed by: OTOLARYNGOLOGY

## 2022-02-15 PROCEDURE — 3008F BODY MASS INDEX DOCD: CPT | Mod: CPTII,S$GLB,, | Performed by: OTOLARYNGOLOGY

## 2022-02-15 PROCEDURE — 99999 PR PBB SHADOW E&M-EST. PATIENT-LVL V: ICD-10-PCS | Mod: PBBFAC,,, | Performed by: OTOLARYNGOLOGY

## 2022-02-15 PROCEDURE — 3008F PR BODY MASS INDEX (BMI) DOCUMENTED: ICD-10-PCS | Mod: CPTII,S$GLB,, | Performed by: OTOLARYNGOLOGY

## 2022-02-15 NOTE — PROGRESS NOTES
"  Subjective:      Magdalena Acevedo is a 66 y.o. female who comes for follow-up 1 week status-post septum and turbinate surgery.  Her last visit with me was on 12/16/2021.  Admitted postop for COPD exacerbation, doing better now.  Old blood from nose over weekend, none in 3 days, pain improving.  Using saline rinse 2-3 times daily.    SNOT-22 score: : (P) 55  NOSE score:: (P) 85%  ETDQ-7 score:: (P) 1      Objective:     Ht 5' 3" (1.6 m)   Wt 108 kg (238 lb)   LMP  (LMP Unknown)   BMI 42.16 kg/m²      General:   not in distress   Nasal:  edematous mucosa   incision intact   no septal hematoma   no bleeding   crusts removed with forceps from nasal valves   Oral Cavity:   clear   Oropharynx:   no bleeding   Neck:   nontender       Procedure     None        Data Reviewed    None.      Assessment:     Doing well following septoplasty and turbinate reduction.    1. Deviated nasal septum    2. Nasal turbinate hypertrophy         Plan:     Continue saline rinse 1-2 times daily.  Follow up in about 1 month (around 3/15/2022).        "

## 2022-02-18 ENCOUNTER — OFFICE VISIT (OUTPATIENT)
Dept: PULMONOLOGY | Facility: CLINIC | Age: 67
End: 2022-02-18
Payer: MEDICARE

## 2022-02-18 VITALS
BODY MASS INDEX: 42.38 KG/M2 | HEART RATE: 83 BPM | DIASTOLIC BLOOD PRESSURE: 80 MMHG | WEIGHT: 239.19 LBS | SYSTOLIC BLOOD PRESSURE: 156 MMHG | OXYGEN SATURATION: 97 % | HEIGHT: 63 IN

## 2022-02-18 DIAGNOSIS — G47.30 SLEEP-DISORDERED BREATHING: Primary | ICD-10-CM

## 2022-02-18 DIAGNOSIS — E66.01 MORBID OBESITY WITH BMI OF 40.0-44.9, ADULT: ICD-10-CM

## 2022-02-18 DIAGNOSIS — R06.09 DOE (DYSPNEA ON EXERTION): ICD-10-CM

## 2022-02-18 PROCEDURE — 3288F FALL RISK ASSESSMENT DOCD: CPT | Mod: CPTII,S$GLB,, | Performed by: NURSE PRACTITIONER

## 2022-02-18 PROCEDURE — 3077F PR MOST RECENT SYSTOLIC BLOOD PRESSURE >= 140 MM HG: ICD-10-PCS | Mod: CPTII,S$GLB,, | Performed by: NURSE PRACTITIONER

## 2022-02-18 PROCEDURE — 3066F PR DOCUMENTATION OF TREATMENT FOR NEPHROPATHY: ICD-10-PCS | Mod: CPTII,S$GLB,, | Performed by: NURSE PRACTITIONER

## 2022-02-18 PROCEDURE — 3060F POS MICROALBUMINURIA REV: CPT | Mod: CPTII,S$GLB,, | Performed by: NURSE PRACTITIONER

## 2022-02-18 PROCEDURE — 1126F AMNT PAIN NOTED NONE PRSNT: CPT | Mod: CPTII,S$GLB,, | Performed by: NURSE PRACTITIONER

## 2022-02-18 PROCEDURE — 3060F PR POS MICROALBUMINURIA RESULT DOCUMENTED/REVIEW: ICD-10-PCS | Mod: CPTII,S$GLB,, | Performed by: NURSE PRACTITIONER

## 2022-02-18 PROCEDURE — 3288F PR FALLS RISK ASSESSMENT DOCUMENTED: ICD-10-PCS | Mod: CPTII,S$GLB,, | Performed by: NURSE PRACTITIONER

## 2022-02-18 PROCEDURE — 99213 OFFICE O/P EST LOW 20 MIN: CPT | Mod: S$GLB,,, | Performed by: NURSE PRACTITIONER

## 2022-02-18 PROCEDURE — 3066F NEPHROPATHY DOC TX: CPT | Mod: CPTII,S$GLB,, | Performed by: NURSE PRACTITIONER

## 2022-02-18 PROCEDURE — 4010F PR ACE/ARB THEARPY RXD/TAKEN: ICD-10-PCS | Mod: CPTII,S$GLB,, | Performed by: NURSE PRACTITIONER

## 2022-02-18 PROCEDURE — 1100F PR PT FALLS ASSESS DOC 2+ FALLS/FALL W/INJURY/YR: ICD-10-PCS | Mod: CPTII,S$GLB,, | Performed by: NURSE PRACTITIONER

## 2022-02-18 PROCEDURE — 99999 PR PBB SHADOW E&M-EST. PATIENT-LVL V: ICD-10-PCS | Mod: PBBFAC,,, | Performed by: NURSE PRACTITIONER

## 2022-02-18 PROCEDURE — 3077F SYST BP >= 140 MM HG: CPT | Mod: CPTII,S$GLB,, | Performed by: NURSE PRACTITIONER

## 2022-02-18 PROCEDURE — 3079F PR MOST RECENT DIASTOLIC BLOOD PRESSURE 80-89 MM HG: ICD-10-PCS | Mod: CPTII,S$GLB,, | Performed by: NURSE PRACTITIONER

## 2022-02-18 PROCEDURE — 3008F BODY MASS INDEX DOCD: CPT | Mod: CPTII,S$GLB,, | Performed by: NURSE PRACTITIONER

## 2022-02-18 PROCEDURE — 1100F PTFALLS ASSESS-DOCD GE2>/YR: CPT | Mod: CPTII,S$GLB,, | Performed by: NURSE PRACTITIONER

## 2022-02-18 PROCEDURE — 3051F PR MOST RECENT HEMOGLOBIN A1C LEVEL 7.0 - < 8.0%: ICD-10-PCS | Mod: CPTII,S$GLB,, | Performed by: NURSE PRACTITIONER

## 2022-02-18 PROCEDURE — 1126F PR PAIN SEVERITY QUANTIFIED, NO PAIN PRESENT: ICD-10-PCS | Mod: CPTII,S$GLB,, | Performed by: NURSE PRACTITIONER

## 2022-02-18 PROCEDURE — 3079F DIAST BP 80-89 MM HG: CPT | Mod: CPTII,S$GLB,, | Performed by: NURSE PRACTITIONER

## 2022-02-18 PROCEDURE — 99213 PR OFFICE/OUTPT VISIT, EST, LEVL III, 20-29 MIN: ICD-10-PCS | Mod: S$GLB,,, | Performed by: NURSE PRACTITIONER

## 2022-02-18 PROCEDURE — 1159F MED LIST DOCD IN RCRD: CPT | Mod: CPTII,S$GLB,, | Performed by: NURSE PRACTITIONER

## 2022-02-18 PROCEDURE — 3008F PR BODY MASS INDEX (BMI) DOCUMENTED: ICD-10-PCS | Mod: CPTII,S$GLB,, | Performed by: NURSE PRACTITIONER

## 2022-02-18 PROCEDURE — 4010F ACE/ARB THERAPY RXD/TAKEN: CPT | Mod: CPTII,S$GLB,, | Performed by: NURSE PRACTITIONER

## 2022-02-18 PROCEDURE — 99999 PR PBB SHADOW E&M-EST. PATIENT-LVL V: CPT | Mod: PBBFAC,,, | Performed by: NURSE PRACTITIONER

## 2022-02-18 PROCEDURE — 3051F HG A1C>EQUAL 7.0%<8.0%: CPT | Mod: CPTII,S$GLB,, | Performed by: NURSE PRACTITIONER

## 2022-02-18 PROCEDURE — 1159F PR MEDICATION LIST DOCUMENTED IN MEDICAL RECORD: ICD-10-PCS | Mod: CPTII,S$GLB,, | Performed by: NURSE PRACTITIONER

## 2022-02-18 NOTE — PROGRESS NOTES
Magdalena Acevedo  was seen as a new patient at the request of Karri Molina MD for the evaluation of  possible sleep apnea.    CHIEF COMPLAINT:    Chief Complaint   Patient presents with    Apnea    COPD       HISTORY OF PRESENT ILLNESS: Magdalena Acevedo is a 66 y.o. female with problems listed on problem list is here for sleep evaluation. Patient with symptoms of  snoring, witnessed apnea, excessive daytime sleepiness, fatigue and nocturia 2 to 3 times a night. Sleep is complicated by lack of set sleep schedule.       Patient does not have: sleep paralysis, cataplexy, sleep talking, sleep walking, and hypnic hallucinations  Patient does have: RLS and nightmares    [place] Sleepiness Scale score 8    SLEEP ROUTINE:  Time to bed: varies, sometimes 2-3 am  Sleep onset latency: varies    Disruptions or awakenings:   2-3 times  Wakeup time:     varies  Perceived sleep quality:  restless       REVIEW OF SYSTEMS:   Review of Systems   Constitutional: Positive for activity change (inactive per pt due to NDIAYE) and fatigue. Negative for fever, chills, weight loss, weight gain, appetite change and night sweats.   HENT: Negative for congestion.         Throat hurts in am  Recent deviated septal repair 1.5 week ago   Eyes: Negative.    Respiratory: Positive for apnea, snoring, cough, sputum production, wheezing, previous hospitalization due to pulmonary problems, dyspnea on extertion (walking to mailbox), use of rescue inhaler (with exertion about once a day) and somnolence. Negative for chest tightness, orthopnea and Paroxysmal Nocturnal Dyspnea.         Compliant with breztri twice daily   Cardiovascular: Positive for leg swelling. Negative for chest pain and palpitations.   Genitourinary:        Nocturia 2-3 x   Endocrine: endocrine negative   Musculoskeletal: Positive for gait problem (sob).   Skin: Negative.    Gastrointestinal: Positive for acid reflux. Negative for nausea, vomiting and abdominal pain.    Neurological: Positive for headaches (daily, wake up with HA).   Psychiatric/Behavioral: Positive for sleep disturbance (waking up snoring).         PAST MEDICAL HISTORY:    Active Ambulatory Problems     Diagnosis Date Noted    DDD (degenerative disc disease), cervical 10/13/2017    Chronic intractable headache 10/13/2017    Hypertension 10/13/2017    Psoriasis 10/13/2017    Bronchitis 11/09/2017    Urinary incontinence 05/15/2018    Anger 05/15/2018    Hyperlipidemia 05/22/2018    Left shoulder strain 11/18/2019    Morbid obesity with BMI of 40.0-44.9, adult 03/02/2020    Neutrophilia 03/04/2020    Pulmonary nodule 03/04/2020    Type 2 diabetes mellitus with hyperglycemia, without long-term current use of insulin 03/05/2020    Chronic renal insufficiency, stage III (moderate) 03/05/2020    Chronic obstructive pulmonary disease 03/05/2020    Abnormal chest CT 03/05/2020    History of smoking greater than 50 pack years 03/09/2020    Intertrigo 03/19/2020    Hepatitis B core antibody positive 07/06/2020    Restless legs syndrome 07/06/2020    Edema 11/10/2020    Gastroesophageal reflux disease 11/10/2020    Depression 11/10/2020    Nocturnal muscle cramps 04/05/2021    Tremor 05/27/2021    Peripheral polyneuropathy 05/27/2021    Falls 07/06/2021    Weakness of both lower extremities 07/06/2021    Other acute recurrent sinusitis 07/06/2021    Personal history of nicotine dependence 12/08/2021    Age-related cataract of both eyes 12/23/2021    Deviated nasal septum 02/08/2022    Sleep-disordered breathing 02/18/2022    NDIAYE (dyspnea on exertion) 02/18/2022     Resolved Ambulatory Problems     Diagnosis Date Noted    Obesity 10/13/2017    History of DVT (deep vein thrombosis) 05/14/2018    Type 2 diabetes mellitus without complication, without long-term current use of insulin 05/14/2018    Overweight 05/14/2018    Borderline diabetes 05/22/2018    Left tennis elbow 11/18/2019     Left upper quadrant pain 2020    Acute hypoxemic respiratory failure 2020     Past Medical History:   Diagnosis Date    Arthritis     Chronic bronchitis     COPD (chronic obstructive pulmonary disease)     Diabetes mellitus     GERD (gastroesophageal reflux disease)                 PAST SURGICAL HISTORY:    Past Surgical History:   Procedure Laterality Date    CHOLECYSTECTOMY      COLONOSCOPY N/A 2020    Procedure: COLONOSCOPY;  Surgeon: Anny Acevedo MD;  Location: [facility];  Service: Endoscopy;  Laterality: N/A;    COLONOSCOPY W/ BIOPSIES  2020    colonoscopy w/ biopsy per  2020    NASAL SEPTOPLASTY Bilateral 2022    Procedure: SEPTOPLASTY, NOSE;  Surgeon: Karri Berrios MD;  Location: [facility] OR;  Service: ENT;  Laterality: Bilateral;   SAID CASE IS 1 HOUR    sinoplasty      TUBAL LIGATION           FAMILY HISTORY:                Family History   Problem Relation Age of Onset    Cancer Mother     Diabetes Mother     Heart disease Father     Cancer Sister     Diabetes Sister     Cancer Sister     Breast cancer Sister        SOCIAL HISTORY:          Tobacco:   Social History     Tobacco Use   Smoking Status Former Smoker    Packs/day: 2.00    Years: 45.00    Pack years: 90.00    Types: Cigarettes    Quit date: 2016    Years since quittin.4   Smokeless Tobacco Never Used       alcohol use:    Social History     Substance and Sexual Activity   Alcohol Use No                   ALLERGIES:    Review of patient's allergies indicates:   Allergen Reactions    Augmentin [amoxicillin-pot clavulanate] Hives and Rash       CURRENT MEDICATIONS:    Current Outpatient Medications   Medication Sig Dispense Refill    acetaminophen (TYLENOL) 650 MG TbSR Take 1 tablet (650 mg total) by mouth every 8 (eight) hours as needed (pain). 30 tablet 0    albuterol (PROAIR HFA) 90 mcg/actuation inhaler Inhale 2 puffs into the lungs every 4 (four) hours as  needed for Wheezing or Shortness of Breath. Rescue 18 g 5    amLODIPine (NORVASC) 5 MG tablet Take 1 tablet (5 mg total) by mouth once daily. 90 tablet 3    apremilast (OTEZLA) 30 mg Tab Take 30 mg by mouth 2 (two) times a day.      atorvastatin (LIPITOR) 20 MG tablet Take 1 tablet (20 mg total) by mouth every evening. 90 tablet 3    blood sugar diagnostic (TRUE METRIX GLUCOSE TEST STRIP) Strp 1 strip by Misc.(Non-Drug; Combo Route) route once daily. 100 strip 3    blood sugar diagnostic Strp To check BG 2 times daily, to use with insurance preferred meter 100 strip 11    BREZTRI AEROSPHERE 160-9-4.8 mcg/actuation HFAA 2 (two) times a day.      diclofenac (VOLTAREN) 75 MG EC tablet TAKE ONE TABLET BY MOUTH TWICE A  tablet 1    diphenhydrAMINE (BENADRYL) 25 mg capsule Take 25 mg by mouth 2 (two) times a day. Pt takes PRN      fluticasone propionate (FLONASE) 50 mcg/actuation nasal spray 2 sprays (100 mcg total) by Each Nostril route once daily. (Patient taking differently: 2 sprays by Each Nostril route once daily. Pt uses PRN) 16 g 5    furosemide (LASIX) 40 MG tablet One p.o. q.a.m. p.r.n. ankle with edema only 90 tablet 3    gabapentin (NEURONTIN) 300 MG capsule TAKE 1 CAPSULE BY MOUTH TWICE DAILY AS NEEDED FOR RESTLESS LEG SYNDROME MAY INCREASE TO 3 TIMES DAILY 90 capsule 2    glipiZIDE (GLUCOTROL) 10 MG tablet Take 1 tablet (10 mg total) by mouth daily with breakfast. 90 tablet 3    ibuprofen (ADVIL,MOTRIN) 600 MG tablet Take 1 tablet (600 mg total) by mouth every 8 (eight) hours as needed for Pain. 30 tablet 0    ketorolac 0.5% (ACULAR) 0.5 % Drop       lancets 33 gauge Misc 1 lancet by Misc.(Non-Drug; Combo Route) route once daily. 100 each 3    lancets Misc To check BG 2 times daily, to use with insurance preferred meter 100 each 11    lisinopriL 10 MG tablet Take 1 tablet (10 mg total) by mouth once daily. 90 tablet 3    loratadine (CLARITIN) 10 mg tablet Take 1 tablet (10 mg total)  "by mouth once daily. 30 tablet 5    potassium chloride (MICRO-K) 10 MEQ CpSR One p.o. q.a.m. with Lasix only 90 capsule 3    prednisoLONE acetate (PRED FORTE) 1 % DrpS       sertraline (ZOLOFT) 25 MG tablet Take 1 tablet (25 mg total) by mouth once daily. 90 tablet 1    tiZANidine (ZANAFLEX) 4 MG tablet TAKE ONE TABLET BY MOUTH EVERY NIGHT AT BEDTIME AS NEEDED FOR MUSCLE SPASMS 30 tablet 5    blood-glucose meter (TRUE METRIX AIR GLUCOSE METER) kit Use as instructed 1 each 0     No current facility-administered medications for this visit.                     PHYSICAL EXAM:  Vitals:    02/18/22 0953   BP: (!) 156/80   Pulse: 83   SpO2: 97%   Weight: 108.5 kg (239 lb 3.2 oz)   Height: 5' 3" (1.6 m)   PainSc: 0-No pain     Body mass index is 42.37 kg/m².   Physical Exam   Constitutional: She is oriented to person, place, and time. She appears well-developed. No distress. She is obese.   HENT:   Head: Normocephalic.   Cardiovascular: Normal rate, regular rhythm and normal heart sounds.   Pulmonary/Chest: Normal expansion, effort normal and breath sounds normal.   Musculoskeletal:         General: Edema present.      Cervical back: Neck supple.   Neurological: She is alert and oriented to person, place, and time. Gait normal.   Skin: Skin is warm. She is not diaphoretic.   Psychiatric: She has a normal mood and affect. Her behavior is normal. Judgment and thought content normal.                                               DATA:  TSH:  Lab Results   Component Value Date    TSH 1.226 01/04/2022     CBC:  Lab Results   Component Value Date    WBC 14.97 (H) 01/04/2022    WBC 14.97 (H) 01/04/2022    WBC 14.97 (H) 01/04/2022    HGB 14.9 01/04/2022    HGB 14.9 01/04/2022    HGB 14.9 01/04/2022    HCT 46.2 01/04/2022    HCT 46.2 01/04/2022    HCT 46.2 01/04/2022    MCV 94 01/04/2022    MCV 94 01/04/2022    MCV 94 01/04/2022     01/04/2022     01/04/2022     01/04/2022     BMP:  Lab Results   Component " Value Date     01/04/2022     01/04/2022     01/04/2022    K 4.2 01/04/2022    K 4.2 01/04/2022    K 4.2 01/04/2022     01/04/2022     01/04/2022     01/04/2022    CO2 26 01/04/2022    CO2 26 01/04/2022    CO2 26 01/04/2022    BUN 19 01/04/2022    BUN 19 01/04/2022    BUN 19 01/04/2022    CREATININE 0.9 01/04/2022    CREATININE 0.9 01/04/2022    CREATININE 0.9 01/04/2022    CALCIUM 9.6 01/04/2022    CALCIUM 9.6 01/04/2022    CALCIUM 9.6 01/04/2022    ANIONGAP 13 01/04/2022    ANIONGAP 13 01/04/2022    ANIONGAP 13 01/04/2022    ESTGFRAFRICA >60.0 01/04/2022    ESTGFRAFRICA >60.0 01/04/2022    ESTGFRAFRICA >60.0 01/04/2022    EGFRNONAA >60.0 01/04/2022    EGFRNONAA >60.0 01/04/2022    EGFRNONAA >60.0 01/04/2022     HgbA1C:  Lab Results   Component Value Date    HGBA1C 7.5 (H) 01/04/2022    HGBA1C 7.5 (H) 01/04/2022    HGBA1C 7.5 (H) 01/04/2022          Sleep study:N/A    ECHO 3/6/2020: ef 60% grade I dastolic dysfunction          ASSESSMENT    ICD-10-CM ICD-9-CM    1. Sleep-disordered breathing  G47.30 780.59 Ambulatory referral/consult to Sleep Disorders      Home Sleep Studies   2. Morbid obesity with BMI of 40.0-44.9, adult  E66.01 278.01 Ambulatory referral/consult to Bariatric Medicine    Z68.41 V85.41    3. NDIAYE (dyspnea on exertion)  R06.00 786.09 Echo       PLAN:    Problem List Items Addressed This Visit        Unprioritized    NDIAYE (dyspnea on exertion)    Overview     Multifactorial-obesity, deconditioning, obstructive lung disease         Relevant Orders    Echo    Morbid obesity with BMI of 40.0-44.9, adult    Overview     Body mass index is 42.22 kg/m².    Discussed with pt at length about the need to work on diet and weight loss.  Have offered suggestions on approaches for this problem.  Also discussed the need to start a regular exercise program.  We discussed at length the importance of regular exercise and working at getting to a healthier weight.  All questions  answered.  Pt voices understanding of these principles and hopefully will take action.           Relevant Orders    Ambulatory referral/consult to Bariatric Medicine    Sleep-disordered breathing - Primary    Overview     The patient symptomatically has snoring, witnessed apnea, fatigue, EDS  with findings of thick neck, obesity.This warrants further investigation for possible obstructive sleep apnea.  Patient will be contacted after sleep study is done.         Relevant Orders    Home Sleep Studies              Thank you for allowing me the opportunity to participate in the care of your patient.    Patient will Follow up for will contact you to return once we obtain result.     Please cc note to  Karri Molina MD.

## 2022-02-18 NOTE — PATIENT INSTRUCTIONS
Bariatric Medicine  [facility]  [address] [facility] [address]  [phone] referral services  [phone]

## 2022-02-22 ENCOUNTER — TELEPHONE (OUTPATIENT)
Dept: SLEEP MEDICINE | Facility: OTHER | Age: 67
End: 2022-02-22
Payer: MEDICARE

## 2022-02-23 ENCOUNTER — TELEPHONE (OUTPATIENT)
Dept: SLEEP MEDICINE | Facility: OTHER | Age: 67
End: 2022-02-23
Payer: MEDICARE

## 2022-02-24 ENCOUNTER — HOSPITAL ENCOUNTER (OUTPATIENT)
Dept: SLEEP MEDICINE | Facility: OTHER | Age: 67
Discharge: HOME | End: 2022-02-24
Attending: NURSE PRACTITIONER
Payer: MEDICARE

## 2022-02-24 DIAGNOSIS — G47.30 SLEEP-DISORDERED BREATHING: ICD-10-CM

## 2022-02-24 PROCEDURE — 95806 PR SLEEP STUDY, UNATTENDED, SIMUL RECORD HR/O2 SAT/RESP FLOW/RESP EFFT: ICD-10-PCS | Mod: 26,,, | Performed by: INTERNAL MEDICINE

## 2022-02-24 PROCEDURE — 95800 SLP STDY UNATTENDED: CPT

## 2022-02-24 PROCEDURE — 95806 SLEEP STUDY UNATT&RESP EFFT: CPT | Mod: 26,,, | Performed by: INTERNAL MEDICINE

## 2022-02-24 NOTE — PROGRESS NOTES
Per physician orders, patient was given home sleep testing device and instructed on how to apply the device before going to bed tonight. I sized the device and showed the patient using a mirror how the device fits and what it should look like so they can use a mirror when putting it on themselves at home. We reviewed the instruction booklet and the number to call if they have any questions at night. Patient understood and was instructed to return the device the next day back to the sleep lab.

## 2022-02-25 ENCOUNTER — HOSPITAL ENCOUNTER (OUTPATIENT)
Dept: CARDIOLOGY | Facility: HOSPITAL | Age: 67
Discharge: HOME | End: 2022-02-25
Attending: NURSE PRACTITIONER
Payer: MEDICARE

## 2022-02-25 DIAGNOSIS — R06.09 DOE (DYSPNEA ON EXERTION): ICD-10-CM

## 2022-02-25 PROCEDURE — 93306 TTE W/DOPPLER COMPLETE: CPT | Mod: 26,,, | Performed by: INTERNAL MEDICINE

## 2022-02-25 PROCEDURE — 93306 ECHO (CUPID ONLY): ICD-10-PCS | Mod: 26,,, | Performed by: INTERNAL MEDICINE

## 2022-02-25 PROCEDURE — 93306 TTE W/DOPPLER COMPLETE: CPT

## 2022-02-26 LAB
AORTIC ROOT ANNULUS: 2.72 CM
AORTIC VALVE CUSP SEPERATION: 1.73 CM
ASCENDING AORTA: 3.17 CM
AV INDEX (PROSTH): 0.7
AV MEAN GRADIENT: 7 MMHG
AV PEAK GRADIENT: 12 MMHG
AV VALVE AREA: 2.14 CM2
AV VELOCITY RATIO: 0.62
CV ECHO LV RWT: 0.41 CM
DOP CALC AO PEAK VEL: 1.72 M/S
DOP CALC AO VTI: 37.92 CM
DOP CALC LVOT AREA: 3 CM2
DOP CALC LVOT DIAMETER: 1.97 CM
DOP CALC LVOT PEAK VEL: 1.07 M/S
DOP CALC LVOT STROKE VOLUME: 81.16 CM3
DOP CALCLVOT PEAK VEL VTI: 26.64 CM
E WAVE DECELERATION TIME: 202.72 MSEC
E/A RATIO: 0.87
E/E' RATIO: 16.67 M/S
ECHO LV POSTERIOR WALL: 1 CM (ref 0.6–1.1)
EJECTION FRACTION: 55 %
FRACTIONAL SHORTENING: 27 % (ref 28–44)
INTERVENTRICULAR SEPTUM: 1.07 CM (ref 0.6–1.1)
IVRT: 122.26 MSEC
LA MAJOR: 5.34 CM
LA MINOR: 5.05 CM
LA WIDTH: 3.23 CM
LEFT ATRIUM SIZE: 3.45 CM
LEFT ATRIUM VOLUME: 49.17 CM3
LEFT INTERNAL DIMENSION IN SYSTOLE: 3.58 CM (ref 2.1–4)
LEFT VENTRICLE DIASTOLIC VOLUME: 114.08 ML
LEFT VENTRICLE SYSTOLIC VOLUME: 53.66 ML
LEFT VENTRICULAR INTERNAL DIMENSION IN DIASTOLE: 4.92 CM (ref 3.5–6)
LEFT VENTRICULAR MASS: 185.67 G
LV LATERAL E/E' RATIO: 16.67 M/S
LV SEPTAL E/E' RATIO: 16.67 M/S
MV PEAK A VEL: 1.15 M/S
MV PEAK E VEL: 1 M/S
PISA TR MAX VEL: 1.19 M/S
PULM VEIN S/D RATIO: 1.44
PV PEAK D VEL: 0.36 M/S
PV PEAK S VEL: 0.52 M/S
PV PEAK VELOCITY: 1.03 CM/S
RA MAJOR: 4.34 CM
RA PRESSURE: 3 MMHG
RA WIDTH: 3.54 CM
RIGHT VENTRICULAR END-DIASTOLIC DIMENSION: 3.38 CM
RV TISSUE DOPPLER FREE WALL SYSTOLIC VELOCITY 1 (APICAL 4 CHAMBER VIEW): 9.65 CM/S
SINUS: 2.64 CM
STJ: 2.12 CM
TDI LATERAL: 0.06 M/S
TDI SEPTAL: 0.06 M/S
TDI: 0.06 M/S
TR MAX PG: 6 MMHG
TRICUSPID ANNULAR PLANE SYSTOLIC EXCURSION: 2.19 CM
TV REST PULMONARY ARTERY PRESSURE: 9 MMHG

## 2022-02-27 ENCOUNTER — PATIENT MESSAGE (OUTPATIENT)
Dept: PULMONOLOGY | Facility: CLINIC | Age: 67
End: 2022-02-27
Payer: MEDICARE

## 2022-03-03 NOTE — PROCEDURES
"Dear Provider,     You have ordered sleep LAB services to perform the sleep study for Magdalena Acevedo.  The sleep study that you ordered is complete.      Please find Sleep Study result in "Chart Review" under the "Media tab."      As the ordering provider, you are responsible for reviewing the results and implementing a treatment plan with your patient.    If you need a Sleep Medicine provider to explain the sleep study findings and arrange treatment for the patient, please refer patient for consultation to our Sleep Clinic via [facility] with Ambulatory Consult Sleep.    To do that please place an order for an  "Ambulatory Consult Sleep" - it will go to our clinic work queue for our Medical Assistant to contact the patient for an appointment.     For any questions, please contact our clinic staff at [phone] to talk to clinical staff.   "

## 2022-03-08 ENCOUNTER — OFFICE VISIT (OUTPATIENT)
Dept: OTOLARYNGOLOGY | Facility: CLINIC | Age: 67
End: 2022-03-08
Payer: MEDICARE

## 2022-03-08 ENCOUNTER — TELEPHONE (OUTPATIENT)
Dept: PULMONOLOGY | Facility: CLINIC | Age: 67
End: 2022-03-08
Payer: MEDICARE

## 2022-03-08 VITALS — BODY MASS INDEX: 40.75 KG/M2 | HEIGHT: 63 IN | WEIGHT: 230 LBS

## 2022-03-08 DIAGNOSIS — G47.30 SLEEP-DISORDERED BREATHING: ICD-10-CM

## 2022-03-08 DIAGNOSIS — J34.2 DEVIATED NASAL SEPTUM: Primary | ICD-10-CM

## 2022-03-08 DIAGNOSIS — J34.3 NASAL TURBINATE HYPERTROPHY: ICD-10-CM

## 2022-03-08 PROCEDURE — 99999 PR PBB SHADOW E&M-EST. PATIENT-LVL V: ICD-10-PCS | Mod: PBBFAC,,, | Performed by: OTOLARYNGOLOGY

## 2022-03-08 PROCEDURE — 99024 PR POST-OP FOLLOW-UP VISIT: ICD-10-PCS | Mod: S$GLB,,, | Performed by: OTOLARYNGOLOGY

## 2022-03-08 PROCEDURE — 1160F PR REVIEW ALL MEDS BY PRESCRIBER/CLIN PHARMACIST DOCUMENTED: ICD-10-PCS | Mod: CPTII,S$GLB,, | Performed by: OTOLARYNGOLOGY

## 2022-03-08 PROCEDURE — 3288F FALL RISK ASSESSMENT DOCD: CPT | Mod: CPTII,S$GLB,, | Performed by: OTOLARYNGOLOGY

## 2022-03-08 PROCEDURE — 3060F POS MICROALBUMINURIA REV: CPT | Mod: CPTII,S$GLB,, | Performed by: OTOLARYNGOLOGY

## 2022-03-08 PROCEDURE — 4010F PR ACE/ARB THEARPY RXD/TAKEN: ICD-10-PCS | Mod: CPTII,S$GLB,, | Performed by: OTOLARYNGOLOGY

## 2022-03-08 PROCEDURE — 3288F PR FALLS RISK ASSESSMENT DOCUMENTED: ICD-10-PCS | Mod: CPTII,S$GLB,, | Performed by: OTOLARYNGOLOGY

## 2022-03-08 PROCEDURE — 3008F BODY MASS INDEX DOCD: CPT | Mod: CPTII,S$GLB,, | Performed by: OTOLARYNGOLOGY

## 2022-03-08 PROCEDURE — 3008F PR BODY MASS INDEX (BMI) DOCUMENTED: ICD-10-PCS | Mod: CPTII,S$GLB,, | Performed by: OTOLARYNGOLOGY

## 2022-03-08 PROCEDURE — 3060F PR POS MICROALBUMINURIA RESULT DOCUMENTED/REVIEW: ICD-10-PCS | Mod: CPTII,S$GLB,, | Performed by: OTOLARYNGOLOGY

## 2022-03-08 PROCEDURE — 3051F PR MOST RECENT HEMOGLOBIN A1C LEVEL 7.0 - < 8.0%: ICD-10-PCS | Mod: CPTII,S$GLB,, | Performed by: OTOLARYNGOLOGY

## 2022-03-08 PROCEDURE — 3066F NEPHROPATHY DOC TX: CPT | Mod: CPTII,S$GLB,, | Performed by: OTOLARYNGOLOGY

## 2022-03-08 PROCEDURE — 3066F PR DOCUMENTATION OF TREATMENT FOR NEPHROPATHY: ICD-10-PCS | Mod: CPTII,S$GLB,, | Performed by: OTOLARYNGOLOGY

## 2022-03-08 PROCEDURE — 4010F ACE/ARB THERAPY RXD/TAKEN: CPT | Mod: CPTII,S$GLB,, | Performed by: OTOLARYNGOLOGY

## 2022-03-08 PROCEDURE — 1126F AMNT PAIN NOTED NONE PRSNT: CPT | Mod: CPTII,S$GLB,, | Performed by: OTOLARYNGOLOGY

## 2022-03-08 PROCEDURE — 1159F MED LIST DOCD IN RCRD: CPT | Mod: CPTII,S$GLB,, | Performed by: OTOLARYNGOLOGY

## 2022-03-08 PROCEDURE — 1101F PT FALLS ASSESS-DOCD LE1/YR: CPT | Mod: CPTII,S$GLB,, | Performed by: OTOLARYNGOLOGY

## 2022-03-08 PROCEDURE — 1126F PR PAIN SEVERITY QUANTIFIED, NO PAIN PRESENT: ICD-10-PCS | Mod: CPTII,S$GLB,, | Performed by: OTOLARYNGOLOGY

## 2022-03-08 PROCEDURE — 1101F PR PT FALLS ASSESS DOC 0-1 FALLS W/OUT INJ PAST YR: ICD-10-PCS | Mod: CPTII,S$GLB,, | Performed by: OTOLARYNGOLOGY

## 2022-03-08 PROCEDURE — 99024 POSTOP FOLLOW-UP VISIT: CPT | Mod: S$GLB,,, | Performed by: OTOLARYNGOLOGY

## 2022-03-08 PROCEDURE — 1159F PR MEDICATION LIST DOCUMENTED IN MEDICAL RECORD: ICD-10-PCS | Mod: CPTII,S$GLB,, | Performed by: OTOLARYNGOLOGY

## 2022-03-08 PROCEDURE — 1160F RVW MEDS BY RX/DR IN RCRD: CPT | Mod: CPTII,S$GLB,, | Performed by: OTOLARYNGOLOGY

## 2022-03-08 PROCEDURE — 3051F HG A1C>EQUAL 7.0%<8.0%: CPT | Mod: CPTII,S$GLB,, | Performed by: OTOLARYNGOLOGY

## 2022-03-08 PROCEDURE — 99999 PR PBB SHADOW E&M-EST. PATIENT-LVL V: CPT | Mod: PBBFAC,,, | Performed by: OTOLARYNGOLOGY

## 2022-03-08 NOTE — TELEPHONE ENCOUNTER
Spoke with patient scheduled an appointment to see provider.    VITALIY Peterson              ----- Message from Marianela Ortiz NP sent at 3/4/2022 12:15 PM CST -----  Please call the patient  and let them know their test results are back. Please call the patient to schedule a follow up appointment to discuss results.

## 2022-03-08 NOTE — PROGRESS NOTES
"  Subjective:      Magdalena Acevedo is a 67 y.o. female who comes for follow-up 1 month status-post septum and turbinate surgery.  Her last visit with me was on 2/15/2022.  Remains congested on left side, improved on right side.  Minimal discharge, but saline rinse doesn't go through on left side.  No bleeding.    SNOT-22 score: : (P) 63  NOSE score:: (P) 70%  ETDQ-7 score:: (P) 1.4      Objective:     Ht 5' 3" (1.6 m)   Wt 104.3 kg (230 lb)   LMP  (LMP Unknown)   BMI 40.74 kg/m²      General:   not in distress   Nasal:  edematous mucosa   incision intact   no septal hematoma   no bleeding   Oral Cavity:   clear   Oropharynx:   no bleeding   Neck:   nontender       Procedure     None        Data Reviewed    None.      Assessment:     Doing well following septoplasty and turbinate reduction.    1. Deviated nasal septum    2. Nasal turbinate hypertrophy    3. Sleep-disordered breathing         Plan:     Rx flonase daily.  Continue saline rinse.  Allow 3 months for full healing.  Follow up if symptoms worsen or fail to improve.          "

## 2022-03-28 NOTE — TELEPHONE ENCOUNTER
No new care gaps identified.  Powered by [email or web address]. Reference number: [id number].   3/28/2022 8:50:40 AM CDT

## 2022-03-29 ENCOUNTER — LAB VISIT (OUTPATIENT)
Dept: LAB | Facility: HOSPITAL | Age: 67
End: 2022-03-29
Attending: NURSE PRACTITIONER
Payer: MEDICARE

## 2022-03-29 ENCOUNTER — OFFICE VISIT (OUTPATIENT)
Dept: PULMONOLOGY | Facility: CLINIC | Age: 67
End: 2022-03-29
Payer: MEDICARE

## 2022-03-29 VITALS
WEIGHT: 236.88 LBS | DIASTOLIC BLOOD PRESSURE: 72 MMHG | OXYGEN SATURATION: 95 % | HEIGHT: 63 IN | BODY MASS INDEX: 41.97 KG/M2 | HEART RATE: 93 BPM | SYSTOLIC BLOOD PRESSURE: 112 MMHG

## 2022-03-29 DIAGNOSIS — R91.1 PULMONARY NODULE: ICD-10-CM

## 2022-03-29 DIAGNOSIS — R06.09 DOE (DYSPNEA ON EXERTION): ICD-10-CM

## 2022-03-29 DIAGNOSIS — R06.09 DOE (DYSPNEA ON EXERTION): Primary | ICD-10-CM

## 2022-03-29 DIAGNOSIS — G47.33 OSA (OBSTRUCTIVE SLEEP APNEA): ICD-10-CM

## 2022-03-29 DIAGNOSIS — J42 CHRONIC BRONCHITIS, UNSPECIFIED CHRONIC BRONCHITIS TYPE: ICD-10-CM

## 2022-03-29 LAB
ANION GAP SERPL CALC-SCNC: 9 MMOL/L (ref 8–16)
BASOPHILS # BLD AUTO: 0.17 K/UL (ref 0–0.2)
BASOPHILS NFR BLD: 0.9 % (ref 0–1.9)
BNP SERPL-MCNC: 32 PG/ML (ref 0–99)
BUN SERPL-MCNC: 15 MG/DL (ref 8–23)
CALCIUM SERPL-MCNC: 9.3 MG/DL (ref 8.7–10.5)
CHLORIDE SERPL-SCNC: 105 MMOL/L (ref 95–110)
CO2 SERPL-SCNC: 27 MMOL/L (ref 23–29)
CREAT SERPL-MCNC: 0.9 MG/DL (ref 0.5–1.4)
DIFFERENTIAL METHOD: ABNORMAL
EOSINOPHIL # BLD AUTO: 0.2 K/UL (ref 0–0.5)
EOSINOPHIL NFR BLD: 1.3 % (ref 0–8)
ERYTHROCYTE [DISTWIDTH] IN BLOOD BY AUTOMATED COUNT: 14.5 % (ref 11.5–14.5)
EST. GFR  (AFRICAN AMERICAN): >60 ML/MIN/1.73 M^2
EST. GFR  (NON AFRICAN AMERICAN): >60 ML/MIN/1.73 M^2
GLUCOSE SERPL-MCNC: 131 MG/DL (ref 70–110)
HCT VFR BLD AUTO: 46.6 % (ref 37–48.5)
HGB BLD-MCNC: 14.9 G/DL (ref 12–16)
IMM GRANULOCYTES # BLD AUTO: 0.2 K/UL (ref 0–0.04)
IMM GRANULOCYTES NFR BLD AUTO: 1.1 % (ref 0–0.5)
LYMPHOCYTES # BLD AUTO: 3.8 K/UL (ref 1–4.8)
LYMPHOCYTES NFR BLD: 20.9 % (ref 18–48)
MCH RBC QN AUTO: 29.7 PG (ref 27–31)
MCHC RBC AUTO-ENTMCNC: 32 G/DL (ref 32–36)
MCV RBC AUTO: 93 FL (ref 82–98)
MONOCYTES # BLD AUTO: 1.2 K/UL (ref 0.3–1)
MONOCYTES NFR BLD: 6.7 % (ref 4–15)
NEUTROPHILS # BLD AUTO: 12.4 K/UL (ref 1.8–7.7)
NEUTROPHILS NFR BLD: 69.1 % (ref 38–73)
NRBC BLD-RTO: 0 /100 WBC
PLATELET # BLD AUTO: 361 K/UL (ref 150–450)
PMV BLD AUTO: 9.9 FL (ref 9.2–12.9)
POTASSIUM SERPL-SCNC: 4 MMOL/L (ref 3.5–5.1)
RBC # BLD AUTO: 5.01 M/UL (ref 4–5.4)
SODIUM SERPL-SCNC: 141 MMOL/L (ref 136–145)
WBC # BLD AUTO: 17.98 K/UL (ref 3.9–12.7)

## 2022-03-29 PROCEDURE — 99999 PR PBB SHADOW E&M-EST. PATIENT-LVL V: ICD-10-PCS | Mod: PBBFAC,,, | Performed by: NURSE PRACTITIONER

## 2022-03-29 PROCEDURE — 1126F PR PAIN SEVERITY QUANTIFIED, NO PAIN PRESENT: ICD-10-PCS | Mod: CPTII,S$GLB,, | Performed by: NURSE PRACTITIONER

## 2022-03-29 PROCEDURE — 3074F PR MOST RECENT SYSTOLIC BLOOD PRESSURE < 130 MM HG: ICD-10-PCS | Mod: CPTII,S$GLB,, | Performed by: NURSE PRACTITIONER

## 2022-03-29 PROCEDURE — 3066F PR DOCUMENTATION OF TREATMENT FOR NEPHROPATHY: ICD-10-PCS | Mod: CPTII,S$GLB,, | Performed by: NURSE PRACTITIONER

## 2022-03-29 PROCEDURE — 3074F SYST BP LT 130 MM HG: CPT | Mod: CPTII,S$GLB,, | Performed by: NURSE PRACTITIONER

## 2022-03-29 PROCEDURE — 3008F BODY MASS INDEX DOCD: CPT | Mod: CPTII,S$GLB,, | Performed by: NURSE PRACTITIONER

## 2022-03-29 PROCEDURE — 1126F AMNT PAIN NOTED NONE PRSNT: CPT | Mod: CPTII,S$GLB,, | Performed by: NURSE PRACTITIONER

## 2022-03-29 PROCEDURE — 3288F PR FALLS RISK ASSESSMENT DOCUMENTED: ICD-10-PCS | Mod: CPTII,S$GLB,, | Performed by: NURSE PRACTITIONER

## 2022-03-29 PROCEDURE — 99999 PR PBB SHADOW E&M-EST. PATIENT-LVL V: CPT | Mod: PBBFAC,,, | Performed by: NURSE PRACTITIONER

## 2022-03-29 PROCEDURE — 3066F NEPHROPATHY DOC TX: CPT | Mod: CPTII,S$GLB,, | Performed by: NURSE PRACTITIONER

## 2022-03-29 PROCEDURE — 4010F PR ACE/ARB THEARPY RXD/TAKEN: ICD-10-PCS | Mod: CPTII,S$GLB,, | Performed by: NURSE PRACTITIONER

## 2022-03-29 PROCEDURE — 4010F ACE/ARB THERAPY RXD/TAKEN: CPT | Mod: CPTII,S$GLB,, | Performed by: NURSE PRACTITIONER

## 2022-03-29 PROCEDURE — 83880 ASSAY OF NATRIURETIC PEPTIDE: CPT | Performed by: NURSE PRACTITIONER

## 2022-03-29 PROCEDURE — 99214 OFFICE O/P EST MOD 30 MIN: CPT | Mod: S$GLB,,, | Performed by: NURSE PRACTITIONER

## 2022-03-29 PROCEDURE — 85025 COMPLETE CBC W/AUTO DIFF WBC: CPT | Performed by: NURSE PRACTITIONER

## 2022-03-29 PROCEDURE — 3060F POS MICROALBUMINURIA REV: CPT | Mod: CPTII,S$GLB,, | Performed by: NURSE PRACTITIONER

## 2022-03-29 PROCEDURE — 3051F HG A1C>EQUAL 7.0%<8.0%: CPT | Mod: CPTII,S$GLB,, | Performed by: NURSE PRACTITIONER

## 2022-03-29 PROCEDURE — 3078F PR MOST RECENT DIASTOLIC BLOOD PRESSURE < 80 MM HG: ICD-10-PCS | Mod: CPTII,S$GLB,, | Performed by: NURSE PRACTITIONER

## 2022-03-29 PROCEDURE — 3078F DIAST BP <80 MM HG: CPT | Mod: CPTII,S$GLB,, | Performed by: NURSE PRACTITIONER

## 2022-03-29 PROCEDURE — 3288F FALL RISK ASSESSMENT DOCD: CPT | Mod: CPTII,S$GLB,, | Performed by: NURSE PRACTITIONER

## 2022-03-29 PROCEDURE — 1101F PT FALLS ASSESS-DOCD LE1/YR: CPT | Mod: CPTII,S$GLB,, | Performed by: NURSE PRACTITIONER

## 2022-03-29 PROCEDURE — 3060F PR POS MICROALBUMINURIA RESULT DOCUMENTED/REVIEW: ICD-10-PCS | Mod: CPTII,S$GLB,, | Performed by: NURSE PRACTITIONER

## 2022-03-29 PROCEDURE — 3008F PR BODY MASS INDEX (BMI) DOCUMENTED: ICD-10-PCS | Mod: CPTII,S$GLB,, | Performed by: NURSE PRACTITIONER

## 2022-03-29 PROCEDURE — 80048 BASIC METABOLIC PNL TOTAL CA: CPT | Performed by: NURSE PRACTITIONER

## 2022-03-29 PROCEDURE — 3051F PR MOST RECENT HEMOGLOBIN A1C LEVEL 7.0 - < 8.0%: ICD-10-PCS | Mod: CPTII,S$GLB,, | Performed by: NURSE PRACTITIONER

## 2022-03-29 PROCEDURE — 36415 COLL VENOUS BLD VENIPUNCTURE: CPT | Performed by: NURSE PRACTITIONER

## 2022-03-29 PROCEDURE — 1101F PR PT FALLS ASSESS DOC 0-1 FALLS W/OUT INJ PAST YR: ICD-10-PCS | Mod: CPTII,S$GLB,, | Performed by: NURSE PRACTITIONER

## 2022-03-29 PROCEDURE — 99214 PR OFFICE/OUTPT VISIT, EST, LEVL IV, 30-39 MIN: ICD-10-PCS | Mod: S$GLB,,, | Performed by: NURSE PRACTITIONER

## 2022-03-29 NOTE — PROGRESS NOTES
HISTORY OF PRESENT ILLNESS: Magdalena Acevedo is a 67 y.o. female with problems listed on problem list is here for sleep study follow up. Patient with symptoms of  snoring, witnessed apnea, excessive daytime sleepiness, fatigue and nocturia 2 to 3 times a night. Sleep is complicated by lack of set sleep schedule.   Sob > 1 year, soboe improved with breztri but not significant,  increasing adl  Patient does not have: sleep paralysis, cataplexy, sleep talking, sleep walking, and hypnic hallucinations  Patient does have: RLS and nightmares    [place] Sleepiness Scale score 8    SLEEP ROUTINE:  Time to bed: varies, sometimes 2-3 am  Sleep onset latency: varies    Disruptions or awakenings:   2-3 times  Wakeup time:     varies  Perceived sleep quality:  restless     Home sleep study 02/24/2022 AHI 25 RDI 49   Walk 02/01/2022 96/93/95  PFT 01/24/2022 ratio 77 FEV1 1.68 (75%) TLC 77% DLCO 47%  · ECHO 2/25/2022: The estimated ejection fraction is 55%.  · The left ventricle is normal in size with normal systolic function.  · Grade I left ventricular diastolic dysfunction.  · Normal right ventricular size with normal right ventricular systolic function.  · Mild left atrial enlargement.  · Normal central venous pressure (3 mmHg).  · The estimated PA systolic pressure is 9 mmHg.       REVIEW OF SYSTEMS:   Review of Systems   Constitutional: Positive for activity change (inactive per pt due to NDIAYE) and fatigue. Negative for fever, chills, weight loss, weight gain, appetite change and night sweats.   HENT: Negative for congestion.         Throat hurts in am  Recent deviated septal repair 1.5 week ago   Eyes: Negative.    Respiratory: Positive for apnea, snoring, cough, sputum production, wheezing, previous hospitalization due to pulmonary problems, dyspnea on extertion (walking to mailbox), use of rescue inhaler (with exertion about once a day) and somnolence. Negative for chest tightness, orthopnea and Paroxysmal Nocturnal  Dyspnea.         Compliant with breztri twice daily   Cardiovascular: Positive for leg swelling. Negative for chest pain and palpitations.   Genitourinary:        Nocturia 2-3 x   Endocrine: endocrine negative   Musculoskeletal: Positive for gait problem (sob).   Skin: Negative.    Gastrointestinal: Positive for acid reflux. Negative for nausea, vomiting and abdominal pain.   Neurological: Positive for headaches (daily, wake up with HA).   Psychiatric/Behavioral: Positive for sleep disturbance (waking up snoring).         PAST MEDICAL HISTORY:    Active Ambulatory Problems     Diagnosis Date Noted    DDD (degenerative disc disease), cervical 10/13/2017    Chronic intractable headache 10/13/2017    Hypertension 10/13/2017    Psoriasis 10/13/2017    Bronchitis 11/09/2017    Urinary incontinence 05/15/2018    Anger 05/15/2018    Hyperlipidemia 05/22/2018    Left shoulder strain 11/18/2019    Morbid obesity with BMI of 40.0-44.9, adult 03/02/2020    Leukocytosis 03/04/2020    Pulmonary nodule 03/04/2020    Type 2 diabetes mellitus with hyperglycemia, without long-term current use of insulin 03/05/2020    Chronic renal insufficiency, stage III (moderate) 03/05/2020    Chronic obstructive pulmonary disease 03/05/2020    Abnormal chest CT 03/05/2020    History of smoking greater than 50 pack years 03/09/2020    Intertrigo 03/19/2020    Hepatitis B core antibody positive 07/06/2020    Restless legs syndrome 07/06/2020    Edema 11/10/2020    Gastroesophageal reflux disease 11/10/2020    Depression 11/10/2020    Nocturnal muscle cramps 04/05/2021    Tremor 05/27/2021    Peripheral polyneuropathy 05/27/2021    Falls 07/06/2021    Weakness of both lower extremities 07/06/2021    Other acute recurrent sinusitis 07/06/2021    Personal history of nicotine dependence 12/08/2021    Age-related cataract of both eyes 12/23/2021    Deviated nasal septum 02/08/2022    FOZIA (obstructive sleep apnea)  2022    NDIAYE (dyspnea on exertion) 2022    Localized edema 2022     Resolved Ambulatory Problems     Diagnosis Date Noted    Obesity 10/13/2017    History of DVT (deep vein thrombosis) 2018    Type 2 diabetes mellitus without complication, without long-term current use of insulin 2018    Overweight 2018    Borderline diabetes 2018    Left tennis elbow 2019    Left upper quadrant pain 2020    Acute hypoxemic respiratory failure 2020     Past Medical History:   Diagnosis Date    Arthritis     Chronic bronchitis     COPD (chronic obstructive pulmonary disease)     Diabetes mellitus     GERD (gastroesophageal reflux disease)     Sleep apnea                 PAST SURGICAL HISTORY:    Past Surgical History:   Procedure Laterality Date    CHOLECYSTECTOMY      COLONOSCOPY N/A 2020    Procedure: COLONOSCOPY;  Surgeon: Anny Acevedo MD;  [facility];  Service: Endoscopy;  Laterality: N/A;    COLONOSCOPY W/ BIOPSIES  2020    colonoscopy w/ biopsy per  2020    NASAL SEPTOPLASTY Bilateral 2022    Procedure: SEPTOPLASTY, NOSE;  Surgeon: Karri Berrios MD;  Location: [facility] OR;  Service: ENT;  Laterality: Bilateral;  DR. HUNT CASE IS 1 HOUR    sinoplasty      TUBAL LIGATION           FAMILY HISTORY:                Family History   Problem Relation Age of Onset    Cancer Mother     Diabetes Mother     Heart disease Father     Cancer Sister     Diabetes Sister     Cancer Sister     Breast cancer Sister        SOCIAL HISTORY:          Tobacco:   Social History     Tobacco Use   Smoking Status Former Smoker    Packs/day: 2.00    Years: 45.00    Pack years: 90.00    Types: Cigarettes    Start date: 1972    Quit date: 2016    Years since quittin.6   Smokeless Tobacco Never Used       alcohol use:    Social History     Substance and Sexual Activity   Alcohol Use No                    ALLERGIES:    Review of patient's allergies indicates:   Allergen Reactions    Augmentin [amoxicillin-pot clavulanate] Hives and Rash       CURRENT MEDICATIONS:    Current Outpatient Medications   Medication Sig Dispense Refill    acetaminophen (TYLENOL) 650 MG TbSR Take 1 tablet (650 mg total) by mouth every 8 (eight) hours as needed (pain). 30 tablet 0    albuterol (PROAIR HFA) 90 mcg/actuation inhaler Inhale 2 puffs into the lungs every 4 (four) hours as needed for Wheezing or Shortness of Breath. Rescue 18 g 5    atorvastatin (LIPITOR) 20 MG tablet Take 1 tablet (20 mg total) by mouth every evening. 90 tablet 3    blood sugar diagnostic (TRUE METRIX GLUCOSE TEST STRIP) Strp 1 strip by Misc.(Non-Drug; Combo Route) route once daily. 100 strip 3    blood sugar diagnostic Strp To check BG 2 times daily, to use with insurance preferred meter 100 strip 11    BREZTRI AEROSPHERE 160-9-4.8 mcg/actuation HFAA 2 (two) times a day.      diclofenac (VOLTAREN) 75 MG EC tablet TAKE ONE TABLET BY MOUTH TWICE A  tablet 1    fluticasone propionate (FLONASE) 50 mcg/actuation nasal spray 2 sprays (100 mcg total) by Each Nostril route once daily. (Patient taking differently: 2 sprays by Each Nostril route once daily. Pt uses PRN) 16 g 5    furosemide (LASIX) 40 MG tablet One p.o. q.a.m. p.r.n. ankle with edema only (Patient taking differently: One p.o. q.a.m. p.r.n. ankle with edema only  Patient takes every morning.everardo) 90 tablet 3    gabapentin (NEURONTIN) 300 MG capsule TAKE 1 CAPSULE BY MOUTH TWICE DAILY AS NEEDED FOR RESTLESS LEG SYNDROME MAY INCREASE TO 3 TIMES DAILY 90 capsule 2    ibuprofen (ADVIL,MOTRIN) 600 MG tablet Take 1 tablet (600 mg total) by mouth every 8 (eight) hours as needed for Pain. 30 tablet 0    lancets 33 gauge Misc 1 lancet by Misc.(Non-Drug; Combo Route) route once daily. 100 each 3    lancets Misc To check BG 2 times daily, to use with insurance preferred meter 100 each 11     "potassium chloride (MICRO-K) 10 MEQ CpSR One p.o. q.a.m. with Lasix only 90 capsule 3    blood-glucose meter (TRUE METRIX AIR GLUCOSE METER) kit Use as instructed 1 each 0    fexofenadine-pseudoephedrine (ALLEGRA-D 24) 180-240 mg per 24 hr tablet Take 1 tablet by mouth once daily.      glipiZIDE (GLUCOTROL) 10 MG tablet TAKE ONE TABLET BY MOUTH ONCE DAILY WITH BREAKFAST 90 tablet 0    lisinopriL (PRINIVIL,ZESTRIL) 40 MG tablet Take 1 tablet (40 mg total) by mouth once daily. 90 tablet 3    sertraline (ZOLOFT) 25 MG tablet TAKE 1 TABLET (25 MG TOTAL) BY MOUTH ONCE DAILY. 90 tablet 4    tiZANidine (ZANAFLEX) 4 MG tablet TAKE ONE TABLET BY MOUTH EVERY NIGHT AT BEDTIME AS NEEDED FOR MUSCLE SPASMS 30 tablet 5     No current facility-administered medications for this visit.                     PHYSICAL EXAM:  Vitals:    03/29/22 1102   BP: 112/72   Pulse: 93   SpO2: 95%   Weight: 107.5 kg (236 lb 14.2 oz)   Height: 5' 3" (1.6 m)   PainSc: 0-No pain     Body mass index is 41.96 kg/m².   Physical Exam   Constitutional: She is oriented to person, place, and time. She appears well-developed. No distress. She is obese.   HENT:   Head: Normocephalic.   Cardiovascular: Normal rate, regular rhythm and normal heart sounds.   Pulmonary/Chest: Normal expansion, effort normal and breath sounds normal.   Musculoskeletal:         General: Edema present.      Cervical back: Neck supple.   Neurological: She is alert and oriented to person, place, and time. Gait normal.   Skin: Skin is warm. She is not diaphoretic.   Psychiatric: She has a normal mood and affect. Her behavior is normal. Judgment and thought content normal.                                               DATA:  TSH:  Lab Results   Component Value Date    TSH 1.226 01/04/2022     CBC:  Lab Results   Component Value Date    WBC 16.98 (H) 04/20/2022    HGB 14.7 04/20/2022    HCT 45.5 04/20/2022    MCV 93 04/20/2022     04/20/2022     BMP:  Lab Results   Component " Value Date     04/20/2022    K 3.9 04/20/2022     04/20/2022    CO2 27 04/20/2022    BUN 12 04/20/2022    CREATININE 0.9 04/20/2022    CALCIUM 9.5 04/20/2022    ANIONGAP 13 04/20/2022    ESTGFRAFRICA >60 04/20/2022    EGFRNONAA >60 04/20/2022     HgbA1C:  Lab Results   Component Value Date    HGBA1C 7.5 (H) 01/04/2022    HGBA1C 7.5 (H) 01/04/2022    HGBA1C 7.5 (H) 01/04/2022            ECHO 3/6/2020: ef 60% grade I dastolic dysfunction          ASSESSMENT    ICD-10-CM ICD-9-CM    1. NDIAYE (dyspnea on exertion)  R06.00 786.09 BNP      Basic Metabolic Panel      CBC Auto Differential      Arterial Blood Gas      Ambulatory referral/consult to Cardiology   2. Chronic bronchitis, unspecified chronic bronchitis type  J42 491.9    3. Pulmonary nodule  R91.1 793.11    4. FOZIA (obstructive sleep apnea)  G47.33 327.23 CPAP FOR HOME USE       PLAN:    Problem List Items Addressed This Visit        Unprioritized    Chronic obstructive pulmonary disease    Overview     Patient has significant smoking history.  Quit 2016.  Experiences chronic daily cough, NDIAYE, frequent exacerbation requiring steroids, likely asthmatic bronchitis versus copd  PFT 01/24/2022 ratio 77 FEV1 1.68 (75%) TLC 77% DLCO 47%-normal spirometry, restrictive pattern but inconclusive due to poor effort was noted on PFT   CT without evidence emphysema  Pt on breztri which helps some            NDIAYE (dyspnea on exertion) - Primary    Overview     Multifactorial-obesity, deconditioning, obstructive lung disease, likely asthmatic bronchitis, recommend carduac workup as well           Relevant Orders    BNP (Completed)    Basic Metabolic Panel (Completed)    CBC Auto Differential (Completed)    Arterial Blood Gas    Ambulatory referral/consult to Cardiology    FOZIA (obstructive sleep apnea)    Overview     .Home sleep study 02/24/2022 AHI 25 RDI 49  After discussing all options, patient agree to cpap.           Relevant Orders    CPAP FOR HOME USE     Pulmonary nodule    Overview     Stable less than 0.4 cm pulmonary nodules.  Patient has significant smoking history.  Recommend continue with low-dose CT screening next due January 2023                   Patient will Follow up for 5-6 weeks following cpap usage, please bring cpap.

## 2022-03-29 NOTE — PATIENT INSTRUCTIONS
August Acevedo     Your home sleep study confirmed sleep apnea  AHI 25 meaning you are having 25 apneas per hour. I recommend continuous positive air pressure to treat this. I will place the order. Please schedule a follow up appointment with myself  5-6 week after using the cpap to assess treatment effectiveness and make adjustments if needed. Bring your machine.     Someone from the medical equipment department will be contacting you to set you up with the CPAP.  If not please,  contact them to check on the status of your order at [phone] option 2 then option 2    Plan:   1. Start auto PAP therapy(the order will go to medical equipment and they will contact you after it gets processed through insurance which takes approximately 12 weeks)   2. There is a compliance requirement on machine. The requirement  is minimum 4 hours or more 70% nights (22/30 days) x 1 year or until machine paid off otherwise you risk not having the machine cover by your insurance company.   3. Pick a comfortable mask; but should you have problems with the mask, [facility] [place] [facility]) has a 30 day mask exchangepolicy so exchange any mask within 30 days if the mask is uncomfortable. You will need to contact medical equipment to schedule an appointment with them to get another mask fitting. DME [id number] option 2 then option 2  4. We do not actively monitor you.  Please schedule a follow up appointment after using your cpap x 5-6 weeks to determine treatment effectiveness and address problems. This is also a requirement by some insurance in order to meet compliance.     The potential ramifications of untreated sleep apnea  could include hypoxia, daytime sleepiness, dementia, cognitive impairment, hypertension, heart disease and/or stroke. Potential treatment options, which could include weight loss, body positioning, oral appliances (OA), continuous positive airway pressure (CPAP), or referral for surgical consideration.  CPAP is the most effective and least invasive treatment and I would recommend this as a first line treatment.    Behavior modification which includes losing weight, exercising, changing the sleep position, abstaining from alcohol, and avoiding certain medications    Please  abstain from driving should you feel sleepy or drowsy      Please contact us if you have questions, persistent problems or concerns.    Thank you,    Marianela VILLARREAL, [facility]  [phone]

## 2022-03-30 RX ORDER — TIZANIDINE 4 MG/1
TABLET ORAL
Qty: 30 TABLET | Refills: 5 | Status: SHIPPED | OUTPATIENT
Start: 2022-03-30 | End: 2022-05-11

## 2022-04-01 ENCOUNTER — TELEPHONE (OUTPATIENT)
Dept: PULMONOLOGY | Facility: CLINIC | Age: 67
End: 2022-04-01

## 2022-04-01 DIAGNOSIS — J40 BRONCHITIS: Primary | ICD-10-CM

## 2022-04-01 DIAGNOSIS — D72.829 LEUKOCYTOSIS, UNSPECIFIED TYPE: ICD-10-CM

## 2022-04-01 RX ORDER — DOXYCYCLINE 100 MG/1
100 CAPSULE ORAL EVERY 12 HOURS
Qty: 20 CAPSULE | Refills: 0 | Status: SHIPPED | OUTPATIENT
Start: 2022-04-01 | End: 2022-04-11

## 2022-04-04 ENCOUNTER — HOSPITAL ENCOUNTER (OUTPATIENT)
Dept: RESPIRATORY THERAPY | Facility: HOSPITAL | Age: 67
Discharge: HOME | End: 2022-04-04
Attending: NURSE PRACTITIONER
Payer: MEDICARE

## 2022-04-04 DIAGNOSIS — R06.09 DOE (DYSPNEA ON EXERTION): ICD-10-CM

## 2022-04-04 LAB
ALLENS TEST: ABNORMAL
DELSYS: ABNORMAL
HCO3 UR-SCNC: 27.7 MMOL/L (ref 24–28)
MODE: ABNORMAL
PCO2 BLDA: 41.2 MMHG (ref 35–45)
PH SMN: 7.43 [PH] (ref 7.35–7.45)
PO2 BLDA: 89 MMHG (ref 80–100)
POC BE: 3 MMOL/L
POC SATURATED O2: 97 % (ref 95–100)
POC TCO2: 29 MMOL/L (ref 23–27)
SAMPLE: ABNORMAL
SITE: ABNORMAL
SP02: 95

## 2022-04-04 PROCEDURE — 82803 BLOOD GASES ANY COMBINATION: CPT

## 2022-04-04 PROCEDURE — 36600 WITHDRAWAL OF ARTERIAL BLOOD: CPT

## 2022-04-04 PROCEDURE — 99900035 HC TECH TIME PER 15 MIN (STAT)

## 2022-04-06 ENCOUNTER — TELEPHONE (OUTPATIENT)
Dept: PULMONOLOGY | Facility: CLINIC | Age: 67
End: 2022-04-06
Payer: MEDICARE

## 2022-04-06 NOTE — TELEPHONE ENCOUNTER
Sent [facility] message to set up an appt for patient.                      ----- Message from Kristen Scherer MA sent at 4/4/2022  7:33 AM CDT -----  Consult hematology for persistent elevated wbc. Thank you-Marianela Calles comment     Marianela Ortiz, KERRY 3 days ago             Pt with rising wbc, elevated in past, consult hematology, recommend doxycycline,

## 2022-04-07 ENCOUNTER — PATIENT OUTREACH (OUTPATIENT)
Dept: ADMINISTRATIVE | Facility: OTHER | Age: 67
End: 2022-04-07
Payer: MEDICARE

## 2022-04-07 NOTE — PROGRESS NOTES
LINKS immunization registry updated  Health Maintenance updated  Chart reviewed for overdue Proactive [facility] ([facility]) health maintenance testing (CRS, Breast Ca, Diabetic Eye Exam)   Orders entered:N/A

## 2022-04-11 ENCOUNTER — OFFICE VISIT (OUTPATIENT)
Dept: CARDIOLOGY | Facility: CLINIC | Age: 67
End: 2022-04-11
Payer: MEDICARE

## 2022-04-11 VITALS
DIASTOLIC BLOOD PRESSURE: 78 MMHG | OXYGEN SATURATION: 96 % | SYSTOLIC BLOOD PRESSURE: 132 MMHG | WEIGHT: 239.75 LBS | HEIGHT: 63 IN | HEART RATE: 81 BPM | BODY MASS INDEX: 42.48 KG/M2

## 2022-04-11 DIAGNOSIS — I10 ESSENTIAL HYPERTENSION: ICD-10-CM

## 2022-04-11 DIAGNOSIS — R06.09 DOE (DYSPNEA ON EXERTION): ICD-10-CM

## 2022-04-11 DIAGNOSIS — R60.0 LOCALIZED EDEMA: Primary | ICD-10-CM

## 2022-04-11 DIAGNOSIS — E78.2 MIXED HYPERLIPIDEMIA: ICD-10-CM

## 2022-04-11 PROCEDURE — 3008F PR BODY MASS INDEX (BMI) DOCUMENTED: ICD-10-PCS | Mod: CPTII,S$GLB,, | Performed by: INTERNAL MEDICINE

## 2022-04-11 PROCEDURE — 1159F PR MEDICATION LIST DOCUMENTED IN MEDICAL RECORD: ICD-10-PCS | Mod: CPTII,S$GLB,, | Performed by: INTERNAL MEDICINE

## 2022-04-11 PROCEDURE — 1126F PR PAIN SEVERITY QUANTIFIED, NO PAIN PRESENT: ICD-10-PCS | Mod: CPTII,S$GLB,, | Performed by: INTERNAL MEDICINE

## 2022-04-11 PROCEDURE — 3288F PR FALLS RISK ASSESSMENT DOCUMENTED: ICD-10-PCS | Mod: CPTII,S$GLB,, | Performed by: INTERNAL MEDICINE

## 2022-04-11 PROCEDURE — 4010F ACE/ARB THERAPY RXD/TAKEN: CPT | Mod: CPTII,S$GLB,, | Performed by: INTERNAL MEDICINE

## 2022-04-11 PROCEDURE — 99999 PR PBB SHADOW E&M-EST. PATIENT-LVL V: CPT | Mod: PBBFAC,,, | Performed by: INTERNAL MEDICINE

## 2022-04-11 PROCEDURE — 3008F BODY MASS INDEX DOCD: CPT | Mod: CPTII,S$GLB,, | Performed by: INTERNAL MEDICINE

## 2022-04-11 PROCEDURE — 99499 RISK ADDL DX/OHS AUDIT: ICD-10-PCS | Mod: S$GLB,,, | Performed by: INTERNAL MEDICINE

## 2022-04-11 PROCEDURE — 3051F PR MOST RECENT HEMOGLOBIN A1C LEVEL 7.0 - < 8.0%: ICD-10-PCS | Mod: CPTII,S$GLB,, | Performed by: INTERNAL MEDICINE

## 2022-04-11 PROCEDURE — 3078F PR MOST RECENT DIASTOLIC BLOOD PRESSURE < 80 MM HG: ICD-10-PCS | Mod: CPTII,S$GLB,, | Performed by: INTERNAL MEDICINE

## 2022-04-11 PROCEDURE — 3060F POS MICROALBUMINURIA REV: CPT | Mod: CPTII,S$GLB,, | Performed by: INTERNAL MEDICINE

## 2022-04-11 PROCEDURE — 99205 OFFICE O/P NEW HI 60 MIN: CPT | Mod: 25,S$GLB,, | Performed by: INTERNAL MEDICINE

## 2022-04-11 PROCEDURE — 1126F AMNT PAIN NOTED NONE PRSNT: CPT | Mod: CPTII,S$GLB,, | Performed by: INTERNAL MEDICINE

## 2022-04-11 PROCEDURE — 3078F DIAST BP <80 MM HG: CPT | Mod: CPTII,S$GLB,, | Performed by: INTERNAL MEDICINE

## 2022-04-11 PROCEDURE — 4010F PR ACE/ARB THEARPY RXD/TAKEN: ICD-10-PCS | Mod: CPTII,S$GLB,, | Performed by: INTERNAL MEDICINE

## 2022-04-11 PROCEDURE — 93000 ELECTROCARDIOGRAM COMPLETE: CPT | Mod: S$GLB,,, | Performed by: INTERNAL MEDICINE

## 2022-04-11 PROCEDURE — 1160F PR REVIEW ALL MEDS BY PRESCRIBER/CLIN PHARMACIST DOCUMENTED: ICD-10-PCS | Mod: CPTII,S$GLB,, | Performed by: INTERNAL MEDICINE

## 2022-04-11 PROCEDURE — 99205 PR OFFICE/OUTPT VISIT, NEW, LEVL V, 60-74 MIN: ICD-10-PCS | Mod: 25,S$GLB,, | Performed by: INTERNAL MEDICINE

## 2022-04-11 PROCEDURE — 1100F PR PT FALLS ASSESS DOC 2+ FALLS/FALL W/INJURY/YR: ICD-10-PCS | Mod: CPTII,S$GLB,, | Performed by: INTERNAL MEDICINE

## 2022-04-11 PROCEDURE — 1100F PTFALLS ASSESS-DOCD GE2>/YR: CPT | Mod: CPTII,S$GLB,, | Performed by: INTERNAL MEDICINE

## 2022-04-11 PROCEDURE — 1160F RVW MEDS BY RX/DR IN RCRD: CPT | Mod: CPTII,S$GLB,, | Performed by: INTERNAL MEDICINE

## 2022-04-11 PROCEDURE — 3075F PR MOST RECENT SYSTOLIC BLOOD PRESS GE 130-139MM HG: ICD-10-PCS | Mod: CPTII,S$GLB,, | Performed by: INTERNAL MEDICINE

## 2022-04-11 PROCEDURE — 3066F NEPHROPATHY DOC TX: CPT | Mod: CPTII,S$GLB,, | Performed by: INTERNAL MEDICINE

## 2022-04-11 PROCEDURE — 3060F PR POS MICROALBUMINURIA RESULT DOCUMENTED/REVIEW: ICD-10-PCS | Mod: CPTII,S$GLB,, | Performed by: INTERNAL MEDICINE

## 2022-04-11 PROCEDURE — 3075F SYST BP GE 130 - 139MM HG: CPT | Mod: CPTII,S$GLB,, | Performed by: INTERNAL MEDICINE

## 2022-04-11 PROCEDURE — 3051F HG A1C>EQUAL 7.0%<8.0%: CPT | Mod: CPTII,S$GLB,, | Performed by: INTERNAL MEDICINE

## 2022-04-11 PROCEDURE — 3288F FALL RISK ASSESSMENT DOCD: CPT | Mod: CPTII,S$GLB,, | Performed by: INTERNAL MEDICINE

## 2022-04-11 PROCEDURE — 99999 PR PBB SHADOW E&M-EST. PATIENT-LVL V: ICD-10-PCS | Mod: PBBFAC,,, | Performed by: INTERNAL MEDICINE

## 2022-04-11 PROCEDURE — 1159F MED LIST DOCD IN RCRD: CPT | Mod: CPTII,S$GLB,, | Performed by: INTERNAL MEDICINE

## 2022-04-11 PROCEDURE — 93000 EKG 12-LEAD: ICD-10-PCS | Mod: S$GLB,,, | Performed by: INTERNAL MEDICINE

## 2022-04-11 PROCEDURE — 99499 UNLISTED E&M SERVICE: CPT | Mod: S$GLB,,, | Performed by: INTERNAL MEDICINE

## 2022-04-11 PROCEDURE — 3066F PR DOCUMENTATION OF TREATMENT FOR NEPHROPATHY: ICD-10-PCS | Mod: CPTII,S$GLB,, | Performed by: INTERNAL MEDICINE

## 2022-04-11 RX ORDER — FEXOFENADINE HCL AND PSEUDOEPHEDRINE HCI 180; 240 MG/1; MG/1
1 TABLET, EXTENDED RELEASE ORAL DAILY
COMMUNITY
End: 2022-12-19

## 2022-04-11 RX ORDER — LISINOPRIL 40 MG/1
40 TABLET ORAL DAILY
Qty: 90 TABLET | Refills: 3 | Status: SHIPPED | OUTPATIENT
Start: 2022-04-11 | End: 2023-04-03

## 2022-04-11 NOTE — PROGRESS NOTES
Subjective:      Patient ID: Magdalena Acevedo is a 67 y.o. female.    Chief Complaint: Shortness of Breath and Swelling (bilateral legs and feet)    HPI:  Pt c/o shortness of breath for 11 years.    Pt c/o swelling of both feet and legs for about a year.      Review of Systems   Cardiovascular: Positive for dyspnea on exertion and leg swelling. Negative for chest pain, claudication, irregular heartbeat, near-syncope, orthopnea, palpitations and syncope.      Pt fell in the yard after stumbling.    Pt takes the furosemide every day.    Pt takes gabapentin for spasms in feet.    Pt takes ibuprofen for headaches.    Pt takes diclofenac for neck pain.    Past Medical History:   Diagnosis Date    Arthritis     Chronic bronchitis     COPD (chronic obstructive pulmonary disease)     Diabetes mellitus     GERD (gastroesophageal reflux disease)     Hyperlipidemia     Hypertension     Pulmonary nodule     Sleep apnea         Past Surgical History:   Procedure Laterality Date    CHOLECYSTECTOMY      COLONOSCOPY N/A 7/20/2020    Procedure: COLONOSCOPY;  Surgeon: Anny Acevedo MD;  Location: [facility];  Service: Endoscopy;  Laterality: N/A;    COLONOSCOPY W/ BIOPSIES  07/20/2020    colonoscopy w/ biopsy per  7/20/2020    NASAL SEPTOPLASTY Bilateral 2/8/2022    Procedure: SEPTOPLASTY, NOSE;  Surgeon: Karri Berrios MD;  Location: [facility] OR;  Service: ENT;  Laterality: Bilateral;   SAID CASE IS 1 HOUR    sinoplasty      TUBAL LIGATION         Family History   Problem Relation Age of Onset    Cancer Mother     Diabetes Mother     Heart disease Father     Cancer Sister     Diabetes Sister     Cancer Sister     Breast cancer Sister        Social History     Socioeconomic History    Marital status:    Tobacco Use    Smoking status: Former Smoker     Packs/day: 2.00     Years: 45.00     Pack years: 90.00     Types: Cigarettes     Start date: 4/11/1972     Quit date: 8/31/2016      Years since quittin.6    Smokeless tobacco: Never Used   Substance and Sexual Activity    Alcohol use: No    Drug use: No    Sexual activity: Yes       Current Outpatient Medications on File Prior to Visit   Medication Sig Dispense Refill    acetaminophen (TYLENOL) 650 MG TbSR Take 1 tablet (650 mg total) by mouth every 8 (eight) hours as needed (pain). 30 tablet 0    albuterol (PROAIR HFA) 90 mcg/actuation inhaler Inhale 2 puffs into the lungs every 4 (four) hours as needed for Wheezing or Shortness of Breath. Rescue 18 g 5    atorvastatin (LIPITOR) 20 MG tablet Take 1 tablet (20 mg total) by mouth every evening. 90 tablet 3    blood sugar diagnostic (TRUE METRIX GLUCOSE TEST STRIP) Strp 1 strip by Misc.(Non-Drug; Combo Route) route once daily. 100 strip 3    blood sugar diagnostic Strp To check BG 2 times daily, to use with insurance preferred meter 100 strip 11    BREZTRI AEROSPHERE 160-9-4.8 mcg/actuation HFAA 2 (two) times a day.      doxycycline (VIBRAMYCIN) 100 MG Cap Take 1 capsule (100 mg total) by mouth every 12 (twelve) hours. for 10 days 20 capsule 0    fexofenadine-pseudoephedrine (ALLEGRA-D 24) 180-240 mg per 24 hr tablet Take 1 tablet by mouth once daily.      fluticasone propionate (FLONASE) 50 mcg/actuation nasal spray 2 sprays (100 mcg total) by Each Nostril route once daily. (Patient taking differently: 2 sprays by Each Nostril route once daily. Pt uses PRN) 16 g 5    furosemide (LASIX) 40 MG tablet One p.o. q.a.m. p.r.n. ankle with edema only (Patient taking differently: One p.o. q.a.m. p.r.n. ankle with edema only  Patient takes every morning.[email or web address]) 90 tablet 3    gabapentin (NEURONTIN) 300 MG capsule TAKE 1 CAPSULE BY MOUTH TWICE DAILY AS NEEDED FOR RESTLESS LEG SYNDROME MAY INCREASE TO 3 TIMES DAILY 90 capsule 2    glipiZIDE (GLUCOTROL) 10 MG tablet Take 1 tablet (10 mg total) by mouth daily with breakfast. 90 tablet 3    ibuprofen (ADVIL,MOTRIN) 600 MG tablet Take 1 tablet  "(600 mg total) by mouth every 8 (eight) hours as needed for Pain. 30 tablet 0    lancets 33 gauge Misc 1 lancet by Misc.(Non-Drug; Combo Route) route once daily. 100 each 3    lancets Misc To check BG 2 times daily, to use with insurance preferred meter 100 each 11    potassium chloride (MICRO-K) 10 MEQ CpSR One p.o. q.a.m. with Lasix only 90 capsule 3    sertraline (ZOLOFT) 25 MG tablet Take 1 tablet (25 mg total) by mouth once daily. 90 tablet 1    tiZANidine (ZANAFLEX) 4 MG tablet TAKE ONE TABLET BY MOUTH EVERY NIGHT AT BEDTIME AS NEEDED FOR MUSCLE SPASMS 30 tablet 5    [DISCONTINUED] amLODIPine (NORVASC) 5 MG tablet Take 1 tablet (5 mg total) by mouth once daily. 90 tablet 3    [DISCONTINUED] lisinopriL 10 MG tablet Take 1 tablet (10 mg total) by mouth once daily. 90 tablet 3    blood-glucose meter (TRUE METRIX AIR GLUCOSE METER) kit Use as instructed 1 each 0    diclofenac (VOLTAREN) 75 MG EC tablet TAKE ONE TABLET BY MOUTH TWICE A  tablet 1    [DISCONTINUED] apremilast (OTEZLA) 30 mg Tab Take 30 mg by mouth 2 (two) times a day.      [DISCONTINUED] diphenhydrAMINE (BENADRYL) 25 mg capsule Take 25 mg by mouth 2 (two) times a day. Pt takes PRN      [DISCONTINUED] ketorolac 0.5% (ACULAR) 0.5 % Drop       [DISCONTINUED] loratadine (CLARITIN) 10 mg tablet Take 1 tablet (10 mg total) by mouth once daily. (Patient not taking: Reported on 4/11/2022) 30 tablet 5    [DISCONTINUED] prednisoLONE acetate (PRED FORTE) 1 % DrpS        No current facility-administered medications on file prior to visit.       Review of patient's allergies indicates:   Allergen Reactions    Augmentin [amoxicillin-pot clavulanate] Hives and Rash     Objective:     Vitals:    04/11/22 1105 04/11/22 1144   BP: (!) 142/82 132/78   BP Location: Right arm Right arm   Patient Position: Sitting Sitting   BP Method: Large (Automatic)    Pulse: 81    SpO2: 96%    Weight: 108.7 kg (239 lb 12 oz)    Height: 5' 2.8" (1.595 m)     "     Physical Exam  Constitutional:       Appearance: She is well-developed. She is obese.   Eyes:      General: No scleral icterus.  Neck:      Vascular: No carotid bruit or JVD.   Cardiovascular:      Rate and Rhythm: Normal rate and regular rhythm.      Pulses:           Posterior tibial pulses are 2+ on the right side and 2+ on the left side.      Heart sounds: No murmur heard.    No gallop.   Pulmonary:      Breath sounds: Normal breath sounds.   Abdominal:      General: There is no abdominal bruit.      Palpations: Abdomen is soft.   Musculoskeletal:      Right lower leg: Edema present.      Left lower leg: Edema (two plus edema of both lower extremities.) present.   Skin:     General: Skin is warm and dry.   Neurological:      Mental Status: She is alert and oriented to person, place, and time.   Psychiatric:         Behavior: Behavior normal.         Thought Content: Thought content normal.         Judgment: Judgment normal.            ECG today reviewed by me:  EUN BETTS    Hospital Outpatient Visit on 04/04/2022   Component Date Value Ref Range Status    POC PH 04/04/2022 7.435  7.35 - 7.45 Final    POC PCO2 04/04/2022 41.2  35 - 45 mmHg Final    POC PO2 04/04/2022 89  80 - 100 mmHg Final    POC HCO3 04/04/2022 27.7  24 - 28 mmol/L Final    POC BE 04/04/2022 3  -2 to 2 mmol/L Final    POC SATURATED O2 04/04/2022 97  95 - 100 % Final    POC TCO2 04/04/2022 29 (A) 23 - 27 mmol/L Final    Sample 04/04/2022 ARTERIAL   Final    Site 04/04/2022 RR   Final    Allens Test 04/04/2022 Pass   Final    DelSys 04/04/2022 Room Air   Final    Mode 04/04/2022 SPONT   Final    Sp02 04/04/2022 95   Final   Lab Visit on 03/29/2022   Component Date Value Ref Range Status    BNP 03/29/2022 32  0 - 99 pg/mL Final    Sodium 03/29/2022 141  136 - 145 mmol/L Final    Potassium 03/29/2022 4.0  3.5 - 5.1 mmol/L Final    Chloride 03/29/2022 105  95 - 110 mmol/L Final    CO2 03/29/2022 27  23 - 29 mmol/L Final     Glucose 03/29/2022 131 (A) 70 - 110 mg/dL Final    BUN 03/29/2022 15  8 - 23 mg/dL Final    Creatinine 03/29/2022 0.9  0.5 - 1.4 mg/dL Final    Calcium 03/29/2022 9.3  8.7 - 10.5 mg/dL Final    Anion Gap 03/29/2022 9  8 - 16 mmol/L Final    eGFR if African American 03/29/2022 >60  >60 mL/min/1.73 m^2 Final    eGFR if non African American 03/29/2022 >60  >60 mL/min/1.73 m^2 Final    WBC 03/29/2022 17.98 (A) 3.90 - 12.70 K/uL Final    RBC 03/29/2022 5.01  4.00 - 5.40 M/uL Final    Hemoglobin 03/29/2022 14.9  12.0 - 16.0 g/dL Final    Hematocrit 03/29/2022 46.6  37.0 - 48.5 % Final    MCV 03/29/2022 93  82 - 98 fL Final    MCH 03/29/2022 29.7  27.0 - 31.0 pg Final    MCHC 03/29/2022 32.0  32.0 - 36.0 g/dL Final    RDW 03/29/2022 14.5  11.5 - 14.5 % Final    Platelets 03/29/2022 361  150 - 450 K/uL Final    MPV 03/29/2022 9.9  9.2 - 12.9 fL Final    Immature Granulocytes 03/29/2022 1.1 (A) 0.0 - 0.5 % Final    Gran # (ANC) 03/29/2022 12.4 (A) 1.8 - 7.7 K/uL Final    Immature Grans (Abs) 03/29/2022 0.20 (A) 0.00 - 0.04 K/uL Final    Lymph # 03/29/2022 3.8  1.0 - 4.8 K/uL Final    Mono # 03/29/2022 1.2 (A) 0.3 - 1.0 K/uL Final    Eos # 03/29/2022 0.2  0.0 - 0.5 K/uL Final    Baso # 03/29/2022 0.17  0.00 - 0.20 K/uL Final    nRBC 03/29/2022 0  0 /100 WBC Final    Gran % 03/29/2022 69.1  38.0 - 73.0 % Final    Lymph % 03/29/2022 20.9  18.0 - 48.0 % Final    Mono % 03/29/2022 6.7  4.0 - 15.0 % Final    Eosinophil % 03/29/2022 1.3  0.0 - 8.0 % Final    Basophil % 03/29/2022 0.9  0.0 - 1.9 % Final    Differential Method 03/29/2022 Automated   Final   Hospital Outpatient Visit on 02/25/2022   Component Date Value Ref Range Status    TDI SEPTAL 02/25/2022 0.06  m/s Final    LV LATERAL E/E' RATIO 02/25/2022 16.67  m/s Final    LV SEPTAL E/E' RATIO 02/25/2022 16.67  m/s Final    LA WIDTH 02/25/2022 3.23  cm Final    AORTIC VALVE CUSP SEPERATION 02/25/2022 1.73  cm Final    TDI LATERAL 02/25/2022  0.06  m/s Final    PV PEAK VELOCITY 02/25/2022 1.03  cm/s Final    LVIDd 02/25/2022 4.92  3.5 - 6.0 cm Final    IVS 02/25/2022 1.07  0.6 - 1.1 cm Final    Posterior Wall 02/25/2022 1.00  0.6 - 1.1 cm Final    Ao root annulus 02/25/2022 2.72  cm Final    LVIDs 02/25/2022 3.58  2.1 - 4.0 cm Final    FS 02/25/2022 27  28 - 44 % Final    LA volume 02/25/2022 49.17  cm3 Final    Sinus 02/25/2022 2.64  cm Final    STJ 02/25/2022 2.12  cm Final    Ascending aorta 02/25/2022 3.17  cm Final    LV mass 02/25/2022 185.67  g Final    LA size 02/25/2022 3.45  cm Final    RVDD 02/25/2022 3.38  cm Final    TAPSE 02/25/2022 2.19  cm Final    RV S' 02/25/2022 9.65  cm/s Final    Left Ventricle Relative Wall Thick* 02/25/2022 0.41  cm Final    AV mean gradient 02/25/2022 7  mmHg Final    AV valve area 02/25/2022 2.14  cm2 Final    AV Velocity Ratio 02/25/2022 0.62   Final    AV index (prosthetic) 02/25/2022 0.70   Final    E/A ratio 02/25/2022 0.87   Final    Mean e' 02/25/2022 0.06  m/s Final    E wave deceleration time 02/25/2022 202.72  msec Final    IVRT 02/25/2022 122.26  msec Final    Pulm vein S/D ratio 02/25/2022 1.44   Final    LVOT diameter 02/25/2022 1.97  cm Final    LVOT area 02/25/2022 3.0  cm2 Final    LVOT peak gallo 02/25/2022 1.07  m/s Final    LVOT peak VTI 02/25/2022 26.64  cm Final    Ao peak gallo 02/25/2022 1.72  m/s Final    Ao VTI 02/25/2022 37.92  cm Final    LVOT stroke volume 02/25/2022 81.16  cm3 Final    AV peak gradient 02/25/2022 12  mmHg Final    E/E' ratio 02/25/2022 16.67  m/s Final    MV Peak E Gallo 02/25/2022 1.00  m/s Final    TR Max Gallo 02/25/2022 1.19  m/s Final    MV Peak A Gallo 02/25/2022 1.15  m/s Final    PV Peak S Gallo 02/25/2022 0.52  m/s Final    PV Peak D Gallo 02/25/2022 0.36  m/s Final    LV Systolic Volume 02/25/2022 53.66  mL Final    LV Diastolic Volume 02/25/2022 114.08  mL Final    RA Major Axis 02/25/2022 4.34  cm Final    Left Atrium Minor  Axis 02/25/2022 5.05  cm Final    Left Atrium Major Axis 02/25/2022 5.34  cm Final    Triscuspid Valve Regurgitation Pea* 02/25/2022 6  mmHg Final    RA Width 02/25/2022 3.54  cm Final    Right Atrial Pressure (from IVC) 02/25/2022 3  mmHg Final    EF 02/25/2022 55  % Final    TV rest pulmonary artery pressure 02/25/2022 9  mmHg Final   Admission on 02/08/2022, Discharged on 02/09/2022   Component Date Value Ref Range Status    POCT Glucose 02/08/2022 139 (A) 70 - 110 mg/dL Final    POCT Glucose 02/08/2022 131 (A) 70 - 110 mg/dL Final    POCT Glucose 02/09/2022 257 (A) 70 - 110 mg/dL Final    POCT Glucose 02/09/2022 180 (A) 70 - 110 mg/dL Final    POCT Glucose 02/09/2022 216 (A) 70 - 110 mg/dL Final   Hospital Outpatient Visit on 02/05/2022   Component Date Value Ref Range Status    SARS-CoV2 (COVID-19) Qualitative P* 02/05/2022 Not Detected  Not Detected Final    SARS-COV-2- Cycle Number 02/05/2022 N/A   Final   Clinical Support on 01/24/2022   Component Date Value Ref Range Status    Interpretation 01/25/2022    Final                    Value:Spirometry is normal. Spirometry remains unimproved following bronchodilator. Lung volumes show mild restriction is present. Airway mechanics show normal airway resistance and conductance. Although DLCO is moderately decreased, poor performance on   testing makes interpretation of DLCO less reliable.   Â   Notes:  Â   The failure to demonstrate improvement in spirometry does not preclude a clinical response to a trial of bronchodilators. DLCO may be underestimated due to submaximal IVC. DLCO interpretation is based upon the reported hemoglobin level. Discrepancy in   lung volume and vital capacity measurements between various testing maneuvers makes DLCO interpretation potentially unreliable.      Pre FVC 01/25/2022 2.16  2.10 - 3.59 L Final    Pre FEV1 01/25/2022 1.68  1.64 - 2.81 L Final    Pre FEV1 FVC 01/25/2022 77.47  65.77 - 91.53 % Final    Pre FEF 25  75 01/25/2022 1.54  0.93 - 2.96 L/s Final    Pre PEF 01/25/2022 2.47 (A) 4.12 - 7.43 L/s Final    Pre  01/25/2022 4.83  sec Final    Pre DLCO 01/25/2022 10.06 (A) 15.51 - 26.98 ml/(min*mmHg) Final    DLCO ADJ PRE 01/25/2022 9.64 (A) 15.51 - 26.98 ml/(min*mmHg) Final    DLCOVA PRE 01/25/2022 3.79  2.94 - 5.97 ml/(min*mmHg*L) Final    DLVAAdj PRE 01/25/2022 3.63  2.94 - 5.97 ml/(min*mmHg*L) Final    VA PRE 01/25/2022 2.67 (A) 4.62 - 4.62 L Final    IVC PRE 01/25/2022 1.43 (A) 2.10 - 3.59 L Final    Pre TLC 01/25/2022 3.67 (A) 3.78 - 5.76 L Final    VC PRE 01/25/2022 2.16  2.10 - 3.59 L Final    Pre FRC PL 01/25/2022 2.07  L Final    Pre ERV 01/25/2022 0.16  -61672.30 - 05305.70 L Final    Pre RV 01/25/2022 1.51  1.38 - 2.53 L Final    RVTLC PRE 01/25/2022 41.12  31.81 - 50.99 % Final    Raw PRE 01/25/2022 2.05 (A) 3.06 - 3.06 cmH2O*s/L Final    VTGRAWPRE 01/25/2022 3.05  L Final    Post FVC 01/25/2022 2.52  2.10 - 3.59 L Final    Post FEV1 01/25/2022 1.65  1.64 - 2.81 L Final    Post FEV1 FVC 01/25/2022 65.69 (A) 65.77 - 91.53 % Final    Post FEF 25 75 01/25/2022 0.91 (A) 0.93 - 2.96 L/s Final    Post PEF 01/25/2022 2.94 (A) 4.12 - 7.43 L/s Final    Post  01/25/2022 7.27  sec Final    FVC Ref 01/25/2022 2.85   Final    FVC LLN 01/25/2022 2.10   Final    FVC Pre Ref 01/25/2022 76.0  % Final    FVC Post Ref 01/25/2022 88.5  % Final    FVC Chg 01/25/2022 16.5  % Final    FEV1 Ref 01/25/2022 2.22   Final    FEV1 LLN 01/25/2022 1.64   Final    FEV1 Pre Ref 01/25/2022 75.3  % Final    FEV1 Post Ref 01/25/2022 74.4  % Final    FEV1 Chg 01/25/2022 -1.3  % Final    FEV1 FVC Ref 01/25/2022 79   Final    FEV1 FVC LLN 01/25/2022 66   Final    FEV1 FVC Pre Ref 01/25/2022 98.5  % Final    FEV1 FVC Post Ref 01/25/2022 83.5  % Final    FEV1 FVC Chg 01/25/2022 -15.2  % Final    FEF 25 75 Ref 01/25/2022 1.94   Final    FEF 25 75 LLN 01/25/2022 0.93   Final    FEF 25 75 Pre Ref  01/25/2022 79.0  % Final    FEF 25 75 Post Ref 01/25/2022 47.0  % Final    FEF 25 75 Chg 01/25/2022 -40.5  % Final    PEF Ref 01/25/2022 5.78   Final    PEF LLN 01/25/2022 4.12   Final    PEF Pre Ref 01/25/2022 42.7  % Final    PEF Post Ref 01/25/2022 50.9  % Final    PEF Chg 01/25/2022 19.1  % Final    THM356 Chg 01/25/2022 50.5  % Final    TLC Ref 01/25/2022 4.77   Final    TLC LLN 01/25/2022 3.78   Final    TLC Pre Ref 01/25/2022 77.0  % Final    VC Ref 01/25/2022 2.85   Final    VC LLN 01/25/2022 2.10   Final    VC Pre Ref 01/25/2022 76.0  % Final    FRCpleth Ref 01/25/2022 2.65   Final    FRCpleth LLN 01/25/2022 1.83   Final    FRCpleth PreRef 01/25/2022 78.1  % Final    ERV Ref 01/25/2022 0.70   Final    ERV LLN 01/25/2022 -69127.30   Final    ERV Pre Ref 01/25/2022 23.1  % Final    RV Ref 01/25/2022 1.95   Final    RV LLN 01/25/2022 1.38   Final    RV Pre Ref 01/25/2022 77.4  % Final    RVTLC Ref 01/25/2022 41   Final    RVTLC LLN 01/25/2022 32   Final    RVTLC Pre Ref 01/25/2022 99.3  % Final    Raw Ref 01/25/2022 3.06   Final    Raw LLN 01/25/2022 3.06   Final    Raw Pre Ref 01/25/2022 66.9  % Final    DLCO Single Breath Ref 01/25/2022 21.24   Final    DLCO Single Breath LLN 01/25/2022 15.51   Final    DLCO Single Breath Pre Ref 01/25/2022 47.4  % Final    DLCOc Single Breath Ref 01/25/2022 21.24   Final    DLCOc Single Breath LLN 01/25/2022 15.51   Final    DLCOc Single Breath Pre Ref 01/25/2022 45.4  % Final    DLCOVA Ref 01/25/2022 4.45   Final    DLCOVA LLN 01/25/2022 2.94   Final    DLCOVA Pre Ref 01/25/2022 85.0  % Final    DLCOc SBVA Ref 01/25/2022 4.45   Final    DLCOc SBVA LLN 01/25/2022 2.94   Final    DLCOc SBVA Pre Ref 01/25/2022 81.5  % Final    VA Single Breath Ref 01/25/2022 4.62   Final    VA Single Breath LLN 01/25/2022 4.62   Final    VA Single Breath Pre Ref 01/25/2022 57.8  % Final    IVC Single Breath Ref 01/25/2022 2.85   Final    IVC  Single Breath LLN 01/25/2022 2.10   Final    IVC Single Breath Pre Ref 01/25/2022 50.2  % Final   Lab Visit on 01/04/2022   Component Date Value Ref Range Status    WBC 01/04/2022 14.97 (A) 3.90 - 12.70 K/uL Final    RBC 01/04/2022 4.92  4.00 - 5.40 M/uL Final    Hemoglobin 01/04/2022 14.9  12.0 - 16.0 g/dL Final    Hematocrit 01/04/2022 46.2  37.0 - 48.5 % Final    MCV 01/04/2022 94  82 - 98 fL Final    MCH 01/04/2022 30.3  27.0 - 31.0 pg Final    MCHC 01/04/2022 32.3  32.0 - 36.0 g/dL Final    RDW 01/04/2022 14.8 (A) 11.5 - 14.5 % Final    Platelets 01/04/2022 307  150 - 450 K/uL Final    MPV 01/04/2022 9.9  9.2 - 12.9 fL Final    Immature Granulocytes 01/04/2022 0.7 (A) 0.0 - 0.5 % Final    Gran # (ANC) 01/04/2022 9.4 (A) 1.8 - 7.7 K/uL Final    Immature Grans (Abs) 01/04/2022 0.10 (A) 0.00 - 0.04 K/uL Final    Lymph # 01/04/2022 4.0  1.0 - 4.8 K/uL Final    Mono # 01/04/2022 1.0  0.3 - 1.0 K/uL Final    Eos # 01/04/2022 0.3  0.0 - 0.5 K/uL Final    Baso # 01/04/2022 0.11  0.00 - 0.20 K/uL Final    nRBC 01/04/2022 0  0 /100 WBC Final    Gran % 01/04/2022 63.1  38.0 - 73.0 % Final    Lymph % 01/04/2022 26.5  18.0 - 48.0 % Final    Mono % 01/04/2022 6.9  4.0 - 15.0 % Final    Eosinophil % 01/04/2022 2.1  0.0 - 8.0 % Final    Basophil % 01/04/2022 0.7  0.0 - 1.9 % Final    Differential Method 01/04/2022 Automated   Final    Sodium 01/04/2022 144  136 - 145 mmol/L Final    Potassium 01/04/2022 4.2  3.5 - 5.1 mmol/L Final    Chloride 01/04/2022 105  95 - 110 mmol/L Final    CO2 01/04/2022 26  23 - 29 mmol/L Final    Glucose 01/04/2022 146 (A) 70 - 110 mg/dL Final    BUN 01/04/2022 19  8 - 23 mg/dL Final    Creatinine 01/04/2022 0.9  0.5 - 1.4 mg/dL Final    Calcium 01/04/2022 9.6  8.7 - 10.5 mg/dL Final    Total Protein 01/04/2022 6.7  6.0 - 8.4 g/dL Final    Albumin 01/04/2022 3.9  3.5 - 5.2 g/dL Final    Total Bilirubin 01/04/2022 0.5  0.1 - 1.0 mg/dL Final    Alkaline  Phosphatase 01/04/2022 106  55 - 135 U/L Final    AST 01/04/2022 17  10 - 40 U/L Final    ALT 01/04/2022 25  10 - 44 U/L Final    Anion Gap 01/04/2022 13  8 - 16 mmol/L Final    eGFR if African American 01/04/2022 >60.0  >60 mL/min/1.73 m^2 Final    eGFR if non African American 01/04/2022 >60.0  >60 mL/min/1.73 m^2 Final    Cholesterol 01/04/2022 167  120 - 199 mg/dL Final    Triglycerides 01/04/2022 294 (A) 30 - 150 mg/dL Final    HDL 01/04/2022 32 (A) 40 - 75 mg/dL Final    LDL Cholesterol 01/04/2022 76.2  63.0 - 159.0 mg/dL Final    HDL/Cholesterol Ratio 01/04/2022 19.2 (A) 20.0 - 50.0 % Final    Total Cholesterol/HDL Ratio 01/04/2022 5.2 (A) 2.0 - 5.0 Final    Non-HDL Cholesterol 01/04/2022 135  mg/dL Final    Hemoglobin A1C 01/04/2022 7.5 (A) 4.0 - 5.6 % Final    Estimated Avg Glucose 01/04/2022 169 (A) 68 - 131 mg/dL Final    WBC 01/04/2022 14.97 (A) 3.90 - 12.70 K/uL Final    RBC 01/04/2022 4.92  4.00 - 5.40 M/uL Final    Hemoglobin 01/04/2022 14.9  12.0 - 16.0 g/dL Final    Hematocrit 01/04/2022 46.2  37.0 - 48.5 % Final    MCV 01/04/2022 94  82 - 98 fL Final    MCH 01/04/2022 30.3  27.0 - 31.0 pg Final    MCHC 01/04/2022 32.3  32.0 - 36.0 g/dL Final    RDW 01/04/2022 14.8 (A) 11.5 - 14.5 % Final    Platelets 01/04/2022 307  150 - 450 K/uL Final    MPV 01/04/2022 9.9  9.2 - 12.9 fL Final    Immature Granulocytes 01/04/2022 0.7 (A) 0.0 - 0.5 % Final    Gran # (ANC) 01/04/2022 9.4 (A) 1.8 - 7.7 K/uL Final    Immature Grans (Abs) 01/04/2022 0.10 (A) 0.00 - 0.04 K/uL Final    Lymph # 01/04/2022 4.0  1.0 - 4.8 K/uL Final    Mono # 01/04/2022 1.0  0.3 - 1.0 K/uL Final    Eos # 01/04/2022 0.3  0.0 - 0.5 K/uL Final    Baso # 01/04/2022 0.11  0.00 - 0.20 K/uL Final    nRBC 01/04/2022 0  0 /100 WBC Final    Gran % 01/04/2022 63.1  38.0 - 73.0 % Final    Lymph % 01/04/2022 26.5  18.0 - 48.0 % Final    Mono % 01/04/2022 6.9  4.0 - 15.0 % Final    Eosinophil % 01/04/2022 2.1  0.0 - 8.0  % Final    Basophil % 01/04/2022 0.7  0.0 - 1.9 % Final    Differential Method 01/04/2022 Automated   Final    Sodium 01/04/2022 144  136 - 145 mmol/L Final    Potassium 01/04/2022 4.2  3.5 - 5.1 mmol/L Final    Chloride 01/04/2022 105  95 - 110 mmol/L Final    CO2 01/04/2022 26  23 - 29 mmol/L Final    Glucose 01/04/2022 146 (A) 70 - 110 mg/dL Final    BUN 01/04/2022 19  8 - 23 mg/dL Final    Creatinine 01/04/2022 0.9  0.5 - 1.4 mg/dL Final    Calcium 01/04/2022 9.6  8.7 - 10.5 mg/dL Final    Total Protein 01/04/2022 6.7  6.0 - 8.4 g/dL Final    Albumin 01/04/2022 3.9  3.5 - 5.2 g/dL Final    Total Bilirubin 01/04/2022 0.5  0.1 - 1.0 mg/dL Final    Alkaline Phosphatase 01/04/2022 106  55 - 135 U/L Final    AST 01/04/2022 17  10 - 40 U/L Final    ALT 01/04/2022 25  10 - 44 U/L Final    Anion Gap 01/04/2022 13  8 - 16 mmol/L Final    eGFR if African American 01/04/2022 >60.0  >60 mL/min/1.73 m^2 Final    eGFR if non African American 01/04/2022 >60.0  >60 mL/min/1.73 m^2 Final    Hemoglobin A1C 01/04/2022 7.5 (A) 4.0 - 5.6 % Final    Estimated Avg Glucose 01/04/2022 169 (A) 68 - 131 mg/dL Final    Cholesterol 01/04/2022 167  120 - 199 mg/dL Final    Triglycerides 01/04/2022 294 (A) 30 - 150 mg/dL Final    HDL 01/04/2022 32 (A) 40 - 75 mg/dL Final    LDL Cholesterol 01/04/2022 76.2  63.0 - 159.0 mg/dL Final    HDL/Cholesterol Ratio 01/04/2022 19.2 (A) 20.0 - 50.0 % Final    Total Cholesterol/HDL Ratio 01/04/2022 5.2 (A) 2.0 - 5.0 Final    Non-HDL Cholesterol 01/04/2022 135  mg/dL Final    WBC 01/04/2022 14.97 (A) 3.90 - 12.70 K/uL Final    RBC 01/04/2022 4.92  4.00 - 5.40 M/uL Final    Hemoglobin 01/04/2022 14.9  12.0 - 16.0 g/dL Final    Hematocrit 01/04/2022 46.2  37.0 - 48.5 % Final    MCV 01/04/2022 94  82 - 98 fL Final    MCH 01/04/2022 30.3  27.0 - 31.0 pg Final    MCHC 01/04/2022 32.3  32.0 - 36.0 g/dL Final    RDW 01/04/2022 14.8 (A) 11.5 - 14.5 % Final    Platelets  01/04/2022 307  150 - 450 K/uL Final    MPV 01/04/2022 9.9  9.2 - 12.9 fL Final    Immature Granulocytes 01/04/2022 0.7 (A) 0.0 - 0.5 % Final    Gran # (ANC) 01/04/2022 9.4 (A) 1.8 - 7.7 K/uL Final    Immature Grans (Abs) 01/04/2022 0.10 (A) 0.00 - 0.04 K/uL Final    Lymph # 01/04/2022 4.0  1.0 - 4.8 K/uL Final    Mono # 01/04/2022 1.0  0.3 - 1.0 K/uL Final    Eos # 01/04/2022 0.3  0.0 - 0.5 K/uL Final    Baso # 01/04/2022 0.11  0.00 - 0.20 K/uL Final    nRBC 01/04/2022 0  0 /100 WBC Final    Gran % 01/04/2022 63.1  38.0 - 73.0 % Final    Lymph % 01/04/2022 26.5  18.0 - 48.0 % Final    Mono % 01/04/2022 6.9  4.0 - 15.0 % Final    Eosinophil % 01/04/2022 2.1  0.0 - 8.0 % Final    Basophil % 01/04/2022 0.7  0.0 - 1.9 % Final    Differential Method 01/04/2022 Automated   Final    Sodium 01/04/2022 144  136 - 145 mmol/L Final    Potassium 01/04/2022 4.2  3.5 - 5.1 mmol/L Final    Chloride 01/04/2022 105  95 - 110 mmol/L Final    CO2 01/04/2022 26  23 - 29 mmol/L Final    Glucose 01/04/2022 146 (A) 70 - 110 mg/dL Final    BUN 01/04/2022 19  8 - 23 mg/dL Final    Creatinine 01/04/2022 0.9  0.5 - 1.4 mg/dL Final    Calcium 01/04/2022 9.6  8.7 - 10.5 mg/dL Final    Total Protein 01/04/2022 6.7  6.0 - 8.4 g/dL Final    Albumin 01/04/2022 3.9  3.5 - 5.2 g/dL Final    Total Bilirubin 01/04/2022 0.5  0.1 - 1.0 mg/dL Final    Alkaline Phosphatase 01/04/2022 106  55 - 135 U/L Final    AST 01/04/2022 17  10 - 40 U/L Final    ALT 01/04/2022 25  10 - 44 U/L Final    Anion Gap 01/04/2022 13  8 - 16 mmol/L Final    eGFR if African American 01/04/2022 >60.0  >60 mL/min/1.73 m^2 Final    eGFR if non African American 01/04/2022 >60.0  >60 mL/min/1.73 m^2 Final    Hemoglobin A1C 01/04/2022 7.5 (A) 4.0 - 5.6 % Final    Estimated Avg Glucose 01/04/2022 169 (A) 68 - 131 mg/dL Final    Cholesterol 01/04/2022 167  120 - 199 mg/dL Final    Triglycerides 01/04/2022 294 (A) 30 - 150 mg/dL Final    HDL  01/04/2022 32 (A) 40 - 75 mg/dL Final    LDL Cholesterol 01/04/2022 76.2  63.0 - 159.0 mg/dL Final    HDL/Cholesterol Ratio 01/04/2022 19.2 (A) 20.0 - 50.0 % Final    Total Cholesterol/HDL Ratio 01/04/2022 5.2 (A) 2.0 - 5.0 Final    Non-HDL Cholesterol 01/04/2022 135  mg/dL Final    TSH 01/04/2022 1.226  0.400 - 4.000 uIU/mL Final    Free T4 01/04/2022 0.90  0.71 - 1.51 ng/dL Final    Microalbumin, Urine 01/04/2022 48.0  ug/mL Final    Creatinine, Urine 01/04/2022 102.0  15.0 - 325.0 mg/dL Final    Microalb/Creat Ratio 01/04/2022 47.1 (A) 0.0 - 30.0 ug/mg Final   (    mo ago   InterpretationSpirometry is normal. Spirometry remains unimproved following bronchodilator. Lung volumes show mild restriction is present. Airway mechanics show normal airway resistance and conductance. Although DLCO is moderately decreased, poor performance on   testing makes interpretation of DLCO less reliable.   Â   Notes:   Â   The failure to demonstrate improvement in spirometry does not preclude a clinical response to a trial of bronchodilators. DLCO may be underestimated due to submaximal IVC. DLCO interpretation is based upon the reported hemoglobin level. Discrepancy in   lung volume and vital capacity measurements between various testing maneuvers makes DLCO interpretation potentially unreliable.   Pre FVC2.10 - 3.59 L2.16   Accession #: [id number]    Transthoracic echo (TTE) complete  Order# [id number]  Reading physician: Nick Lee MD Ordering physician: Marianela Ortiz NP Study date: 2/25/22       Reason for Exam  Priority: Routine  Dx: NDIAYE (dyspnea on exertion) [R06.00 (ICD-10-CM)]     Result Image Hyperlink     Show images for Echo    Summary    · The estimated ejection fraction is 55%.  · The left ventricle is normal in size with normal systolic function.  · Grade I left ventricular diastolic dysfunction.  · Normal right ventricular size with normal right ventricular systolic function.  · Mild left atrial  enlargement.  · Normal central venous pressure (3 mmHg).  · The estimated PA systolic pressure is 9 mmHg.         Vitals    Height Weight BMI (Calculated) BSA (Calculated - sq m) BP Pulse             Transthoracic echo (TTE) complete  Order# [id number]  Reading physician: Hubert Cano MD Ordering physician: Satish White MD Study date: 3/6/20       Reason for Exam  Priority: Routine  Dx: Dyspnea [R06.00 (ICD-10-CM)]     Result Image Hyperlink     Show images for Echo Color Flow Doppler? Yes    Summary    · Mild concentric left ventricular hypertrophy.  · Normal left ventricular systolic function. The estimated ejection fraction is 60%.  · Grade I (mild) left ventricular diastolic dysfunction consistent with impaired relaxation.  · Normal right ventricular systolic function.  · Mild aortic valve stenosis.  · Aortic valve area is 1.85 cm2; peak velocity is 2.18 m/s; mean gradient is 9 mmHg.  · Mild mitral sclerosis.  · Normal central venous pressure (3 mmHg).         Vitals    Height      Result Priority    Guanakito Jimenez MD  [phone]  [phone] 1/24/2022 Routine     Narrative & Impression  EXAMINATION:  CT CHEST LUNG SCREENING LOW DOSE     CLINICAL HISTORY:  Lung cancer screening, >= 30 pk-yr smoking history in last 15 yrs (Age 55-80y); Personal history of nicotine dependence     TECHNIQUE:  CT of the thorax was performed with low dose, lung screening protocol.  No contrast was administered.  Sagittal and coronal reconstructions were obtained.     COMPARISON:  CT chest from 03/05/2020     FINDINGS:  Lungs: There are no abnormal opacities that require further evaluation.  There are a few scattered pulmonary nodules bilaterally.  The largest opacity in the right lung appears solid and measures 0.4 cm on series 4, image 120.  The largest opacity in the left lung appears solid and measures 0.4 cm on series 4, image 177.  The lungs show no findings consistent with emphysema.  Previously identified  reticulonodular interstitial opacities have resolved when compared to prior exam.     Pleura:   No effusion..     Heart and pericardium: Normal size without effusion.     Aorta and vasculature: Atherosclerosis including coronary arteries.     Chest wall and skeletal structures: Stable lymph node anterior to the trachea measuring 1.2 cm (series 3, image 20), unchanged when compared to prior exam from 03/05/2020.  Unremarkable except age-appropriate degenerative changes.     Upper abdomen: Diffuse hypoattenuation of the liver suggestive of hepatic steatosis.  Gallbladder surgically absent.  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     Impression:     Lung-RADS Category:  2 - Benign Appearance or Behavior - continue annual screening with LDCT in 12 months.     Clinically or potentially clinically significant non lung cancer finding:  None.     Prior Lung Cancer Modifier:  No history of prior lung cancer.     Hepatic steatosis.     Interval resolution of previously identified reticulonodular interstitial opacities seen on exam from 03/05/2020.     Additional findings as described above.        Electronically signed by: Guanakito Jimenez MD  Date:                                            01/24/2022  Time:                                           11:17    Status: Final result       [employer] Results Release    [employer] Status: Active  Results Release       PACS Images for [employer] Viewer     Show images for X-Ray Chest AP Portable    X-Ray Chest AP Portable  Order: [id number]  · Status: Final result     · Visible to patient: Yes (not seen)      · Next appt: 04/20/2022 at 02:00 PM in Hematology and Oncology (Barbara Galloway NP)      · 0 Result Notes      Details    Reading Physician Reading Date Result Priority   David Prakash DO  [phone] 2/9/2022 Routine     Narrative & Impression  EXAMINATION:  XR CHEST AP PORTABLE     CLINICAL HISTORY:  Increased O2 requirement;      TECHNIQUE:  Single frontal view of the chest was performed.     COMPARISON:  Chest radiograph 03/09/2020.     FINDINGS:  The lungs are well expanded and clear. No focal opacities are seen. The pleural spaces are clear.     The cardiac silhouette is borderline enlarged.     The visualized osseous structures are intact.     Impression:     No acute cardiopulmonary abnormality.        Electronically signed by: David Prakash  Date:                                            02/09/2022  Time:                                           06:37               Exam Ended: 02/09/22 04:19 Last Resulted: 02/09/22 06:37                   Assessment:     1. Localized edema    2. NDIAYE (dyspnea on exertion)    3. Essential hypertension    4. Mixed hyperlipidemia      Plan:   Magdalena was seen today for shortness of breath and swelling.    Diagnoses and all orders for this visit:    Localized edema  -     IN OFFICE EKG 12-LEAD (to [facility])    NDIAYE (dyspnea on exertion)  -     Ambulatory referral/consult to Cardiology  -     IN OFFICE EKG 12-LEAD (to [facility])    Essential hypertension  -     IN OFFICE EKG 12-LEAD (to [facility])    Mixed hyperlipidemia  -     IN OFFICE EKG 12-LEAD (to [place])    Other orders  -     lisinopriL (PRINIVIL,ZESTRIL) 40 MG tablet; Take 1 tablet (40 mg total) by mouth once daily.     Pt has venous stasis edema exacerbated by amlodipine and gabapentin and ibuprofen and diclofenac and by BMI 43.    I have recommended to pt that she substitute Tylenol for the gabapentin and ibuprofen and diclofenac.    Will discontinue the amlodipine and in its place increase the lisinopril to 40 mg daily    The shortness of breath is likely due to restrictive lung disease and deconditioning and BMI 43.    ADA diet discussed at length and in detail    RTC one month    Follow up in about 4 weeks (around 5/9/2022).

## 2022-04-12 DIAGNOSIS — F32.A DEPRESSION, UNSPECIFIED DEPRESSION TYPE: ICD-10-CM

## 2022-04-12 NOTE — TELEPHONE ENCOUNTER
Care Due:                  Date            Visit Type   Department     Provider  --------------------------------------------------------------------------------                                VIRTUAL      [facility]  Last Visit: 01-      AUDIO ONLY   PRIMARY CARE   Jaime William                               -                              PRIMARY      [facility]  Next Visit: 06-      CARE ([facility])   PRIMARY CARE   Jaime William                                                            Last  Test          Frequency    Reason                     Performed    Due Date  --------------------------------------------------------------------------------    HBA1C.......  6 months...  glipiZIDE................  01- 07-    Powered by [employer]. Reference number: [id number].   4/12/2022 9:10:31 AM CDT

## 2022-04-12 NOTE — TELEPHONE ENCOUNTER
Refill Routing Note   Medication(s) are not appropriate for processing by [facility] for the following reason(s):      - Patient has been seen in the ED/Hospital since the last PCP visit    [facility] action(s):  Defer          Medication reconciliation completed: No     Appointments  past 12m or future 3m with PCP    Date Provider   Last Visit   1/11/2022 Jaime William MD   Next Visit   6/23/2022 Jaime William MD   ED visits in past 90 days: 0        Note composed:1:30 PM 04/12/2022

## 2022-04-14 RX ORDER — SERTRALINE HYDROCHLORIDE 25 MG/1
25 TABLET, FILM COATED ORAL DAILY
Qty: 90 TABLET | Refills: 4 | Status: SHIPPED | OUTPATIENT
Start: 2022-04-14 | End: 2022-12-19

## 2022-04-14 RX ORDER — GLIPIZIDE 10 MG/1
TABLET ORAL
Qty: 90 TABLET | Refills: 0 | Status: SHIPPED | OUTPATIENT
Start: 2022-04-14 | End: 2022-07-21

## 2022-04-19 NOTE — PROGRESS NOTES
Section of Hematology and Oncology  New Patient Consult     Referred by:  Marianela Ortiz  Date of visit: 4/20/22    Reason for visit: Leukocytosis, unspecified type [D72.829]    History of Present Ilness:   Magdalena Delgado) is a pleasant 67 y.o.female COPD, FOZIA awaiting CPAP, HTN, HLN, DMII, obesity, psoriasis, here today for leukocytosis.    She reports a longstanding hx of seasonal allergies. She denies recent infections. She reports cataract surgery in January and deviated septum surgery in February. She denies steroid use. She denies smoking. Record reveals former smoker, quit in 2016. She denies splenectomy. She denies recent stressors. She denies vigorous exercise. She denies fever, chills, fatigue, early satiety, weight loss, lymphadenopathy. She reports night sweats once a week to once every other week over the past couple of years. She denies drenching night sweats.      PAST MEDICAL HISTORY:   Past Medical History:   Diagnosis Date    Arthritis     Chronic bronchitis     COPD (chronic obstructive pulmonary disease)     Diabetes mellitus     GERD (gastroesophageal reflux disease)     Hyperlipidemia     Hypertension     Pulmonary nodule     Sleep apnea        PAST SURGICAL HISTORY:   Past Surgical History:   Procedure Laterality Date    CHOLECYSTECTOMY      COLONOSCOPY N/A 7/20/2020    Procedure: COLONOSCOPY;  Surgeon: Anny Acevedo MD;  Location: [facility];  Service: Endoscopy;  Laterality: N/A;    COLONOSCOPY W/ BIOPSIES  07/20/2020    colonoscopy w/ biopsy per  7/20/2020    NASAL SEPTOPLASTY Bilateral 2/8/2022    Procedure: SEPTOPLASTY, NOSE;  Surgeon: Karri Berrios MD;  Location: [facility];  Service: ENT;  Laterality: Bilateral;  DR. HUNT CASE IS 1 HOUR    sinoplasty      TUBAL LIGATION         PAST SOCIAL HISTORY:  Social History     Tobacco Use    Smoking status: Former Smoker     Packs/day: 2.00     Years: 45.00     Pack years: 90.00     Types: Cigarettes      Start date: 1972     Quit date: 2016     Years since quittin.6    Smokeless tobacco: Never Used   Substance Use Topics    Alcohol use: No    Drug use: No       FAMILY HISTORY:  Family History   Problem Relation Age of Onset    Cancer Mother     Diabetes Mother     Heart disease Father     Cancer Sister     Diabetes Sister     Cancer Sister     Breast cancer Sister        CURRENT MEDICATIONS:   Current Outpatient Medications   Medication Sig    acetaminophen (TYLENOL) 650 MG TbSR Take 1 tablet (650 mg total) by mouth every 8 (eight) hours as needed (pain).    albuterol (PROAIR HFA) 90 mcg/actuation inhaler Inhale 2 puffs into the lungs every 4 (four) hours as needed for Wheezing or Shortness of Breath. Rescue    blood sugar diagnostic (TRUE METRIX GLUCOSE TEST STRIP) Strp 1 strip by Misc.(Non-Drug; Combo Route) route once daily.    blood sugar diagnostic Strp To check BG 2 times daily, to use with insurance preferred meter    BREZTRI AEROSPHERE 160-9-4.8 mcg/actuation HFAA 2 (two) times a day.    diclofenac (VOLTAREN) 75 MG EC tablet TAKE ONE TABLET BY MOUTH TWICE A DAY    fexofenadine-pseudoephedrine (ALLEGRA-D 24) 180-240 mg per 24 hr tablet Take 1 tablet by mouth once daily.    fluticasone propionate (FLONASE) 50 mcg/actuation nasal spray 2 sprays (100 mcg total) by Each Nostril route once daily. (Patient taking differently: 2 sprays by Each Nostril route once daily. Pt uses PRN)    furosemide (LASIX) 40 MG tablet One p.o. q.a.m. p.r.n. ankle with edema only (Patient taking differently: One p.o. q.a.m. p.r.n. ankle with edema only  Patient takes every morning.everardo)    gabapentin (NEURONTIN) 300 MG capsule TAKE 1 CAPSULE BY MOUTH TWICE DAILY AS NEEDED FOR RESTLESS LEG SYNDROME MAY INCREASE TO 3 TIMES DAILY    glipiZIDE (GLUCOTROL) 10 MG tablet TAKE ONE TABLET BY MOUTH ONCE DAILY WITH BREAKFAST    ibuprofen (ADVIL,MOTRIN) 600 MG tablet Take 1 tablet (600 mg total) by mouth every  8 (eight) hours as needed for Pain.    lancets 33 gauge Misc 1 lancet by Misc.(Non-Drug; Combo Route) route once daily.    lancets Misc To check BG 2 times daily, to use with insurance preferred meter    lisinopriL (PRINIVIL,ZESTRIL) 40 MG tablet Take 1 tablet (40 mg total) by mouth once daily.    potassium chloride (MICRO-K) 10 MEQ CpSR One p.o. q.a.m. with Lasix only    sertraline (ZOLOFT) 25 MG tablet TAKE 1 TABLET (25 MG TOTAL) BY MOUTH ONCE DAILY.    tiZANidine (ZANAFLEX) 4 MG tablet TAKE ONE TABLET BY MOUTH EVERY NIGHT AT BEDTIME AS NEEDED FOR MUSCLE SPASMS    atorvastatin (LIPITOR) 20 MG tablet Take 1 tablet (20 mg total) by mouth every evening.    blood-glucose meter (TRUE METRIX AIR GLUCOSE METER) kit Use as instructed     No current facility-administered medications for this visit.       ALLERGIES:   Review of patient's allergies indicates:   Allergen Reactions    Augmentin [amoxicillin-pot clavulanate] Hives and Rash       Review of Systems:     Review of Systems   Constitutional: Negative for chills, diaphoresis, fever, malaise/fatigue and weight loss.   Respiratory: Positive for shortness of breath (NDIAYE). Negative for cough.    Cardiovascular: Positive for leg swelling. Negative for chest pain.   Gastrointestinal: Negative for abdominal pain, constipation and diarrhea.   Skin: Positive for rash.         Physical Exam:     Vitals:    04/20/22 1349   BP: 139/75   Pulse: 85   Temp: 98.9 °F (37.2 °C)       Physical Exam  Constitutional:       General: She is not in acute distress.     Appearance: Normal appearance. She is not ill-appearing or diaphoretic.   HENT:      Head: Normocephalic and atraumatic.   Eyes:      Extraocular Movements: Extraocular movements intact.      Conjunctiva/sclera: Conjunctivae normal.      Pupils: Pupils are equal, round, and reactive to light.   Cardiovascular:      Rate and Rhythm: Normal rate and regular rhythm.      Heart sounds: Normal heart sounds.   Pulmonary:       Effort: Pulmonary effort is normal.      Breath sounds: Normal breath sounds.   Chest:   Breasts:      Right: No supraclavicular adenopathy.      Left: No supraclavicular adenopathy.       Abdominal:      General: Abdomen is flat. Bowel sounds are normal. There is no distension.      Palpations: Abdomen is soft. There is no hepatomegaly or splenomegaly.      Tenderness: There is no abdominal tenderness. There is no guarding.   Musculoskeletal:      Right lower leg: Edema present.      Left lower leg: Edema present.   Lymphadenopathy:      Head:      Right side of head: No submental, submandibular or tonsillar adenopathy.      Left side of head: No submental, submandibular or tonsillar adenopathy.      Cervical: No cervical adenopathy.      Right cervical: No superficial, deep or posterior cervical adenopathy.     Left cervical: No superficial, deep or posterior cervical adenopathy.      Upper Body:      Right upper body: No supraclavicular adenopathy.      Left upper body: No supraclavicular adenopathy.   Skin:     General: Skin is warm and dry.      Findings: Rash present. Rash is scaling.      Comments: Scaling noted to bilateral upper and lower extremities   Neurological:      General: No focal deficit present.      Mental Status: She is alert and oriented to person, place, and time. Mental status is at baseline.   Psychiatric:         Mood and Affect: Mood normal.         Thought Content: Thought content normal.         Judgment: Judgment normal.          Labs:   Lab Results   Component Value Date    WBC 17.98 (H) 03/29/2022    HGB 14.9 03/29/2022    HCT 46.6 03/29/2022    MCV 93 03/29/2022     03/29/2022       Lab Results   Component Value Date     03/29/2022    K 4.0 03/29/2022     03/29/2022    CO2 27 03/29/2022    BUN 15 03/29/2022    CREATININE 0.9 03/29/2022    ALBUMIN 3.9 01/04/2022    ALBUMIN 3.9 01/04/2022    ALBUMIN 3.9 01/04/2022    BILITOT 0.5 01/04/2022    BILITOT 0.5 01/04/2022     BILITOT 0.5 01/04/2022    ALKPHOS 106 01/04/2022    ALKPHOS 106 01/04/2022    ALKPHOS 106 01/04/2022    AST 17 01/04/2022    AST 17 01/04/2022    AST 17 01/04/2022    ALT 25 01/04/2022    ALT 25 01/04/2022    ALT 25 01/04/2022       No results found for: IRON, TIBC, FERRITIN    No components found for: RETICULOCYTES    No results found for: HAPTOGLOBIN, LDH      Assessment and Plan:   Magdalena Acevedo (Magdalena) is a pleasant 67 y.o.female here today for leukocytosis.    1. Leukocytosis, unspecified type      1. Likely reactive given dating back to 2018 with multiple co-morbidities. WBC 17.98K/uL. Predominantly neutrophilia. ANC 12.4K/uL. Monocytosis 1.2K/uL noted. No eosinophilia, no basophilia noted. No other cell line abnormalities. Will proceed with labs below.     She was educated on the symptoms that warrant emergent evaluation in the ED. She verbalized understanding.  Orders/Follow Up:      Orders Placed This Encounter    Comprehensive Metabolic Panel    CBC Auto Differential    Pathologist Interpretation Differential    Lactate Dehydrogenase    Sedimentation rate    Flow Cytometry Analysis (Peripheral Blood)     Follow up in 1-2 weeks to review results.    Patient is in agreement with the proposed treatment plan. All questions were answered to the patient's satisfaction. Patient knows to call clinic for any new or worsening symptoms and if anything is needed before the next clinic visit.    Barbara Galloway NP  Hematology/ Oncology

## 2022-04-20 ENCOUNTER — OFFICE VISIT (OUTPATIENT)
Dept: HEMATOLOGY/ONCOLOGY | Facility: CLINIC | Age: 67
End: 2022-04-20
Payer: MEDICARE

## 2022-04-20 ENCOUNTER — LAB VISIT (OUTPATIENT)
Dept: LAB | Facility: HOSPITAL | Age: 67
End: 2022-04-20
Payer: MEDICARE

## 2022-04-20 ENCOUNTER — TELEPHONE (OUTPATIENT)
Dept: BARIATRICS | Facility: CLINIC | Age: 67
End: 2022-04-20
Payer: MEDICARE

## 2022-04-20 VITALS
TEMPERATURE: 99 F | WEIGHT: 236.75 LBS | SYSTOLIC BLOOD PRESSURE: 139 MMHG | DIASTOLIC BLOOD PRESSURE: 75 MMHG | BODY MASS INDEX: 43.57 KG/M2 | HEIGHT: 62 IN | HEART RATE: 85 BPM | OXYGEN SATURATION: 97 %

## 2022-04-20 DIAGNOSIS — D72.829 LEUKOCYTOSIS, UNSPECIFIED TYPE: Primary | ICD-10-CM

## 2022-04-20 DIAGNOSIS — D72.829 LEUKOCYTOSIS, UNSPECIFIED TYPE: ICD-10-CM

## 2022-04-20 LAB
ALBUMIN SERPL BCP-MCNC: 4 G/DL (ref 3.5–5.2)
ALP SERPL-CCNC: 124 U/L (ref 55–135)
ALT SERPL W/O P-5'-P-CCNC: 33 U/L (ref 10–44)
ANION GAP SERPL CALC-SCNC: 13 MMOL/L (ref 8–16)
AST SERPL-CCNC: 20 U/L (ref 10–40)
BASOPHILS # BLD AUTO: 0.15 K/UL (ref 0–0.2)
BASOPHILS NFR BLD: 0.9 % (ref 0–1.9)
BILIRUB SERPL-MCNC: 0.4 MG/DL (ref 0.1–1)
BUN SERPL-MCNC: 12 MG/DL (ref 8–23)
CALCIUM SERPL-MCNC: 9.5 MG/DL (ref 8.7–10.5)
CHLORIDE SERPL-SCNC: 104 MMOL/L (ref 95–110)
CO2 SERPL-SCNC: 27 MMOL/L (ref 23–29)
CREAT SERPL-MCNC: 0.9 MG/DL (ref 0.5–1.4)
DIFFERENTIAL METHOD: ABNORMAL
EOSINOPHIL # BLD AUTO: 0.3 K/UL (ref 0–0.5)
EOSINOPHIL NFR BLD: 1.5 % (ref 0–8)
ERYTHROCYTE [DISTWIDTH] IN BLOOD BY AUTOMATED COUNT: 14.6 % (ref 11.5–14.5)
ERYTHROCYTE [SEDIMENTATION RATE] IN BLOOD BY WESTERGREN METHOD: 45 MM/HR (ref 0–20)
EST. GFR  (AFRICAN AMERICAN): >60 ML/MIN/1.73 M^2
EST. GFR  (NON AFRICAN AMERICAN): >60 ML/MIN/1.73 M^2
GLUCOSE SERPL-MCNC: 119 MG/DL (ref 70–110)
HCT VFR BLD AUTO: 45.5 % (ref 37–48.5)
HGB BLD-MCNC: 14.7 G/DL (ref 12–16)
IMM GRANULOCYTES # BLD AUTO: 0.11 K/UL (ref 0–0.04)
IMM GRANULOCYTES NFR BLD AUTO: 0.6 % (ref 0–0.5)
LDH SERPL L TO P-CCNC: 231 U/L (ref 110–260)
LYMPHOCYTES # BLD AUTO: 3.5 K/UL (ref 1–4.8)
LYMPHOCYTES NFR BLD: 20.6 % (ref 18–48)
MCH RBC QN AUTO: 29.9 PG (ref 27–31)
MCHC RBC AUTO-ENTMCNC: 32.3 G/DL (ref 32–36)
MCV RBC AUTO: 93 FL (ref 82–98)
MONOCYTES # BLD AUTO: 1.3 K/UL (ref 0.3–1)
MONOCYTES NFR BLD: 7.7 % (ref 4–15)
NEUTROPHILS # BLD AUTO: 11.7 K/UL (ref 1.8–7.7)
NEUTROPHILS NFR BLD: 68.7 % (ref 38–73)
NRBC BLD-RTO: 0 /100 WBC
PATH REV BLD -IMP: NORMAL
PLATELET # BLD AUTO: 285 K/UL (ref 150–450)
PMV BLD AUTO: 10.4 FL (ref 9.2–12.9)
POTASSIUM SERPL-SCNC: 3.9 MMOL/L (ref 3.5–5.1)
PROT SERPL-MCNC: 7.5 G/DL (ref 6–8.4)
RBC # BLD AUTO: 4.91 M/UL (ref 4–5.4)
SODIUM SERPL-SCNC: 144 MMOL/L (ref 136–145)
WBC # BLD AUTO: 16.98 K/UL (ref 3.9–12.7)

## 2022-04-20 PROCEDURE — 3288F PR FALLS RISK ASSESSMENT DOCUMENTED: ICD-10-PCS | Mod: CPTII,S$GLB,,

## 2022-04-20 PROCEDURE — 1126F PR PAIN SEVERITY QUANTIFIED, NO PAIN PRESENT: ICD-10-PCS | Mod: CPTII,S$GLB,,

## 2022-04-20 PROCEDURE — 85060 PATHOLOGIST REVIEW: ICD-10-PCS | Mod: ,,, | Performed by: PATHOLOGY

## 2022-04-20 PROCEDURE — 99204 PR OFFICE/OUTPT VISIT, NEW, LEVL IV, 45-59 MIN: ICD-10-PCS | Mod: S$GLB,,,

## 2022-04-20 PROCEDURE — 1160F PR REVIEW ALL MEDS BY PRESCRIBER/CLIN PHARMACIST DOCUMENTED: ICD-10-PCS | Mod: CPTII,S$GLB,,

## 2022-04-20 PROCEDURE — 1126F AMNT PAIN NOTED NONE PRSNT: CPT | Mod: CPTII,S$GLB,,

## 2022-04-20 PROCEDURE — 99999 PR PBB SHADOW E&M-EST. PATIENT-LVL V: CPT | Mod: PBBFAC,,,

## 2022-04-20 PROCEDURE — 3075F PR MOST RECENT SYSTOLIC BLOOD PRESS GE 130-139MM HG: ICD-10-PCS | Mod: CPTII,S$GLB,,

## 2022-04-20 PROCEDURE — 1100F PR PT FALLS ASSESS DOC 2+ FALLS/FALL W/INJURY/YR: ICD-10-PCS | Mod: CPTII,S$GLB,,

## 2022-04-20 PROCEDURE — 1159F MED LIST DOCD IN RCRD: CPT | Mod: CPTII,S$GLB,,

## 2022-04-20 PROCEDURE — 3066F PR DOCUMENTATION OF TREATMENT FOR NEPHROPATHY: ICD-10-PCS | Mod: CPTII,S$GLB,,

## 2022-04-20 PROCEDURE — 85060 BLOOD SMEAR INTERPRETATION: CPT | Mod: ,,, | Performed by: PATHOLOGY

## 2022-04-20 PROCEDURE — 1159F PR MEDICATION LIST DOCUMENTED IN MEDICAL RECORD: ICD-10-PCS | Mod: CPTII,S$GLB,,

## 2022-04-20 PROCEDURE — 3051F HG A1C>EQUAL 7.0%<8.0%: CPT | Mod: CPTII,S$GLB,,

## 2022-04-20 PROCEDURE — 3078F DIAST BP <80 MM HG: CPT | Mod: CPTII,S$GLB,,

## 2022-04-20 PROCEDURE — 99999 PR PBB SHADOW E&M-EST. PATIENT-LVL V: ICD-10-PCS | Mod: PBBFAC,,,

## 2022-04-20 PROCEDURE — 36415 COLL VENOUS BLD VENIPUNCTURE: CPT

## 2022-04-20 PROCEDURE — 3066F NEPHROPATHY DOC TX: CPT | Mod: CPTII,S$GLB,,

## 2022-04-20 PROCEDURE — 3008F BODY MASS INDEX DOCD: CPT | Mod: CPTII,S$GLB,,

## 2022-04-20 PROCEDURE — 85652 RBC SED RATE AUTOMATED: CPT

## 2022-04-20 PROCEDURE — 4010F ACE/ARB THERAPY RXD/TAKEN: CPT | Mod: CPTII,S$GLB,,

## 2022-04-20 PROCEDURE — 99204 OFFICE O/P NEW MOD 45 MIN: CPT | Mod: S$GLB,,,

## 2022-04-20 PROCEDURE — 3060F POS MICROALBUMINURIA REV: CPT | Mod: CPTII,S$GLB,,

## 2022-04-20 PROCEDURE — 85025 COMPLETE CBC W/AUTO DIFF WBC: CPT

## 2022-04-20 PROCEDURE — 3008F PR BODY MASS INDEX (BMI) DOCUMENTED: ICD-10-PCS | Mod: CPTII,S$GLB,,

## 2022-04-20 PROCEDURE — 1160F RVW MEDS BY RX/DR IN RCRD: CPT | Mod: CPTII,S$GLB,,

## 2022-04-20 PROCEDURE — 4010F PR ACE/ARB THEARPY RXD/TAKEN: ICD-10-PCS | Mod: CPTII,S$GLB,,

## 2022-04-20 PROCEDURE — 3078F PR MOST RECENT DIASTOLIC BLOOD PRESSURE < 80 MM HG: ICD-10-PCS | Mod: CPTII,S$GLB,,

## 2022-04-20 PROCEDURE — 83615 LACTATE (LD) (LDH) ENZYME: CPT

## 2022-04-20 PROCEDURE — 3075F SYST BP GE 130 - 139MM HG: CPT | Mod: CPTII,S$GLB,,

## 2022-04-20 PROCEDURE — 3288F FALL RISK ASSESSMENT DOCD: CPT | Mod: CPTII,S$GLB,,

## 2022-04-20 PROCEDURE — 80053 COMPREHEN METABOLIC PANEL: CPT

## 2022-04-20 PROCEDURE — 3060F PR POS MICROALBUMINURIA RESULT DOCUMENTED/REVIEW: ICD-10-PCS | Mod: CPTII,S$GLB,,

## 2022-04-20 PROCEDURE — 3051F PR MOST RECENT HEMOGLOBIN A1C LEVEL 7.0 - < 8.0%: ICD-10-PCS | Mod: CPTII,S$GLB,,

## 2022-04-20 PROCEDURE — 1100F PTFALLS ASSESS-DOCD GE2>/YR: CPT | Mod: CPTII,S$GLB,,

## 2022-04-21 LAB — PATH REV BLD -IMP: NORMAL

## 2022-05-02 DIAGNOSIS — E11.9 TYPE 2 DIABETES MELLITUS WITHOUT COMPLICATION, WITH LONG-TERM CURRENT USE OF INSULIN: ICD-10-CM

## 2022-05-02 DIAGNOSIS — K21.9 GASTROESOPHAGEAL REFLUX DISEASE, UNSPECIFIED WHETHER ESOPHAGITIS PRESENT: ICD-10-CM

## 2022-05-02 DIAGNOSIS — Z79.4 TYPE 2 DIABETES MELLITUS WITHOUT COMPLICATION, WITH LONG-TERM CURRENT USE OF INSULIN: ICD-10-CM

## 2022-05-02 NOTE — TELEPHONE ENCOUNTER
No new care gaps identified.  Powered by [employer]. Reference number: [id number].   5/02/2022 6:25:33 AM CDT

## 2022-05-02 NOTE — TELEPHONE ENCOUNTER
Refill Routing Note   Medication(s) are not appropriate for processing by [facility] for the following reason(s):      - Patient has been seen in the ED/Hospital since the last PCP visit    [facility] action(s):  Defer          Medication reconciliation completed: No     Appointments  past 12m or future 3m with PCP    Date Provider   Last Visit   1/11/2022 Jaime William MD   Next Visit   6/23/2022 Jaime William MD   ED visits in past 90 days: 0        Note composed:5:28 PM 05/02/2022

## 2022-05-03 RX ORDER — FUROSEMIDE 40 MG/1
TABLET ORAL
Qty: 90 TABLET | Refills: 3 | Status: SHIPPED | OUTPATIENT
Start: 2022-05-03 | End: 2023-05-10

## 2022-05-03 RX ORDER — POTASSIUM CHLORIDE 750 MG/1
CAPSULE, EXTENDED RELEASE ORAL
Qty: 90 CAPSULE | Refills: 3 | Status: SHIPPED | OUTPATIENT
Start: 2022-05-03 | End: 2023-05-10

## 2022-05-04 NOTE — PROGRESS NOTES
Section of Hematology and Oncology  New Patient Consult     Referred by:  No ref. provider found  Date of visit: 5/5/22    Reason for visit: Leukocytosis, unspecified type [D72.829]    History of Present Ilness:   Magdalena Delgado) is a pleasant 67 y.o.female COPD, FOZIA awaiting CPAP, HTN, HLN, DMII, obesity, psoriasis, here today for leukocytosis.    She reports a longstanding hx of seasonal allergies. She denies recent infections. She reports cataract surgery in January and deviated septum surgery in February. She denies steroid use. She denies smoking. Record reveals former smoker, quit in 2016. She denies splenectomy. She denies recent stressors. She denies vigorous exercise. She denies fever, chills, fatigue, early satiety, weight loss, lymphadenopathy. She reports night sweats once a week to once every other week over the past couple of years. She denies drenching night sweats.    Interval History:  She reports feeling well, denies any recent changes.  She reports NDIAYE, she reports it is stable and unchanged.  She explicitly denies B symptoms.    PAST MEDICAL HISTORY:   Past Medical History:   Diagnosis Date    Arthritis     Chronic bronchitis     COPD (chronic obstructive pulmonary disease)     Diabetes mellitus     GERD (gastroesophageal reflux disease)     Hyperlipidemia     Hypertension     Pulmonary nodule     Sleep apnea        PAST SURGICAL HISTORY:   Past Surgical History:   Procedure Laterality Date    CHOLECYSTECTOMY      COLONOSCOPY N/A 7/20/2020    Procedure: COLONOSCOPY;  Surgeon: Anny Acevedo MD;  Location: [facility];  Service: Endoscopy;  Laterality: N/A;    COLONOSCOPY W/ BIOPSIES  07/20/2020    colonoscopy w/ biopsy per  7/20/2020    NASAL SEPTOPLASTY Bilateral 2/8/2022    Procedure: SEPTOPLASTY, NOSE;  Surgeon: Karri Berrios MD;  Location: [facility] OR;  Service: ENT;  Laterality: Bilateral;  DR. HUNT CASE IS 1 HOUR    sinoplasty      TUBAL LIGATION          PAST SOCIAL HISTORY:  Social History     Tobacco Use    Smoking status: Former Smoker     Packs/day: 2.00     Years: 45.00     Pack years: 90.00     Types: Cigarettes     Start date: 1972     Quit date: 2016     Years since quittin.6    Smokeless tobacco: Never Used   Substance Use Topics    Alcohol use: No    Drug use: No       FAMILY HISTORY:  Family History   Problem Relation Age of Onset    Cancer Mother     Diabetes Mother     Heart disease Father     Cancer Sister     Diabetes Sister     Cancer Sister     Breast cancer Sister        CURRENT MEDICATIONS:   Current Outpatient Medications   Medication Sig    acetaminophen (TYLENOL) 650 MG TbSR Take 1 tablet (650 mg total) by mouth every 8 (eight) hours as needed (pain).    albuterol (PROAIR HFA) 90 mcg/actuation inhaler Inhale 2 puffs into the lungs every 4 (four) hours as needed for Wheezing or Shortness of Breath. Rescue    blood sugar diagnostic (TRUE METRIX GLUCOSE TEST STRIP) Strp 1 strip by Misc.(Non-Drug; Combo Route) route once daily.    blood sugar diagnostic Strp To check BG 2 times daily, to use with insurance preferred meter    BREZTRI AEROSPHERE 160-9-4.8 mcg/actuation HFAA 2 (two) times a day.    diclofenac (VOLTAREN) 75 MG EC tablet TAKE ONE TABLET BY MOUTH TWICE A DAY    fexofenadine-pseudoephedrine (ALLEGRA-D 24) 180-240 mg per 24 hr tablet Take 1 tablet by mouth once daily.    fluticasone propionate (FLONASE) 50 mcg/actuation nasal spray 2 sprays (100 mcg total) by Each Nostril route once daily. (Patient taking differently: 2 sprays by Each Nostril route once daily. Pt uses PRN)    furosemide (LASIX) 40 MG tablet TAKE 1 TABLET BY MOUTH IN THE MORNING AS NEEDED ANKLE WITH EDEMA ONLY    gabapentin (NEURONTIN) 300 MG capsule TAKE 1 CAPSULE BY MOUTH TWICE DAILY AS NEEDED FOR RESTLESS LEG SYNDROME MAY INCREASE TO 3 TIMES DAILY    glipiZIDE (GLUCOTROL) 10 MG tablet TAKE ONE TABLET BY MOUTH ONCE DAILY WITH  BREAKFAST    ibuprofen (ADVIL,MOTRIN) 600 MG tablet Take 1 tablet (600 mg total) by mouth every 8 (eight) hours as needed for Pain.    lancets 33 gauge Misc 1 lancet by Misc.(Non-Drug; Combo Route) route once daily.    lancets Misc To check BG 2 times daily, to use with insurance preferred meter    lisinopriL (PRINIVIL,ZESTRIL) 40 MG tablet Take 1 tablet (40 mg total) by mouth once daily.    potassium chloride (MICRO-K) 10 MEQ CpSR TAKE 1 CAPSULE BY MOUTH IN THE MORNING WITH LASIX ONLY    sertraline (ZOLOFT) 25 MG tablet TAKE 1 TABLET (25 MG TOTAL) BY MOUTH ONCE DAILY.    tiZANidine (ZANAFLEX) 4 MG tablet TAKE ONE TABLET BY MOUTH EVERY NIGHT AT BEDTIME AS NEEDED FOR MUSCLE SPASMS    atorvastatin (LIPITOR) 20 MG tablet Take 1 tablet (20 mg total) by mouth every evening.    blood-glucose meter (TRUE METRIX AIR GLUCOSE METER) kit Use as instructed     No current facility-administered medications for this visit.       ALLERGIES:   Review of patient's allergies indicates:   Allergen Reactions    Augmentin [amoxicillin-pot clavulanate] Hives and Rash       Review of Systems:     Review of Systems   Constitutional: Negative for chills, diaphoresis, fever, malaise/fatigue and weight loss.   Respiratory: Positive for shortness of breath (NDIAYE). Negative for cough.    Cardiovascular: Positive for leg swelling. Negative for chest pain.   Gastrointestinal: Negative for abdominal pain, constipation and diarrhea.   Skin: Positive for rash.         Physical Exam:     Vitals:    05/05/22 0917   BP: (!) 141/69   Pulse: 88   Temp: 98.4 °F (36.9 °C)       Physical Exam  Constitutional:       General: She is not in acute distress.     Appearance: Normal appearance. She is not ill-appearing or diaphoretic.   HENT:      Head: Normocephalic and atraumatic.   Eyes:      Extraocular Movements: Extraocular movements intact.      Conjunctiva/sclera: Conjunctivae normal.      Pupils: Pupils are equal, round, and reactive to light.    Cardiovascular:      Rate and Rhythm: Normal rate and regular rhythm.      Heart sounds: Normal heart sounds.   Pulmonary:      Effort: Pulmonary effort is normal.      Breath sounds: Normal breath sounds.   Chest:   Breasts:      Right: No supraclavicular adenopathy.      Left: No supraclavicular adenopathy.       Abdominal:      General: Abdomen is flat. Bowel sounds are normal. There is no distension.      Palpations: Abdomen is soft.      Tenderness: There is no abdominal tenderness. There is no guarding.   Musculoskeletal:      Right lower leg: Edema present.      Left lower leg: Edema present.   Lymphadenopathy:      Head:      Right side of head: No submental, submandibular or tonsillar adenopathy.      Left side of head: No submental, submandibular or tonsillar adenopathy.      Cervical: No cervical adenopathy.      Right cervical: No superficial, deep or posterior cervical adenopathy.     Left cervical: No superficial, deep or posterior cervical adenopathy.      Upper Body:      Right upper body: No supraclavicular adenopathy.      Left upper body: No supraclavicular adenopathy.   Skin:     General: Skin is warm and dry.      Findings: Rash present. Rash is scaling.      Comments: Scaling noted to bilateral upper and lower extremities   Neurological:      General: No focal deficit present.      Mental Status: She is alert and oriented to person, place, and time. Mental status is at baseline.   Psychiatric:         Mood and Affect: Mood normal.         Thought Content: Thought content normal.         Judgment: Judgment normal.          Labs:   Lab Results   Component Value Date    WBC 16.98 (H) 04/20/2022    HGB 14.7 04/20/2022    HCT 45.5 04/20/2022    MCV 93 04/20/2022     04/20/2022       Lab Results   Component Value Date     04/20/2022    K 3.9 04/20/2022     04/20/2022    CO2 27 04/20/2022    BUN 12 04/20/2022    CREATININE 0.9 04/20/2022    ALBUMIN 4.0 04/20/2022    BILITOT 0.4  04/20/2022    ALKPHOS 124 04/20/2022    AST 20 04/20/2022    ALT 33 04/20/2022       No results found for: IRON, TIBC, FERRITIN    No components found for: RETICULOCYTES    Lab Results   Component Value Date     04/20/2022     Smear  Electronically reviewed and signed by:   Reyes De Leon M.D.   Signed on 04/21/22 at 14:04   Absolute neutrophilia within neutrophilic left shift, absolute   monocytosis.  Lymphocytes are present in normal numbers with many   reactive lymphocytes identified.  Findings generally are smooth   suggestive of a reactive process but require clinical correlation.       Normal hemoglobin, red count, and erythrocyte morphology with normal   polychromasia.     Platelets are normal in number and exhibit normal morphology.       Correlation with clinical and other laboratory findings is necessary   for complete interpretation.       SUSANNAH De Leon M.D.     Assessment and Plan:   Magdalena Acevedo (Magdalena) is a pleasant 67 y.o.female here today for leukocytosis.    1. Leukocytosis, unspecified type      1. Likely reactive given dating back to 2018 with multiple co-morbidities. No eosinophilia, no basophilia noted. No other cell line abnormalities. Smear suggests reactive process. LDH WNL. ESR 45. Will obtain BCR ABL mutation. Discussed with patient BMBx given leukocytosis. Pt declines at this time.      Orders/Follow Up:      Orders Placed This Encounter    BCR/ABL Mutation, ASPE. Blood     Will call pt with results. Follow up 4 months.    Patient is in agreement with the proposed treatment plan. All questions were answered to the patient's satisfaction. Patient knows to call clinic for any new or worsening symptoms and if anything is needed before the next clinic visit.    Barbara Galloway NP  Hematology/ Oncology

## 2022-05-05 ENCOUNTER — OFFICE VISIT (OUTPATIENT)
Dept: HEMATOLOGY/ONCOLOGY | Facility: CLINIC | Age: 67
End: 2022-05-05
Payer: MEDICARE

## 2022-05-05 VITALS
TEMPERATURE: 98 F | HEIGHT: 62 IN | OXYGEN SATURATION: 96 % | WEIGHT: 235.25 LBS | BODY MASS INDEX: 43.29 KG/M2 | DIASTOLIC BLOOD PRESSURE: 69 MMHG | HEART RATE: 88 BPM | SYSTOLIC BLOOD PRESSURE: 141 MMHG

## 2022-05-05 DIAGNOSIS — D72.829 LEUKOCYTOSIS, UNSPECIFIED TYPE: Primary | ICD-10-CM

## 2022-05-05 PROCEDURE — 3288F PR FALLS RISK ASSESSMENT DOCUMENTED: ICD-10-PCS | Mod: CPTII,S$GLB,,

## 2022-05-05 PROCEDURE — 3077F SYST BP >= 140 MM HG: CPT | Mod: CPTII,S$GLB,,

## 2022-05-05 PROCEDURE — 3051F PR MOST RECENT HEMOGLOBIN A1C LEVEL 7.0 - < 8.0%: ICD-10-PCS | Mod: CPTII,S$GLB,,

## 2022-05-05 PROCEDURE — 1160F RVW MEDS BY RX/DR IN RCRD: CPT | Mod: CPTII,S$GLB,,

## 2022-05-05 PROCEDURE — 3078F PR MOST RECENT DIASTOLIC BLOOD PRESSURE < 80 MM HG: ICD-10-PCS | Mod: CPTII,S$GLB,,

## 2022-05-05 PROCEDURE — 3060F PR POS MICROALBUMINURIA RESULT DOCUMENTED/REVIEW: ICD-10-PCS | Mod: CPTII,S$GLB,,

## 2022-05-05 PROCEDURE — 3051F HG A1C>EQUAL 7.0%<8.0%: CPT | Mod: CPTII,S$GLB,,

## 2022-05-05 PROCEDURE — 99999 PR PBB SHADOW E&M-EST. PATIENT-LVL IV: CPT | Mod: PBBFAC,,,

## 2022-05-05 PROCEDURE — 3066F PR DOCUMENTATION OF TREATMENT FOR NEPHROPATHY: ICD-10-PCS | Mod: CPTII,S$GLB,,

## 2022-05-05 PROCEDURE — 1101F PT FALLS ASSESS-DOCD LE1/YR: CPT | Mod: CPTII,S$GLB,,

## 2022-05-05 PROCEDURE — 4010F PR ACE/ARB THEARPY RXD/TAKEN: ICD-10-PCS | Mod: CPTII,S$GLB,,

## 2022-05-05 PROCEDURE — 1160F PR REVIEW ALL MEDS BY PRESCRIBER/CLIN PHARMACIST DOCUMENTED: ICD-10-PCS | Mod: CPTII,S$GLB,,

## 2022-05-05 PROCEDURE — 3077F PR MOST RECENT SYSTOLIC BLOOD PRESSURE >= 140 MM HG: ICD-10-PCS | Mod: CPTII,S$GLB,,

## 2022-05-05 PROCEDURE — 1101F PR PT FALLS ASSESS DOC 0-1 FALLS W/OUT INJ PAST YR: ICD-10-PCS | Mod: CPTII,S$GLB,,

## 2022-05-05 PROCEDURE — 1126F AMNT PAIN NOTED NONE PRSNT: CPT | Mod: CPTII,S$GLB,,

## 2022-05-05 PROCEDURE — 3066F NEPHROPATHY DOC TX: CPT | Mod: CPTII,S$GLB,,

## 2022-05-05 PROCEDURE — 4010F ACE/ARB THERAPY RXD/TAKEN: CPT | Mod: CPTII,S$GLB,,

## 2022-05-05 PROCEDURE — 1159F MED LIST DOCD IN RCRD: CPT | Mod: CPTII,S$GLB,,

## 2022-05-05 PROCEDURE — 1159F PR MEDICATION LIST DOCUMENTED IN MEDICAL RECORD: ICD-10-PCS | Mod: CPTII,S$GLB,,

## 2022-05-05 PROCEDURE — 99999 PR PBB SHADOW E&M-EST. PATIENT-LVL IV: ICD-10-PCS | Mod: PBBFAC,,,

## 2022-05-05 PROCEDURE — 3008F PR BODY MASS INDEX (BMI) DOCUMENTED: ICD-10-PCS | Mod: CPTII,S$GLB,,

## 2022-05-05 PROCEDURE — 99213 PR OFFICE/OUTPT VISIT, EST, LEVL III, 20-29 MIN: ICD-10-PCS | Mod: S$GLB,,,

## 2022-05-05 PROCEDURE — 3078F DIAST BP <80 MM HG: CPT | Mod: CPTII,S$GLB,,

## 2022-05-05 PROCEDURE — 99213 OFFICE O/P EST LOW 20 MIN: CPT | Mod: S$GLB,,,

## 2022-05-05 PROCEDURE — 1126F PR PAIN SEVERITY QUANTIFIED, NO PAIN PRESENT: ICD-10-PCS | Mod: CPTII,S$GLB,,

## 2022-05-05 PROCEDURE — 3060F POS MICROALBUMINURIA REV: CPT | Mod: CPTII,S$GLB,,

## 2022-05-05 PROCEDURE — 3288F FALL RISK ASSESSMENT DOCD: CPT | Mod: CPTII,S$GLB,,

## 2022-05-05 PROCEDURE — 3008F BODY MASS INDEX DOCD: CPT | Mod: CPTII,S$GLB,,

## 2022-05-06 ENCOUNTER — TELEPHONE (OUTPATIENT)
Dept: HEMATOLOGY/ONCOLOGY | Facility: CLINIC | Age: 67
End: 2022-05-06
Payer: MEDICARE

## 2022-05-06 NOTE — TELEPHONE ENCOUNTER
----- Message from Barbara Galloway NP sent at 5/5/2022  3:22 PM CDT -----  Yes, please. Cbc, cmp, esr. Thanks!    ----- Message -----  From: Jenny Camarillo MA  Sent: 5/5/2022   2:38 PM CDT  To: Barbara Galloway NP    Will the patient need labs done prior?    Jenny    ----- Message -----  From: Barbara Galloway NP  Sent: 5/5/2022   1:07 PM CDT  To: Health system Hem Onc Clinical Staff    Follow up 4 months.

## 2022-05-06 NOTE — TELEPHONE ENCOUNTER
----- Message from Barbara Galloway NP sent at 5/6/2022  8:46 AM CDT -----  Thank you!  ----- Message -----  From: Jenny Camarillo MA  Sent: 5/6/2022   8:32 AM CDT  To: Barbara Galloway NP    FYI: Bcr test was done yesterday.    ----- Message -----  From: Barbara Galloway NP  Sent: 5/5/2022   3:24 PM CDT  To: Jenny Camarillo MA    Yes, please. Cbc, cmp, esr. Thanks!    ----- Message -----  From: Jenny Camarillo MA  Sent: 5/5/2022   2:38 PM CDT  To: Barbara Galloway NP    Will the patient need labs done prior?    Jenny    ----- Message -----  From: Barbara Galloway NP  Sent: 5/5/2022   1:07 PM CDT  To: [facility] Hem Onc Clinical Staff    Follow up 4 months.

## 2022-05-09 ENCOUNTER — PATIENT OUTREACH (OUTPATIENT)
Dept: ADMINISTRATIVE | Facility: OTHER | Age: 67
End: 2022-05-09
Payer: MEDICARE

## 2022-05-09 NOTE — PROGRESS NOTES
Health Maintenance Due   Topic Date Due    Low Dose Statin  Never done    Shingles Vaccine (1 of 2) Never done    COVID-19 Vaccine (4 - Booster for Pfizer series) 04/01/2022    Foot Exam  04/05/2022     Chart was reviewed for overdue Proactive [facility] Encounters ([facility]) topics (CRS, Breast Cancer Screening, Eye exam)  Health Maintenance has been updated.  LINKS immunization registry triggered.  Immunizations were reconciled.

## 2022-05-11 ENCOUNTER — OFFICE VISIT (OUTPATIENT)
Dept: CARDIOLOGY | Facility: CLINIC | Age: 67
End: 2022-05-11
Payer: MEDICARE

## 2022-05-11 VITALS
DIASTOLIC BLOOD PRESSURE: 65 MMHG | SYSTOLIC BLOOD PRESSURE: 138 MMHG | HEIGHT: 62 IN | BODY MASS INDEX: 42.96 KG/M2 | WEIGHT: 233.44 LBS | HEART RATE: 89 BPM | OXYGEN SATURATION: 95 %

## 2022-05-11 DIAGNOSIS — R06.09 DOE (DYSPNEA ON EXERTION): ICD-10-CM

## 2022-05-11 DIAGNOSIS — E66.01 MORBID OBESITY WITH BMI OF 40.0-44.9, ADULT: ICD-10-CM

## 2022-05-11 DIAGNOSIS — I25.10 CORONARY ARTERY DISEASE INVOLVING NATIVE CORONARY ARTERY OF NATIVE HEART WITHOUT ANGINA PECTORIS: ICD-10-CM

## 2022-05-11 DIAGNOSIS — J42 CHRONIC BRONCHITIS, UNSPECIFIED CHRONIC BRONCHITIS TYPE: ICD-10-CM

## 2022-05-11 DIAGNOSIS — R25.1 TREMOR: ICD-10-CM

## 2022-05-11 DIAGNOSIS — E11.65 TYPE 2 DIABETES MELLITUS WITH HYPERGLYCEMIA, WITHOUT LONG-TERM CURRENT USE OF INSULIN: ICD-10-CM

## 2022-05-11 DIAGNOSIS — I70.0 THORACIC AORTIC ATHEROSCLEROSIS: ICD-10-CM

## 2022-05-11 DIAGNOSIS — R60.0 LOCALIZED EDEMA: ICD-10-CM

## 2022-05-11 DIAGNOSIS — Z87.891 HISTORY OF SMOKING GREATER THAN 50 PACK YEARS: ICD-10-CM

## 2022-05-11 DIAGNOSIS — E78.2 MIXED HYPERLIPIDEMIA: ICD-10-CM

## 2022-05-11 DIAGNOSIS — R93.89 ABNORMAL CHEST CT: ICD-10-CM

## 2022-05-11 DIAGNOSIS — I10 PRIMARY HYPERTENSION: Primary | ICD-10-CM

## 2022-05-11 PROCEDURE — 3008F BODY MASS INDEX DOCD: CPT | Mod: CPTII,S$GLB,, | Performed by: INTERNAL MEDICINE

## 2022-05-11 PROCEDURE — 1159F MED LIST DOCD IN RCRD: CPT | Mod: CPTII,S$GLB,, | Performed by: INTERNAL MEDICINE

## 2022-05-11 PROCEDURE — 3060F POS MICROALBUMINURIA REV: CPT | Mod: CPTII,S$GLB,, | Performed by: INTERNAL MEDICINE

## 2022-05-11 PROCEDURE — 99999 PR PBB SHADOW E&M-EST. PATIENT-LVL III: ICD-10-PCS | Mod: PBBFAC,,, | Performed by: INTERNAL MEDICINE

## 2022-05-11 PROCEDURE — 3051F PR MOST RECENT HEMOGLOBIN A1C LEVEL 7.0 - < 8.0%: ICD-10-PCS | Mod: CPTII,S$GLB,, | Performed by: INTERNAL MEDICINE

## 2022-05-11 PROCEDURE — 1126F AMNT PAIN NOTED NONE PRSNT: CPT | Mod: CPTII,S$GLB,, | Performed by: INTERNAL MEDICINE

## 2022-05-11 PROCEDURE — 1160F PR REVIEW ALL MEDS BY PRESCRIBER/CLIN PHARMACIST DOCUMENTED: ICD-10-PCS | Mod: CPTII,S$GLB,, | Performed by: INTERNAL MEDICINE

## 2022-05-11 PROCEDURE — 1126F PR PAIN SEVERITY QUANTIFIED, NO PAIN PRESENT: ICD-10-PCS | Mod: CPTII,S$GLB,, | Performed by: INTERNAL MEDICINE

## 2022-05-11 PROCEDURE — 3060F PR POS MICROALBUMINURIA RESULT DOCUMENTED/REVIEW: ICD-10-PCS | Mod: CPTII,S$GLB,, | Performed by: INTERNAL MEDICINE

## 2022-05-11 PROCEDURE — 1100F PR PT FALLS ASSESS DOC 2+ FALLS/FALL W/INJURY/YR: ICD-10-PCS | Mod: CPTII,S$GLB,, | Performed by: INTERNAL MEDICINE

## 2022-05-11 PROCEDURE — 99999 PR PBB SHADOW E&M-EST. PATIENT-LVL III: CPT | Mod: PBBFAC,,, | Performed by: INTERNAL MEDICINE

## 2022-05-11 PROCEDURE — 1160F RVW MEDS BY RX/DR IN RCRD: CPT | Mod: CPTII,S$GLB,, | Performed by: INTERNAL MEDICINE

## 2022-05-11 PROCEDURE — 3078F PR MOST RECENT DIASTOLIC BLOOD PRESSURE < 80 MM HG: ICD-10-PCS | Mod: CPTII,S$GLB,, | Performed by: INTERNAL MEDICINE

## 2022-05-11 PROCEDURE — 4010F ACE/ARB THERAPY RXD/TAKEN: CPT | Mod: CPTII,S$GLB,, | Performed by: INTERNAL MEDICINE

## 2022-05-11 PROCEDURE — 3075F PR MOST RECENT SYSTOLIC BLOOD PRESS GE 130-139MM HG: ICD-10-PCS | Mod: CPTII,S$GLB,, | Performed by: INTERNAL MEDICINE

## 2022-05-11 PROCEDURE — 99213 OFFICE O/P EST LOW 20 MIN: CPT | Mod: S$GLB,,, | Performed by: INTERNAL MEDICINE

## 2022-05-11 PROCEDURE — 99499 RISK ADDL DX/OHS AUDIT: ICD-10-PCS | Mod: S$GLB,,, | Performed by: INTERNAL MEDICINE

## 2022-05-11 PROCEDURE — 3008F PR BODY MASS INDEX (BMI) DOCUMENTED: ICD-10-PCS | Mod: CPTII,S$GLB,, | Performed by: INTERNAL MEDICINE

## 2022-05-11 PROCEDURE — 4010F PR ACE/ARB THEARPY RXD/TAKEN: ICD-10-PCS | Mod: CPTII,S$GLB,, | Performed by: INTERNAL MEDICINE

## 2022-05-11 PROCEDURE — 3078F DIAST BP <80 MM HG: CPT | Mod: CPTII,S$GLB,, | Performed by: INTERNAL MEDICINE

## 2022-05-11 PROCEDURE — 3066F PR DOCUMENTATION OF TREATMENT FOR NEPHROPATHY: ICD-10-PCS | Mod: CPTII,S$GLB,, | Performed by: INTERNAL MEDICINE

## 2022-05-11 PROCEDURE — 3066F NEPHROPATHY DOC TX: CPT | Mod: CPTII,S$GLB,, | Performed by: INTERNAL MEDICINE

## 2022-05-11 PROCEDURE — 3051F HG A1C>EQUAL 7.0%<8.0%: CPT | Mod: CPTII,S$GLB,, | Performed by: INTERNAL MEDICINE

## 2022-05-11 PROCEDURE — 3075F SYST BP GE 130 - 139MM HG: CPT | Mod: CPTII,S$GLB,, | Performed by: INTERNAL MEDICINE

## 2022-05-11 PROCEDURE — 3288F PR FALLS RISK ASSESSMENT DOCUMENTED: ICD-10-PCS | Mod: CPTII,S$GLB,, | Performed by: INTERNAL MEDICINE

## 2022-05-11 PROCEDURE — 99499 UNLISTED E&M SERVICE: CPT | Mod: S$GLB,,, | Performed by: INTERNAL MEDICINE

## 2022-05-11 PROCEDURE — 1159F PR MEDICATION LIST DOCUMENTED IN MEDICAL RECORD: ICD-10-PCS | Mod: CPTII,S$GLB,, | Performed by: INTERNAL MEDICINE

## 2022-05-11 PROCEDURE — 1100F PTFALLS ASSESS-DOCD GE2>/YR: CPT | Mod: CPTII,S$GLB,, | Performed by: INTERNAL MEDICINE

## 2022-05-11 PROCEDURE — 99213 PR OFFICE/OUTPT VISIT, EST, LEVL III, 20-29 MIN: ICD-10-PCS | Mod: S$GLB,,, | Performed by: INTERNAL MEDICINE

## 2022-05-11 PROCEDURE — 3288F FALL RISK ASSESSMENT DOCD: CPT | Mod: CPTII,S$GLB,, | Performed by: INTERNAL MEDICINE

## 2022-05-11 NOTE — PROGRESS NOTES
Subjective:      Patient ID: Magdalena Acevedo is a 67 y.o. female.    Chief Complaint: Follow-up (Medication changes)    HPI:  Pt is feeling well.    Still short of breath walking less than a block.    Feet are much less swollen after discontinuing the amlodipine and after switching from NSAID's to Tylenol and after reducing the dose of gabapentin for restless legs.        Review of Systems   Cardiovascular: Positive for dyspnea on exertion (chronic stable) and leg swelling (much improved). Negative for chest pain, claudication, irregular heartbeat, near-syncope, orthopnea, palpitations and syncope.        Past Medical History:   Diagnosis Date    Arthritis     Chronic bronchitis     COPD (chronic obstructive pulmonary disease)     Coronary artery disease involving native coronary artery of native heart without angina pectoris 5/11/2022    Diabetes mellitus     GERD (gastroesophageal reflux disease)     Hyperlipidemia     Hypertension     Pulmonary nodule     Sleep apnea         Past Surgical History:   Procedure Laterality Date    CHOLECYSTECTOMY      COLONOSCOPY N/A 7/20/2020    Procedure: COLONOSCOPY;  Surgeon: Anny Acevedo MD;  Location: [facility];  Service: Endoscopy;  Laterality: N/A;    COLONOSCOPY W/ BIOPSIES  07/20/2020    colonoscopy w/ biopsy per  7/20/2020    NASAL SEPTOPLASTY Bilateral 2/8/2022    Procedure: SEPTOPLASTY, NOSE;  Surgeon: Karri Berrios MD;  Location: [facility] OR;  Service: ENT;  Laterality: Bilateral;   SAID CASE IS 1 HOUR    sinoplasty      TUBAL LIGATION         Family History   Problem Relation Age of Onset    Cancer Mother     Diabetes Mother     Heart disease Father     Cancer Sister     Diabetes Sister     Cancer Sister     Breast cancer Sister        Social History     Socioeconomic History    Marital status:    Tobacco Use    Smoking status: Former Smoker     Packs/day: 2.00     Years: 45.00     Pack years: 90.00     Types:  Cigarettes     Start date: 1972     Quit date: 2016     Years since quittin.6    Smokeless tobacco: Never Used   Substance and Sexual Activity    Alcohol use: No    Drug use: No    Sexual activity: Yes       Current Outpatient Medications on File Prior to Visit   Medication Sig Dispense Refill    acetaminophen (TYLENOL) 650 MG TbSR Take 1 tablet (650 mg total) by mouth every 8 (eight) hours as needed (pain). 30 tablet 0    albuterol (PROAIR HFA) 90 mcg/actuation inhaler Inhale 2 puffs into the lungs every 4 (four) hours as needed for Wheezing or Shortness of Breath. Rescue 18 g 5    atorvastatin (LIPITOR) 20 MG tablet Take 1 tablet (20 mg total) by mouth every evening. 90 tablet 3    blood sugar diagnostic (TRUE METRIX GLUCOSE TEST STRIP) Strp 1 strip by Misc.(Non-Drug; Combo Route) route once daily. 100 strip 3    blood sugar diagnostic Strp To check BG 2 times daily, to use with insurance preferred meter 100 strip 11    blood-glucose meter (TRUE METRIX AIR GLUCOSE METER) kit Use as instructed 1 each 0    BREZTRI AEROSPHERE 160-9-4.8 mcg/actuation HFAA 2 (two) times a day.      fexofenadine-pseudoephedrine (ALLEGRA-D 24) 180-240 mg per 24 hr tablet Take 1 tablet by mouth once daily.      fluticasone propionate (FLONASE) 50 mcg/actuation nasal spray 2 sprays (100 mcg total) by Each Nostril route once daily. (Patient taking differently: 2 sprays by Each Nostril route once daily. Pt uses PRN) 16 g 5    furosemide (LASIX) 40 MG tablet TAKE 1 TABLET BY MOUTH IN THE MORNING AS NEEDED ANKLE WITH EDEMA ONLY 90 tablet 3    gabapentin (NEURONTIN) 300 MG capsule TAKE 1 CAPSULE BY MOUTH TWICE DAILY AS NEEDED FOR RESTLESS LEG SYNDROME MAY INCREASE TO 3 TIMES DAILY 90 capsule 2    glipiZIDE (GLUCOTROL) 10 MG tablet TAKE ONE TABLET BY MOUTH ONCE DAILY WITH BREAKFAST 90 tablet 0    lancets 33 gauge Misc 1 lancet by Misc.(Non-Drug; Combo Route) route once daily. 100 each 3    lancets Misc To check BG  "2 times daily, to use with insurance preferred meter 100 each 11    lisinopriL (PRINIVIL,ZESTRIL) 40 MG tablet Take 1 tablet (40 mg total) by mouth once daily. 90 tablet 3    potassium chloride (MICRO-K) 10 MEQ CpSR TAKE 1 CAPSULE BY MOUTH IN THE MORNING WITH LASIX ONLY 90 capsule 3    sertraline (ZOLOFT) 25 MG tablet TAKE 1 TABLET (25 MG TOTAL) BY MOUTH ONCE DAILY. 90 tablet 4    [DISCONTINUED] diclofenac (VOLTAREN) 75 MG EC tablet TAKE ONE TABLET BY MOUTH TWICE A DAY (Patient not taking: Reported on 5/11/2022) 180 tablet 1    [DISCONTINUED] ibuprofen (ADVIL,MOTRIN) 600 MG tablet Take 1 tablet (600 mg total) by mouth every 8 (eight) hours as needed for Pain. (Patient not taking: Reported on 5/11/2022) 30 tablet 0    [DISCONTINUED] tiZANidine (ZANAFLEX) 4 MG tablet TAKE ONE TABLET BY MOUTH EVERY NIGHT AT BEDTIME AS NEEDED FOR MUSCLE SPASMS (Patient not taking: Reported on 5/11/2022) 30 tablet 5     No current facility-administered medications on file prior to visit.       Review of patient's allergies indicates:   Allergen Reactions    Augmentin [amoxicillin-pot clavulanate] Hives and Rash     Objective:     Vitals:    05/11/22 1101   BP: 138/65   BP Location: Right arm   Patient Position: Sitting   BP Method: Large (Automatic)   Pulse: 89   SpO2: 95%   Weight: 105.9 kg (233 lb 7.5 oz)   Height: 5' 2" (1.575 m)        Physical Exam  Vitals reviewed.   Constitutional:       Appearance: She is well-developed.   Eyes:      General: No scleral icterus.  Neck:      Vascular: No carotid bruit or JVD.   Cardiovascular:      Rate and Rhythm: Normal rate and regular rhythm.      Heart sounds: No murmur heard.    No gallop.   Pulmonary:      Breath sounds: Normal breath sounds.   Musculoskeletal:      Right lower leg: Edema present.      Left lower leg: Edema (trivial bilateral) present.   Skin:     General: Skin is warm and dry.   Neurological:      Mental Status: She is alert and oriented to person, place, and time. "   Psychiatric:         Behavior: Behavior normal.         Thought Content: Thought content normal.         Judgment: Judgment normal.      wt down 2 lbs    mo ago   InterpretationSpirometry is normal. Spirometry remains unimproved following bronchodilator. Lung volumes show mild restriction is present. Airway mechanics show normal airway resistance and conductance. Although DLCO is moderately decreased, poor performance on   testing makes interpretation of DLCO less reliable.   Â   Notes:   Â   The failure to demonstrate improvement in spirometry does not preclude a clinical response to a trial of bronchodilators. DLCO may be underestimated due to submaximal IVC. DLCO interpretation is based upon the reported hemoglobin level. Discrepancy in   lung volume and vital capacity measurements between various testing maneuvers makes DLCO interpretation potentially unreliable.   Pre FVC2.10 - 3.59 L2.16   Accession #: [id number]    Transthoracic echo (TTE) complete  Order# [id number]  Reading physician: Nick Lee MD Ordering physician: Marianela Ortiz NP Study date: 2/25/22       Reason for Exam  Priority: Routine  Dx: NDIAYE (dyspnea on exertion) [R06.00 (ICD-10-CM)]     Result Image Hyperlink     Show images for Echo    Summary    · The estimated ejection fraction is 55%.  · The left ventricle is normal in size with normal systolic function.  · Grade I left ventricular diastolic dysfunction.  · Normal right ventricular size with normal right ventricular systolic function.  · Mild left atrial enlargement.  · Normal central venous pressure (3 mmHg).  · The estimated PA systolic pressure is 9 mmHg.         Vitals    Height Weight BMI (Calculated) BSA (Calculated - sq m) BP Pulse               Name MRN Description   Magdalena Acevedo [id number] 67 y.o. female       Result Image Hyperlink     Show images for IN OFFICE EKG 12-LEAD (to Muse)   Reviewed By    Kong Hernandez MD on 4/13/2022 07:25        IN OFFICE EKG  12-LEAD (to Muse)  Order: [id number]  · Status: Final result     · Visible to patient: Yes (not seen)      · Next appt: 06/20/2022 at 10:30 AM in Bariatrics (Divya Mendoza MD)      · Dx: Localized edema; Mixed hyperlipidemia...     · 0 Result Notes             Narrative  Performed by: GEMUSE  Test Reason : R06.00,R60.0,I10,E78.2,     Vent. Rate : 070 BPM     Atrial Rate : 070 BPM      P-R Int : 148 ms          QRS Dur : 088 ms       QT Int : 406 ms       P-R-T Axes : 044 088 064 degrees      QTc Int : 438 ms     Normal sinus rhythm   Normal ECG   When compared with ECG of 05-MAR-2020 18:58,   No significant change was found   Confirmed by Kong Hernandez MD ([id number]) on 4/12/2022 6:09:18 PM     Referred By: YOLANDA HERNANDEZ           Confirmed By:Kong Hernandez MD      Specimen Collected: 04/11/22 11:23 Last Resulted: 04/12/22 18:09                Lab Visit on 05/05/2022   Component Date Value Ref Range Status    BCR/ABL Specimen Type (Blood) 05/05/2022 Test Not Performed   Corrected    BCRABL Fusion Form, Blood 05/05/2022 Test Not Performed   Corrected    BCR/ABL Final Diagnosis, Blood 05/05/2022 Test Not Performed   Final    Specimen Type, BCR/ABL 05/05/2022 Peripheral blood   Final    Final Diagnosis, BCR/ABL 05/05/2022 SEE BELOW   Final    Diagnostic BCR/ABL 1 Result 05/05/2022 see interpretation   Final   Lab Visit on 04/20/2022   Component Date Value Ref Range Status    Sodium 04/20/2022 144  136 - 145 mmol/L Final    Potassium 04/20/2022 3.9  3.5 - 5.1 mmol/L Final    Chloride 04/20/2022 104  95 - 110 mmol/L Final    CO2 04/20/2022 27  23 - 29 mmol/L Final    Glucose 04/20/2022 119 (A) 70 - 110 mg/dL Final    BUN 04/20/2022 12  8 - 23 mg/dL Final    Creatinine 04/20/2022 0.9  0.5 - 1.4 mg/dL Final    Calcium 04/20/2022 9.5  8.7 - 10.5 mg/dL Final    Total Protein 04/20/2022 7.5  6.0 - 8.4 g/dL Final    Albumin 04/20/2022 4.0  3.5 - 5.2 g/dL Final    Total Bilirubin 04/20/2022 0.4  0.1 - 1.0  mg/dL Final    Alkaline Phosphatase 04/20/2022 124  55 - 135 U/L Final    AST 04/20/2022 20  10 - 40 U/L Final    ALT 04/20/2022 33  10 - 44 U/L Final    Anion Gap 04/20/2022 13  8 - 16 mmol/L Final    eGFR if African American 04/20/2022 >60  >60 mL/min/1.73 m^2 Final    eGFR if non African American 04/20/2022 >60  >60 mL/min/1.73 m^2 Final    WBC 04/20/2022 16.98 (A) 3.90 - 12.70 K/uL Final    RBC 04/20/2022 4.91  4.00 - 5.40 M/uL Final    Hemoglobin 04/20/2022 14.7  12.0 - 16.0 g/dL Final    Hematocrit 04/20/2022 45.5  37.0 - 48.5 % Final    MCV 04/20/2022 93  82 - 98 fL Final    MCH 04/20/2022 29.9  27.0 - 31.0 pg Final    MCHC 04/20/2022 32.3  32.0 - 36.0 g/dL Final    RDW 04/20/2022 14.6 (A) 11.5 - 14.5 % Final    Platelets 04/20/2022 285  150 - 450 K/uL Final    MPV 04/20/2022 10.4  9.2 - 12.9 fL Final    Immature Granulocytes 04/20/2022 0.6 (A) 0.0 - 0.5 % Final    Gran # (ANC) 04/20/2022 11.7 (A) 1.8 - 7.7 K/uL Final    Immature Grans (Abs) 04/20/2022 0.11 (A) 0.00 - 0.04 K/uL Final    Lymph # 04/20/2022 3.5  1.0 - 4.8 K/uL Final    Mono # 04/20/2022 1.3 (A) 0.3 - 1.0 K/uL Final    Eos # 04/20/2022 0.3  0.0 - 0.5 K/uL Final    Baso # 04/20/2022 0.15  0.00 - 0.20 K/uL Final    nRBC 04/20/2022 0  0 /100 WBC Final    Gran % 04/20/2022 68.7  38.0 - 73.0 % Final    Lymph % 04/20/2022 20.6  18.0 - 48.0 % Final    Mono % 04/20/2022 7.7  4.0 - 15.0 % Final    Eosinophil % 04/20/2022 1.5  0.0 - 8.0 % Final    Basophil % 04/20/2022 0.9  0.0 - 1.9 % Final    Differential Method 04/20/2022 Automated   Final    Pathologist Review 04/20/2022 Review required   Final    LD 04/20/2022 231  110 - 260 U/L Final    Sed Rate 04/20/2022 45 (A) 0 - 20 mm/Hr Final    Pathologist Review Peripheral Smear 04/20/2022 REVIEWED   Final   Hospital Outpatient Visit on 04/04/2022   Component Date Value Ref Range Status    POC PH 04/04/2022 7.435  7.35 - 7.45 Final    POC PCO2 04/04/2022 41.2  35 - 45 mmHg  Final    POC PO2 04/04/2022 89  80 - 100 mmHg Final    POC HCO3 04/04/2022 27.7  24 - 28 mmol/L Final    POC BE 04/04/2022 3  -2 to 2 mmol/L Final    POC SATURATED O2 04/04/2022 97  95 - 100 % Final    POC TCO2 04/04/2022 29 (A) 23 - 27 mmol/L Final    Sample 04/04/2022 ARTERIAL   Final    Site 04/04/2022 RR   Final    Allens Test 04/04/2022 Pass   Final    DelSys 04/04/2022 Room Air   Final    Mode 04/04/2022 SPONT   Final    Sp02 04/04/2022 95   Final   Lab Visit on 03/29/2022   Component Date Value Ref Range Status    BNP 03/29/2022 32  0 - 99 pg/mL Final    Sodium 03/29/2022 141  136 - 145 mmol/L Final    Potassium 03/29/2022 4.0  3.5 - 5.1 mmol/L Final    Chloride 03/29/2022 105  95 - 110 mmol/L Final    CO2 03/29/2022 27  23 - 29 mmol/L Final    Glucose 03/29/2022 131 (A) 70 - 110 mg/dL Final    BUN 03/29/2022 15  8 - 23 mg/dL Final    Creatinine 03/29/2022 0.9  0.5 - 1.4 mg/dL Final    Calcium 03/29/2022 9.3  8.7 - 10.5 mg/dL Final    Anion Gap 03/29/2022 9  8 - 16 mmol/L Final    eGFR if African American 03/29/2022 >60  >60 mL/min/1.73 m^2 Final    eGFR if non African American 03/29/2022 >60  >60 mL/min/1.73 m^2 Final    WBC 03/29/2022 17.98 (A) 3.90 - 12.70 K/uL Final    RBC 03/29/2022 5.01  4.00 - 5.40 M/uL Final    Hemoglobin 03/29/2022 14.9  12.0 - 16.0 g/dL Final    Hematocrit 03/29/2022 46.6  37.0 - 48.5 % Final    MCV 03/29/2022 93  82 - 98 fL Final    MCH 03/29/2022 29.7  27.0 - 31.0 pg Final    MCHC 03/29/2022 32.0  32.0 - 36.0 g/dL Final    RDW 03/29/2022 14.5  11.5 - 14.5 % Final    Platelets 03/29/2022 361  150 - 450 K/uL Final    MPV 03/29/2022 9.9  9.2 - 12.9 fL Final    Immature Granulocytes 03/29/2022 1.1 (A) 0.0 - 0.5 % Final    Gran # (ANC) 03/29/2022 12.4 (A) 1.8 - 7.7 K/uL Final    Immature Grans (Abs) 03/29/2022 0.20 (A) 0.00 - 0.04 K/uL Final    Lymph # 03/29/2022 3.8  1.0 - 4.8 K/uL Final    Mono # 03/29/2022 1.2 (A) 0.3 - 1.0 K/uL Final    Eos #  03/29/2022 0.2  0.0 - 0.5 K/uL Final    Baso # 03/29/2022 0.17  0.00 - 0.20 K/uL Final    nRBC 03/29/2022 0  0 /100 WBC Final    Gran % 03/29/2022 69.1  38.0 - 73.0 % Final    Lymph % 03/29/2022 20.9  18.0 - 48.0 % Final    Mono % 03/29/2022 6.7  4.0 - 15.0 % Final    Eosinophil % 03/29/2022 1.3  0.0 - 8.0 % Final    Basophil % 03/29/2022 0.9  0.0 - 1.9 % Final    Differential Method 03/29/2022 Automated   Final   Hospital Outpatient Visit on 02/25/2022   Component Date Value Ref Range Status    TDI SEPTAL 02/25/2022 0.06  m/s Final    LV LATERAL E/E' RATIO 02/25/2022 16.67  m/s Final    LV SEPTAL E/E' RATIO 02/25/2022 16.67  m/s Final    LA WIDTH 02/25/2022 3.23  cm Final    AORTIC VALVE CUSP SEPERATION 02/25/2022 1.73  cm Final    TDI LATERAL 02/25/2022 0.06  m/s Final    PV PEAK VELOCITY 02/25/2022 1.03  cm/s Final    LVIDd 02/25/2022 4.92  3.5 - 6.0 cm Final    IVS 02/25/2022 1.07  0.6 - 1.1 cm Final    Posterior Wall 02/25/2022 1.00  0.6 - 1.1 cm Final    Ao root annulus 02/25/2022 2.72  cm Final    LVIDs 02/25/2022 3.58  2.1 - 4.0 cm Final    FS 02/25/2022 27  28 - 44 % Final    LA volume 02/25/2022 49.17  cm3 Final    Sinus 02/25/2022 2.64  cm Final    STJ 02/25/2022 2.12  cm Final    Ascending aorta 02/25/2022 3.17  cm Final    LV mass 02/25/2022 185.67  g Final    LA size 02/25/2022 3.45  cm Final    RVDD 02/25/2022 3.38  cm Final    TAPSE 02/25/2022 2.19  cm Final    RV S' 02/25/2022 9.65  cm/s Final    Left Ventricle Relative Wall Thick* 02/25/2022 0.41  cm Final    AV mean gradient 02/25/2022 7  mmHg Final    AV valve area 02/25/2022 2.14  cm2 Final    AV Velocity Ratio 02/25/2022 0.62   Final    AV index (prosthetic) 02/25/2022 0.70   Final    E/A ratio 02/25/2022 0.87   Final    Mean e' 02/25/2022 0.06  m/s Final    E wave deceleration time 02/25/2022 202.72  msec Final    IVRT 02/25/2022 122.26  msec Final    Pulm vein S/D ratio 02/25/2022 1.44   Final    LVOT  diameter 02/25/2022 1.97  cm Final    LVOT area 02/25/2022 3.0  cm2 Final    LVOT peak gallo 02/25/2022 1.07  m/s Final    LVOT peak VTI 02/25/2022 26.64  cm Final    Ao peak gallo 02/25/2022 1.72  m/s Final    Ao VTI 02/25/2022 37.92  cm Final    LVOT stroke volume 02/25/2022 81.16  cm3 Final    AV peak gradient 02/25/2022 12  mmHg Final    E/E' ratio 02/25/2022 16.67  m/s Final    MV Peak E Gallo 02/25/2022 1.00  m/s Final    TR Max Gallo 02/25/2022 1.19  m/s Final    MV Peak A Gallo 02/25/2022 1.15  m/s Final    PV Peak S Gallo 02/25/2022 0.52  m/s Final    PV Peak D Gallo 02/25/2022 0.36  m/s Final    LV Systolic Volume 02/25/2022 53.66  mL Final    LV Diastolic Volume 02/25/2022 114.08  mL Final    RA Major Axis 02/25/2022 4.34  cm Final    Left Atrium Minor Axis 02/25/2022 5.05  cm Final    Left Atrium Major Axis 02/25/2022 5.34  cm Final    Triscuspid Valve Regurgitation Pea* 02/25/2022 6  mmHg Final    RA Width 02/25/2022 3.54  cm Final    Right Atrial Pressure (from IVC) 02/25/2022 3  mmHg Final    EF 02/25/2022 55  % Final    TV rest pulmonary artery pressure 02/25/2022 9  mmHg Final   Admission on 02/08/2022, Discharged on 02/09/2022   Component Date Value Ref Range Status    POCT Glucose 02/08/2022 139 (A) 70 - 110 mg/dL Final    POCT Glucose 02/08/2022 131 (A) 70 - 110 mg/dL Final    POCT Glucose 02/09/2022 257 (A) 70 - 110 mg/dL Final    POCT Glucose 02/09/2022 180 (A) 70 - 110 mg/dL Final    POCT Glucose 02/09/2022 216 (A) 70 - 110 mg/dL Final   Hospital Outpatient Visit on 02/05/2022   Component Date Value Ref Range Status    SARS-CoV2 (COVID-19) Qualitative P* 02/05/2022 Not Detected  Not Detected Final    SARS-COV-2- Cycle Number 02/05/2022 N/A   Final   Clinical Support on 01/24/2022   Component Date Value Ref Range Status    Interpretation 01/25/2022    Final                    Value:Spirometry is normal. Spirometry remains unimproved following bronchodilator. Lung volumes show  mild restriction is present. Airway mechanics show normal airway resistance and conductance. Although DLCO is moderately decreased, poor performance on   testing makes interpretation of DLCO less reliable.   Â   Notes:  Â   The failure to demonstrate improvement in spirometry does not preclude a clinical response to a trial of bronchodilators. DLCO may be underestimated due to submaximal IVC. DLCO interpretation is based upon the reported hemoglobin level. Discrepancy in   lung volume and vital capacity measurements between various testing maneuvers makes DLCO interpretation potentially unreliable.      Pre FVC 01/25/2022 2.16  2.10 - 3.59 L Final    Pre FEV1 01/25/2022 1.68  1.64 - 2.81 L Final    Pre FEV1 FVC 01/25/2022 77.47  65.77 - 91.53 % Final    Pre FEF 25 75 01/25/2022 1.54  0.93 - 2.96 L/s Final    Pre PEF 01/25/2022 2.47 (A) 4.12 - 7.43 L/s Final    Pre  01/25/2022 4.83  sec Final    Pre DLCO 01/25/2022 10.06 (A) 15.51 - 26.98 ml/(min*mmHg) Final    DLCO ADJ PRE 01/25/2022 9.64 (A) 15.51 - 26.98 ml/(min*mmHg) Final    DLCOVA PRE 01/25/2022 3.79  2.94 - 5.97 ml/(min*mmHg*L) Final    DLVAAdj PRE 01/25/2022 3.63  2.94 - 5.97 ml/(min*mmHg*L) Final    VA PRE 01/25/2022 2.67 (A) 4.62 - 4.62 L Final    IVC PRE 01/25/2022 1.43 (A) 2.10 - 3.59 L Final    Pre TLC 01/25/2022 3.67 (A) 3.78 - 5.76 L Final    VC PRE 01/25/2022 2.16  2.10 - 3.59 L Final    Pre FRC PL 01/25/2022 2.07  L Final    Pre ERV 01/25/2022 0.16  -66726.30 - 11447.70 L Final    Pre RV 01/25/2022 1.51  1.38 - 2.53 L Final    RVTLC PRE 01/25/2022 41.12  31.81 - 50.99 % Final    Raw PRE 01/25/2022 2.05 (A) 3.06 - 3.06 cmH2O*s/L Final    VTGRAWPRE 01/25/2022 3.05  L Final    Post FVC 01/25/2022 2.52  2.10 - 3.59 L Final    Post FEV1 01/25/2022 1.65  1.64 - 2.81 L Final    Post FEV1 FVC 01/25/2022 65.69 (A) 65.77 - 91.53 % Final    Post FEF 25 75 01/25/2022 0.91 (A) 0.93 - 2.96 L/s Final    Post PEF 01/25/2022 2.94 (A)  4.12 - 7.43 L/s Final    Post  01/25/2022 7.27  sec Final    FVC Ref 01/25/2022 2.85   Final    FVC LLN 01/25/2022 2.10   Final    FVC Pre Ref 01/25/2022 76.0  % Final    FVC Post Ref 01/25/2022 88.5  % Final    FVC Chg 01/25/2022 16.5  % Final    FEV1 Ref 01/25/2022 2.22   Final    FEV1 LLN 01/25/2022 1.64   Final    FEV1 Pre Ref 01/25/2022 75.3  % Final    FEV1 Post Ref 01/25/2022 74.4  % Final    FEV1 Chg 01/25/2022 -1.3  % Final    FEV1 FVC Ref 01/25/2022 79   Final    FEV1 FVC LLN 01/25/2022 66   Final    FEV1 FVC Pre Ref 01/25/2022 98.5  % Final    FEV1 FVC Post Ref 01/25/2022 83.5  % Final    FEV1 FVC Chg 01/25/2022 -15.2  % Final    FEF 25 75 Ref 01/25/2022 1.94   Final    FEF 25 75 LLN 01/25/2022 0.93   Final    FEF 25 75 Pre Ref 01/25/2022 79.0  % Final    FEF 25 75 Post Ref 01/25/2022 47.0  % Final    FEF 25 75 Chg 01/25/2022 -40.5  % Final    PEF Ref 01/25/2022 5.78   Final    PEF LLN 01/25/2022 4.12   Final    PEF Pre Ref 01/25/2022 42.7  % Final    PEF Post Ref 01/25/2022 50.9  % Final    PEF Chg 01/25/2022 19.1  % Final    [id number] Chg 01/25/2022 50.5  % Final    TLC Ref 01/25/2022 4.77   Final    TLC LLN 01/25/2022 3.78   Final    TLC Pre Ref 01/25/2022 77.0  % Final    VC Ref 01/25/2022 2.85   Final    VC LLN 01/25/2022 2.10   Final    VC Pre Ref 01/25/2022 76.0  % Final    FRCpleth Ref 01/25/2022 2.65   Final    FRCpleth LLN 01/25/2022 1.83   Final    FRCpleth PreRef 01/25/2022 78.1  % Final    ERV Ref 01/25/2022 0.70   Final    ERV LLN 01/25/2022 -76156.30   Final    ERV Pre Ref 01/25/2022 23.1  % Final    RV Ref 01/25/2022 1.95   Final    RV LLN 01/25/2022 1.38   Final    RV Pre Ref 01/25/2022 77.4  % Final    RVTLC Ref 01/25/2022 41   Final    RVTLC LLN 01/25/2022 32   Final    RVTLC Pre Ref 01/25/2022 99.3  % Final    Raw Ref 01/25/2022 3.06   Final    Raw LLN 01/25/2022 3.06   Final    Raw Pre Ref 01/25/2022 66.9  % Final    DLCO Single  Breath Ref 01/25/2022 21.24   Final    DLCO Single Breath LLN 01/25/2022 15.51   Final    DLCO Single Breath Pre Ref 01/25/2022 47.4  % Final    DLCOc Single Breath Ref 01/25/2022 21.24   Final    DLCOc Single Breath LLN 01/25/2022 15.51   Final    DLCOc Single Breath Pre Ref 01/25/2022 45.4  % Final    DLCOVA Ref 01/25/2022 4.45   Final    DLCOVA LLN 01/25/2022 2.94   Final    DLCOVA Pre Ref 01/25/2022 85.0  % Final    DLCOc SBVA Ref 01/25/2022 4.45   Final    DLCOc SBVA LLN 01/25/2022 2.94   Final    DLCOc SBVA Pre Ref 01/25/2022 81.5  % Final    VA Single Breath Ref 01/25/2022 4.62   Final    [facility] Single Breath LLN 01/25/2022 4.62   Final    VA Single Breath Pre Ref 01/25/2022 57.8  % Final    IVC Single Breath Ref 01/25/2022 2.85   Final    IVC Single Breath LLN 01/25/2022 2.10   Final    IVC Single Breath Pre Ref 01/25/2022 50.2  % Final   Lab Visit on 01/04/2022   Component Date Value Ref Range Status    WBC 01/04/2022 14.97 (A) 3.90 - 12.70 K/uL Final    RBC 01/04/2022 4.92  4.00 - 5.40 M/uL Final    Hemoglobin 01/04/2022 14.9  12.0 - 16.0 g/dL Final    Hematocrit 01/04/2022 46.2  37.0 - 48.5 % Final    MCV 01/04/2022 94  82 - 98 fL Final    MCH 01/04/2022 30.3  27.0 - 31.0 pg Final    MCHC 01/04/2022 32.3  32.0 - 36.0 g/dL Final    RDW 01/04/2022 14.8 (A) 11.5 - 14.5 % Final    Platelets 01/04/2022 307  150 - 450 K/uL Final    MPV 01/04/2022 9.9  9.2 - 12.9 fL Final    Immature Granulocytes 01/04/2022 0.7 (A) 0.0 - 0.5 % Final    Gran # (ANC) 01/04/2022 9.4 (A) 1.8 - 7.7 K/uL Final    Immature Grans (Abs) 01/04/2022 0.10 (A) 0.00 - 0.04 K/uL Final    Lymph # 01/04/2022 4.0  1.0 - 4.8 K/uL Final    Mono # 01/04/2022 1.0  0.3 - 1.0 K/uL Final    Eos # 01/04/2022 0.3  0.0 - 0.5 K/uL Final    Baso # 01/04/2022 0.11  0.00 - 0.20 K/uL Final    nRBC 01/04/2022 0  0 /100 WBC Final    Gran % 01/04/2022 63.1  38.0 - 73.0 % Final    Lymph % 01/04/2022 26.5  18.0 - 48.0 % Final     Mono % 01/04/2022 6.9  4.0 - 15.0 % Final    Eosinophil % 01/04/2022 2.1  0.0 - 8.0 % Final    Basophil % 01/04/2022 0.7  0.0 - 1.9 % Final    Differential Method 01/04/2022 Automated   Final    Sodium 01/04/2022 144  136 - 145 mmol/L Final    Potassium 01/04/2022 4.2  3.5 - 5.1 mmol/L Final    Chloride 01/04/2022 105  95 - 110 mmol/L Final    CO2 01/04/2022 26  23 - 29 mmol/L Final    Glucose 01/04/2022 146 (A) 70 - 110 mg/dL Final    BUN 01/04/2022 19  8 - 23 mg/dL Final    Creatinine 01/04/2022 0.9  0.5 - 1.4 mg/dL Final    Calcium 01/04/2022 9.6  8.7 - 10.5 mg/dL Final    Total Protein 01/04/2022 6.7  6.0 - 8.4 g/dL Final    Albumin 01/04/2022 3.9  3.5 - 5.2 g/dL Final    Total Bilirubin 01/04/2022 0.5  0.1 - 1.0 mg/dL Final    Alkaline Phosphatase 01/04/2022 106  55 - 135 U/L Final    AST 01/04/2022 17  10 - 40 U/L Final    ALT 01/04/2022 25  10 - 44 U/L Final    Anion Gap 01/04/2022 13  8 - 16 mmol/L Final    eGFR if African American 01/04/2022 >60.0  >60 mL/min/1.73 m^2 Final    eGFR if non African American 01/04/2022 >60.0  >60 mL/min/1.73 m^2 Final    Cholesterol 01/04/2022 167  120 - 199 mg/dL Final    Triglycerides 01/04/2022 294 (A) 30 - 150 mg/dL Final    HDL 01/04/2022 32 (A) 40 - 75 mg/dL Final    LDL Cholesterol 01/04/2022 76.2  63.0 - 159.0 mg/dL Final    HDL/Cholesterol Ratio 01/04/2022 19.2 (A) 20.0 - 50.0 % Final    Total Cholesterol/HDL Ratio 01/04/2022 5.2 (A) 2.0 - 5.0 Final    Non-HDL Cholesterol 01/04/2022 135  mg/dL Final    Hemoglobin A1C 01/04/2022 7.5 (A) 4.0 - 5.6 % Final    Estimated Avg Glucose 01/04/2022 169 (A) 68 - 131 mg/dL Final    WBC 01/04/2022 14.97 (A) 3.90 - 12.70 K/uL Final    RBC 01/04/2022 4.92  4.00 - 5.40 M/uL Final    Hemoglobin 01/04/2022 14.9  12.0 - 16.0 g/dL Final    Hematocrit 01/04/2022 46.2  37.0 - 48.5 % Final    MCV 01/04/2022 94  82 - 98 fL Final    MCH 01/04/2022 30.3  27.0 - 31.0 pg Final    MCHC 01/04/2022 32.3  32.0 -  36.0 g/dL Final    RDW 01/04/2022 14.8 (A) 11.5 - 14.5 % Final    Platelets 01/04/2022 307  150 - 450 K/uL Final    MPV 01/04/2022 9.9  9.2 - 12.9 fL Final    Immature Granulocytes 01/04/2022 0.7 (A) 0.0 - 0.5 % Final    Gran # (ANC) 01/04/2022 9.4 (A) 1.8 - 7.7 K/uL Final    Immature Grans (Abs) 01/04/2022 0.10 (A) 0.00 - 0.04 K/uL Final    Lymph # 01/04/2022 4.0  1.0 - 4.8 K/uL Final    Mono # 01/04/2022 1.0  0.3 - 1.0 K/uL Final    Eos # 01/04/2022 0.3  0.0 - 0.5 K/uL Final    Baso # 01/04/2022 0.11  0.00 - 0.20 K/uL Final    nRBC 01/04/2022 0  0 /100 WBC Final    Gran % 01/04/2022 63.1  38.0 - 73.0 % Final    Lymph % 01/04/2022 26.5  18.0 - 48.0 % Final    Mono % 01/04/2022 6.9  4.0 - 15.0 % Final    Eosinophil % 01/04/2022 2.1  0.0 - 8.0 % Final    Basophil % 01/04/2022 0.7  0.0 - 1.9 % Final    Differential Method 01/04/2022 Automated   Final    Sodium 01/04/2022 144  136 - 145 mmol/L Final    Potassium 01/04/2022 4.2  3.5 - 5.1 mmol/L Final    Chloride 01/04/2022 105  95 - 110 mmol/L Final    CO2 01/04/2022 26  23 - 29 mmol/L Final    Glucose 01/04/2022 146 (A) 70 - 110 mg/dL Final    BUN 01/04/2022 19  8 - 23 mg/dL Final    Creatinine 01/04/2022 0.9  0.5 - 1.4 mg/dL Final    Calcium 01/04/2022 9.6  8.7 - 10.5 mg/dL Final    Total Protein 01/04/2022 6.7  6.0 - 8.4 g/dL Final    Albumin 01/04/2022 3.9  3.5 - 5.2 g/dL Final    Total Bilirubin 01/04/2022 0.5  0.1 - 1.0 mg/dL Final    Alkaline Phosphatase 01/04/2022 106  55 - 135 U/L Final    AST 01/04/2022 17  10 - 40 U/L Final    ALT 01/04/2022 25  10 - 44 U/L Final    Anion Gap 01/04/2022 13  8 - 16 mmol/L Final    eGFR if African American 01/04/2022 >60.0  >60 mL/min/1.73 m^2 Final    eGFR if non African American 01/04/2022 >60.0  >60 mL/min/1.73 m^2 Final    Hemoglobin A1C 01/04/2022 7.5 (A) 4.0 - 5.6 % Final    Estimated Avg Glucose 01/04/2022 169 (A) 68 - 131 mg/dL Final    Cholesterol 01/04/2022 167  120 - 199 mg/dL  Final    Triglycerides 01/04/2022 294 (A) 30 - 150 mg/dL Final    HDL 01/04/2022 32 (A) 40 - 75 mg/dL Final    LDL Cholesterol 01/04/2022 76.2  63.0 - 159.0 mg/dL Final    HDL/Cholesterol Ratio 01/04/2022 19.2 (A) 20.0 - 50.0 % Final    Total Cholesterol/HDL Ratio 01/04/2022 5.2 (A) 2.0 - 5.0 Final    Non-HDL Cholesterol 01/04/2022 135  mg/dL Final    WBC 01/04/2022 14.97 (A) 3.90 - 12.70 K/uL Final    RBC 01/04/2022 4.92  4.00 - 5.40 M/uL Final    Hemoglobin 01/04/2022 14.9  12.0 - 16.0 g/dL Final    Hematocrit 01/04/2022 46.2  37.0 - 48.5 % Final    MCV 01/04/2022 94  82 - 98 fL Final    MCH 01/04/2022 30.3  27.0 - 31.0 pg Final    MCHC 01/04/2022 32.3  32.0 - 36.0 g/dL Final    RDW 01/04/2022 14.8 (A) 11.5 - 14.5 % Final    Platelets 01/04/2022 307  150 - 450 K/uL Final    MPV 01/04/2022 9.9  9.2 - 12.9 fL Final    Immature Granulocytes 01/04/2022 0.7 (A) 0.0 - 0.5 % Final    Gran # (ANC) 01/04/2022 9.4 (A) 1.8 - 7.7 K/uL Final    Immature Grans (Abs) 01/04/2022 0.10 (A) 0.00 - 0.04 K/uL Final    Lymph # 01/04/2022 4.0  1.0 - 4.8 K/uL Final    Mono # 01/04/2022 1.0  0.3 - 1.0 K/uL Final    Eos # 01/04/2022 0.3  0.0 - 0.5 K/uL Final    Baso # 01/04/2022 0.11  0.00 - 0.20 K/uL Final    nRBC 01/04/2022 0  0 /100 WBC Final    Gran % 01/04/2022 63.1  38.0 - 73.0 % Final    Lymph % 01/04/2022 26.5  18.0 - 48.0 % Final    Mono % 01/04/2022 6.9  4.0 - 15.0 % Final    Eosinophil % 01/04/2022 2.1  0.0 - 8.0 % Final    Basophil % 01/04/2022 0.7  0.0 - 1.9 % Final    Differential Method 01/04/2022 Automated   Final    Sodium 01/04/2022 144  136 - 145 mmol/L Final    Potassium 01/04/2022 4.2  3.5 - 5.1 mmol/L Final    Chloride 01/04/2022 105  95 - 110 mmol/L Final    CO2 01/04/2022 26  23 - 29 mmol/L Final    Glucose 01/04/2022 146 (A) 70 - 110 mg/dL Final    BUN 01/04/2022 19  8 - 23 mg/dL Final    Creatinine 01/04/2022 0.9  0.5 - 1.4 mg/dL Final    Calcium 01/04/2022 9.6  8.7 - 10.5  mg/dL Final    Total Protein 01/04/2022 6.7  6.0 - 8.4 g/dL Final    Albumin 01/04/2022 3.9  3.5 - 5.2 g/dL Final    Total Bilirubin 01/04/2022 0.5  0.1 - 1.0 mg/dL Final    Alkaline Phosphatase 01/04/2022 106  55 - 135 U/L Final    AST 01/04/2022 17  10 - 40 U/L Final    ALT 01/04/2022 25  10 - 44 U/L Final    Anion Gap 01/04/2022 13  8 - 16 mmol/L Final    eGFR if African American 01/04/2022 >60.0  >60 mL/min/1.73 m^2 Final    eGFR if non African American 01/04/2022 >60.0  >60 mL/min/1.73 m^2 Final    Hemoglobin A1C 01/04/2022 7.5 (A) 4.0 - 5.6 % Final    Estimated Avg Glucose 01/04/2022 169 (A) 68 - 131 mg/dL Final    Cholesterol 01/04/2022 167  120 - 199 mg/dL Final    Triglycerides 01/04/2022 294 (A) 30 - 150 mg/dL Final    HDL 01/04/2022 32 (A) 40 - 75 mg/dL Final    LDL Cholesterol 01/04/2022 76.2  63.0 - 159.0 mg/dL Final    HDL/Cholesterol Ratio 01/04/2022 19.2 (A) 20.0 - 50.0 % Final    Total Cholesterol/HDL Ratio 01/04/2022 5.2 (A) 2.0 - 5.0 Final    Non-HDL Cholesterol 01/04/2022 135  mg/dL Final    TSH 01/04/2022 1.226  0.400 - 4.000 uIU/mL Final    Free T4 01/04/2022 0.90  0.71 - 1.51 ng/dL Final    Microalbumin, Urine 01/04/2022 48.0  ug/mL Final    Creatinine, Urine 01/04/2022 102.0  15.0 - 325.0 mg/dL Final    Microalb/Creat Ratio 01/04/2022 47.1 (A) 0.0 - 30.0 ug/mg Final   (  CT Chest Lung Screening Low Dose    Status: Final result       MyChart Results Release    MyChart Status: Active  Results Release       PACS Images for [facility] Viewer     Show images for CT Chest Lung Screening Low Dose    All Reviewers List    Laura Vasquez NP on 2/1/2022 15:49     CT Chest Lung Screening Low Dose  Order: [id number]  · Status: Final result     · Visible to patient: Yes (not seen)      · Next appt: 06/20/2022 at 10:30 AM in Bariatrics (Divya Mendoza MD)      · Dx: Personal history of nicotine dependence     · 0 Result Notes      · 1 Patient Communication      · 1 HM  Topic      Recommendations     Due   Follow Up LDCT 1 Year 1/24/2023       Details    Reading Physician Reading Date Result Priority   Guanakito Jimenez MD  [phone] 1/24/2022 Routine     Narrative & Impression  EXAMINATION:  CT CHEST LUNG SCREENING LOW DOSE     CLINICAL HISTORY:  Lung cancer screening, >= 30 pk-yr smoking history in last 15 yrs (Age 55-80y); Personal history of nicotine dependence     TECHNIQUE:  CT of the thorax was performed with low dose, lung screening protocol.  No contrast was administered.  Sagittal and coronal reconstructions were obtained.     COMPARISON:  CT chest from 03/05/2020     FINDINGS:  Lungs: There are no abnormal opacities that require further evaluation.  There are a few scattered pulmonary nodules bilaterally.  The largest opacity in the right lung appears solid and measures 0.4 cm on series 4, image 120.  The largest opacity in the left lung appears solid and measures 0.4 cm on series 4, image 177.  The lungs show no findings consistent with emphysema.  Previously identified reticulonodular interstitial opacities have resolved when compared to prior exam.     Pleura:   No effusion..     Heart and pericardium: Normal size without effusion.     Aorta and vasculature: Atherosclerosis including coronary arteries.     Chest wall and skeletal structures: Stable lymph node anterior to the trachea measuring 1.2 cm (series 3, image 20), unchanged when compared to prior exam from 03/05/2020.  Unremarkable except age-appropriate degenerative changes.     Upper abdomen: Diffuse hypoattenuation of the liver suggestive of hepatic steatosis.  Gallbladder surgically absent.  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     Impression:     Lung-RADS Category:  2 - Benign Appearance or Behavior - continue annual screening with LDCT in 12 months.     Clinically or potentially clinically significant non lung cancer finding:  None.     Prior Lung Cancer  Modifier:  No history of prior lung cancer.     Hepatic steatosis.     Interval resolution of previously identified reticulonodular interstitial opacities seen on exam from 03/05/2020.     Additional findings as described above.        Electronically signed by: Guanakito Jimenez MD  Date:                                            01/24/2022  Time:                                           11              MyChart Results Release    MyChart Status: Active  Results Release       PACS Images for [facility] [place] Viewer     Show images for X-Ray Chest AP Portable    X-Ray Chest AP Portable  Order: [id number]  · Status: Final result     · Visible to patient: Yes (not seen)      · Next appt: 06/20/2022 at 10:30 AM in Bariatrics (Divya Mendoza MD)      · 0 Result Notes      Details    Reading Physician Reading Date Result Priority   Davidcarolyn Prakash,   [phone] 2/9/2022 Routine     Narrative & Impression  EXAMINATION:  XR CHEST AP PORTABLE     CLINICAL HISTORY:  Increased O2 requirement;     TECHNIQUE:  Single frontal view of the chest was performed.     COMPARISON:  Chest radiograph 03/09/2020.     FINDINGS:  The lungs are well expanded and clear. No focal opacities are seen. The pleural spaces are clear.     The cardiac silhouette is borderline enlarged.     The visualized osseous structures are intact.     Impression:     No acute cardiopulmonary abnormality.        Electronically signed by: David Prakash  Date:                                            02/09/2022  Time:                                           06:37               Exam Ended: 02/09/22 04:19 Last Resulted: 02/09/22 06:37                 Assessment:     1. Primary hypertension    2. Mixed hyperlipidemia    3. NDIAYE (dyspnea on exertion)    4. Chronic bronchitis, unspecified chronic bronchitis type    5. Tremor    6. Type 2 diabetes mellitus with hyperglycemia, without long-term current use of insulin    7. Morbid obesity with BMI of 40.0-44.9,  adult    8. Abnormal chest CT    9. History of smoking greater than 50 pack years    10. Localized edema    11. Coronary artery disease involving native coronary artery of native heart without angina pectoris    12. Thoracic aortic atherosclerosis      Plan:   Magdalena was seen today for follow-up.    Diagnoses and all orders for this visit:    Primary hypertension    Mixed hyperlipidemia    NDIAYE (dyspnea on exertion)    Chronic bronchitis, unspecified chronic bronchitis type    Tremor    Type 2 diabetes mellitus with hyperglycemia, without long-term current use of insulin    Morbid obesity with BMI of 40.0-44.9, adult    Abnormal chest CT    History of smoking greater than 50 pack years    Localized edema    Coronary artery disease involving native coronary artery of native heart without angina pectoris    Thoracic aortic atherosclerosis     Edema has resolved and blood pressure is now controlled since pt stopped NSAID's and amlodipine and increased the lisinopril    The shortness of breath is most likely due to lung disease from smoking.    Discussed possible stress test (Lexiscan Cardiolite) or cor angio should pt develop symptoms of chest discomfort  (dx of CAD is based on coronary calcifications noted on CT of chest)    Same meds    F/u with Dr William    RTC 6 months    Walk more    Low carb diet discussed    Follow up in about 6 months (around 11/11/2022).

## 2022-05-12 ENCOUNTER — TELEPHONE (OUTPATIENT)
Dept: HEMATOLOGY/ONCOLOGY | Facility: CLINIC | Age: 67
End: 2022-05-12
Payer: MEDICARE

## 2022-05-12 NOTE — TELEPHONE ENCOUNTER
----- Message from Gracie Martines RN sent at 5/11/2022  3:12 PM CDT -----    ----- Message -----  From: Barbara Galloway NP  Sent: 5/11/2022   3:01 PM CDT  To: [facility] Hem Onc Clinical Staff    Can you please let this pt know that the lab she had done recently to test for a gene mutation (BCR ABL) was negative. She can follow up as scheduled in 4 months. Thanks!

## 2022-06-20 ENCOUNTER — OFFICE VISIT (OUTPATIENT)
Dept: BARIATRICS | Facility: CLINIC | Age: 67
End: 2022-06-20
Payer: MEDICARE

## 2022-06-20 VITALS
BODY MASS INDEX: 42.35 KG/M2 | WEIGHT: 230.13 LBS | OXYGEN SATURATION: 94 % | DIASTOLIC BLOOD PRESSURE: 63 MMHG | HEART RATE: 74 BPM | SYSTOLIC BLOOD PRESSURE: 134 MMHG | HEIGHT: 62 IN

## 2022-06-20 DIAGNOSIS — G47.33 OSA (OBSTRUCTIVE SLEEP APNEA): ICD-10-CM

## 2022-06-20 DIAGNOSIS — E11.65 TYPE 2 DIABETES MELLITUS WITH HYPERGLYCEMIA, WITHOUT LONG-TERM CURRENT USE OF INSULIN: ICD-10-CM

## 2022-06-20 DIAGNOSIS — I10 PRIMARY HYPERTENSION: ICD-10-CM

## 2022-06-20 DIAGNOSIS — E78.2 MIXED HYPERLIPIDEMIA: ICD-10-CM

## 2022-06-20 DIAGNOSIS — E66.01 CLASS 3 SEVERE OBESITY DUE TO EXCESS CALORIES WITH SERIOUS COMORBIDITY AND BODY MASS INDEX (BMI) OF 40.0 TO 44.9 IN ADULT: Primary | ICD-10-CM

## 2022-06-20 DIAGNOSIS — K21.9 GASTROESOPHAGEAL REFLUX DISEASE, UNSPECIFIED WHETHER ESOPHAGITIS PRESENT: ICD-10-CM

## 2022-06-20 DIAGNOSIS — Z71.3 ENCOUNTER FOR WEIGHT LOSS COUNSELING: ICD-10-CM

## 2022-06-20 PROCEDURE — 99204 OFFICE O/P NEW MOD 45 MIN: CPT | Mod: S$GLB,,, | Performed by: STUDENT IN AN ORGANIZED HEALTH CARE EDUCATION/TRAINING PROGRAM

## 2022-06-20 PROCEDURE — 3060F POS MICROALBUMINURIA REV: CPT | Mod: CPTII,S$GLB,, | Performed by: STUDENT IN AN ORGANIZED HEALTH CARE EDUCATION/TRAINING PROGRAM

## 2022-06-20 PROCEDURE — 99999 PR PBB SHADOW E&M-EST. PATIENT-LVL V: CPT | Mod: PBBFAC,,, | Performed by: STUDENT IN AN ORGANIZED HEALTH CARE EDUCATION/TRAINING PROGRAM

## 2022-06-20 PROCEDURE — 3066F PR DOCUMENTATION OF TREATMENT FOR NEPHROPATHY: ICD-10-PCS | Mod: CPTII,S$GLB,, | Performed by: STUDENT IN AN ORGANIZED HEALTH CARE EDUCATION/TRAINING PROGRAM

## 2022-06-20 PROCEDURE — 1101F PT FALLS ASSESS-DOCD LE1/YR: CPT | Mod: CPTII,S$GLB,, | Performed by: STUDENT IN AN ORGANIZED HEALTH CARE EDUCATION/TRAINING PROGRAM

## 2022-06-20 PROCEDURE — 3288F PR FALLS RISK ASSESSMENT DOCUMENTED: ICD-10-PCS | Mod: CPTII,S$GLB,, | Performed by: STUDENT IN AN ORGANIZED HEALTH CARE EDUCATION/TRAINING PROGRAM

## 2022-06-20 PROCEDURE — 3008F BODY MASS INDEX DOCD: CPT | Mod: CPTII,S$GLB,, | Performed by: STUDENT IN AN ORGANIZED HEALTH CARE EDUCATION/TRAINING PROGRAM

## 2022-06-20 PROCEDURE — 1159F PR MEDICATION LIST DOCUMENTED IN MEDICAL RECORD: ICD-10-PCS | Mod: CPTII,S$GLB,, | Performed by: STUDENT IN AN ORGANIZED HEALTH CARE EDUCATION/TRAINING PROGRAM

## 2022-06-20 PROCEDURE — 1160F RVW MEDS BY RX/DR IN RCRD: CPT | Mod: CPTII,S$GLB,, | Performed by: STUDENT IN AN ORGANIZED HEALTH CARE EDUCATION/TRAINING PROGRAM

## 2022-06-20 PROCEDURE — 1101F PR PT FALLS ASSESS DOC 0-1 FALLS W/OUT INJ PAST YR: ICD-10-PCS | Mod: CPTII,S$GLB,, | Performed by: STUDENT IN AN ORGANIZED HEALTH CARE EDUCATION/TRAINING PROGRAM

## 2022-06-20 PROCEDURE — 3078F PR MOST RECENT DIASTOLIC BLOOD PRESSURE < 80 MM HG: ICD-10-PCS | Mod: CPTII,S$GLB,, | Performed by: STUDENT IN AN ORGANIZED HEALTH CARE EDUCATION/TRAINING PROGRAM

## 2022-06-20 PROCEDURE — 4010F ACE/ARB THERAPY RXD/TAKEN: CPT | Mod: CPTII,S$GLB,, | Performed by: STUDENT IN AN ORGANIZED HEALTH CARE EDUCATION/TRAINING PROGRAM

## 2022-06-20 PROCEDURE — 1159F MED LIST DOCD IN RCRD: CPT | Mod: CPTII,S$GLB,, | Performed by: STUDENT IN AN ORGANIZED HEALTH CARE EDUCATION/TRAINING PROGRAM

## 2022-06-20 PROCEDURE — 3008F PR BODY MASS INDEX (BMI) DOCUMENTED: ICD-10-PCS | Mod: CPTII,S$GLB,, | Performed by: STUDENT IN AN ORGANIZED HEALTH CARE EDUCATION/TRAINING PROGRAM

## 2022-06-20 PROCEDURE — 3288F FALL RISK ASSESSMENT DOCD: CPT | Mod: CPTII,S$GLB,, | Performed by: STUDENT IN AN ORGANIZED HEALTH CARE EDUCATION/TRAINING PROGRAM

## 2022-06-20 PROCEDURE — 3060F PR POS MICROALBUMINURIA RESULT DOCUMENTED/REVIEW: ICD-10-PCS | Mod: CPTII,S$GLB,, | Performed by: STUDENT IN AN ORGANIZED HEALTH CARE EDUCATION/TRAINING PROGRAM

## 2022-06-20 PROCEDURE — 1160F PR REVIEW ALL MEDS BY PRESCRIBER/CLIN PHARMACIST DOCUMENTED: ICD-10-PCS | Mod: CPTII,S$GLB,, | Performed by: STUDENT IN AN ORGANIZED HEALTH CARE EDUCATION/TRAINING PROGRAM

## 2022-06-20 PROCEDURE — 1126F PR PAIN SEVERITY QUANTIFIED, NO PAIN PRESENT: ICD-10-PCS | Mod: CPTII,S$GLB,, | Performed by: STUDENT IN AN ORGANIZED HEALTH CARE EDUCATION/TRAINING PROGRAM

## 2022-06-20 PROCEDURE — 3075F SYST BP GE 130 - 139MM HG: CPT | Mod: CPTII,S$GLB,, | Performed by: STUDENT IN AN ORGANIZED HEALTH CARE EDUCATION/TRAINING PROGRAM

## 2022-06-20 PROCEDURE — 3066F NEPHROPATHY DOC TX: CPT | Mod: CPTII,S$GLB,, | Performed by: STUDENT IN AN ORGANIZED HEALTH CARE EDUCATION/TRAINING PROGRAM

## 2022-06-20 PROCEDURE — 99999 PR PBB SHADOW E&M-EST. PATIENT-LVL V: ICD-10-PCS | Mod: PBBFAC,,, | Performed by: STUDENT IN AN ORGANIZED HEALTH CARE EDUCATION/TRAINING PROGRAM

## 2022-06-20 PROCEDURE — 3051F PR MOST RECENT HEMOGLOBIN A1C LEVEL 7.0 - < 8.0%: ICD-10-PCS | Mod: CPTII,S$GLB,, | Performed by: STUDENT IN AN ORGANIZED HEALTH CARE EDUCATION/TRAINING PROGRAM

## 2022-06-20 PROCEDURE — 3075F PR MOST RECENT SYSTOLIC BLOOD PRESS GE 130-139MM HG: ICD-10-PCS | Mod: CPTII,S$GLB,, | Performed by: STUDENT IN AN ORGANIZED HEALTH CARE EDUCATION/TRAINING PROGRAM

## 2022-06-20 PROCEDURE — 4010F PR ACE/ARB THEARPY RXD/TAKEN: ICD-10-PCS | Mod: CPTII,S$GLB,, | Performed by: STUDENT IN AN ORGANIZED HEALTH CARE EDUCATION/TRAINING PROGRAM

## 2022-06-20 PROCEDURE — 1126F AMNT PAIN NOTED NONE PRSNT: CPT | Mod: CPTII,S$GLB,, | Performed by: STUDENT IN AN ORGANIZED HEALTH CARE EDUCATION/TRAINING PROGRAM

## 2022-06-20 PROCEDURE — 99204 PR OFFICE/OUTPT VISIT, NEW, LEVL IV, 45-59 MIN: ICD-10-PCS | Mod: S$GLB,,, | Performed by: STUDENT IN AN ORGANIZED HEALTH CARE EDUCATION/TRAINING PROGRAM

## 2022-06-20 PROCEDURE — 3078F DIAST BP <80 MM HG: CPT | Mod: CPTII,S$GLB,, | Performed by: STUDENT IN AN ORGANIZED HEALTH CARE EDUCATION/TRAINING PROGRAM

## 2022-06-20 PROCEDURE — 3051F HG A1C>EQUAL 7.0%<8.0%: CPT | Mod: CPTII,S$GLB,, | Performed by: STUDENT IN AN ORGANIZED HEALTH CARE EDUCATION/TRAINING PROGRAM

## 2022-06-20 NOTE — PROGRESS NOTES
Subjective:       Patient ID: Magdalena Acevedo is a 67 y.o. female.    Chief Complaint: Consult and Obesity    Patient presents for treatment of obesity.   Weighted 135-150 lbs about 5 years ago  Weight has increased steadily as patient is no longer active due to NDIAYE    Co-morbidities:   HTN  HLD  DM2  FOZIA  GERD  CAD  COPD    Negative for thyroid cancer     History of Weight Loss Efforts    Current Physical Activity  No activity due to NDIAYE  Used to go to gym, had treadmill at home    Current Eating Habits  Breakfast - coffee with SF creamer  Lunch - usually skips  First meal of the day 3-4 pm  Frozen dinner, lasagna, red beans, ground meat, spaghetti  Sometimes fast food or take out, about once per week  Doesn't snack  Beverages - water, flavored water, no alcohol    Medical Weight Loss  6/20/2022: 230.1 lbs, BMI 42.1, BFP 53.1%, .3 lbs, SMM 59.3 lbs, BMR 1427 kcal    Review of Systems   Constitutional: Negative for chills and fever.   Respiratory: Positive for shortness of breath.    Cardiovascular: Negative for chest pain and palpitations.   Gastrointestinal: Negative for abdominal pain, nausea and vomiting.   Musculoskeletal: Positive for arthralgias and back pain.   Neurological: Negative for dizziness and light-headedness.   Psychiatric/Behavioral: The patient is not nervous/anxious.          Objective:        Latest Reference Range & Units 04/20/22 14:58   WBC 3.90 - 12.70 K/uL 16.98 (H)   RBC 4.00 - 5.40 M/uL 4.91   Hemoglobin 12.0 - 16.0 g/dL 14.7   Hematocrit 37.0 - 48.5 % 45.5   MCV 82 - 98 fL 93   MCH 27.0 - 31.0 pg 29.9   MCHC 32.0 - 36.0 g/dL 32.3   RDW 11.5 - 14.5 % 14.6 (H)   Platelets 150 - 450 K/uL 285   MPV 9.2 - 12.9 fL 10.4   Gran % 38.0 - 73.0 % 68.7   Lymph % 18.0 - 48.0 % 20.6   Mono % 4.0 - 15.0 % 7.7   Eosinophil % 0.0 - 8.0 % 1.5   Basophil % 0.0 - 1.9 % 0.9   Immature Granulocytes 0.0 - 0.5 % 0.6 (H)   Gran # (ANC) 1.8 - 7.7 K/uL 11.7 (H)   Lymph # 1.0 - 4.8 K/uL 3.5   Mono #  0.3 - 1.0 K/uL 1.3 (H)   Eos # 0.0 - 0.5 K/uL 0.3   Baso # 0.00 - 0.20 K/uL 0.15   Immature Grans (Abs) 0.00 - 0.04 K/uL 0.11 (H)   nRBC 0 /100 WBC 0   Differential Method  Automated   Sed Rate 0 - 20 mm/Hr 45 (H)   Pathologist Review Peripheral Smear  REVIEWED   Pathologist Review  Review required   Sodium 136 - 145 mmol/L 144   Potassium 3.5 - 5.1 mmol/L 3.9   Chloride 95 - 110 mmol/L 104   CO2 23 - 29 mmol/L 27   Anion Gap 8 - 16 mmol/L 13   BUN 8 - 23 mg/dL 12   Creatinine 0.5 - 1.4 mg/dL 0.9   eGFR if non African American >60 mL/min/1.73 m^2 >60   eGFR if African American >60 mL/min/1.73 m^2 >60   Glucose 70 - 110 mg/dL 119 (H)   Calcium 8.7 - 10.5 mg/dL 9.5   Alkaline Phosphatase 55 - 135 U/L 124   PROTEIN TOTAL 6.0 - 8.4 g/dL 7.5   Albumin 3.5 - 5.2 g/dL 4.0   BILIRUBIN TOTAL 0.1 - 1.0 mg/dL 0.4   AST 10 - 40 U/L 20   ALT 10 - 44 U/L 33    - 260 U/L 231      [facility] Reference Range & Units 01/04/22 07:17   Hemoglobin A1C External 4.0 - 5.6 %  4.0 - 5.6 %  4.0 - 5.6 % 7.5 (H)  7.5 (H)  7.5 (H)   Estimated Avg Glucose 68 - 131 mg/dL  68 - 131 mg/dL  68 - 131 mg/dL 169 (H)  169 (H)  169 (H)   TSH 0.400 - 4.000 uIU/mL 1.226   Free T4 0.71 - 1.51 ng/dL 0.90       Vitals:    06/20/22 0954   BP: 134/63   Pulse: 74           Physical Exam  Vitals reviewed.   Constitutional:       General: She is not in acute distress.     Appearance: Normal appearance. She is obese. She is not ill-appearing, toxic-appearing or diaphoretic.   HENT:      Head: Normocephalic and atraumatic.   Eyes:      Extraocular Movements: Extraocular movements intact.   Cardiovascular:      Rate and Rhythm: Normal rate.   Pulmonary:      Effort: Pulmonary effort is normal. No respiratory distress.   Musculoskeletal:      Cervical back: Normal range of motion.   Skin:     General: Skin is warm and dry.   Neurological:      General: No focal deficit present.      Mental Status: She is alert and oriented to person, place, and time.      Gait: Gait  normal.   Psychiatric:         Mood and Affect: Mood normal.         Behavior: Behavior normal.         Thought Content: Thought content normal.         Judgment: Judgment normal.         Assessment:       Problem List Items Addressed This Visit     Hypertension    Hyperlipidemia    Type 2 diabetes mellitus with hyperglycemia, without long-term current use of insulin    Gastroesophageal reflux disease    FOZIA (obstructive sleep apnea)      Other Visit Diagnoses     Class 3 severe obesity due to excess calories with serious comorbidity and body mass index (BMI) of 40.0 to 44.9 in adult    -  Primary    Encounter for weight loss counseling              Plan:   - Discuss starting GLP-1 with PCP who manages DM    - Log all food and beverage intake with a daily calorie goal of 1200 calories per day; multiple small meals per day    - Low intensity aerobic exercise for 15-30 minutes 3-5x/week

## 2022-06-20 NOTE — PATIENT INSTRUCTIONS
Food and Beverage Log:  Keep a log of all food and beverages that you consume.  Keeping track of calories will help you lose weight, because monitoring will help you become more aware of what you are eating and recognize your eating patterns.  Be mindful of your hunger level before eating and your satiety level after eating.  Just keeping records will help you make healthy changes in your diet and eat fewer calories.    Tips for keeping a calorie count:     Each day, aim for the daily calorie goal.     Record your food intake immediately after eating. If possible, look up calories before or immediately after eating.     Download an eliud like i[employer] Fitness Pal, Lose It!, or [employer] (Code: 94437) To keep track of your calories.    Measuring foods (using measuring cups or spoons) will help you be more accurate with counting calories.     Keep a running calorie count throughout the day.     Count daily calories toward a weekly calorie total. If you eat too many calories one day, cut back slightly over the next few days to meet your weekly goal.         Meal Planning & Grocery Shopping    Meal planning builds the foundation for healthy eating. When you have structured ideas for healthy meals and foods available at home to prepare those meals, weight control becomes easier.  If only healthy foods are available at home, then you will be much more likely to eat healthy foods. And you will be less likely to go to a restaurant or  a fast food meal, which tend to be unhealthy and higher in calories than meals prepared at home.      Take 5-10 minutes each week to plan meals for the next 7 days.  Make a grocery list based on the meal plan.    Grocery Shopping Tips:  Shop on a full stomach.  Schedule your shopping for times when you are most motivated and able to be disciplined about your purchases. For example, after a stressful day at work it may be difficult to make the healthiest choices. Shopping at other times, such  as early in the morning or after dinner, may be easier.  Focus your shopping on the outside aisles of the store, which tend to contain more fresh foods and lower calorie foods. The inside aisles tend to have more processed foods.  Stick to your list. Avoid buying unhealthy items just because they are on sale.   Compare nutrition labels to check the number of calories and percentage of fat.      What to buy:    Vegetables  Fresh vegetables  Frozen vegetables with no sauce or added salt  Canned vegetables with no sauce or added salt    Protein  Lean meats, such as chicken and turkey  Limit red meats, such as beef to no more than 1x/week  Limit processed meats, such as cold cuts, polk, sausage, and hot dogs. Look for brands that have no nitrites and are minimally processed. Consider turkey sausage or turkey polk.  Fish and Shellfish  Eggs  Dried beans  Canned beans (reduced sodium)    Fat  Use healthy oils, such as olive oil or canola oil, for cooking, salad dressings, etc.  Unflavored nuts and seeds  Nut butters (no added sugar)    Dairy  Yogurt (no sugar added)  Cheese  Low-fat milk  Unsweetened nondairy milks (almond milk, soy milk, etc)    Fruit  Fresh Fruit  Frozen fruit with no added sugar  Canned fruit with no added sugar  Dried fruit with no added sugar  100% fruit juice    Whole Grains  Single ingredient grains, such as oats, quinoa, brown rice  Whole-wheat pasta  Sprouted whole-grain bread    What to avoid:  - Avoid fried foods  - Avoid foods with added sugar  - Avoid sugar-sweetened beverages  - Avoid ultra-processed foods            Copyright © 2011, [facility]. For more information about The Healthy Eating Plate, please see The Nutrition Source, Department of Nutrition, [employer], www.thenutritionsource.org, and [employer], www.health.[place].edu.      Lifestyle Activity    Lifestyle activity consists of all the activities that burn calories during  the course of a normal day. Using the stairs, washing the dishes, or even getting up to turn off the television are all examples of lifestyle activity. All activities, no matter how small, burn calories. Increasing lifestyle activity can help with weight control, so building physical activity into your everyday routine is important.     A pedometer is a tool that can help you track how much lifestyle activity you are getting. It can help you stay active. A pedometer counts each step you take and displays your total steps on the screen. Many smartphones, including iPhones and Androids, have applications that automatically count your steps. If you decide to use this method, make sure you are holding or wearing your smartphone so it can count all of your steps.     During the next 2 weeks, aim for 5,000 or more steps per day. Each week aim to increase your daily step goal by 250 so that you will reach an average of 10,000 steps per day (equal to walking 4 to 5 miles).     Start making more active choices in your routine in order to increase the amount of walking you do each day.     Strategies to Increase Lifestyle Activity:    Take several 5-10-minute walks during the day.   Set a reminder to take a 5 minute break from your desk every hour.   Choose the farthest entrance to a building that you are entering.   Host walking meetings at work.   Walk down the brenner to contact co-workers face-to-face, instead of emailing or calling.  Walk to a restroom, water cooler, or copy machine on a different floor at work.   Walk during your lunch break.   Park farther away in parking lots.   Get off the bus or train earlier and walk farther to your destination.   Take the stairs rather than the elevator or the escalator.   Start a walking club with co-workers or neighbors.   Walk - don't drive - for trips less than one mile.   Take an after-dinner walk with family.   Go for a walk while talking on your wireless phone.   Walk the  dog more often.   If you prefer to stay indoors because of the weather, try walking in a shopping mall or doing laps around a large store.   Purchase a treadmill to use at home.   Schedule time for walking every week and stick to it like any other appointment.   Or think of your own strategy: _______________________________       Physical Activity    Physical activity improves your health and helps with weight control, especially weight loss maintenance.      When starting to incorporate an exercise routine into your day, it is helpful to set specific goals.  For example, I will walk at moderate intensity from 12:30-12:45pm on Monday, Wednesday, and Friday.  Schedule your exercise time into your calendar.  Think of any potential barriers that may come up and prevent you from exercising.  Develop solutions or back-up plans for when these barriers may arise.    Walking is an ideal activity to start with if you do not currently have an exercise routine. You can make the activity more fun by doing it with a friend or listening to music or a book on tape while you do it. If you don't enjoy walking, think of other activities you like, such as bike riding or swimming.  Other alternatives include group fitness classes or exercise at home using DVDs, Apps, etc.    If you are new to exercising, then start with 10 minutes 3-4 days per week.  After two weeks, increase to 15 minutes 3-4 days per week.  If you already exercise more than this, continue at your higher level, or increase by 10-15 minutes if able.         Aerobic Activity  Aerobic Activity gets you breathing harder and your heart beating faster.  Eventually, you should work up to 150 - 300 minutes of moderate-intensity aerobic activity every week or 75 - 150 minutes of vigorous-intensity aerobic activity every week.  An easy way to gauge the intensity of your activity is with the Talk Test.  During moderate activity, you should be able to talk, but not sing  without having to pause for a breath.  During vigorous activity, you shouldn't be able to say more than a few words without pausing for a breath.  You should not push yourself until you are completely out of breath, tired, or sore.    Examples of Aerobic Activity:  Walking  Running  Swimming  Biking  Playing sports like tennis or basketball  Dancing  Jumping Rope      Strength Training  It is also recommended that you perform muscle-strengthening activities at least 2 days per week.  Be sure to include activities that work all major muscle groups (legs, arms, abdomen, back, buttocks, chest, and shoulders)    Examples of Strength Training  Lifting weights  Working with resistance band  Using your body weight for resistance (squats, push-ups, sit-ups)  Pilates  Yoga      https://health.gov/moveyourway/activity-planner/      Remember to keep a record of your activity to track your progress.

## 2022-06-23 ENCOUNTER — OFFICE VISIT (OUTPATIENT)
Dept: PRIMARY CARE CLINIC | Facility: CLINIC | Age: 67
End: 2022-06-23
Payer: MEDICARE

## 2022-06-23 VITALS
OXYGEN SATURATION: 95 % | DIASTOLIC BLOOD PRESSURE: 80 MMHG | HEART RATE: 87 BPM | TEMPERATURE: 99 F | WEIGHT: 232.25 LBS | SYSTOLIC BLOOD PRESSURE: 130 MMHG | RESPIRATION RATE: 18 BRPM | BODY MASS INDEX: 42.74 KG/M2 | HEIGHT: 62 IN

## 2022-06-23 DIAGNOSIS — J44.9 CHRONIC OBSTRUCTIVE PULMONARY DISEASE, UNSPECIFIED COPD TYPE: ICD-10-CM

## 2022-06-23 DIAGNOSIS — I10 HYPERTENSION, UNSPECIFIED TYPE: ICD-10-CM

## 2022-06-23 PROCEDURE — 1126F AMNT PAIN NOTED NONE PRSNT: CPT | Mod: CPTII,S$GLB,, | Performed by: FAMILY MEDICINE

## 2022-06-23 PROCEDURE — 3075F PR MOST RECENT SYSTOLIC BLOOD PRESS GE 130-139MM HG: ICD-10-PCS | Mod: CPTII,S$GLB,, | Performed by: FAMILY MEDICINE

## 2022-06-23 PROCEDURE — 3060F PR POS MICROALBUMINURIA RESULT DOCUMENTED/REVIEW: ICD-10-PCS | Mod: CPTII,S$GLB,, | Performed by: FAMILY MEDICINE

## 2022-06-23 PROCEDURE — 3066F PR DOCUMENTATION OF TREATMENT FOR NEPHROPATHY: ICD-10-PCS | Mod: CPTII,S$GLB,, | Performed by: FAMILY MEDICINE

## 2022-06-23 PROCEDURE — 3060F POS MICROALBUMINURIA REV: CPT | Mod: CPTII,S$GLB,, | Performed by: FAMILY MEDICINE

## 2022-06-23 PROCEDURE — 1126F PR PAIN SEVERITY QUANTIFIED, NO PAIN PRESENT: ICD-10-PCS | Mod: CPTII,S$GLB,, | Performed by: FAMILY MEDICINE

## 2022-06-23 PROCEDURE — 99999 PR PBB SHADOW E&M-EST. PATIENT-LVL V: ICD-10-PCS | Mod: PBBFAC,,, | Performed by: FAMILY MEDICINE

## 2022-06-23 PROCEDURE — 3051F HG A1C>EQUAL 7.0%<8.0%: CPT | Mod: CPTII,S$GLB,, | Performed by: FAMILY MEDICINE

## 2022-06-23 PROCEDURE — 3288F FALL RISK ASSESSMENT DOCD: CPT | Mod: CPTII,S$GLB,, | Performed by: FAMILY MEDICINE

## 2022-06-23 PROCEDURE — 3288F PR FALLS RISK ASSESSMENT DOCUMENTED: ICD-10-PCS | Mod: CPTII,S$GLB,, | Performed by: FAMILY MEDICINE

## 2022-06-23 PROCEDURE — 3051F PR MOST RECENT HEMOGLOBIN A1C LEVEL 7.0 - < 8.0%: ICD-10-PCS | Mod: CPTII,S$GLB,, | Performed by: FAMILY MEDICINE

## 2022-06-23 PROCEDURE — 3079F PR MOST RECENT DIASTOLIC BLOOD PRESSURE 80-89 MM HG: ICD-10-PCS | Mod: CPTII,S$GLB,, | Performed by: FAMILY MEDICINE

## 2022-06-23 PROCEDURE — 3008F PR BODY MASS INDEX (BMI) DOCUMENTED: ICD-10-PCS | Mod: CPTII,S$GLB,, | Performed by: FAMILY MEDICINE

## 2022-06-23 PROCEDURE — 1100F PR PT FALLS ASSESS DOC 2+ FALLS/FALL W/INJURY/YR: ICD-10-PCS | Mod: CPTII,S$GLB,, | Performed by: FAMILY MEDICINE

## 2022-06-23 PROCEDURE — 4010F ACE/ARB THERAPY RXD/TAKEN: CPT | Mod: CPTII,S$GLB,, | Performed by: FAMILY MEDICINE

## 2022-06-23 PROCEDURE — 99214 OFFICE O/P EST MOD 30 MIN: CPT | Mod: S$GLB,,, | Performed by: FAMILY MEDICINE

## 2022-06-23 PROCEDURE — 3008F BODY MASS INDEX DOCD: CPT | Mod: CPTII,S$GLB,, | Performed by: FAMILY MEDICINE

## 2022-06-23 PROCEDURE — 3066F NEPHROPATHY DOC TX: CPT | Mod: CPTII,S$GLB,, | Performed by: FAMILY MEDICINE

## 2022-06-23 PROCEDURE — 3079F DIAST BP 80-89 MM HG: CPT | Mod: CPTII,S$GLB,, | Performed by: FAMILY MEDICINE

## 2022-06-23 PROCEDURE — 99214 PR OFFICE/OUTPT VISIT, EST, LEVL IV, 30-39 MIN: ICD-10-PCS | Mod: S$GLB,,, | Performed by: FAMILY MEDICINE

## 2022-06-23 PROCEDURE — 4010F PR ACE/ARB THEARPY RXD/TAKEN: ICD-10-PCS | Mod: CPTII,S$GLB,, | Performed by: FAMILY MEDICINE

## 2022-06-23 PROCEDURE — 3075F SYST BP GE 130 - 139MM HG: CPT | Mod: CPTII,S$GLB,, | Performed by: FAMILY MEDICINE

## 2022-06-23 PROCEDURE — 99999 PR PBB SHADOW E&M-EST. PATIENT-LVL V: CPT | Mod: PBBFAC,,, | Performed by: FAMILY MEDICINE

## 2022-06-23 PROCEDURE — 1100F PTFALLS ASSESS-DOCD GE2>/YR: CPT | Mod: CPTII,S$GLB,, | Performed by: FAMILY MEDICINE

## 2022-06-23 RX ORDER — BUDESONIDE, GLYCOPYRROLATE, AND FORMOTEROL FUMARATE 160; 9; 4.8 UG/1; UG/1; UG/1
AEROSOL, METERED RESPIRATORY (INHALATION) 2 TIMES DAILY
Status: CANCELLED | OUTPATIENT
Start: 2022-06-23

## 2022-06-23 RX ORDER — BUDESONIDE, GLYCOPYRROLATE, AND FORMOTEROL FUMARATE 160; 9; 4.8 UG/1; UG/1; UG/1
2 AEROSOL, METERED RESPIRATORY (INHALATION) 2 TIMES DAILY
Qty: 10.7 G | Refills: 5 | Status: SHIPPED | OUTPATIENT
Start: 2022-06-23 | End: 2022-06-27 | Stop reason: SDUPTHER

## 2022-06-23 RX ORDER — BUPROPION HYDROCHLORIDE 150 MG/1
150 TABLET ORAL DAILY
Qty: 90 TABLET | Refills: 3 | Status: SHIPPED | OUTPATIENT
Start: 2022-06-23 | End: 2023-06-21

## 2022-06-23 NOTE — PROGRESS NOTES
Subjective:       Patient ID: Magdalena Acevedo is a 67 y.o. female.    Chief Complaint: HPI:  66-year-old female-patient in for refills--cramps in hands and toes--hands can just be sitting there in fingers lock up on her in very painful--night when go to bed occasionally toes curl up---spasm from toes to the ankle on the dorsum of the foot-knots.  Patient on potassium.  Occasionally when backing up car--or reaches to wipe after having a bowel movement--gets abdominal cramps.       Hx tremor difficulty walking long distances needed a scooter.Unable use wheelchair due weight and back pain       Eating well--+ BM --Ambulating car to fornt door building has rest catch breath  --walking from bedroom to den has to stop and catch her breath        ROS:  Skin: + psoriasis--better since Christopher has appoint with dermatologist tomorrow   HEENT: no  headache, no  ocular pain, blurred vision, diplopia, epistaxis,hoarseness change in voice,  No thyroid trouble    Lung: No pneumonia, asthma, Tb, wheezing, SOB, no smoking history of bronchitis/COPD--occasional bronchospasm on ProAir and Symbicort  Heart: No chest pain, no ankle edema since on Lasix,no  MI, mendy murmur, +hypertension blood pressure,+  Hyperlipidemia---no stent bypass arrhythmia  Abdomen: No nausea, vomiting, diarrhea, constipation, ulcers, hepatitis,  melena, hematochezia, hematemesis history cholecystectomy  : no UTI, renal disease, stones + urinary incontinence with coughing same   GYN last mammogram due June 2021 Pap smear over 20 years  MS: no fractures, O/A, lupus, rheumatoid, gout left shoulder pain  + DDD cervical spine X up now on  Neuro: No dizziness, LOC, seizures   +diabetes Glu 103 now off steroids , no anemia, +anxiety, + depression upset gets angry patient is on Zoloft but not using lorazepam or Ativan doing okay --anxiety trying to get money to replace root   , 1 child, unemployed,, grandson stays with patient in every other week great  grand children stay with patient    Objective:   Physical Exam:  No physical exam was done this was an audio visit  General: Well nourished, well developed, no acute distress + morbid obesity  Skin: + Dry scaly skin especially on the legs scalp arms and back-psoriasis -  HEENT: Eyes PERRLA, EOM intact, nose clear , throat nonerythema ears TMs clear  NECK: Supple, no bruits, No JVD, no nodes  Lungs: Clear, no rales, rhonchi, nowheezing   Heart: Regular rate and rhythm, no murmurs, gallops, or rubs  Abdomen: flat, bowel sounds positive, no tenderness, or organomegaly   MS:  Tenderness especially left acromioclavicular joint and especially the left upper trapezius --no significant change    Neuro: Alert, CN intact, oriented X 3 tremor of the hands bilaterally right greater than left  Extremities: No cyanosis, clubbing, or edema         Assessment:       1. Hypertension, unspecified type    2. Chronic obstructive pulmonary disease, unspecified COPD type        Plan:       Hypertension, unspecified type    Chronic obstructive pulmonary disease, unspecified COPD type  -     Discontinue: budesonide-glycopyr-formoterol (BREZTRI AEROSPHERE) 160-9-4.8 mcg/actuation HFAA; Inhale 2 puffs into the lungs 2 (two) times a day. Rinse mouth after use.  Dispense: 10.7 g; Refill: 5    Other orders  -     buPROPion (WELLBUTRIN XL) 150 MG TB24 tablet; Take 1 tablet (150 mg total) by mouth once daily.  Dispense: 90 tablet; Refill: 3         Main Reason for Visit--nocturnal muscle cramps--especially hands feet--pulses 2/4--drinks a lot of water will do electrolyte testing CMP magnesium CBC thyroid  COPD on albuterol and Symbicort  Hypertension/hyperlipidemia needs to be on low-sodium low-fat diet exercise as tolerated did--trying get to ideal body weight Norvasc lisinopril for blood pressure Lipitor for hyperlipidemia ankle edema  Type 2 diabetes on Glucotrol  GERD omeprazole  Anxiety depression on Zoloft  Cervical DDD/anxiety/depression  may need counseling if symptoms persist/cholecystectomy  Lab patient just had lab WBC 13.8 glucose 166 better of 100-120 excellent 120-150-very good greater than 180 poor hemoglobin A1c 6.9 very good no need for changing diabetic treatment triglyceride 225 needs to exercise try to get ideal body weight may add fenofibrate if fails to decrease continue Lipitor for cholesterol redo lab in 6 months CBCs CMP lipids hemoglobin B0o--yp magnesium now   Health maintenane COVID vaccine shingles pneumococcal ft exam  Foot Exam--pulses 2/4 toenails in good shape no specific lesions noted                                            Subjective:       Patient ID: Magdalena Acevedo is a 67 y.o. female.    Chief Complaint: HPI:  66-year-old white female--in for six-month checkup  need special request for [facility] going to send to patient  Eating well--+BM--ambulation--SOB when exerets self       Had recent surgery cataract and deviated septum surgery        Does not need any other refills       Saw Dr Mendoza weight loss clinic --wants change glipizide to Ozempic       ROS:  Skin: + psoriasis--better since Christopher has appoint with dermatologist tomorrow   HEENT: no  headache, no  ocular pain, blurred vision, diplopia, epistaxis,hoarseness change in voice,  No thyroid trouble    Lung: No pneumonia, asthma, Tb, wheezing, +SOB, no smoking history of bronchitis/COPD--occasional bronchospasm on ProAir saw pulmonary MD was on 2 different medications that were not covered by insurance so told get back on Symbicort by me  Heart: No chest pain, no ankle edema since on Lasix,no  MI, mendy murmur, +hypertension blood pressure,+  Hyperlipidemia---no stent bypass arrhythmia--was staying with ankle edema but cardiologist took patient off of some medications and problem has resolved  Abdomen: No nausea, vomiting, diarrhea, constipation, ulcers, hepatitis,  melena, hematochezia, hematemesis history cholecystectomy  : no  UTI, renal disease, stones + urinary incontinence with coughing same   GYN last mammogram due June 2021 Pap smear over 20 years  MS: no fractures, O/A, lupus, rheumatoid, gout left shoulder pain  + DDD cervical spine and lower back if stands to long burns   Neuro: No dizziness, LOC, seizures   +diabetes Glu 103 now off steroids , no anemia, +anxiety, + depression upset gets angry patient is on Zoloft but not using lorazepam or Ativan doing okay --anxiety trying to get money to replace root   , 1 child, unemployed,, grandson stays with patient in every other week great grand children stay with patient    Objective:   Physical Exam:  No physical exam was done this was an audio visit  General: Well nourished, well developed, no acute distress + morbid obesity  Skin: + Dry scaly skin especially on the legs scalp arms and back-psoriasis -  HEENT: Eyes PERRLA, EOM intact, nose clear discharge, throat nonerythema ears TMs clear  NECK: Supple, no bruits, No JVD, no nodes   Lungs: Clear, no rales, rhonchi, nowheezing +coarse cough but no rales rhonchi wheezes at this time  Heart: Regular rate and rhythm, no murmurs, gallops, or rubs  Abdomen: flat, bowel sounds positive, no tenderness, or organomegaly   MS:  Tenderness especially left acromioclavicular joint and especially the left upper trapezius --no significant change    Neuro: Alert, CN intact, oriented X 3 tremor of the hands bilaterally right greater than left  Extremities: No cyanosis, clubbing, or edema         Assessment:       1. Hypertension, unspecified type    2. Chronic obstructive pulmonary disease, unspecified COPD type        Plan:       Hypertension, unspecified type    Chronic obstructive pulmonary disease, unspecified COPD type  -     Discontinue: budesonide-glycopyr-formoterol (BREZTRI AEROSPHERE) 160-9-4.8 mcg/actuation HFAA; Inhale 2 puffs into the lungs 2 (two) times a day. Rinse mouth after use.  Dispense: 10.7 g; Refill: 5    Other orders  -      buPROPion (WELLBUTRIN XL) 150 MG TB24 tablet; Take 1 tablet (150 mg total) by mouth once daily.  Dispense: 90 tablet; Refill: 3         Wellbutrin 150 will increase to 300 if needed  Other medical issues  History-falls--bilateral leg weakness --hx tremor--polyneuropathy?  Diabetes-- nocturnal muscle cramps--recently seen DR Carrion --lab done --B12 B1 RPR TSH all normal--patient states not told anything about her medical condition--does not recall getting peripheral vessels evaluated will order ultrasound lower extremity the artery--Dr alyx Jean Baptiste [facility]---MRI LS spine --see neurosurgeon --only taking gabapentin 300 at bedtime told increase to t.i.d.  Hypertension/hyperlipidemia needs to be on low-sodium low-fat diet exercise as tolerated did--trying get to ideal body weight Norvasc lisinopril for blood pressure Lipitor for hyperlipidemia ankle edema  Type 2 diabetes on Glucotrol doing well  GERD omeprazole  Anxiety depression on Zoloft  Cervical DDD/anxiety/depression may need counseling if symptoms persist/cholecystectomy  Lab--see above  [facility] lung [facility] hemoglobin A1c  Patient medically cleared if lab OK if lab not okay call my cell phone [phone] does not need to repeat chest x-ray EKG

## 2022-06-24 DIAGNOSIS — J44.9 CHRONIC OBSTRUCTIVE PULMONARY DISEASE, UNSPECIFIED COPD TYPE: ICD-10-CM

## 2022-06-24 RX ORDER — BUDESONIDE, GLYCOPYRROLATE, AND FORMOTEROL FUMARATE 160; 9; 4.8 UG/1; UG/1; UG/1
2 AEROSOL, METERED RESPIRATORY (INHALATION) 2 TIMES DAILY
Qty: 10.7 G | Refills: 5 | Status: CANCELLED | OUTPATIENT
Start: 2022-06-24

## 2022-06-24 NOTE — TELEPHONE ENCOUNTER
----- Message from Meeta Schwarz sent at 6/24/2022  1:17 PM CDT -----  Contact: Pt [phone]  The following prescription was printed instead of being sent to the pharmacy.    Requesting an RX refill or new RX.  Is this a refill or new RX: New  RX name and strength (copy/paste from chart):  budesonide-glycopyr-formoterol (BREZTRI AEROSPHERE) 160-9-4.8 mcg/actuation HFAA  Is this a 30 day or 90 day RX: 30  Pharmacy name and phone # (copy/paste from chart):    [id number] [address] [address]  Phone: [phone] Fax: [phone]    Comment:  Patient is requesting it be sent today.    Please call and advise.    Thank You

## 2022-06-27 ENCOUNTER — OFFICE VISIT (OUTPATIENT)
Dept: PULMONOLOGY | Facility: CLINIC | Age: 67
End: 2022-06-27
Payer: MEDICARE

## 2022-06-27 VITALS
TEMPERATURE: 98 F | SYSTOLIC BLOOD PRESSURE: 116 MMHG | HEART RATE: 96 BPM | OXYGEN SATURATION: 96 % | WEIGHT: 229.06 LBS | DIASTOLIC BLOOD PRESSURE: 76 MMHG | HEIGHT: 62 IN | BODY MASS INDEX: 42.15 KG/M2

## 2022-06-27 DIAGNOSIS — J41.0 SIMPLE CHRONIC BRONCHITIS: ICD-10-CM

## 2022-06-27 DIAGNOSIS — G47.33 OSA ON CPAP: Primary | ICD-10-CM

## 2022-06-27 DIAGNOSIS — J44.9 CHRONIC OBSTRUCTIVE PULMONARY DISEASE, UNSPECIFIED COPD TYPE: ICD-10-CM

## 2022-06-27 PROCEDURE — 4010F ACE/ARB THERAPY RXD/TAKEN: CPT | Mod: CPTII,S$GLB,, | Performed by: NURSE PRACTITIONER

## 2022-06-27 PROCEDURE — 1125F PR PAIN SEVERITY QUANTIFIED, PAIN PRESENT: ICD-10-PCS | Mod: CPTII,S$GLB,, | Performed by: NURSE PRACTITIONER

## 2022-06-27 PROCEDURE — 4010F PR ACE/ARB THEARPY RXD/TAKEN: ICD-10-PCS | Mod: CPTII,S$GLB,, | Performed by: NURSE PRACTITIONER

## 2022-06-27 PROCEDURE — 3008F BODY MASS INDEX DOCD: CPT | Mod: CPTII,S$GLB,, | Performed by: NURSE PRACTITIONER

## 2022-06-27 PROCEDURE — 3078F DIAST BP <80 MM HG: CPT | Mod: CPTII,S$GLB,, | Performed by: NURSE PRACTITIONER

## 2022-06-27 PROCEDURE — 3288F FALL RISK ASSESSMENT DOCD: CPT | Mod: CPTII,S$GLB,, | Performed by: NURSE PRACTITIONER

## 2022-06-27 PROCEDURE — 3060F POS MICROALBUMINURIA REV: CPT | Mod: CPTII,S$GLB,, | Performed by: NURSE PRACTITIONER

## 2022-06-27 PROCEDURE — 3066F NEPHROPATHY DOC TX: CPT | Mod: CPTII,S$GLB,, | Performed by: NURSE PRACTITIONER

## 2022-06-27 PROCEDURE — 3008F PR BODY MASS INDEX (BMI) DOCUMENTED: ICD-10-PCS | Mod: CPTII,S$GLB,, | Performed by: NURSE PRACTITIONER

## 2022-06-27 PROCEDURE — 3060F PR POS MICROALBUMINURIA RESULT DOCUMENTED/REVIEW: ICD-10-PCS | Mod: CPTII,S$GLB,, | Performed by: NURSE PRACTITIONER

## 2022-06-27 PROCEDURE — 1159F PR MEDICATION LIST DOCUMENTED IN MEDICAL RECORD: ICD-10-PCS | Mod: CPTII,S$GLB,, | Performed by: NURSE PRACTITIONER

## 2022-06-27 PROCEDURE — 3066F PR DOCUMENTATION OF TREATMENT FOR NEPHROPATHY: ICD-10-PCS | Mod: CPTII,S$GLB,, | Performed by: NURSE PRACTITIONER

## 2022-06-27 PROCEDURE — 99214 PR OFFICE/OUTPT VISIT, EST, LEVL IV, 30-39 MIN: ICD-10-PCS | Mod: S$GLB,,, | Performed by: NURSE PRACTITIONER

## 2022-06-27 PROCEDURE — 1125F AMNT PAIN NOTED PAIN PRSNT: CPT | Mod: CPTII,S$GLB,, | Performed by: NURSE PRACTITIONER

## 2022-06-27 PROCEDURE — 3074F PR MOST RECENT SYSTOLIC BLOOD PRESSURE < 130 MM HG: ICD-10-PCS | Mod: CPTII,S$GLB,, | Performed by: NURSE PRACTITIONER

## 2022-06-27 PROCEDURE — 1159F MED LIST DOCD IN RCRD: CPT | Mod: CPTII,S$GLB,, | Performed by: NURSE PRACTITIONER

## 2022-06-27 PROCEDURE — 99999 PR PBB SHADOW E&M-EST. PATIENT-LVL V: CPT | Mod: PBBFAC,,, | Performed by: NURSE PRACTITIONER

## 2022-06-27 PROCEDURE — 1101F PR PT FALLS ASSESS DOC 0-1 FALLS W/OUT INJ PAST YR: ICD-10-PCS | Mod: CPTII,S$GLB,, | Performed by: NURSE PRACTITIONER

## 2022-06-27 PROCEDURE — 3074F SYST BP LT 130 MM HG: CPT | Mod: CPTII,S$GLB,, | Performed by: NURSE PRACTITIONER

## 2022-06-27 PROCEDURE — 3078F PR MOST RECENT DIASTOLIC BLOOD PRESSURE < 80 MM HG: ICD-10-PCS | Mod: CPTII,S$GLB,, | Performed by: NURSE PRACTITIONER

## 2022-06-27 PROCEDURE — 3051F PR MOST RECENT HEMOGLOBIN A1C LEVEL 7.0 - < 8.0%: ICD-10-PCS | Mod: CPTII,S$GLB,, | Performed by: NURSE PRACTITIONER

## 2022-06-27 PROCEDURE — 99214 OFFICE O/P EST MOD 30 MIN: CPT | Mod: S$GLB,,, | Performed by: NURSE PRACTITIONER

## 2022-06-27 PROCEDURE — 3051F HG A1C>EQUAL 7.0%<8.0%: CPT | Mod: CPTII,S$GLB,, | Performed by: NURSE PRACTITIONER

## 2022-06-27 PROCEDURE — 3288F PR FALLS RISK ASSESSMENT DOCUMENTED: ICD-10-PCS | Mod: CPTII,S$GLB,, | Performed by: NURSE PRACTITIONER

## 2022-06-27 PROCEDURE — 1101F PT FALLS ASSESS-DOCD LE1/YR: CPT | Mod: CPTII,S$GLB,, | Performed by: NURSE PRACTITIONER

## 2022-06-27 PROCEDURE — 99999 PR PBB SHADOW E&M-EST. PATIENT-LVL V: ICD-10-PCS | Mod: PBBFAC,,, | Performed by: NURSE PRACTITIONER

## 2022-06-27 RX ORDER — BUDESONIDE, GLYCOPYRROLATE, AND FORMOTEROL FUMARATE 160; 9; 4.8 UG/1; UG/1; UG/1
2 AEROSOL, METERED RESPIRATORY (INHALATION) 2 TIMES DAILY
Qty: 10.7 G | Refills: 5 | Status: SHIPPED | OUTPATIENT
Start: 2022-06-27 | End: 2022-12-19 | Stop reason: SDUPTHER

## 2022-06-27 RX ORDER — BUDESONIDE, GLYCOPYRROLATE, AND FORMOTEROL FUMARATE 160; 9; 4.8 UG/1; UG/1; UG/1
2 AEROSOL, METERED RESPIRATORY (INHALATION) 2 TIMES DAILY
Qty: 10.7 G | Refills: 5 | OUTPATIENT
Start: 2022-06-27 | End: 2022-12-19

## 2022-06-27 NOTE — PROGRESS NOTES
HISTORY OF PRESENT ILLNESS: Magdalena Acevedo is a 67 y.o. female with problems listed on problem list is here  for management of obstructive sleep apnea and CPAP equipment check. Patient with symptoms of  snoring, witnessed apnea, excessive daytime sleepiness, fatigue and nocturia 2 to 3 times a night. Sleep is complicated by lack of set sleep schedule.   Sob > 1 year, soboe improved with breztri but not significant,  increasing adl  Patient does not have: sleep paralysis, cataplexy, sleep talking, sleep walking, and hypnic hallucinations  Patient does have: RLS and nightmares    Elbe Sleepiness Scale score 8    SLEEP ROUTINE:  Time to bed: varies, sometimes 2-3 am  Sleep onset latency: varies    Disruptions or awakenings:   2-3 times  Wakeup time:     varies  Perceived sleep quality:  restless     Home sleep study 02/24/2022 AHI 25 RDI 49     Patient was started on CPAP ibreeze 5- setting 4 to 20 cm H2O.  Reports the pressure is comfortable.  Denies dryness.  Compliance 97%, average usage is 9 hours, 29/30 days over 4 hours usage, 9.3 liters/minute week, AHI 1.5       Patient reports she is compliant with Breztri but still using rescue daily with exertion.  Flare up about 2-3 times require antibiotics or steroid.  Walk 02/01/2022 96/93/95  PFT 01/24/2022 ratio 77 FEV1 1.68 (75%) TLC 77% DLCO 47% poor effort noted on flow loop so test unreliable    CT chest 1/24/2022:Lungs: There are no abnormal opacities that require further evaluation.  There are a few scattered pulmonary nodules bilaterally.  The largest opacity in the right lung appears solid and measures 0.4 cm on series 4, image 120.  The largest opacity in the left lung appears solid and measures 0.4 cm on series 4, image 177.  The lungs show no findings consistent with emphysema.  Previously identified reticulonodular interstitial opacities have resolved when compared to prior exam.     Pleura:   No effusion..    · ECHO 2/25/2022: The  estimated ejection fraction is 55%.  · The left ventricle is normal in size with normal systolic function.  · Grade I left ventricular diastolic dysfunction.  · Normal right ventricular size with normal right ventricular systolic function.  · Mild left atrial enlargement.  · Normal central venous pressure (3 mmHg).  · The estimated PA systolic pressure is 9 mmHg.       REVIEW OF SYSTEMS:   Review of Systems   Constitutional: Positive for activity change (inactive per pt due to NDIAYE) and fatigue. Negative for fever, chills, weight loss, weight gain, appetite change and night sweats.   HENT: Negative for congestion.         Throat hurts in am  Recent deviated septal repair 1.5 week ago   Eyes: Negative.    Respiratory: Positive for cough, sputum production, wheezing, previous hospitalization due to pulmonary problems (2 years ), dyspnea on extertion (walking to mailbox), use of rescue inhaler (with exertion about once a day) and somnolence. Negative for apnea, snoring, chest tightness, orthopnea and Paroxysmal Nocturnal Dyspnea.         Compliant with breztri twice daily   Cardiovascular: Positive for leg swelling. Negative for chest pain and palpitations.   Genitourinary:        Nocturia 2-3 x-same   Endocrine: endocrine negative   Musculoskeletal: Positive for gait problem (sob).   Skin: Negative.    Gastrointestinal: Positive for acid reflux. Negative for nausea, vomiting and abdominal pain.   Neurological: Positive for headaches (daily, wake up with GALLO still).   Psychiatric/Behavioral: Positive for sleep disturbance (bathroom ).         PAST MEDICAL HISTORY:    Active Ambulatory Problems     Diagnosis Date Noted    DDD (degenerative disc disease), cervical 10/13/2017    Chronic intractable headache 10/13/2017    Hypertension 10/13/2017    Psoriasis 10/13/2017    Bronchitis 11/09/2017    Urinary incontinence 05/15/2018    Anger 05/15/2018    Hyperlipidemia 05/22/2018    Left shoulder strain 11/18/2019     Morbid obesity with BMI of 40.0-44.9, adult 03/02/2020    Leukocytosis 03/04/2020    Pulmonary nodule 03/04/2020    Type 2 diabetes mellitus with hyperglycemia, without long-term current use of insulin 03/05/2020    Chronic renal insufficiency, stage III (moderate) 03/05/2020    Chronic obstructive pulmonary disease 03/05/2020    Abnormal chest CT 03/05/2020    History of smoking greater than 50 pack years 03/09/2020    Intertrigo 03/19/2020    Hepatitis B core antibody positive 07/06/2020    Restless legs syndrome 07/06/2020    Edema 11/10/2020    Gastroesophageal reflux disease 11/10/2020    Depression 11/10/2020    Nocturnal muscle cramps 04/05/2021    Tremor 05/27/2021    Peripheral polyneuropathy 05/27/2021    Falls 07/06/2021    Weakness of both lower extremities 07/06/2021    Other acute recurrent sinusitis 07/06/2021    Personal history of nicotine dependence 12/08/2021    Age-related cataract of both eyes 12/23/2021    Deviated nasal septum 02/08/2022    FOZIA on CPAP 02/18/2022    NDIAYE (dyspnea on exertion) 02/18/2022    Localized edema 04/11/2022    Coronary artery disease involving native coronary artery of native heart without angina pectoris 05/11/2022    Thoracic aortic atherosclerosis 05/11/2022     Resolved Ambulatory Problems     Diagnosis Date Noted    Obesity 10/13/2017    History of DVT (deep vein thrombosis) 05/14/2018    Type 2 diabetes mellitus without complication, without long-term current use of insulin 05/14/2018    Overweight 05/14/2018    Borderline diabetes 05/22/2018    Left tennis elbow 11/18/2019    Left upper quadrant pain 02/04/2020    Acute hypoxemic respiratory failure 03/06/2020     Past Medical History:   Diagnosis Date    Arthritis     Chronic bronchitis     COPD (chronic obstructive pulmonary disease)     Diabetes mellitus     GERD (gastroesophageal reflux disease)     Sleep apnea                 PAST SURGICAL HISTORY:    Past Surgical  History:   Procedure Laterality Date    CHOLECYSTECTOMY      COLONOSCOPY N/A 2020    Procedure: COLONOSCOPY;  Surgeon: Anny Acevedo MD;  Location: [facility] ENDO;  Service: Endoscopy;  Laterality: N/A;    COLONOSCOPY W/ BIOPSIES  2020    colonoscopy w/ biopsy per  2020    NASAL SEPTOPLASTY Bilateral 2022    Procedure: SEPTOPLASTY, NOSE;  Surgeon: Karri Berrios MD;  Location: [facility] OR;  Service: ENT;  Laterality: Bilateral;   SAID CASE IS 1 HOUR    sinoplasty      TUBAL LIGATION           FAMILY HISTORY:                Family History   Problem Relation Age of Onset    Cancer Mother     Diabetes Mother     Heart disease Father     Cancer Sister     Diabetes Sister     Cancer Sister     Breast cancer Sister        SOCIAL HISTORY:          Tobacco:   Social History     Tobacco Use   Smoking Status Former Smoker    Packs/day: 2.00    Years: 45.00    Pack years: 90.00    Types: Cigarettes    Start date: 1972    Quit date: 2016    Years since quittin.8   Smokeless Tobacco Never Used       alcohol use:    Social History     Substance and Sexual Activity   Alcohol Use No                   ALLERGIES:    Review of patient's allergies indicates:   Allergen Reactions    Augmentin [amoxicillin-pot clavulanate] Hives and Rash    Wellbutrin [bupropion hcl] Rash       CURRENT MEDICATIONS:    Current Outpatient Medications   Medication Sig Dispense Refill    acetaminophen (TYLENOL) 650 MG TbSR Take 1 tablet (650 mg total) by mouth every 8 (eight) hours as needed (pain). 30 tablet 0    albuterol (PROAIR HFA) 90 mcg/actuation inhaler Inhale 2 puffs into the lungs every 4 (four) hours as needed for Wheezing or Shortness of Breath. Rescue 18 g 5    blood sugar diagnostic (TRUE METRIX GLUCOSE TEST STRIP) Strp 1 strip by Misc.(Non-Drug; Combo Route) route once daily. 100 strip 3    blood sugar diagnostic Strp To check BG 2 times daily, to use with insurance preferred  meter 100 strip 11    buPROPion (WELLBUTRIN XL) 150 MG TB24 tablet Take 1 tablet (150 mg total) by mouth once daily. 90 tablet 3    fexofenadine-pseudoephedrine (ALLEGRA-D 24) 180-240 mg per 24 hr tablet Take 1 tablet by mouth once daily.      fluticasone propionate (FLONASE) 50 mcg/actuation nasal spray 2 sprays (100 mcg total) by Each Nostril route once daily. (Patient taking differently: 2 sprays by Each Nostril route once daily. Pt uses PRN) 16 g 5    furosemide (LASIX) 40 MG tablet TAKE 1 TABLET BY MOUTH IN THE MORNING AS NEEDED ANKLE WITH EDEMA ONLY 90 tablet 3    glipiZIDE (GLUCOTROL) 10 MG tablet TAKE ONE TABLET BY MOUTH ONCE DAILY WITH BREAKFAST 90 tablet 0    lancets 33 gauge Misc 1 lancet by Misc.(Non-Drug; Combo Route) route once daily. 100 each 3    lancets Misc To check BG 2 times daily, to use with insurance preferred meter 100 each 11    lisinopriL (PRINIVIL,ZESTRIL) 40 MG tablet Take 1 tablet (40 mg total) by mouth once daily. 90 tablet 3    potassium chloride (MICRO-K) 10 MEQ CpSR TAKE 1 CAPSULE BY MOUTH IN THE MORNING WITH LASIX ONLY 90 capsule 3    sertraline (ZOLOFT) 25 MG tablet TAKE 1 TABLET (25 MG TOTAL) BY MOUTH ONCE DAILY. 90 tablet 4    atorvastatin (LIPITOR) 20 MG tablet Take 1 tablet (20 mg total) by mouth every evening. 90 tablet 3    blood-glucose meter (TRUE METRIX AIR GLUCOSE METER) kit Use as instructed 1 each 0    BREZTRI AEROSPHERE 160-9-4.8 mcg/actuation HFAA Inhale 2 puffs into the lungs 2 (two) times a day. 10.7 g 5    budesonide-glycopyr-formoterol (BREZTRI AEROSPHERE) 160-9-4.8 mcg/actuation HFAA Inhale 2 puffs into the lungs 2 (two) times a day. Rinse mouth after use. 10.7 g 5    gabapentin (NEURONTIN) 300 MG capsule TAKE 1 CAPSULE BY MOUTH TWICE DAILY AS NEEDED FOR RESTLESS LEG SYNDROME MAY INCREASE TO 3 TIMES DAILY 90 capsule 2     No current facility-administered medications for this visit.                     PHYSICAL EXAM:  Vitals:    06/27/22 0900   BP:  "116/76   Pulse: 96   Temp: 97.7 °F (36.5 °C)   SpO2: 96%   Weight: 103.9 kg (229 lb 0.9 oz)   Height: 5' 2" (1.575 m)   PainSc:   5   PainLoc: Knee     Body mass index is 41.9 kg/m².   Physical Exam   Constitutional: She is oriented to person, place, and time. She appears well-developed. No distress. She is obese.   HENT:   Head: Normocephalic.   Cardiovascular: Normal rate, regular rhythm and normal heart sounds.   Pulmonary/Chest: Normal expansion, effort normal and breath sounds normal.   Musculoskeletal:         General: No edema (trace nonpitting).      Cervical back: Neck supple.   Neurological: She is alert and oriented to person, place, and time. Gait normal.   Skin: She is not diaphoretic.   psoriasis   Psychiatric: She has a normal mood and affect. Her behavior is normal. Judgment and thought content normal.                                               DATA:  TSH:  Lab Results   Component Value Date    TSH 1.226 01/04/2022     CBC:  Lab Results   Component Value Date    WBC 16.98 (H) 04/20/2022    HGB 14.7 04/20/2022    HCT 45.5 04/20/2022    MCV 93 04/20/2022     04/20/2022     BMP:  Lab Results   Component Value Date     04/20/2022    K 3.9 04/20/2022     04/20/2022    CO2 27 04/20/2022    BUN 12 04/20/2022    CREATININE 0.9 04/20/2022    CALCIUM 9.5 04/20/2022    ANIONGAP 13 04/20/2022    ESTGFRAFRICA >60 04/20/2022    EGFRNONAA >60 04/20/2022     HgbA1C:  Lab Results   Component Value Date    HGBA1C 7.5 (H) 01/04/2022    HGBA1C 7.5 (H) 01/04/2022    HGBA1C 7.5 (H) 01/04/2022            ECHO 3/6/2020: ef 60% grade I dastolic dysfunction          ASSESSMENT    ICD-10-CM ICD-9-CM    1. FOZIA on CPAP  G47.33 327.23     Z99.89 V46.8    2. Simple chronic bronchitis  J41.0 491.0 Complete PFT with bronchodilator      Ambulatory referral/consult to Pulmonary Rehab       PLAN:    Problem List Items Addressed This Visit        Unprioritized    Chronic obstructive pulmonary disease    Overview    "  Patient has significant smoking history.  Quit 2016.  Experiences chronic daily cough, NDIAYE, frequent exacerbation requiring steroids, likely asthmatic bronchitis versus copd  PFT 01/24/2022 ratio 77 FEV1 1.68 (75%) TLC 77% DLCO 47%-normal spirometry, restrictive pattern but inconclusive due to poor effort was noted on PFT   CT without evidence emphysema  Pt on breztri which helps some            Relevant Orders    Complete PFT with bronchodilator    Ambulatory referral/consult to Pulmonary Rehab    FOZIA on CPAP - Primary    Overview     .Home sleep study 02/24/2022 AHI 25 RDI 49  Patient is using and benefiting from cpap. Residual predicted AHI optimal.                  Patient will Follow up in about 3 months (around 9/27/2022), or if symptoms worsen or fail to improve.

## 2022-07-07 ENCOUNTER — TELEPHONE (OUTPATIENT)
Dept: PRIMARY CARE CLINIC | Facility: CLINIC | Age: 67
End: 2022-07-07
Payer: MEDICARE

## 2022-07-07 NOTE — TELEPHONE ENCOUNTER
----- Message from Berta Prescott sent at 7/7/2022  8:25 AM CDT -----  Contact: Patient, [phone]  Calling to speak with the nurse regarding Rx buPROPion (WELLBUTRIN XL) 150 MG TB24 tablet, is the strength correct, plus she broke out with a rash when she started it. Please call her. Thanks.

## 2022-07-07 NOTE — TELEPHONE ENCOUNTER
Called and spoke with patient, she states that since she started taking her Wellbutrin she has broken out with a rash on her face and wants to know what she should do.  Please advise

## 2022-07-19 ENCOUNTER — TELEPHONE (OUTPATIENT)
Dept: PRIMARY CARE CLINIC | Facility: CLINIC | Age: 67
End: 2022-07-19
Payer: MEDICARE

## 2022-07-19 DIAGNOSIS — Z12.31 ENCOUNTER FOR SCREENING MAMMOGRAM FOR MALIGNANT NEOPLASM OF BREAST: Primary | ICD-10-CM

## 2022-07-19 NOTE — TELEPHONE ENCOUNTER
----- Message from Berta Prescott sent at 7/19/2022  9:38 AM CDT -----  Contact: Patient, [phone]  Caller is requesting to schedule their annual screening mammogram appointment. Order is not listed in Epic.  Please enter order and contact patient to schedule.  Would the patient like a call back, or a response through their [facility] portal?:   Call back

## 2022-08-02 ENCOUNTER — HOSPITAL ENCOUNTER (OUTPATIENT)
Dept: RADIOLOGY | Facility: HOSPITAL | Age: 67
Discharge: HOME | End: 2022-08-02
Attending: FAMILY MEDICINE
Payer: MEDICARE

## 2022-08-02 VITALS — HEIGHT: 62 IN | BODY MASS INDEX: 42.15 KG/M2 | WEIGHT: 229.06 LBS

## 2022-08-02 DIAGNOSIS — Z12.31 ENCOUNTER FOR SCREENING MAMMOGRAM FOR MALIGNANT NEOPLASM OF BREAST: ICD-10-CM

## 2022-08-02 PROCEDURE — 77067 SCR MAMMO BI INCL CAD: CPT | Mod: TC

## 2022-08-02 PROCEDURE — 77067 SCR MAMMO BI INCL CAD: CPT | Mod: 26,,, | Performed by: RADIOLOGY

## 2022-08-02 PROCEDURE — 77063 MAMMO DIGITAL SCREENING BILAT WITH TOMO: ICD-10-PCS | Mod: 26,,, | Performed by: RADIOLOGY

## 2022-08-02 PROCEDURE — 77063 BREAST TOMOSYNTHESIS BI: CPT | Mod: 26,,, | Performed by: RADIOLOGY

## 2022-08-02 PROCEDURE — 77063 BREAST TOMOSYNTHESIS BI: CPT | Mod: TC

## 2022-08-02 PROCEDURE — 77067 MAMMO DIGITAL SCREENING BILAT WITH TOMO: ICD-10-PCS | Mod: 26,,, | Performed by: RADIOLOGY

## 2022-08-03 ENCOUNTER — PATIENT MESSAGE (OUTPATIENT)
Dept: BARIATRICS | Facility: CLINIC | Age: 67
End: 2022-08-03
Payer: MEDICARE

## 2022-08-04 ENCOUNTER — TELEPHONE (OUTPATIENT)
Dept: PRIMARY CARE CLINIC | Facility: CLINIC | Age: 67
End: 2022-08-04
Payer: MEDICARE

## 2022-08-04 DIAGNOSIS — R92.8 ABNORMAL MAMMOGRAM: Primary | ICD-10-CM

## 2022-08-04 NOTE — TELEPHONE ENCOUNTER
----- Message from Jaime William MD sent at 8/4/2022 12:22 AM CDT -----  Call tell mammogram some asymetry leftr breast needs additional workup

## 2022-08-24 ENCOUNTER — PATIENT MESSAGE (OUTPATIENT)
Dept: PRIMARY CARE CLINIC | Facility: CLINIC | Age: 67
End: 2022-08-24
Payer: MEDICARE

## 2022-08-29 ENCOUNTER — HOSPITAL ENCOUNTER (OUTPATIENT)
Dept: RADIOLOGY | Facility: HOSPITAL | Age: 67
Discharge: HOME | End: 2022-08-29
Attending: FAMILY MEDICINE
Payer: MEDICARE

## 2022-08-29 DIAGNOSIS — R92.8 ABNORMAL MAMMOGRAM: ICD-10-CM

## 2022-08-29 PROCEDURE — 77065 DX MAMMO INCL CAD UNI: CPT | Mod: TC,LT

## 2022-08-29 PROCEDURE — 77065 DX MAMMO INCL CAD UNI: CPT | Mod: 26,LT,, | Performed by: RADIOLOGY

## 2022-08-29 PROCEDURE — 77061 BREAST TOMOSYNTHESIS UNI: CPT | Mod: 26,LT,, | Performed by: RADIOLOGY

## 2022-08-29 PROCEDURE — 77061 MAMMO DIGITAL DIAGNOSTIC LEFT WITH TOMO: ICD-10-PCS | Mod: 26,LT,, | Performed by: RADIOLOGY

## 2022-08-29 PROCEDURE — 77065 MAMMO DIGITAL DIAGNOSTIC LEFT WITH TOMO: ICD-10-PCS | Mod: 26,LT,, | Performed by: RADIOLOGY

## 2022-08-31 DIAGNOSIS — Z00.00 ROUTINE GENERAL MEDICAL EXAMINATION AT A HEALTH CARE FACILITY: Primary | ICD-10-CM

## 2022-09-06 ENCOUNTER — TELEPHONE (OUTPATIENT)
Dept: PULMONOLOGY | Facility: CLINIC | Age: 67
End: 2022-09-06
Payer: MEDICARE

## 2022-09-06 DIAGNOSIS — G47.33 OSA ON CPAP: Primary | ICD-10-CM

## 2022-09-06 NOTE — TELEPHONE ENCOUNTER
----- Message from Kristen Scherer MA sent at 9/2/2022 10:34 AM CDT -----  Patient would like to change to a full mask, please put in order.

## 2022-09-07 ENCOUNTER — TELEPHONE (OUTPATIENT)
Dept: PRIMARY CARE CLINIC | Facility: CLINIC | Age: 67
End: 2022-09-07
Payer: MEDICARE

## 2022-09-09 ENCOUNTER — OFFICE VISIT (OUTPATIENT)
Dept: HEMATOLOGY/ONCOLOGY | Facility: CLINIC | Age: 67
End: 2022-09-09
Payer: MEDICARE

## 2022-09-09 VITALS
HEART RATE: 86 BPM | SYSTOLIC BLOOD PRESSURE: 125 MMHG | BODY MASS INDEX: 41.09 KG/M2 | DIASTOLIC BLOOD PRESSURE: 85 MMHG | WEIGHT: 223.31 LBS | OXYGEN SATURATION: 98 % | HEIGHT: 62 IN

## 2022-09-09 DIAGNOSIS — Z87.891 HISTORY OF SMOKING GREATER THAN 50 PACK YEARS: ICD-10-CM

## 2022-09-09 DIAGNOSIS — E78.2 MIXED HYPERLIPIDEMIA: ICD-10-CM

## 2022-09-09 DIAGNOSIS — R91.1 PULMONARY NODULE: ICD-10-CM

## 2022-09-09 DIAGNOSIS — I10 PRIMARY HYPERTENSION: ICD-10-CM

## 2022-09-09 DIAGNOSIS — J41.0 SIMPLE CHRONIC BRONCHITIS: ICD-10-CM

## 2022-09-09 DIAGNOSIS — D72.829 LEUKOCYTOSIS, UNSPECIFIED TYPE: Primary | ICD-10-CM

## 2022-09-09 DIAGNOSIS — E11.65 TYPE 2 DIABETES MELLITUS WITH HYPERGLYCEMIA, WITHOUT LONG-TERM CURRENT USE OF INSULIN: ICD-10-CM

## 2022-09-09 DIAGNOSIS — M50.30 DDD (DEGENERATIVE DISC DISEASE), CERVICAL: ICD-10-CM

## 2022-09-09 DIAGNOSIS — N18.30 CHRONIC RENAL IMPAIRMENT, STAGE 3 (MODERATE), UNSPECIFIED WHETHER STAGE 3A OR 3B CKD: ICD-10-CM

## 2022-09-09 DIAGNOSIS — F17.201 TOBACCO ABUSE, IN REMISSION: ICD-10-CM

## 2022-09-09 PROCEDURE — 99999 PR PBB SHADOW E&M-EST. PATIENT-LVL III: ICD-10-PCS | Mod: PBBFAC,,, | Performed by: STUDENT IN AN ORGANIZED HEALTH CARE EDUCATION/TRAINING PROGRAM

## 2022-09-09 PROCEDURE — 1101F PR PT FALLS ASSESS DOC 0-1 FALLS W/OUT INJ PAST YR: ICD-10-PCS | Mod: CPTII,S$GLB,, | Performed by: STUDENT IN AN ORGANIZED HEALTH CARE EDUCATION/TRAINING PROGRAM

## 2022-09-09 PROCEDURE — 1101F PT FALLS ASSESS-DOCD LE1/YR: CPT | Mod: CPTII,S$GLB,, | Performed by: STUDENT IN AN ORGANIZED HEALTH CARE EDUCATION/TRAINING PROGRAM

## 2022-09-09 PROCEDURE — 99214 PR OFFICE/OUTPT VISIT, EST, LEVL IV, 30-39 MIN: ICD-10-PCS | Mod: S$GLB,,, | Performed by: STUDENT IN AN ORGANIZED HEALTH CARE EDUCATION/TRAINING PROGRAM

## 2022-09-09 PROCEDURE — 3066F PR DOCUMENTATION OF TREATMENT FOR NEPHROPATHY: ICD-10-PCS | Mod: CPTII,S$GLB,, | Performed by: STUDENT IN AN ORGANIZED HEALTH CARE EDUCATION/TRAINING PROGRAM

## 2022-09-09 PROCEDURE — 1126F PR PAIN SEVERITY QUANTIFIED, NO PAIN PRESENT: ICD-10-PCS | Mod: CPTII,S$GLB,, | Performed by: STUDENT IN AN ORGANIZED HEALTH CARE EDUCATION/TRAINING PROGRAM

## 2022-09-09 PROCEDURE — 3066F NEPHROPATHY DOC TX: CPT | Mod: CPTII,S$GLB,, | Performed by: STUDENT IN AN ORGANIZED HEALTH CARE EDUCATION/TRAINING PROGRAM

## 2022-09-09 PROCEDURE — 3061F PR NEG MICROALBUMINURIA RESULT DOCUMENTED/REVIEW: ICD-10-PCS | Mod: CPTII,S$GLB,, | Performed by: STUDENT IN AN ORGANIZED HEALTH CARE EDUCATION/TRAINING PROGRAM

## 2022-09-09 PROCEDURE — 1126F AMNT PAIN NOTED NONE PRSNT: CPT | Mod: CPTII,S$GLB,, | Performed by: STUDENT IN AN ORGANIZED HEALTH CARE EDUCATION/TRAINING PROGRAM

## 2022-09-09 PROCEDURE — 3008F PR BODY MASS INDEX (BMI) DOCUMENTED: ICD-10-PCS | Mod: CPTII,S$GLB,, | Performed by: STUDENT IN AN ORGANIZED HEALTH CARE EDUCATION/TRAINING PROGRAM

## 2022-09-09 PROCEDURE — 4010F PR ACE/ARB THEARPY RXD/TAKEN: ICD-10-PCS | Mod: CPTII,S$GLB,, | Performed by: STUDENT IN AN ORGANIZED HEALTH CARE EDUCATION/TRAINING PROGRAM

## 2022-09-09 PROCEDURE — 99999 PR PBB SHADOW E&M-EST. PATIENT-LVL III: CPT | Mod: PBBFAC,,, | Performed by: STUDENT IN AN ORGANIZED HEALTH CARE EDUCATION/TRAINING PROGRAM

## 2022-09-09 PROCEDURE — 3074F SYST BP LT 130 MM HG: CPT | Mod: CPTII,S$GLB,, | Performed by: STUDENT IN AN ORGANIZED HEALTH CARE EDUCATION/TRAINING PROGRAM

## 2022-09-09 PROCEDURE — 99214 OFFICE O/P EST MOD 30 MIN: CPT | Mod: S$GLB,,, | Performed by: STUDENT IN AN ORGANIZED HEALTH CARE EDUCATION/TRAINING PROGRAM

## 2022-09-09 PROCEDURE — 3051F HG A1C>EQUAL 7.0%<8.0%: CPT | Mod: CPTII,S$GLB,, | Performed by: STUDENT IN AN ORGANIZED HEALTH CARE EDUCATION/TRAINING PROGRAM

## 2022-09-09 PROCEDURE — 3079F DIAST BP 80-89 MM HG: CPT | Mod: CPTII,S$GLB,, | Performed by: STUDENT IN AN ORGANIZED HEALTH CARE EDUCATION/TRAINING PROGRAM

## 2022-09-09 PROCEDURE — 4010F ACE/ARB THERAPY RXD/TAKEN: CPT | Mod: CPTII,S$GLB,, | Performed by: STUDENT IN AN ORGANIZED HEALTH CARE EDUCATION/TRAINING PROGRAM

## 2022-09-09 PROCEDURE — 3008F BODY MASS INDEX DOCD: CPT | Mod: CPTII,S$GLB,, | Performed by: STUDENT IN AN ORGANIZED HEALTH CARE EDUCATION/TRAINING PROGRAM

## 2022-09-09 PROCEDURE — 3061F NEG MICROALBUMINURIA REV: CPT | Mod: CPTII,S$GLB,, | Performed by: STUDENT IN AN ORGANIZED HEALTH CARE EDUCATION/TRAINING PROGRAM

## 2022-09-09 PROCEDURE — 1159F MED LIST DOCD IN RCRD: CPT | Mod: CPTII,S$GLB,, | Performed by: STUDENT IN AN ORGANIZED HEALTH CARE EDUCATION/TRAINING PROGRAM

## 2022-09-09 PROCEDURE — 3288F FALL RISK ASSESSMENT DOCD: CPT | Mod: CPTII,S$GLB,, | Performed by: STUDENT IN AN ORGANIZED HEALTH CARE EDUCATION/TRAINING PROGRAM

## 2022-09-09 PROCEDURE — 3074F PR MOST RECENT SYSTOLIC BLOOD PRESSURE < 130 MM HG: ICD-10-PCS | Mod: CPTII,S$GLB,, | Performed by: STUDENT IN AN ORGANIZED HEALTH CARE EDUCATION/TRAINING PROGRAM

## 2022-09-09 PROCEDURE — 3288F PR FALLS RISK ASSESSMENT DOCUMENTED: ICD-10-PCS | Mod: CPTII,S$GLB,, | Performed by: STUDENT IN AN ORGANIZED HEALTH CARE EDUCATION/TRAINING PROGRAM

## 2022-09-09 PROCEDURE — 3079F PR MOST RECENT DIASTOLIC BLOOD PRESSURE 80-89 MM HG: ICD-10-PCS | Mod: CPTII,S$GLB,, | Performed by: STUDENT IN AN ORGANIZED HEALTH CARE EDUCATION/TRAINING PROGRAM

## 2022-09-09 PROCEDURE — 3051F PR MOST RECENT HEMOGLOBIN A1C LEVEL 7.0 - < 8.0%: ICD-10-PCS | Mod: CPTII,S$GLB,, | Performed by: STUDENT IN AN ORGANIZED HEALTH CARE EDUCATION/TRAINING PROGRAM

## 2022-09-09 PROCEDURE — 1159F PR MEDICATION LIST DOCUMENTED IN MEDICAL RECORD: ICD-10-PCS | Mod: CPTII,S$GLB,, | Performed by: STUDENT IN AN ORGANIZED HEALTH CARE EDUCATION/TRAINING PROGRAM

## 2022-09-09 NOTE — PROGRESS NOTES
Hematology- Oncology Clinic Note :     9/9/2022    Chief Complaint   Patient presents with    leukocytosis    Follow-up         HPI      Magdalena Anisha Acevedo (Magdalena) is a pleasant 67 y.o.female COPD, FOZIA awaiting CPAP, HTN, HLN, DMII, obesity, psoriasis, here today for leukocytosis follow up.     Interval hx:    Pt feels well and denied any new issues. Denies weight loss, night sweats or lymphadenopathy.           Active Problem List with Overview Notes    Diagnosis Date Noted    Coronary artery disease involving native coronary artery of native heart without angina pectoris 05/11/2022    Thoracic aortic atherosclerosis 05/11/2022    Localized edema 04/11/2022    FOZIA on CPAP 02/18/2022     .Home sleep study 02/24/2022 AHI 25 RDI 49  Patient is using and benefiting from cpap. Residual predicted AHI optimal.       NDIAYE (dyspnea on exertion) 02/18/2022     Multifactorial-obesity, deconditioning, obstructive lung disease, likely asthmatic bronchitis, recommend carduac workup as well      Deviated nasal septum 02/08/2022    Age-related cataract of both eyes 12/23/2021    Personal history of nicotine dependence 12/08/2021    Falls 07/06/2021    Weakness of both lower extremities 07/06/2021    Other acute recurrent sinusitis 07/06/2021    Tremor 05/27/2021    Peripheral polyneuropathy 05/27/2021    Nocturnal muscle cramps 04/05/2021    Edema 11/10/2020    Gastroesophageal reflux disease 11/10/2020    Depression 11/10/2020    Hepatitis B core antibody positive 07/06/2020    Restless legs syndrome 07/06/2020    Intertrigo 03/19/2020    History of smoking greater than 50 pack years 03/09/2020     Smoked 1.5 to 2 pack/day from 1975 to 2016.  Successful cessation efforts in August 2016.      Type 2 diabetes mellitus with hyperglycemia, without long-term current use of insulin 03/05/2020    Chronic renal insufficiency, stage III (moderate) 03/05/2020    Chronic obstructive pulmonary disease 03/05/2020     Patient has  significant smoking history.  Quit 2016.  Experiences chronic daily cough, NDIAYE, frequent exacerbation requiring steroids, likely asthmatic bronchitis versus copd  PFT 01/24/2022 ratio 77 FEV1 1.68 (75%) TLC 77% DLCO 47%-normal spirometry, restrictive pattern but inconclusive due to poor effort was noted on PFT   CT without evidence emphysema  Pt on breztri which helps some       Abnormal chest CT 03/05/2020     Chest CT in ED shows diffuse micronodules with areas of tree-in-bud and ground glass most consistent with chronic infection/inflammation (possibly MAC).      Leukocytosis 03/04/2020    Pulmonary nodule 03/04/2020     Stable less than 0.4 cm pulmonary nodules.  Patient has significant smoking history.  Recommend continue with low-dose CT screening next due January 2023        Morbid obesity with BMI of 40.0-44.9, adult 03/02/2020     Body mass index is 42.22 kg/m².    Discussed with pt at length about the need to work on diet and weight loss.  Have offered suggestions on approaches for this problem.  Also discussed the need to start a regular exercise program.  We discussed at length the importance of regular exercise and working at getting to a healthier weight.  All questions answered.  Pt voices understanding of these principles and hopefully will take action.        Left shoulder strain 11/18/2019    Hyperlipidemia 05/22/2018    Urinary incontinence 05/15/2018    Anger 05/15/2018    Bronchitis 11/09/2017    DDD (degenerative disc disease), cervical 10/13/2017    Chronic intractable headache 10/13/2017    Hypertension 10/13/2017    Psoriasis 10/13/2017       Patient Active Problem List    Diagnosis Date Noted    Coronary artery disease involving native coronary artery of native heart without angina pectoris 05/11/2022    Thoracic aortic atherosclerosis 05/11/2022    Localized edema 04/11/2022    FOZIA on CPAP 02/18/2022    NDIAYE (dyspnea on exertion) 02/18/2022    Deviated nasal septum 02/08/2022    Age-related  cataract of both eyes 12/23/2021    Personal history of nicotine dependence 12/08/2021    Falls 07/06/2021    Weakness of both lower extremities 07/06/2021    Other acute recurrent sinusitis 07/06/2021    Tremor 05/27/2021    Peripheral polyneuropathy 05/27/2021    Nocturnal muscle cramps 04/05/2021    Edema 11/10/2020    Gastroesophageal reflux disease 11/10/2020    Depression 11/10/2020    Hepatitis B core antibody positive 07/06/2020    Restless legs syndrome 07/06/2020    Intertrigo 03/19/2020    History of smoking greater than 50 pack years 03/09/2020    Type 2 diabetes mellitus with hyperglycemia, without long-term current use of insulin 03/05/2020    Chronic renal insufficiency, stage III (moderate) 03/05/2020    Chronic obstructive pulmonary disease 03/05/2020    Abnormal chest CT 03/05/2020    Leukocytosis 03/04/2020    Pulmonary nodule 03/04/2020    Morbid obesity with BMI of 40.0-44.9, adult 03/02/2020    Left shoulder strain 11/18/2019    Hyperlipidemia 05/22/2018    Urinary incontinence 05/15/2018    Anger 05/15/2018    Bronchitis 11/09/2017    DDD (degenerative disc disease), cervical 10/13/2017    Chronic intractable headache 10/13/2017    Hypertension 10/13/2017    Psoriasis 10/13/2017     Past Medical History:   Diagnosis Date    Arthritis     Chronic bronchitis     COPD (chronic obstructive pulmonary disease)     Coronary artery disease involving native coronary artery of native heart without angina pectoris 5/11/2022    Diabetes mellitus     GERD (gastroesophageal reflux disease)     Hyperlipidemia     Hypertension     Pulmonary nodule     Sleep apnea       Past Surgical History:   Procedure Laterality Date    CHOLECYSTECTOMY      COLONOSCOPY N/A 7/20/2020    Procedure: COLONOSCOPY;  Surgeon: Anny Acevedo MD;  Location[facility];  Service: Endoscopy;  Laterality: N/A;    COLONOSCOPY W/ BIOPSIES  07/20/2020    colonoscopy w/ biopsy per  7/20/2020    NASAL SEPTOPLASTY Bilateral  2022    Procedure: SEPTOPLASTY, NOSE;  Surgeon: Karri Berrios MD;  Location: [facility];  Service: ENT;  Laterality: Bilateral;  DR. HUNT CASE IS 1 HOUR    sinoplasty      TUBAL LIGATION        (Not in a hospital admission)    Review of patient's allergies indicates:   Allergen Reactions    Augmentin [amoxicillin-pot clavulanate] Hives and Rash    Wellbutrin [bupropion hcl] Rash      Social History     Tobacco Use    Smoking status: Former     Packs/day: 2.00     Years: 45.00     Pack years: 90.00     Types: Cigarettes     Start date: 1972     Quit date: 2016     Years since quittin.0    Smokeless tobacco: Never   Substance Use Topics    Alcohol use: No      Family History   Problem Relation Age of Onset    Cancer Mother     Diabetes Mother     Heart disease Father     Cancer Sister     Diabetes Sister     Cancer Sister     Breast cancer Sister         Review of Systems :  Review of Systems   Constitutional:  Negative for chills, fever and weight loss.   HENT:  Negative for ear discharge, hearing loss and nosebleeds.    Eyes:  Negative for blurred vision.   Respiratory:  Negative for cough and hemoptysis.    Cardiovascular:  Negative for chest pain and palpitations.   Gastrointestinal:  Negative for heartburn and nausea.   Genitourinary:  Negative for dysuria.   Musculoskeletal:  Negative for myalgias.   Neurological:  Negative for dizziness.   Psychiatric/Behavioral:  Negative for depression. The patient is not nervous/anxious.      Physical Exam :  Wt Readings from Last 3 Encounters:   22 101.3 kg (223 lb 5.2 oz)   22 103.9 kg (229 lb 0.9 oz)   22 103.9 kg (229 lb 0.9 oz)     Temp Readings from Last 3 Encounters:   22 97.7 °F (36.5 °C)   22 98.6 °F (37 °C) (Temporal)   22 98.4 °F (36.9 °C) (Oral)     BP Readings from Last 3 Encounters:   22 125/85   22 116/76   22 130/80     Pulse Readings from Last 3 Encounters:   22 86   22 96    06/23/22 87     Body mass index is 40.85 kg/m².    Physical Exam  Vitals reviewed.   Constitutional:       Appearance: Normal appearance. She is obese.   HENT:      Head: Normocephalic and atraumatic.      Nose: Nose normal.      Mouth/Throat:      Mouth: Mucous membranes are moist.   Eyes:      Pupils: Pupils are equal, round, and reactive to light.   Cardiovascular:      Rate and Rhythm: Normal rate and regular rhythm.      Pulses: Normal pulses.      Heart sounds: Normal heart sounds.   Pulmonary:      Effort: Pulmonary effort is normal.      Breath sounds: Normal breath sounds.   Abdominal:      General: Abdomen is flat.   Musculoskeletal:         General: Normal range of motion.      Cervical back: Normal range of motion and neck supple.   Skin:     General: Skin is warm and dry.   Neurological:      General: No focal deficit present.      Mental Status: She is alert and oriented to person, place, and time.   Psychiatric:         Mood and Affect: Mood normal.         Behavior: Behavior normal.         Thought Content: Thought content normal.         Judgment: Judgment normal.         Pertinent Diagnostic studies:    No results found for this or any previous visit (from the past 24 hour(s)).    Assessment/Plan :         Leukocytosis-improving   -Likely reactive given dating back to 2018 with multiple co-morbidities  -WBC trending lower and pt does not have any alarm symptoms   -BCR-ABL negative  -RTC in 6 months for repeat labs, continue to monitor      Other health issues: FOZIA, CKD, HTN, HLD, obesity, former smoker  -Controlled, per PCP      Hx of lung nodule  -12/21: CT Lung-RADS Category:  2 - Benign Appearance or Behavior - continue annual screening with LDCT   -Next Due on 12/22      Summary of orders placed this encounter:  Orders Placed This Encounter   Procedures    COMPREHENSIVE METABOLIC PANEL    CBC w/ DIFF       Future Appointments   Date Time Provider Department Center   9/27/2022 11:00 AM Marianela  K. Angel, NP [facility]   12/22/2022  8:40 AM [facility]           Sonia Ortega MD   Hematology/oncology, [facility]

## 2022-09-23 ENCOUNTER — OFFICE VISIT (OUTPATIENT)
Dept: PULMONOLOGY | Facility: CLINIC | Age: 67
End: 2022-09-23
Payer: MEDICARE

## 2022-09-23 ENCOUNTER — HOSPITAL ENCOUNTER (OUTPATIENT)
Dept: RADIOLOGY | Facility: HOSPITAL | Age: 67
Discharge: HOME | End: 2022-09-23
Attending: NURSE PRACTITIONER
Payer: MEDICARE

## 2022-09-23 VITALS
HEART RATE: 74 BPM | SYSTOLIC BLOOD PRESSURE: 121 MMHG | OXYGEN SATURATION: 96 % | BODY MASS INDEX: 41.86 KG/M2 | DIASTOLIC BLOOD PRESSURE: 86 MMHG | WEIGHT: 227.5 LBS | HEIGHT: 62 IN

## 2022-09-23 DIAGNOSIS — R06.09 DOE (DYSPNEA ON EXERTION): ICD-10-CM

## 2022-09-23 DIAGNOSIS — G47.33 OSA ON CPAP: Primary | ICD-10-CM

## 2022-09-23 DIAGNOSIS — J41.0 SIMPLE CHRONIC BRONCHITIS: ICD-10-CM

## 2022-09-23 PROCEDURE — 3074F PR MOST RECENT SYSTOLIC BLOOD PRESSURE < 130 MM HG: ICD-10-PCS | Mod: CPTII,S$GLB,, | Performed by: NURSE PRACTITIONER

## 2022-09-23 PROCEDURE — 3079F PR MOST RECENT DIASTOLIC BLOOD PRESSURE 80-89 MM HG: ICD-10-PCS | Mod: CPTII,S$GLB,, | Performed by: NURSE PRACTITIONER

## 2022-09-23 PROCEDURE — 3051F PR MOST RECENT HEMOGLOBIN A1C LEVEL 7.0 - < 8.0%: ICD-10-PCS | Mod: CPTII,S$GLB,, | Performed by: NURSE PRACTITIONER

## 2022-09-23 PROCEDURE — 1159F MED LIST DOCD IN RCRD: CPT | Mod: CPTII,S$GLB,, | Performed by: NURSE PRACTITIONER

## 2022-09-23 PROCEDURE — 3288F PR FALLS RISK ASSESSMENT DOCUMENTED: ICD-10-PCS | Mod: CPTII,S$GLB,, | Performed by: NURSE PRACTITIONER

## 2022-09-23 PROCEDURE — 3051F HG A1C>EQUAL 7.0%<8.0%: CPT | Mod: CPTII,S$GLB,, | Performed by: NURSE PRACTITIONER

## 2022-09-23 PROCEDURE — 71046 X-RAY EXAM CHEST 2 VIEWS: CPT | Mod: TC,FY

## 2022-09-23 PROCEDURE — 3079F DIAST BP 80-89 MM HG: CPT | Mod: CPTII,S$GLB,, | Performed by: NURSE PRACTITIONER

## 2022-09-23 PROCEDURE — 4010F ACE/ARB THERAPY RXD/TAKEN: CPT | Mod: CPTII,S$GLB,, | Performed by: NURSE PRACTITIONER

## 2022-09-23 PROCEDURE — 71046 X-RAY EXAM CHEST 2 VIEWS: CPT | Mod: 26,,, | Performed by: RADIOLOGY

## 2022-09-23 PROCEDURE — 1101F PT FALLS ASSESS-DOCD LE1/YR: CPT | Mod: CPTII,S$GLB,, | Performed by: NURSE PRACTITIONER

## 2022-09-23 PROCEDURE — 1126F PR PAIN SEVERITY QUANTIFIED, NO PAIN PRESENT: ICD-10-PCS | Mod: CPTII,S$GLB,, | Performed by: NURSE PRACTITIONER

## 2022-09-23 PROCEDURE — 99214 PR OFFICE/OUTPT VISIT, EST, LEVL IV, 30-39 MIN: ICD-10-PCS | Mod: S$GLB,,, | Performed by: NURSE PRACTITIONER

## 2022-09-23 PROCEDURE — 3066F PR DOCUMENTATION OF TREATMENT FOR NEPHROPATHY: ICD-10-PCS | Mod: CPTII,S$GLB,, | Performed by: NURSE PRACTITIONER

## 2022-09-23 PROCEDURE — 1101F PR PT FALLS ASSESS DOC 0-1 FALLS W/OUT INJ PAST YR: ICD-10-PCS | Mod: CPTII,S$GLB,, | Performed by: NURSE PRACTITIONER

## 2022-09-23 PROCEDURE — 99999 PR PBB SHADOW E&M-EST. PATIENT-LVL V: CPT | Mod: PBBFAC,,, | Performed by: NURSE PRACTITIONER

## 2022-09-23 PROCEDURE — 1126F AMNT PAIN NOTED NONE PRSNT: CPT | Mod: CPTII,S$GLB,, | Performed by: NURSE PRACTITIONER

## 2022-09-23 PROCEDURE — 1159F PR MEDICATION LIST DOCUMENTED IN MEDICAL RECORD: ICD-10-PCS | Mod: CPTII,S$GLB,, | Performed by: NURSE PRACTITIONER

## 2022-09-23 PROCEDURE — 3074F SYST BP LT 130 MM HG: CPT | Mod: CPTII,S$GLB,, | Performed by: NURSE PRACTITIONER

## 2022-09-23 PROCEDURE — 99999 PR PBB SHADOW E&M-EST. PATIENT-LVL V: ICD-10-PCS | Mod: PBBFAC,,, | Performed by: NURSE PRACTITIONER

## 2022-09-23 PROCEDURE — 99214 OFFICE O/P EST MOD 30 MIN: CPT | Mod: S$GLB,,, | Performed by: NURSE PRACTITIONER

## 2022-09-23 PROCEDURE — 3288F FALL RISK ASSESSMENT DOCD: CPT | Mod: CPTII,S$GLB,, | Performed by: NURSE PRACTITIONER

## 2022-09-23 PROCEDURE — 71046 XR CHEST PA AND LATERAL: ICD-10-PCS | Mod: 26,,, | Performed by: RADIOLOGY

## 2022-09-23 PROCEDURE — 3066F NEPHROPATHY DOC TX: CPT | Mod: CPTII,S$GLB,, | Performed by: NURSE PRACTITIONER

## 2022-09-23 PROCEDURE — 3061F NEG MICROALBUMINURIA REV: CPT | Mod: CPTII,S$GLB,, | Performed by: NURSE PRACTITIONER

## 2022-09-23 PROCEDURE — 3008F BODY MASS INDEX DOCD: CPT | Mod: CPTII,S$GLB,, | Performed by: NURSE PRACTITIONER

## 2022-09-23 PROCEDURE — 3008F PR BODY MASS INDEX (BMI) DOCUMENTED: ICD-10-PCS | Mod: CPTII,S$GLB,, | Performed by: NURSE PRACTITIONER

## 2022-09-23 PROCEDURE — 3061F PR NEG MICROALBUMINURIA RESULT DOCUMENTED/REVIEW: ICD-10-PCS | Mod: CPTII,S$GLB,, | Performed by: NURSE PRACTITIONER

## 2022-09-23 PROCEDURE — 4010F PR ACE/ARB THEARPY RXD/TAKEN: ICD-10-PCS | Mod: CPTII,S$GLB,, | Performed by: NURSE PRACTITIONER

## 2022-09-23 NOTE — PROGRESS NOTES
HISTORY OF PRESENT ILLNESS: Magdalena Acevedo is a 67 y.o. female with problems listed on problem list is here  for management of obstructive sleep apnea and CPAP equipment check. Patient with symptoms of  snoring, witnessed apnea, excessive daytime sleepiness, fatigue and nocturia 2 to 3 times a night. Sleep is complicated by lack of set sleep schedule.   Sob > 1 year, soboe improved with breztri but not significant but has increased ADL .    Patient does have: RLS and nightmares    [place] Sleepiness Scale score 0 on cpap (prior 8)    SLEEP ROUTINE:  Time to bed: varies, sometimes 2-3 am  Sleep onset latency: varies    Disruptions or awakenings:   2-3 times  Wakeup time:     varies  Perceived sleep quality:  restless     Home sleep study 02/24/2022 AHI 25 RDI 49     Patient was started on CPAP ibreeze 5- setting 4 to 20 cm H2O.  Reports the pressure is comfortable.  Denies dryness.  Compliance 97%, average usage is 9 hours, 29/30 days over 4 hours usage, 9.3 liters/minute week, AHI 1.5       Patient reports she is compliant with Breztri but still using rescue daily with exertion.  Flare up about 2-3 times require antibiotics or steroid.  Wheezing when she exerts herself.    Walk 02/01/2022 96/93/95  PFT 01/24/2022 ratio 77 FEV1 1.68 (75%) TLC 77% DLCO 47% poor effort noted on flow loop so test unreliable  Recommend patient repeat her PFT  Patient states she has not been contacted by Pulmonary Rehab to start therapy    CT chest 1/24/2022:Lungs: There are no abnormal opacities that require further evaluation.  There are a few scattered pulmonary nodules bilaterally.  The largest opacity in the right lung appears solid and measures 0.4 cm on series 4, image 120.  The largest opacity in the left lung appears solid and measures 0.4 cm on series 4, image 177.  The lungs show no findings consistent with emphysema.  Previously identified reticulonodular interstitial opacities have resolved when compared to  prior exam.     Pleura:   No effusion..    ECHO 2/25/2022: The estimated ejection fraction is 55%.  The left ventricle is normal in size with normal systolic function.  Grade I left ventricular diastolic dysfunction.  Normal right ventricular size with normal right ventricular systolic function.  Mild left atrial enlargement.  Normal central venous pressure (3 mmHg).  The estimated PA systolic pressure is 9 mmHg.       REVIEW OF SYSTEMS:   Review of Systems   Constitutional:  Positive for activity change (inactive per pt due to NDIAYE) and fatigue. Negative for fever, chills, weight loss, weight gain, appetite change and night sweats.   HENT:  Negative for congestion.         Throat hurts in am  Recent deviated septal repair 1.5 week ago   Eyes: Negative.    Respiratory:  Positive for cough, sputum production, wheezing, previous hospitalization due to pulmonary problems (2 years ), dyspnea on extertion (walking to mailbox), use of rescue inhaler (with exertion about once a day) and somnolence. Negative for apnea, snoring, chest tightness, orthopnea and Paroxysmal Nocturnal Dyspnea.         Compliant with breztri twice daily   Cardiovascular:  Positive for leg swelling. Negative for chest pain and palpitations.   Genitourinary:         Nocturia 2-3 x-same   Endocrine: endocrine negative    Musculoskeletal:  Positive for gait problem (sob).   Skin: Negative.    Gastrointestinal:  Positive for acid reflux. Negative for nausea, vomiting and abdominal pain.   Neurological:  Positive for headaches (daily, wake up with GALLO still).   Psychiatric/Behavioral:  Positive for sleep disturbance (bathroom ).    ndiaye    PAST MEDICAL HISTORY:    Active Ambulatory Problems     Diagnosis Date Noted    DDD (degenerative disc disease), cervical 10/13/2017    Chronic intractable headache 10/13/2017    Hypertension 10/13/2017    Psoriasis 10/13/2017    Bronchitis 11/09/2017    Urinary incontinence 05/15/2018    Anger 05/15/2018     Hyperlipidemia 05/22/2018    Left shoulder strain 11/18/2019    Morbid obesity with BMI of 40.0-44.9, adult 03/02/2020    Leukocytosis 03/04/2020    Pulmonary nodule 03/04/2020    Type 2 diabetes mellitus with hyperglycemia, without long-term current use of insulin 03/05/2020    Chronic renal insufficiency, stage III (moderate) 03/05/2020    Chronic obstructive pulmonary disease 03/05/2020    Abnormal chest CT 03/05/2020    History of smoking greater than 50 pack years 03/09/2020    Intertrigo 03/19/2020    Hepatitis B core antibody positive 07/06/2020    Restless legs syndrome 07/06/2020    Edema 11/10/2020    Gastroesophageal reflux disease 11/10/2020    Depression 11/10/2020    Nocturnal muscle cramps 04/05/2021    Tremor 05/27/2021    Peripheral polyneuropathy 05/27/2021    Falls 07/06/2021    Weakness of both lower extremities 07/06/2021    Other acute recurrent sinusitis 07/06/2021    Personal history of nicotine dependence 12/08/2021    Age-related cataract of both eyes 12/23/2021    Deviated nasal septum 02/08/2022    FOZIA on CPAP 02/18/2022    NDIAYE (dyspnea on exertion) 02/18/2022    Localized edema 04/11/2022    Coronary artery disease involving native coronary artery of native heart without angina pectoris 05/11/2022    Thoracic aortic atherosclerosis 05/11/2022     Resolved Ambulatory Problems     Diagnosis Date Noted    Obesity 10/13/2017    History of DVT (deep vein thrombosis) 05/14/2018    Type 2 diabetes mellitus without complication, without long-term current use of insulin 05/14/2018    Overweight 05/14/2018    Borderline diabetes 05/22/2018    Left tennis elbow 11/18/2019    Left upper quadrant pain 02/04/2020    Acute hypoxemic respiratory failure 03/06/2020     Past Medical History:   Diagnosis Date    Arthritis     Chronic bronchitis     COPD (chronic obstructive pulmonary disease)     Diabetes mellitus     GERD (gastroesophageal reflux disease)     Sleep apnea                 PAST SURGICAL  HISTORY:    Past Surgical History:   Procedure Laterality Date    CHOLECYSTECTOMY      COLONOSCOPY N/A 2020    Procedure: COLONOSCOPY;  Surgeon: Anny Acevedo MD;  Location: [facility];  Service: Endoscopy;  Laterality: N/A;    COLONOSCOPY W/ BIOPSIES  2020    colonoscopy w/ biopsy per  2020    NASAL SEPTOPLASTY Bilateral 2022    Procedure: SEPTOPLASTY, NOSE;  Surgeon: Karri Berrios MD;  Location: [facility];  Service: ENT;  Laterality: Bilateral;  DR. HUNT CASE IS 1 HOUR    sinoplasty      TUBAL LIGATION           FAMILY HISTORY:                Family History   Problem Relation Age of Onset    Cancer Mother     Diabetes Mother     Heart disease Father     Cancer Sister     Diabetes Sister     Cancer Sister     Breast cancer Sister        SOCIAL HISTORY:          Tobacco:   Social History     Tobacco Use   Smoking Status Former    Packs/day: 2.00    Years: 45.00    Pack years: 90.00    Types: Cigarettes    Start date: 1972    Quit date: 2016    Years since quittin.0   Smokeless Tobacco Former       alcohol use:    Social History     Substance and Sexual Activity   Alcohol Use No                   ALLERGIES:    Review of patient's allergies indicates:   Allergen Reactions    Augmentin [amoxicillin-pot clavulanate] Hives and Rash    Wellbutrin [bupropion hcl] Rash       CURRENT MEDICATIONS:    Current Outpatient Medications   Medication Sig Dispense Refill    acetaminophen (TYLENOL) 650 MG TbSR Take 1 tablet (650 mg total) by mouth every 8 (eight) hours as needed (pain). 30 tablet 0    albuterol (PROAIR HFA) 90 mcg/actuation inhaler Inhale 2 puffs into the lungs every 4 (four) hours as needed for Wheezing or Shortness of Breath. Rescue 18 g 5    atorvastatin (LIPITOR) 20 MG tablet TAKE ONE TABLET BY MOUTH EVERY EVENING 90 tablet 1    blood sugar diagnostic (TRUE METRIX GLUCOSE TEST STRIP) Strp 1 strip by Misc.(Non-Drug; Combo Route) route once daily. 100 strip 3    blood  sugar diagnostic Strp To check BG 2 times daily, to use with insurance preferred meter 100 strip 11    BREZTRI AEROSPHERE 160-9-4.8 mcg/actuation HFAA Inhale 2 puffs into the lungs 2 (two) times a day. 10.7 g 5    budesonide-glycopyr-formoterol (BREZTRI AEROSPHERE) 160-9-4.8 mcg/actuation HFAA Inhale 2 puffs into the lungs 2 (two) times a day. Rinse mouth after use. 10.7 g 5    buPROPion (WELLBUTRIN XL) 150 MG TB24 tablet Take 1 tablet (150 mg total) by mouth once daily. 90 tablet 3    fexofenadine-pseudoephedrine (ALLEGRA-D 24) 180-240 mg per 24 hr tablet Take 1 tablet by mouth once daily.      fluticasone propionate (FLONASE) 50 mcg/actuation nasal spray 2 sprays (100 mcg total) by Each Nostril route once daily. (Patient taking differently: 2 sprays by Each Nostril route once daily. Pt uses PRN) 16 g 5    furosemide (LASIX) 40 MG tablet TAKE 1 TABLET BY MOUTH IN THE MORNING AS NEEDED ANKLE WITH EDEMA ONLY 90 tablet 3    gabapentin (NEURONTIN) 300 MG capsule TAKE 1 CAPSULE BY MOUTH TWICE DAILY AS NEEDED FOR RESTLESS LEG SYNDROME MAY INCREASE TO 3 TIMES DAILY 90 capsule 2    glipiZIDE (GLUCOTROL) 10 MG tablet TAKE ONE TABLET BY MOUTH ONCE DAILY WITH BREAKFAST 90 tablet 1    lancets 33 gauge Misc 1 lancet by Misc.(Non-Drug; Combo Route) route once daily. 100 each 3    lancets Misc To check BG 2 times daily, to use with insurance preferred meter 100 each 11    lisinopriL (PRINIVIL,ZESTRIL) 40 MG tablet Take 1 tablet (40 mg total) by mouth once daily. 90 tablet 3    potassium chloride (MICRO-K) 10 MEQ CpSR TAKE 1 CAPSULE BY MOUTH IN THE MORNING WITH LASIX ONLY 90 capsule 3    sertraline (ZOLOFT) 25 MG tablet TAKE 1 TABLET (25 MG TOTAL) BY MOUTH ONCE DAILY. 90 tablet 4    blood-glucose meter (TRUE METRIX AIR GLUCOSE METER) kit Use as instructed 1 each 0     No current facility-administered medications for this visit.                Breathing worst after cutting grass     PHYSICAL EXAM:  Vitals:    09/23/22 1133   BP:  "121/86   Pulse: 74   SpO2: 96%   Weight: 103.2 kg (227 lb 8.2 oz)   Height: 5' 2" (1.575 m)   PainSc: 0-No pain     Body mass index is 41.61 kg/m².   Physical Exam   Constitutional: She is oriented to person, place, and time. She appears well-developed. No distress. She is obese.   HENT:   Head: Normocephalic.   Cardiovascular: Normal rate, regular rhythm and normal heart sounds.   Pulmonary/Chest: Normal expansion and effort normal. She has rales.   Musculoskeletal:         General: No edema (trace nonpitting).      Cervical back: Neck supple.   Neurological: She is alert and oriented to person, place, and time. Gait normal.   Skin: She is not diaphoretic.   psoriasis   Psychiatric: She has a normal mood and affect. Her behavior is normal. Judgment and thought content normal.                                             DATA:  TSH:  Lab Results   Component Value Date    TSH 1.226 01/04/2022     CBC:  Lab Results   Component Value Date    WBC 12.79 (H) 08/31/2022    HGB 15.3 08/31/2022    HCT 47.8 08/31/2022    MCV 93 08/31/2022     08/31/2022     BMP:  Lab Results   Component Value Date     09/23/2022    K 4.3 09/23/2022     09/23/2022    CO2 27 09/23/2022    BUN 17 09/23/2022    CREATININE 1.1 09/23/2022    CALCIUM 9.9 09/23/2022    ANIONGAP 12 09/23/2022    ESTGFRAFRICA >60 04/20/2022    EGFRNONAA >60 04/20/2022     HgbA1C:  Lab Results   Component Value Date    HGBA1C 7.2 (H) 08/31/2022            ECHO 3/6/2020: ef 60% grade I dastolic dysfunction          ASSESSMENT    ICD-10-CM ICD-9-CM    1. FOZIA on CPAP  G47.33 327.23     Z99.89 V46.8       2. Simple chronic bronchitis  J41.0 491.0       3. NDIAYE (dyspnea on exertion)  R06.00 786.09 BNP      Basic Metabolic Panel      X-Ray Chest PA And Lateral            PLAN:    Problem List Items Addressed This Visit          Unprioritized    Chronic obstructive pulmonary disease    Overview     Patient has significant smoking history.  Quit " 2016.  Experiences chronic daily cough, NDIAYE, frequent exacerbation requiring steroids, likely asthmatic bronchitis versus copd  PFT 01/24/2022 ratio 77 FEV1 1.68 (75%) TLC 77% DLCO 47%-normal spirometry, restrictive pattern but inconclusive due to poor effort was noted on PFT   CT without evidence emphysema  Pt on breztri which helps some   Referred for pulmonary rehab  Recommend flu/covid vaccination         NDIAYE (dyspnea on exertion)    Overview     Multifactorial-obesity, deconditioning, obstructive lung disease, likely asthmatic bronchitis, CAD (follow by cardiology)         Relevant Orders    BNP (Completed)    Basic Metabolic Panel (Completed)    X-Ray Chest PA And Lateral (Completed)    FOZIA on CPAP - Primary    Overview     .Home sleep study 02/24/2022 AHI 25 RDI 49  Patient is using and benefiting from cpap. Residual predicted AHI optimal.             Patient will Follow up in about 3 months (around 12/23/2022), or if symptoms worsen or fail to improve.

## 2022-09-27 ENCOUNTER — PATIENT MESSAGE (OUTPATIENT)
Dept: PRIMARY CARE CLINIC | Facility: CLINIC | Age: 67
End: 2022-09-27
Payer: MEDICARE

## 2022-10-03 ENCOUNTER — HOSPITAL ENCOUNTER (OUTPATIENT)
Dept: RESPIRATORY THERAPY | Facility: HOSPITAL | Age: 67
Discharge: HOME | End: 2022-10-03
Attending: NURSE PRACTITIONER
Payer: MEDICARE

## 2022-10-03 VITALS — HEART RATE: 78 BPM | RESPIRATION RATE: 17 BRPM

## 2022-10-03 DIAGNOSIS — J41.0 SIMPLE CHRONIC BRONCHITIS: ICD-10-CM

## 2022-10-03 LAB
BRPFT: NORMAL
DLCO ADJ PRE: 10.61 ML/(MIN*MMHG)
DLCO SINGLE BREATH LLN: 14.63
DLCO SINGLE BREATH PRE REF: 54.9 %
DLCO SINGLE BREATH REF: 20.36
DLCOC SBVA LLN: 2.87
DLCOC SBVA PRE REF: 63.5 %
DLCOC SBVA REF: 4.45
DLCOC SINGLE BREATH LLN: 14.63
DLCOC SINGLE BREATH PRE REF: 52.1 %
DLCOC SINGLE BREATH REF: 20.36
DLCOVA LLN: 2.87
DLCOVA PRE REF: 66.9 %
DLCOVA PRE: 2.98 ML/(MIN*MMHG*L)
DLCOVA REF: 4.45
DLVAADJ PRE: 2.83 ML/(MIN*MMHG*L)
ERVN2 LLN: -16449.33
ERVN2 PRE REF: 37.3 %
ERVN2 PRE: 0.25 L
ERVN2 REF: 0.67
FEF 25 75 CHG: -17.9 %
FEF 25 75 LLN: 0.89
FEF 25 75 POST REF: 64.3 %
FEF 25 75 PRE REF: 78.3 %
FEF 25 75 REF: 1.87
FET100 CHG: -15.7 %
FEV1 CHG: 4.2 %
FEV1 FVC CHG: 2 %
FEV1 FVC LLN: 66
FEV1 FVC POST REF: 96.7 %
FEV1 FVC PRE REF: 94.8 %
FEV1 FVC REF: 79
FEV1 LLN: 1.56
FEV1 POST REF: 86.5 %
FEV1 PRE REF: 83 %
FEV1 REF: 2.12
FRCN2 LLN: 1.76
FRCN2 PRE REF: 85.5 %
FRCN2 REF: 2.58
FVC CHG: 2.1 %
FVC LLN: 1.99
FVC POST REF: 88.9 %
FVC PRE REF: 87.1 %
FVC REF: 2.71
IVC PRE: 2.22 L
IVC SINGLE BREATH LLN: 1.99
IVC SINGLE BREATH PRE REF: 82 %
IVC SINGLE BREATH REF: 2.71
PEF CHG: 34 %
PEF LLN: 3.94
PEF POST REF: 101.8 %
PEF PRE REF: 76 %
PEF REF: 5.53
POST FEF 25 75: 1.2 L/S
POST FET 100: 6.1 SEC
POST FEV1 FVC: 76.13 %
POST FEV1: 1.83 L
POST FVC: 2.4 L
POST PEF: 5.63 L/S
PRE DLCO: 11.18 ML/(MIN*MMHG)
PRE FEF 25 75: 1.46 L/S
PRE FET 100: 7.24 SEC
PRE FEV1 FVC: 74.61 %
PRE FEV1: 1.76 L
PRE FRC N2: 2.21 L
PRE FVC: 2.35 L
PRE PEF: 4.2 L/S
RVN2 LLN: 1.34
RVN2 PRE REF: 102.4 %
RVN2 PRE: 1.96 L
RVN2 REF: 1.91
RVN2TLCN2 LLN: 32.15
RVN2TLCN2 PRE REF: 111.8 %
RVN2TLCN2 PRE: 46.67 %
RVN2TLCN2 REF: 41.74
TLCN2 LLN: 3.58
TLCN2 PRE REF: 91.9 %
TLCN2 PRE: 4.2 L
TLCN2 REF: 4.57
VA PRE: 3.75 L
VA SINGLE BREATH LLN: 4.42
VA SINGLE BREATH PRE REF: 84.8 %
VA SINGLE BREATH REF: 4.42
VCMAXN2 LLN: 1.99
VCMAXN2 PRE REF: 82.8 %
VCMAXN2 PRE: 2.24 L
VCMAXN2 REF: 2.71

## 2022-10-03 PROCEDURE — 94060 PR EVAL OF BRONCHOSPASM: ICD-10-PCS | Mod: 26,,, | Performed by: INTERNAL MEDICINE

## 2022-10-03 PROCEDURE — 25000242 PHARM REV CODE 250 ALT 637 W/ HCPCS: Performed by: NURSE PRACTITIONER

## 2022-10-03 PROCEDURE — 94729 PR C02/MEMBANE DIFFUSE CAPACITY: ICD-10-PCS | Mod: 26,,, | Performed by: INTERNAL MEDICINE

## 2022-10-03 PROCEDURE — 94060 EVALUATION OF WHEEZING: CPT | Mod: 26,,, | Performed by: INTERNAL MEDICINE

## 2022-10-03 PROCEDURE — 94010 BREATHING CAPACITY TEST: CPT

## 2022-10-03 PROCEDURE — 94727 GAS DIL/WSHOT DETER LNG VOL: CPT

## 2022-10-03 PROCEDURE — 94727 PR PULM FUNCTION TEST BY GAS: ICD-10-PCS | Mod: 26,,, | Performed by: INTERNAL MEDICINE

## 2022-10-03 PROCEDURE — 94727 GAS DIL/WSHOT DETER LNG VOL: CPT | Mod: 26,,, | Performed by: INTERNAL MEDICINE

## 2022-10-03 PROCEDURE — 94729 DIFFUSING CAPACITY: CPT

## 2022-10-03 PROCEDURE — 94729 DIFFUSING CAPACITY: CPT | Mod: 26,,, | Performed by: INTERNAL MEDICINE

## 2022-10-03 RX ORDER — ALBUTEROL SULFATE 2.5 MG/.5ML
2.5 SOLUTION RESPIRATORY (INHALATION) ONCE
Status: COMPLETED | OUTPATIENT
Start: 2022-10-03 | End: 2022-10-03

## 2022-10-03 RX ADMIN — ALBUTEROL SULFATE 2.5 MG: 2.5 SOLUTION RESPIRATORY (INHALATION) at 11:10

## 2022-10-06 ENCOUNTER — PATIENT MESSAGE (OUTPATIENT)
Dept: BARIATRICS | Facility: CLINIC | Age: 67
End: 2022-10-06
Payer: MEDICARE

## 2022-11-29 ENCOUNTER — PATIENT OUTREACH (OUTPATIENT)
Dept: ADMINISTRATIVE | Facility: HOSPITAL | Age: 67
End: 2022-11-29
Payer: MEDICARE

## 2022-11-29 NOTE — PROGRESS NOTES
Health Maintenance Due   Topic Date Due    Shingles Vaccine (1 of 2) Never done    COVID-19 Vaccine (4 - Booster for Pfizer series) 01/26/2022    Foot Exam  04/05/2022    Influenza Vaccine (1) 09/01/2022    Eye Exam  12/15/2022     Chart review done.  updated. Immunizations reviewed & updated. Care Everywhere updated.

## 2022-12-08 LAB
LEFT EYE DM RETINOPATHY: NEGATIVE
RIGHT EYE DM RETINOPATHY: NEGATIVE

## 2022-12-19 ENCOUNTER — OFFICE VISIT (OUTPATIENT)
Dept: PRIMARY CARE CLINIC | Facility: CLINIC | Age: 67
End: 2022-12-19
Payer: MEDICARE

## 2022-12-19 VITALS
DIASTOLIC BLOOD PRESSURE: 80 MMHG | TEMPERATURE: 98 F | WEIGHT: 232.06 LBS | RESPIRATION RATE: 16 BRPM | OXYGEN SATURATION: 96 % | BODY MASS INDEX: 42.7 KG/M2 | HEART RATE: 82 BPM | HEIGHT: 62 IN | SYSTOLIC BLOOD PRESSURE: 130 MMHG

## 2022-12-19 DIAGNOSIS — G62.9 PERIPHERAL POLYNEUROPATHY: ICD-10-CM

## 2022-12-19 DIAGNOSIS — J44.9 CHRONIC OBSTRUCTIVE PULMONARY DISEASE, UNSPECIFIED COPD TYPE: ICD-10-CM

## 2022-12-19 DIAGNOSIS — E11.65 TYPE 2 DIABETES MELLITUS WITH HYPERGLYCEMIA, WITHOUT LONG-TERM CURRENT USE OF INSULIN: Primary | ICD-10-CM

## 2022-12-19 PROCEDURE — 3051F HG A1C>EQUAL 7.0%<8.0%: CPT | Mod: CPTII,S$GLB,, | Performed by: FAMILY MEDICINE

## 2022-12-19 PROCEDURE — 99214 OFFICE O/P EST MOD 30 MIN: CPT | Mod: S$GLB,,, | Performed by: FAMILY MEDICINE

## 2022-12-19 PROCEDURE — 1126F PR PAIN SEVERITY QUANTIFIED, NO PAIN PRESENT: ICD-10-PCS | Mod: CPTII,S$GLB,, | Performed by: FAMILY MEDICINE

## 2022-12-19 PROCEDURE — 1159F PR MEDICATION LIST DOCUMENTED IN MEDICAL RECORD: ICD-10-PCS | Mod: CPTII,S$GLB,, | Performed by: FAMILY MEDICINE

## 2022-12-19 PROCEDURE — 3075F PR MOST RECENT SYSTOLIC BLOOD PRESS GE 130-139MM HG: ICD-10-PCS | Mod: CPTII,S$GLB,, | Performed by: FAMILY MEDICINE

## 2022-12-19 PROCEDURE — 3008F BODY MASS INDEX DOCD: CPT | Mod: CPTII,S$GLB,, | Performed by: FAMILY MEDICINE

## 2022-12-19 PROCEDURE — 3079F PR MOST RECENT DIASTOLIC BLOOD PRESSURE 80-89 MM HG: ICD-10-PCS | Mod: CPTII,S$GLB,, | Performed by: FAMILY MEDICINE

## 2022-12-19 PROCEDURE — 99999 PR PBB SHADOW E&M-EST. PATIENT-LVL V: CPT | Mod: PBBFAC,,, | Performed by: FAMILY MEDICINE

## 2022-12-19 PROCEDURE — 3288F FALL RISK ASSESSMENT DOCD: CPT | Mod: CPTII,S$GLB,, | Performed by: FAMILY MEDICINE

## 2022-12-19 PROCEDURE — 3066F PR DOCUMENTATION OF TREATMENT FOR NEPHROPATHY: ICD-10-PCS | Mod: CPTII,S$GLB,, | Performed by: FAMILY MEDICINE

## 2022-12-19 PROCEDURE — 3075F SYST BP GE 130 - 139MM HG: CPT | Mod: CPTII,S$GLB,, | Performed by: FAMILY MEDICINE

## 2022-12-19 PROCEDURE — 1159F MED LIST DOCD IN RCRD: CPT | Mod: CPTII,S$GLB,, | Performed by: FAMILY MEDICINE

## 2022-12-19 PROCEDURE — 3079F DIAST BP 80-89 MM HG: CPT | Mod: CPTII,S$GLB,, | Performed by: FAMILY MEDICINE

## 2022-12-19 PROCEDURE — 4010F PR ACE/ARB THEARPY RXD/TAKEN: ICD-10-PCS | Mod: CPTII,S$GLB,, | Performed by: FAMILY MEDICINE

## 2022-12-19 PROCEDURE — 1101F PT FALLS ASSESS-DOCD LE1/YR: CPT | Mod: CPTII,S$GLB,, | Performed by: FAMILY MEDICINE

## 2022-12-19 PROCEDURE — 3288F PR FALLS RISK ASSESSMENT DOCUMENTED: ICD-10-PCS | Mod: CPTII,S$GLB,, | Performed by: FAMILY MEDICINE

## 2022-12-19 PROCEDURE — 1126F AMNT PAIN NOTED NONE PRSNT: CPT | Mod: CPTII,S$GLB,, | Performed by: FAMILY MEDICINE

## 2022-12-19 PROCEDURE — 99214 PR OFFICE/OUTPT VISIT, EST, LEVL IV, 30-39 MIN: ICD-10-PCS | Mod: S$GLB,,, | Performed by: FAMILY MEDICINE

## 2022-12-19 PROCEDURE — 3061F NEG MICROALBUMINURIA REV: CPT | Mod: CPTII,S$GLB,, | Performed by: FAMILY MEDICINE

## 2022-12-19 PROCEDURE — 3051F PR MOST RECENT HEMOGLOBIN A1C LEVEL 7.0 - < 8.0%: ICD-10-PCS | Mod: CPTII,S$GLB,, | Performed by: FAMILY MEDICINE

## 2022-12-19 PROCEDURE — 99999 PR PBB SHADOW E&M-EST. PATIENT-LVL V: ICD-10-PCS | Mod: PBBFAC,,, | Performed by: FAMILY MEDICINE

## 2022-12-19 PROCEDURE — 4010F ACE/ARB THERAPY RXD/TAKEN: CPT | Mod: CPTII,S$GLB,, | Performed by: FAMILY MEDICINE

## 2022-12-19 PROCEDURE — 3066F NEPHROPATHY DOC TX: CPT | Mod: CPTII,S$GLB,, | Performed by: FAMILY MEDICINE

## 2022-12-19 PROCEDURE — 3008F PR BODY MASS INDEX (BMI) DOCUMENTED: ICD-10-PCS | Mod: CPTII,S$GLB,, | Performed by: FAMILY MEDICINE

## 2022-12-19 PROCEDURE — 3061F PR NEG MICROALBUMINURIA RESULT DOCUMENTED/REVIEW: ICD-10-PCS | Mod: CPTII,S$GLB,, | Performed by: FAMILY MEDICINE

## 2022-12-19 PROCEDURE — 1101F PR PT FALLS ASSESS DOC 0-1 FALLS W/OUT INJ PAST YR: ICD-10-PCS | Mod: CPTII,S$GLB,, | Performed by: FAMILY MEDICINE

## 2022-12-19 RX ORDER — PREDNISONE 5 MG/1
TABLET ORAL
Qty: 20 TABLET | Refills: 0 | Status: SHIPPED | OUTPATIENT
Start: 2022-12-19

## 2022-12-19 RX ORDER — BUDESONIDE, GLYCOPYRROLATE, AND FORMOTEROL FUMARATE 160; 9; 4.8 UG/1; UG/1; UG/1
2 AEROSOL, METERED RESPIRATORY (INHALATION) 2 TIMES DAILY
Qty: 10.7 G | Refills: 5 | Status: SHIPPED | OUTPATIENT
Start: 2022-12-19 | End: 2023-05-10 | Stop reason: SDUPTHER

## 2022-12-19 RX ORDER — CODEINE PHOSPHATE AND GUAIFENESIN 10; 100 MG/5ML; MG/5ML
5 SOLUTION ORAL EVERY 6 HOURS PRN
Qty: 180 ML | Refills: 0 | Status: SHIPPED | OUTPATIENT
Start: 2022-12-19

## 2022-12-19 RX ORDER — AZITHROMYCIN 250 MG/1
TABLET, FILM COATED ORAL
Qty: 6 TABLET | Refills: 0 | Status: SHIPPED | OUTPATIENT
Start: 2022-12-19 | End: 2022-12-23

## 2022-12-19 RX ORDER — TRAZODONE HYDROCHLORIDE 50 MG/1
50 TABLET ORAL NIGHTLY
Qty: 30 TABLET | Refills: 11 | Status: SHIPPED | OUTPATIENT
Start: 2022-12-19 | End: 2023-12-05

## 2022-12-19 RX ORDER — INFLUENZA A VIRUS A/VICTORIA/2570/2019 IVR-215 (H1N1) ANTIGEN (FORMALDEHYDE INACTIVATED), INFLUENZA A VIRUS A/DARWIN/6/2021 IVR-227 (H3N2) ANTIGEN (FORMALDEHYDE INACTIVATED), INFLUENZA B VIRUS B/AUSTRIA/1359417/2021 BVR-26 ANTIGEN (FORMALDEHYDE INACTIVATED), INFLUENZA B VIRUS B/PHUKET/3073/2013 BVR-1B ANTIGEN (FORMALDEHYDE INACTIVATED) 15; 15; 15; 15 UG/.5ML; UG/.5ML; UG/.5ML; UG/.5ML
INJECTION, SUSPENSION INTRAMUSCULAR
COMMUNITY
Start: 2022-09-22

## 2022-12-19 RX ORDER — GABAPENTIN 300 MG/1
CAPSULE ORAL
Qty: 90 CAPSULE | Refills: 2 | Status: SHIPPED | OUTPATIENT
Start: 2022-12-19 | End: 2023-07-14

## 2022-12-19 NOTE — PROGRESS NOTES
Subjective:       Patient ID: Magdalena Acevedo is a 67 y.o. female.    Chief Complaint: HPI:  66-year-old female-patient in for refills--cramps in hands and toes--hands can just be sitting there in fingers lock up on her in very painful--night when go to bed occasionally toes curl up---spasm from toes to the ankle on the dorsum of the foot-knots.  Patient on potassium.  Occasionally when backing up car--or reaches to wipe after having a bowel movement--gets abdominal cramps.       Hx tremor difficulty walking long distances needed a scooter.Unable use wheelchair due weight and back pain       Eating well--+ BM --Ambulating car to fornt door building has rest catch breath  --walking from bedroom to den has to stop and catch her breath        ROS:  Skin: + psoriasis--better since Christopher has appoint with dermatologist tomorrow   HEENT: no  headache, no  ocular pain, blurred vision, diplopia, epistaxis,hoarseness change in voice,  No thyroid trouble    Lung: No pneumonia, asthma, Tb, wheezing, SOB, no smoking history of bronchitis/COPD--occasional bronchospasm on ProAir and Symbicort  Heart: No chest pain, no ankle edema since on Lasix,no  MI, mendy murmur, +hypertension blood pressure,+  Hyperlipidemia---no stent bypass arrhythmia  Abdomen: No nausea, vomiting, diarrhea, constipation, ulcers, hepatitis,  melena, hematochezia, hematemesis history cholecystectomy  : no UTI, renal disease, stones + urinary incontinence with coughing same   GYN last mammogram due June 2021 Pap smear over 20 years  MS: no fractures, O/A, lupus, rheumatoid, gout left shoulder pain  + DDD cervical spine X up now on  Neuro: No dizziness, LOC, seizures   +diabetes Glu 103 now off steroids , no anemia, +anxiety, + depression upset gets angry patient is on Zoloft but not using lorazepam or Ativan doing okay --anxiety trying to get money to replace root   , 1 child, unemployed,, grandson stays with patient in every other week  great grand children stay with patient    Objective:   Physical Exam:  No physical exam was done this was an audio visit  General: Well nourished, well developed, no acute distress + morbid obesity  Skin: + Dry scaly skin especially on the legs scalp arms and back-psoriasis -  HEENT: Eyes PERRLA, EOM intact, nose clear , throat nonerythema ears TMs clear  NECK: Supple, no bruits, No JVD, no nodes  Lungs: Clear, no rales, rhonchi, nowheezing   Heart: Regular rate and rhythm, no murmurs, gallops, or rubs  Abdomen: flat, bowel sounds positive, no tenderness, or organomegaly   MS:  Tenderness especially left acromioclavicular joint and especially the left upper trapezius --no significant change    Neuro: Alert, CN intact, oriented X 3 tremor of the hands bilaterally right greater than left  Extremities: No cyanosis, clubbing, or edema         Assessment:       1. Type 2 diabetes mellitus with hyperglycemia, without long-term current use of insulin    2. Chronic obstructive pulmonary disease, unspecified COPD type    3. Peripheral polyneuropathy        Plan:       Type 2 diabetes mellitus with hyperglycemia, without long-term current use of insulin  -     Foot Exam Performed  -     CBC Auto Differential; Future; Expected date: 03/19/2023  -     Comprehensive Metabolic Panel; Future; Expected date: 03/19/2023  -     Lipid Panel; Future; Expected date: 03/19/2023  -     Hemoglobin A1C; Future; Expected date: 03/19/2023    Chronic obstructive pulmonary disease, unspecified COPD type  -     BREZTRI AEROSPHERE 160-9-4.8 mcg/actuation HFAA; Inhale 2 puffs into the lungs 2 (two) times a day.  Dispense: 10.7 g; Refill: 5  -     POCT COVID-19 Rapid Screening  -     POCT Influenza A/B    Peripheral polyneuropathy  -     gabapentin (NEURONTIN) 300 MG capsule; TAKE 1 CAPSULE BY MOUTH TWICE DAILY AS NEEDED FOR RESTLESS LEG SYNDROME MAY INCREASE TO 3 TIMES DAILY  Dispense: 90 capsule; Refill: 2    Other orders  -     azithromycin  (Z-ARELI) 250 MG tablet; 2 tabs by mouth day 1, then 1 tab by mouth daily x 4 days  Dispense: 6 tablet; Refill: 0  -     predniSONE (DELTASONE) 5 MG tablet; 4 po qd x 2, 3 po qd x2, 2 po qd x2, 1 po qd x2  Dispense: 20 tablet; Refill: 0  -     guaiFENesin-codeine 100-10 mg/5 ml (TUSSI-ORGANIDIN NR)  mg/5 mL syrup; Take 5 mLs by mouth every 6 (six) hours as needed.  Dispense: 180 mL; Refill: 0  -     traZODone (DESYREL) 50 MG tablet; Take 1 tablet (50 mg total) by mouth every evening.  Dispense: 30 tablet; Refill: 11         Main Reason for Visit--nocturnal muscle cramps--especially hands feet--pulses 2/4--drinks a lot of water will do electrolyte testing CMP magnesium CBC thyroid  COPD on albuterol and Symbicort  Hypertension/hyperlipidemia needs to be on low-sodium low-fat diet exercise as tolerated did--trying get to ideal body weight Norvasc lisinopril for blood pressure Lipitor for hyperlipidemia ankle edema  Type 2 diabetes on Glucotrol  GERD omeprazole  Anxiety depression on Zoloft  Cervical DDD/anxiety/depression may need counseling if symptoms persist/cholecystectomy  Lab patient just had lab WBC 13.8 glucose 166 better of 100-120 excellent 120-150-very good greater than 180 poor hemoglobin A1c 6.9 very good no need for changing diabetic treatment triglyceride 225 needs to exercise try to get ideal body weight may add fenofibrate if fails to decrease continue Lipitor for cholesterol redo lab in 6 months CBCs CMP lipids hemoglobin V7f--xv magnesium now   [facility] COVID vaccine shingles pneumococcal ft exam  Foot Exam--pulses 2/4 toenails in good shape no specific lesions noted                                            Subjective:       Patient ID: Magdalena Acevedo is a 67 y.o. female.    Chief Complaint: HPI:  66-year-old white female--i chest congestion sore throat hoarseness insomnia  No fever--+runny stuffy nose--no sore throat--neck laryngitis-comes and goes--+cough--+phlegm green.  No  pneumonia asthma TB no smoke Hx COPD --on albuterol and Breztri  Insomnia no relief with OTC medication      ROS:  Skin: + psoriasis off or Ortezla   HEENT: +  headache, no  ocular pain, blurred vision, diplopia, epistaxis,+hoarseness change in voice,  No thyroid trouble    Lung: No pneumonia, asthma, Tb, wheezing, +SOB, no smoking history of bronchitis/COPD--occasional bronchospasm on ProAir saw pulmonary MD was on 2 different medications that were not covered by insurance so told get back on Symbicort by me  Heart: No chest pain, no ankle edema since on Lasix,no  MI, mendy murmur, +hypertension blood pressure,+  Hyperlipidemia---no stent bypass arrhythmia--was staying with ankle edema but cardiologist took patient off of some medications and problem has resolved  Abdomen: No nausea, vomiting, diarrhea, constipation, ulcers, hepatitis,  melena, hematochezia, hematemesis history cholecystectomy  : no UTI, renal disease, stones + urinary incontinence with coughing same   GYN last mammogram due June 2021 Pap smear over 20 years  MS: no fractures, O/A, lupus, rheumatoid, gout left shoulder pain  + DDD cervical spine and lower back if stands to long burns   Neuro: No dizziness, LOC, seizures   +diabetes Glu 147--0ccas 135-200, no anemia, +anxiety, + depression upset gets angry patient is on Zoloft but not using lorazepam or Ativan doing okay --anxiety trying to get money to replace root   , 1 child, unemployed,, grandson stays with patient in every other week great grand children stay with patient    Objective:   Physical Exam:   General: Well nourished, well developed, no acute distress + morbid obesity  Skin: + Dry scaly skin especially on the legs scalp arms and back-psoriasis -  HEENT: Eyes PERRLA, EOM intact, nose clear discharge, throat nonerythema ears TMs clear  NECK: Supple, no bruits, No JVD, no nodes   Lungs: Clear, no rales, rhonchi, nowheezing +coarse cough but no rales rhonchi wheezes at this  time--laryngiits   Heart: Regular rate and rhythm, no murmurs, gallops, or rubs  Abdomen: flat, bowel sounds positive, no tenderness, or organomegaly   MS:  Tenderness especially left acromioclavicular joint and especially the left upper trapezius --no significant change    Neuro: Alert, CN intact, oriented X 3 tremor of the hands bilaterally right greater than left  Extremities: No cyanosis, clubbing, or edema         Assessment:       1. Type 2 diabetes mellitus with hyperglycemia, without long-term current use of insulin    2. Chronic obstructive pulmonary disease, unspecified COPD type    3. Peripheral polyneuropathy        Plan:       Type 2 diabetes mellitus with hyperglycemia, without long-term current use of insulin  -     Foot Exam Performed  -     CBC Auto Differential; Future; Expected date: 03/19/2023  -     Comprehensive Metabolic Panel; Future; Expected date: 03/19/2023  -     Lipid Panel; Future; Expected date: 03/19/2023  -     Hemoglobin A1C; Future; Expected date: 03/19/2023    Chronic obstructive pulmonary disease, unspecified COPD type  -     BREZTRI AEROSPHERE 160-9-4.8 mcg/actuation HFAA; Inhale 2 puffs into the lungs 2 (two) times a day.  Dispense: 10.7 g; Refill: 5  -     POCT COVID-19 Rapid Screening  -     POCT Influenza A/B    Peripheral polyneuropathy  -     gabapentin (NEURONTIN) 300 MG capsule; TAKE 1 CAPSULE BY MOUTH TWICE DAILY AS NEEDED FOR RESTLESS LEG SYNDROME MAY INCREASE TO 3 TIMES DAILY  Dispense: 90 capsule; Refill: 2    Other orders  -     azithromycin (Z-ARELI) 250 MG tablet; 2 tabs by mouth day 1, then 1 tab by mouth daily x 4 days  Dispense: 6 tablet; Refill: 0  -     predniSONE (DELTASONE) 5 MG tablet; 4 po qd x 2, 3 po qd x2, 2 po qd x2, 1 po qd x2  Dispense: 20 tablet; Refill: 0  -     guaiFENesin-codeine 100-10 mg/5 ml (TUSSI-ORGANIDIN NR)  mg/5 mL syrup; Take 5 mLs by mouth every 6 (six) hours as needed.  Dispense: 180 mL; Refill: 0  -     traZODone (DESYREL) 50 MG  tablet; Take 1 tablet (50 mg total) by mouth every evening.  Dispense: 30 tablet; Refill: 11         Wellbutrin 150 will increase to 300 if needed  Other medical issues  Upper respiratory infection sinusitis bronchitis laryngitis---Zithromax/prednisone taper/Cheratussin AC/albuterol/breztri--if not better in 1 week could try omeprazole  Insomnia trazodone 50 mg 1 p.o. q.h.s. p.r.n. sleep if no relief can increase to 100 mg if still no relief will try Ambien  Other medical issues  Hypertension/hyperlipidemia needs to be on low-sodium low-fat diet exercise as tolerated did--trying get to ideal body weight Norvasc lisinopril for blood pressure Lipitor for hyperlipidemia ankle edema  Type 2 diabetes on Glucotrol up and down recently may increase with steroids--in February patient to do a.m. p.m. Accu-Chek bring in 2 weeks worth and will adjust dosages needs do routine lab in 3 months  GERD omeprazole  Anxiety depression on Zoloft  Cervical DDD/anxiety/depression may need counseling if symptoms persist/cholecystectomy  Lab--CBCs CMP lipids hemoglobin A1c in 3 months  Health main  [facility] shingles vaccine COVID vaccine Foot exam flu eye exam  Foot exAM pulses 2/4 thickening of the toenails

## 2022-12-19 NOTE — PATIENT INSTRUCTIONS
Zithromax 2 by mouth the 1st day then 1 every 24 hours for 4 more days   Cheratussin AC 1 tsp every 6 hours as needed for cough has codeine   Prednisone 5 mg taper--4 by mouth every day for 2 days/3 by mouth every day for 2 days/2 by mouth every day for 2 days/1 by mouth every day for 2 days/   Albuterol 2 puffs every 6 hours as needed for wheezing   Breztri as directed   Lab in 3 months CBCs CMP lipids hemoglobin A1c   In February do glucose checks a.m. p.m. for 2 weeks bring them in with diabetic medication patient is on 1 so can adjust doses if needed   Insomnia trazodone 50 mg 1 by mouth at bedtime as needed can increase to 100 mg if needed if no relief will try Ambien 5 mg

## 2022-12-22 ENCOUNTER — PATIENT OUTREACH (OUTPATIENT)
Dept: ADMINISTRATIVE | Facility: HOSPITAL | Age: 67
End: 2022-12-22
Payer: MEDICARE

## 2022-12-22 NOTE — LETTER
AUTHORIZATION FOR RELEASE OF   CONFIDENTIAL INFORMATION    Dear MEDICAL RECORDS,    We are seeing Magdalena Acevedo, date of birth 1955, in the clinic at [facility]. Jaime William MD is the patient's PCP. Magdalena Acevedo has an outstanding lab/procedure at the time we reviewed her chart. In order to help keep her health information updated, she has authorized us to request the following medical record(s):        (  )  MAMMOGRAM                                      (  )  COLONOSCOPY      (  )  PAP SMEAR                                          (  )  OUTSIDE LAB RESULTS     (  )  DEXA SCAN                                          (  X)  EYE EXAM            (  )  FOOT EXAM                                          (  )  ENTIRE RECORD     (  )  OUTSIDE IMMUNIZATIONS                 (  )  _______________         Please fax records to [facility], Bryan J Bertucci, MD, [phone]      Patient Name: Magdalena Acevedo  : 1955  Patient Phone #: [phone]

## 2022-12-22 NOTE — PROGRESS NOTES
Health Maintenance Due   Topic Date Due    COVID-19 Vaccine (4 - Booster for Pfizer series) 01/26/2022    Influenza Vaccine (1) 09/01/2022    Eye Exam  12/15/2022    Chart review done. HM updated. Immunizations reviewed & updated. Care Everywhere updated.  ROSALINDA SENT TO DR ANTONIO FOR THE PATIENT EYE EXAM RESULTS

## 2023-01-09 ENCOUNTER — PATIENT OUTREACH (OUTPATIENT)
Dept: ADMINISTRATIVE | Facility: HOSPITAL | Age: 68
End: 2023-01-09
Payer: MEDICARE

## 2023-01-09 NOTE — PROGRESS NOTES
Health Maintenance Due   Topic Date Due    COVID-19 Vaccine (4 - Booster for Pfizer series) 01/26/2022    Influenza Vaccine (1) 09/01/2022     Chart review done. HM updated. Immunizations reviewed & updated. Care Everywhere updated.

## 2023-02-22 ENCOUNTER — PATIENT MESSAGE (OUTPATIENT)
Dept: BARIATRICS | Facility: CLINIC | Age: 68
End: 2023-02-22
Payer: MEDICARE

## 2023-03-20 ENCOUNTER — OFFICE VISIT (OUTPATIENT)
Dept: PRIMARY CARE CLINIC | Facility: CLINIC | Age: 68
End: 2023-03-20
Payer: MEDICARE

## 2023-03-20 VITALS
DIASTOLIC BLOOD PRESSURE: 80 MMHG | HEART RATE: 68 BPM | TEMPERATURE: 97 F | OXYGEN SATURATION: 98 % | RESPIRATION RATE: 18 BRPM | BODY MASS INDEX: 41.41 KG/M2 | WEIGHT: 225 LBS | HEIGHT: 62 IN | SYSTOLIC BLOOD PRESSURE: 128 MMHG

## 2023-03-20 DIAGNOSIS — L40.9 PSORIASIS: ICD-10-CM

## 2023-03-20 DIAGNOSIS — N39.3 STRESS INCONTINENCE OF URINE: ICD-10-CM

## 2023-03-20 DIAGNOSIS — E66.01 MORBID OBESITY WITH BMI OF 40.0-44.9, ADULT: ICD-10-CM

## 2023-03-20 DIAGNOSIS — F32.0 CURRENT MILD EPISODE OF MAJOR DEPRESSIVE DISORDER WITHOUT PRIOR EPISODE: ICD-10-CM

## 2023-03-20 DIAGNOSIS — E11.65 TYPE 2 DIABETES MELLITUS WITH HYPERGLYCEMIA, WITHOUT LONG-TERM CURRENT USE OF INSULIN: ICD-10-CM

## 2023-03-20 DIAGNOSIS — R25.1 TREMOR: ICD-10-CM

## 2023-03-20 DIAGNOSIS — R45.4 ANGER: ICD-10-CM

## 2023-03-20 DIAGNOSIS — M50.30 DDD (DEGENERATIVE DISC DISEASE), CERVICAL: ICD-10-CM

## 2023-03-20 DIAGNOSIS — G62.9 PERIPHERAL POLYNEUROPATHY: ICD-10-CM

## 2023-03-20 DIAGNOSIS — I10 HYPERTENSION, UNSPECIFIED TYPE: ICD-10-CM

## 2023-03-20 DIAGNOSIS — J44.9 CHRONIC OBSTRUCTIVE PULMONARY DISEASE, UNSPECIFIED COPD TYPE: ICD-10-CM

## 2023-03-20 DIAGNOSIS — E78.00 PURE HYPERCHOLESTEROLEMIA: Primary | ICD-10-CM

## 2023-03-20 PROCEDURE — 3074F SYST BP LT 130 MM HG: CPT | Mod: CPTII,S$GLB,, | Performed by: FAMILY MEDICINE

## 2023-03-20 PROCEDURE — 3008F PR BODY MASS INDEX (BMI) DOCUMENTED: ICD-10-PCS | Mod: CPTII,S$GLB,, | Performed by: FAMILY MEDICINE

## 2023-03-20 PROCEDURE — 1126F AMNT PAIN NOTED NONE PRSNT: CPT | Mod: CPTII,S$GLB,, | Performed by: FAMILY MEDICINE

## 2023-03-20 PROCEDURE — 1159F PR MEDICATION LIST DOCUMENTED IN MEDICAL RECORD: ICD-10-PCS | Mod: CPTII,S$GLB,, | Performed by: FAMILY MEDICINE

## 2023-03-20 PROCEDURE — 3051F PR MOST RECENT HEMOGLOBIN A1C LEVEL 7.0 - < 8.0%: ICD-10-PCS | Mod: CPTII,S$GLB,, | Performed by: FAMILY MEDICINE

## 2023-03-20 PROCEDURE — 3288F PR FALLS RISK ASSESSMENT DOCUMENTED: ICD-10-PCS | Mod: CPTII,S$GLB,, | Performed by: FAMILY MEDICINE

## 2023-03-20 PROCEDURE — 1101F PT FALLS ASSESS-DOCD LE1/YR: CPT | Mod: CPTII,S$GLB,, | Performed by: FAMILY MEDICINE

## 2023-03-20 PROCEDURE — 4010F PR ACE/ARB THEARPY RXD/TAKEN: ICD-10-PCS | Mod: CPTII,S$GLB,, | Performed by: FAMILY MEDICINE

## 2023-03-20 PROCEDURE — 3074F PR MOST RECENT SYSTOLIC BLOOD PRESSURE < 130 MM HG: ICD-10-PCS | Mod: CPTII,S$GLB,, | Performed by: FAMILY MEDICINE

## 2023-03-20 PROCEDURE — 3079F DIAST BP 80-89 MM HG: CPT | Mod: CPTII,S$GLB,, | Performed by: FAMILY MEDICINE

## 2023-03-20 PROCEDURE — 1159F MED LIST DOCD IN RCRD: CPT | Mod: CPTII,S$GLB,, | Performed by: FAMILY MEDICINE

## 2023-03-20 PROCEDURE — 99999 PR PBB SHADOW E&M-EST. PATIENT-LVL V: CPT | Mod: PBBFAC,,, | Performed by: FAMILY MEDICINE

## 2023-03-20 PROCEDURE — 99214 PR OFFICE/OUTPT VISIT, EST, LEVL IV, 30-39 MIN: ICD-10-PCS | Mod: S$GLB,,, | Performed by: FAMILY MEDICINE

## 2023-03-20 PROCEDURE — 3051F HG A1C>EQUAL 7.0%<8.0%: CPT | Mod: CPTII,S$GLB,, | Performed by: FAMILY MEDICINE

## 2023-03-20 PROCEDURE — 99214 OFFICE O/P EST MOD 30 MIN: CPT | Mod: S$GLB,,, | Performed by: FAMILY MEDICINE

## 2023-03-20 PROCEDURE — 3288F FALL RISK ASSESSMENT DOCD: CPT | Mod: CPTII,S$GLB,, | Performed by: FAMILY MEDICINE

## 2023-03-20 PROCEDURE — 1126F PR PAIN SEVERITY QUANTIFIED, NO PAIN PRESENT: ICD-10-PCS | Mod: CPTII,S$GLB,, | Performed by: FAMILY MEDICINE

## 2023-03-20 PROCEDURE — 3008F BODY MASS INDEX DOCD: CPT | Mod: CPTII,S$GLB,, | Performed by: FAMILY MEDICINE

## 2023-03-20 PROCEDURE — 1101F PR PT FALLS ASSESS DOC 0-1 FALLS W/OUT INJ PAST YR: ICD-10-PCS | Mod: CPTII,S$GLB,, | Performed by: FAMILY MEDICINE

## 2023-03-20 PROCEDURE — 3079F PR MOST RECENT DIASTOLIC BLOOD PRESSURE 80-89 MM HG: ICD-10-PCS | Mod: CPTII,S$GLB,, | Performed by: FAMILY MEDICINE

## 2023-03-20 PROCEDURE — 4010F ACE/ARB THERAPY RXD/TAKEN: CPT | Mod: CPTII,S$GLB,, | Performed by: FAMILY MEDICINE

## 2023-03-20 PROCEDURE — 99999 PR PBB SHADOW E&M-EST. PATIENT-LVL V: ICD-10-PCS | Mod: PBBFAC,,, | Performed by: FAMILY MEDICINE

## 2023-03-20 RX ORDER — GLIPIZIDE 5 MG/1
TABLET ORAL
Qty: 90 TABLET | Refills: 3 | Status: SHIPPED | OUTPATIENT
Start: 2023-03-20 | End: 2023-06-22

## 2023-03-20 NOTE — PROGRESS NOTES
Subjective:       Patient ID: Magdalena Acevedo is a 68 y.o. female.    Chief Complaint: HPI:  67 yo WF in for 3 month checkup---eating well--+BM--ambulation--goes mainly to the grocery store--patient would like to have a screws--thinks could do more activities outside the house like going to the park with her grandchildren--family came in over the weekend was unable to go to the rodeo a smoothie sarina--can not walk from the parking area to arena.  Unable walk any significant distance  History of psoriasis          ROS:  Skin: + psoriasis--no medication at this point   HEENT: no  headache, no  ocular pain, blurred vision, diplopia, epistaxis,hoarseness change in voice,  No thyroid trouble    Lung: No pneumonia, asthma, Tb, wheezing, SOB, no smoking history of bronchitis/COPD--occasional bronchospasm on ProAir and Symbicort--does okay as long as does not exert herself  Heart: No chest pain, no ankle edema since on Lasix,no  MI, mendy murmur, +hypertension blood pressure,+  Hyperlipidemia---no stent bypass arrhythmia  Abdomen: No nausea, vomiting, diarrhea, constipation, ulcers, hepatitis,  melena, hematochezia, hematemesis history cholecystectomy  : no UTI, renal disease, stones + urinary incontinence with coughing same   GYN last mammogram last year Pap smear over 20 years  MS: no fractures, O/A, lupus, rheumatoid, gout left shoulder pain  + DDD cervical spine acts up on off takes Tylenol  Neuro: No dizziness, LOC, seizures   +diabetes Glu averages mainly in the 120-160 range, no anemia, +anxiety, + depression upset gets angry patient is on Zoloft but not using lorazepam or Ativan doing okay --anxiety trying to get money to replace root   , 1 child, unemployed,, lives alone    Objective:   Physical Exam:   General: Well nourished, well developed, no acute distress + morbid obesity  Skin: + Dry scaly skin especially on the legs scalp arms and back-psoriasis -  HEENT: Eyes PERRLA, EOM intact, nose clear ,  throat nonerythema ears TMs clear  NECK: Supple, no bruits, No JVD, no nodes  Lungs: Clear, no rales, rhonchi, nowheezing   Heart: Regular rate and rhythm, no murmurs, gallops, or rubs  Abdomen: flat, bowel sounds positive, no tenderness, or organomegaly   MS:  Tenderness especially left acromioclavicular joint and especially the left upper trapezius --hx cervical DDD --occas knee issues     Neuro: Alert, CN intact, oriented X 3 tremor of the hands bilaterally right greater than left--slight right hand weakness  Extremities: No cyanosis, clubbing, or edema         Assessment:       1. Pure hypercholesterolemia    2. Hypertension, unspecified type    3. Stress incontinence of urine    4. Chronic obstructive pulmonary disease, unspecified COPD type    5. Tremor    6. Peripheral polyneuropathy    7. DDD (degenerative disc disease), cervical    8. Anger    9. Current mild episode of major depressive disorder without prior episode    10. Psoriasis    11. Type 2 diabetes mellitus with hyperglycemia, without long-term current use of insulin    12. Morbid obesity with BMI of 40.0-44.9, adult          Plan:       Pure hypercholesterolemia  -     CBC Auto Differential; Future; Expected date: 09/20/2023  -     Comprehensive Metabolic Panel; Future; Expected date: 09/20/2023  -     Lipid Panel; Future; Expected date: 09/20/2023    Hypertension, unspecified type    Stress incontinence of urine    Chronic obstructive pulmonary disease, unspecified COPD type    Tremor  -     MOTORIZED SCOOTER FOR HOME USE    Peripheral polyneuropathy  -     MOTORIZED SCOOTER FOR HOME USE    DDD (degenerative disc disease), cervical  -     MOTORIZED SCOOTER FOR HOME USE    Anger    Current mild episode of major depressive disorder without prior episode    Psoriasis    Type 2 diabetes mellitus with hyperglycemia, without long-term current use of insulin  -     Hemoglobin A1C; Future; Expected date: 09/20/2023    Morbid obesity with BMI of 40.0-44.9,  adult    Other orders  -     glipiZIDE (GLUCOTROL) 5 MG tablet; Glipizide 10 milligrams 1 p.o. q.a.m. and glipizide 5 milligrams p.o. q.p.m.  Dispense: 90 tablet; Refill: 3           Main Reason for Visit-  3 month follow-up  Mobility issues---will apply for scooter---morbid obesity--COPD---cervical DDD/osteoarthritis knees---tremor of the hands--weakness right hand grasps---fatigues quickly with walking any significant distance--stops patient from participating in family activities difficulty shopping   nocturnal muscle cramps--better  COPD on albuterol and Symbicort  Hypertension/hyperlipidemia needs to be on low-sodium low-fat diet exercise as tolerated did--trying get to ideal body weight Norvasc lisinopril for blood pressure Lipitor for hyperlipidemia ankle edema blood pressure 128/80  Type 2 diabetes on Glucotrol  GERD omeprazole  Anxiety depression on Zoloft--go to [place] [facility]  Type 2 diabetes--glucose 120-160--lab glucose 125-very good but hemoglobin A1c 7.4 need to increase diabetic Medicaid  Anxiety depression--aggravated by inability to get out of the house--would benefit from scoote  Lab CBCs CMP lipid TSH hemoglobin A1c  Health [facility]-COVID booster flu shot lung scan                                              Subjective:       Patient ID: Magdalena Acevedo is a 68 y.o. female.    Chief Complaint: HPI:  66-year-old white female--i chest congestion sore throat hoarseness insomnia  No fever--+runny stuffy nose--no sore throat--neck laryngitis-comes and goes--+cough--+phlegm green.  No pneumonia asthma TB no smoke Hx COPD --on albuterol and Breztri  Insomnia no relief with OTC medication      ROS:  Skin: + psoriasis off or Ortezla   HEENT: +  headache, no  ocular pain, blurred vision, diplopia, epistaxis,+hoarseness change in voice,  No thyroid trouble    Lung: No pneumonia, asthma, Tb, wheezing, +SOB, no smoking history of bronchitis/COPD--occasional bronchospasm on ProAir saw pulmonary MD  was on 2 different medications that were not covered by insurance so told get back on Symbicort by me  Heart: No chest pain, no ankle edema since on Lasix,no  MI, mendy murmur, +hypertension blood pressure,+  Hyperlipidemia---no stent bypass arrhythmia--was staying with ankle edema but cardiologist took patient off of some medications and problem has resolved  Abdomen: No nausea, vomiting, diarrhea, constipation, ulcers, hepatitis,  melena, hematochezia, hematemesis history cholecystectomy  : no UTI, renal disease, stones + urinary incontinence with coughing same   GYN last mammogram due June 2021 Pap smear over 20 years  MS: no fractures, O/A, lupus, rheumatoid, gout left shoulder pain  + DDD cervical spine and lower back if stands to long burns   Neuro: No dizziness, LOC, seizures   +diabetes Glu 147--0ccas 135-200, no anemia, +anxiety, + depression upset gets angry patient is on Zoloft but not using lorazepam or Ativan doing okay --anxiety trying to get money to replace root   , 1 child, unemployed,, grandson stays with patient in every other week great grand children stay with patient    Objective:   Physical Exam:   General: Well nourished, well developed, no acute distress + morbid obesity  Skin: + Dry scaly skin especially on the legs scalp arms and back-psoriasis -  HEENT: Eyes PERRLA, EOM intact, nose clear discharge, throat nonerythema ears TMs clear  NECK: Supple, no bruits, No JVD, no nodes   Lungs: Clear, no rales, rhonchi, nowheezing +coarse cough but no rales rhonchi wheezes at this time--laryngiits   Heart: Regular rate and rhythm, no murmurs, gallops, or rubs  Abdomen: flat, bowel sounds positive, no tenderness, or organomegaly   MS:  Tenderness especially left acromioclavicular joint and especially the left upper trapezius --no significant change    Neuro: Alert, CN intact, oriented X 3 tremor of the hands bilaterally right greater than left  Extremities: No cyanosis, clubbing, or edema          Assessment:       1. Pure hypercholesterolemia    2. Hypertension, unspecified type    3. Stress incontinence of urine    4. Chronic obstructive pulmonary disease, unspecified COPD type    5. Tremor    6. Peripheral polyneuropathy    7. DDD (degenerative disc disease), cervical    8. Anger    9. Current mild episode of major depressive disorder without prior episode    10. Psoriasis    11. Type 2 diabetes mellitus with hyperglycemia, without long-term current use of insulin    12. Morbid obesity with BMI of 40.0-44.9, adult          Plan:       Pure hypercholesterolemia  -     CBC Auto Differential; Future; Expected date: 09/20/2023  -     Comprehensive Metabolic Panel; Future; Expected date: 09/20/2023  -     Lipid Panel; Future; Expected date: 09/20/2023    Hypertension, unspecified type    Stress incontinence of urine    Chronic obstructive pulmonary disease, unspecified COPD type    Tremor  -     MOTORIZED SCOOTER FOR HOME USE    Peripheral polyneuropathy  -     MOTORIZED SCOOTER FOR HOME USE    DDD (degenerative disc disease), cervical  -     MOTORIZED SCOOTER FOR HOME USE    Anger    Current mild episode of major depressive disorder without prior episode    Psoriasis    Type 2 diabetes mellitus with hyperglycemia, without long-term current use of insulin  -     Hemoglobin A1C; Future; Expected date: 09/20/2023    Morbid obesity with BMI of 40.0-44.9, adult    Other orders  -     glipiZIDE (GLUCOTROL) 5 MG tablet; Glipizide 10 milligrams 1 p.o. q.a.m. and glipizide 5 milligrams p.o. q.p.m.  Dispense: 90 tablet; Refill: 3           Wellbutrin 150 will increase to 300 if needed  Other medical issues  Upper respiratory infection sinusitis bronchitis laryngitis---Zithromax/prednisone taper/Cheratussin AC/albuterol/breztri--if not better in 1 week could try omeprazole  Insomnia trazodone 50 mg 1 p.o. q.h.s. p.r.n. sleep if no relief can increase to 100 mg if still no relief will try Ambien  Other medical  issues  Hypertension/hyperlipidemia needs to be on low-sodium low-fat diet exercise as tolerated did--trying get to ideal body weight Norvasc lisinopril for blood pressure Lipitor for hyperlipidemia ankle edema  Type 2 diabetes on Glucotrol up and down recently may increase with steroids--in February patient to do a.m. p.m. Accu-Chek bring in 2 weeks worth and will adjust dosages needs do routine lab in 3 months  GERD omeprazole  Anxiety depression on Zoloft  Cervical DDD/anxiety/depression may need counseling if symptoms persist/cholecystectomy  Lab--CBCs CMP lipids hemoglobin A1c in 3 months  [facility]  [facility] shingles vaccine COVID vaccine Foot exam flu eye exam  Foot exAM pulses 2/4 thickening of the toenails

## 2023-03-23 DIAGNOSIS — E11.65 TYPE 2 DIABETES MELLITUS WITH HYPERGLYCEMIA, WITHOUT LONG-TERM CURRENT USE OF INSULIN: Primary | ICD-10-CM

## 2023-04-21 ENCOUNTER — PES CALL (OUTPATIENT)
Dept: ADMINISTRATIVE | Facility: CLINIC | Age: 68
End: 2023-04-21
Payer: MEDICARE

## 2023-05-10 DIAGNOSIS — K21.9 GASTROESOPHAGEAL REFLUX DISEASE, UNSPECIFIED WHETHER ESOPHAGITIS PRESENT: ICD-10-CM

## 2023-05-10 DIAGNOSIS — J44.9 CHRONIC OBSTRUCTIVE PULMONARY DISEASE, UNSPECIFIED COPD TYPE: ICD-10-CM

## 2023-05-10 DIAGNOSIS — E11.9 TYPE 2 DIABETES MELLITUS WITHOUT COMPLICATION, WITH LONG-TERM CURRENT USE OF INSULIN: ICD-10-CM

## 2023-05-10 DIAGNOSIS — Z79.4 TYPE 2 DIABETES MELLITUS WITHOUT COMPLICATION, WITH LONG-TERM CURRENT USE OF INSULIN: ICD-10-CM

## 2023-05-10 RX ORDER — BUDESONIDE, GLYCOPYRROLATE, AND FORMOTEROL FUMARATE 160; 9; 4.8 UG/1; UG/1; UG/1
2 AEROSOL, METERED RESPIRATORY (INHALATION) 2 TIMES DAILY
Qty: 10.7 G | Refills: 5 | OUTPATIENT
Start: 2023-05-10 | End: 2023-05-18 | Stop reason: SDUPTHER

## 2023-05-10 RX ORDER — FUROSEMIDE 40 MG/1
TABLET ORAL
Qty: 90 TABLET | Refills: 3 | Status: SHIPPED | OUTPATIENT
Start: 2023-05-10

## 2023-05-10 RX ORDER — POTASSIUM CHLORIDE 750 MG/1
CAPSULE, EXTENDED RELEASE ORAL
Qty: 90 CAPSULE | Refills: 3 | Status: SHIPPED | OUTPATIENT
Start: 2023-05-10

## 2023-05-10 NOTE — TELEPHONE ENCOUNTER
Refill Routing Note   Medication(s) are not appropriate for processing by [facility] for the following reason(s):      Reported no longer taking 3/20/23    [facility] action(s):  Defer  Approve Care Due:  None identified   Medication Therapy Plan: Breztri:reported no longer taking 3/20/23      Appointments  past 12m or future 3m with PCP    Date Provider   Last Visit   3/20/2023 Jaime William MD   Next Visit   9/20/2023 Jaime William MD   ED visits in past 90 days: 0        Note composed:12:41 PM 05/10/2023

## 2023-05-10 NOTE — TELEPHONE ENCOUNTER
----- Message from Mahad Dykes sent at 5/10/2023 10:39 AM CDT -----  Contact: Pt [phone]  Pt called in regards to [facility]-4.[facility]cg/actuation HFAA she states she would normally get it filled through [facility] but they have not gotten the prescription please advise

## 2023-05-10 NOTE — TELEPHONE ENCOUNTER
No care due was identified.  [facility] Embedded Care Due Messages. Reference number: [id number].   5/10/2023 8:15:28 AM CDT

## 2023-05-17 DIAGNOSIS — J44.9 CHRONIC OBSTRUCTIVE PULMONARY DISEASE, UNSPECIFIED COPD TYPE: ICD-10-CM

## 2023-05-17 RX ORDER — BUDESONIDE, GLYCOPYRROLATE, AND FORMOTEROL FUMARATE 160; 9; 4.8 UG/1; UG/1; UG/1
2 AEROSOL, METERED RESPIRATORY (INHALATION) 2 TIMES DAILY
Qty: 10.7 G | Refills: 5 | Status: CANCELLED | OUTPATIENT
Start: 2023-05-17

## 2023-05-17 NOTE — TELEPHONE ENCOUNTER
----- Message from Teresita Booker sent at 5/17/2023  9:25 AM CDT -----  Contact: Pt Mobile  Patient is calling in regards to her saying that you should get a fax from [facility] about her Breztri Inhaler.

## 2023-05-17 NOTE — TELEPHONE ENCOUNTER
Called patient in regards to message. Patient said that she was requesting refill on medication. Patient said that order was fax. I informed patient that I will call the company in regards to her medication order fax.

## 2023-05-18 RX ORDER — BUDESONIDE, GLYCOPYRROLATE, AND FORMOTEROL FUMARATE 160; 9; 4.8 UG/1; UG/1; UG/1
2 AEROSOL, METERED RESPIRATORY (INHALATION) 2 TIMES DAILY
Qty: 10.7 G | Refills: 0 | Status: SHIPPED | OUTPATIENT
Start: 2023-05-18 | End: 2023-07-11 | Stop reason: SDUPTHER

## 2023-05-18 NOTE — TELEPHONE ENCOUNTER
Called patient in regards to message. I informed patient that I checked fax and I didn't see anything. Patient said that she will call pharmacy again. Can you please fill medication.

## 2023-06-15 LAB
LEFT EYE DM RETINOPATHY: POSITIVE
RIGHT EYE DM RETINOPATHY: POSITIVE

## 2023-06-20 NOTE — TELEPHONE ENCOUNTER
Care Due:                  Date            Visit Type   Department     Provider  --------------------------------------------------------------------------------                                EP -                              PRIMARY [facility]  Last Visit: 03-      CARE ([facility])   PRIMARY CARE   Jaime William                              EP - PRIMARY [facility]  Next Visit: 09-      CARE ([facility])   PRIMARY CARE   Jaime William                                                            Last  Test          Frequency    Reason                     Performed    Due Date  --------------------------------------------------------------------------------    HBA1C.......  6 months...  glipiZIDE................  03- 09-    Health [facility] Embedded Care Due Messages. Reference number: [id number].   6/19/2023 9:39:26 PM CDT

## 2023-06-20 NOTE — TELEPHONE ENCOUNTER
Refill Routing Note   Medication(s) are not appropriate for processing by [facility] for the following reason(s):      No active prescription written by PCP    [facility] action(s):  Defer None identified   Medication Therapy Plan: FOVS; FLOS;  AS OF 2023      Appointments  past 12m or future 3m with PCP    Date Provider   Last Visit   3/20/2023 Jaime William MD   Next Visit   2023 Jaime William MD   ED visits in past 90 days: 0        Note composed:8:30 AM 2023

## 2023-06-21 RX ORDER — BUPROPION HYDROCHLORIDE 150 MG/1
TABLET ORAL
Qty: 90 TABLET | Refills: 1 | Status: SHIPPED | OUTPATIENT
Start: 2023-06-21 | End: 2023-09-20 | Stop reason: SDUPTHER

## 2023-07-11 DIAGNOSIS — J44.9 CHRONIC OBSTRUCTIVE PULMONARY DISEASE, UNSPECIFIED COPD TYPE: ICD-10-CM

## 2023-07-11 RX ORDER — BUDESONIDE, GLYCOPYRROLATE, AND FORMOTEROL FUMARATE 160; 9; 4.8 UG/1; UG/1; UG/1
2 AEROSOL, METERED RESPIRATORY (INHALATION) 2 TIMES DAILY
Qty: 10.7 G | Refills: 0 | Status: SHIPPED | OUTPATIENT
Start: 2023-07-11 | End: 2023-07-14 | Stop reason: SDUPTHER

## 2023-07-11 NOTE — TELEPHONE ENCOUNTER
No care due was identified.  [facility] Embedded Care Due Messages. Reference number: [id number].   7/11/2023 2:42:00 PM CDT

## 2023-07-11 NOTE — TELEPHONE ENCOUNTER
----- Message from Marcela Rodriguez sent at 7/11/2023 12:25 PM CDT -----  Contact: [phone]  Pt is requesting a refill on her BREZTRI AEROSPHERE 160-9-4.8 mcg/actuation HFAA 10. Pt is using   [facility]. Per pt, she has been trying for months to get this refilled.  Pt will like a call when it has been sent.         Thank you

## 2023-07-13 ENCOUNTER — PATIENT OUTREACH (OUTPATIENT)
Dept: ADMINISTRATIVE | Facility: HOSPITAL | Age: 68
End: 2023-07-13
Payer: MEDICARE

## 2023-07-13 DIAGNOSIS — E11.65 TYPE 2 DIABETES MELLITUS WITH HYPERGLYCEMIA, WITHOUT LONG-TERM CURRENT USE OF INSULIN: Primary | ICD-10-CM

## 2023-07-13 DIAGNOSIS — J44.9 CHRONIC OBSTRUCTIVE PULMONARY DISEASE, UNSPECIFIED COPD TYPE: ICD-10-CM

## 2023-07-13 DIAGNOSIS — G62.9 PERIPHERAL POLYNEUROPATHY: ICD-10-CM

## 2023-07-13 PROBLEM — E11.3293 MILD NONPROLIFERATIVE DIABETIC RETINOPATHY OF BOTH EYES ASSOCIATED WITH TYPE 2 DIABETES MELLITUS: Status: ACTIVE | Noted: 2023-07-13

## 2023-07-13 NOTE — TELEPHONE ENCOUNTER
Patient has an annual exam with PCP on 9/20/2023, lab appointment scheduled for 9/15/2023. Urine micro albumin pended to PCP. Please advise.

## 2023-07-13 NOTE — TELEPHONE ENCOUNTER
No care due was identified.  Health [facility] Embedded Care Due Messages. Reference number: [id number].   7/13/2023 9:07:06 AM CDT

## 2023-07-13 NOTE — TELEPHONE ENCOUNTER
----- Message from Marcela Rodriguez sent at 7/13/2023 12:25 PM CDT -----  Contact: [phone]  Pt's prescription for BREZTRI AEROSPHERE 160-9-4.8 mcg/actuation HFAA was sent to the wrong pharmacy([facility] It should be sent to [facility] [phone]. Per pt, please give her a call so she can know it has been sent.             Thank you

## 2023-07-13 NOTE — PROGRESS NOTES
Health Maintenance Due   Topic Date Due    LDCT Lung Screen  01/24/2023    DEXA Scan  06/15/2023    Mammogram  08/29/2023    Diabetes Urine Screening  08/31/2023     Chart Review Done     Patient has an appointment with PCP on 9/20/2023, DEXA, mammogram, LDCT lung screen added to appointment notes.   Patient has a lab appointment on 9/15/2023, urine micro albumin pended to PCP to sign. Once signed will link to lab appointment.     Immunizations - reviewed and updated   Care Everywhere - triggered   Care Teams - updated   Outreach - None needed. Received eye exam records, 6/15/2023, uploaded to media manager.

## 2023-07-14 RX ORDER — BUDESONIDE, GLYCOPYRROLATE, AND FORMOTEROL FUMARATE 160; 9; 4.8 UG/1; UG/1; UG/1
2 AEROSOL, METERED RESPIRATORY (INHALATION) 2 TIMES DAILY
Qty: 10.7 G | Refills: 0 | Status: SHIPPED | OUTPATIENT
Start: 2023-07-14 | End: 2023-07-15 | Stop reason: SDUPTHER

## 2023-07-14 RX ORDER — GABAPENTIN 300 MG/1
CAPSULE ORAL
Qty: 90 CAPSULE | Refills: 0 | Status: SHIPPED | OUTPATIENT
Start: 2023-07-14 | End: 2024-01-08

## 2023-07-14 NOTE — TELEPHONE ENCOUNTER
Called patient in regards to message. Patient said that she that her medication was sent to the wrong pharmacy. Patient updated pharmacy is on file can you please fill. Thank you.

## 2023-07-15 RX ORDER — BUDESONIDE, GLYCOPYRROLATE, AND FORMOTEROL FUMARATE 160; 9; 4.8 UG/1; UG/1; UG/1
2 AEROSOL, METERED RESPIRATORY (INHALATION) 2 TIMES DAILY
Qty: 10.7 G | Refills: 0 | Status: SHIPPED | OUTPATIENT
Start: 2023-07-15 | End: 2023-08-02 | Stop reason: SDUPTHER

## 2023-08-02 DIAGNOSIS — J44.9 CHRONIC OBSTRUCTIVE PULMONARY DISEASE, UNSPECIFIED COPD TYPE: ICD-10-CM

## 2023-08-02 RX ORDER — BUDESONIDE, GLYCOPYRROLATE, AND FORMOTEROL FUMARATE 160; 9; 4.8 UG/1; UG/1; UG/1
2 AEROSOL, METERED RESPIRATORY (INHALATION) 2 TIMES DAILY
Qty: 10.7 G | Refills: 2 | Status: SHIPPED | OUTPATIENT
Start: 2023-08-02 | End: 2023-09-20 | Stop reason: SDUPTHER

## 2023-08-02 NOTE — TELEPHONE ENCOUNTER
No care due was identified.  Health [facility] Embedded Care Due Messages. Reference number: [id number].   8/02/2023 4:28:14 PM CDT

## 2023-08-02 NOTE — TELEPHONE ENCOUNTER
----- Message from Berta Prescott sent at 8/2/2023  3:28 PM CDT -----  Contact: Patient, [phone]  Calling because Rx BREZTRI AEROSPHERE 160-9-4.8 mcg/actuation HFAA does not have nay refills on it. Please advise. Thanks.

## 2023-08-02 NOTE — TELEPHONE ENCOUNTER
----- Message from Berta Prescott sent at 8/2/2023  3:28 PM CDT -----  Contact: Patient, [phone]  Calling because Rx BREZTRI AEROSPHERE 160-9-4.8 mcg/actuation HFAA does not have nay refills on it. Please advise. Thanks.

## 2023-08-03 NOTE — TELEPHONE ENCOUNTER
No care due was identified.  Health [facility] Embedded Care Due Messages. Reference number: [id number].   8/03/2023 8:25:53 AM CDT

## 2023-08-04 RX ORDER — TIZANIDINE 4 MG/1
TABLET ORAL
Qty: 30 TABLET | Refills: 0 | Status: SHIPPED | OUTPATIENT
Start: 2023-08-04

## 2023-08-08 ENCOUNTER — TELEPHONE (OUTPATIENT)
Dept: PRIMARY CARE CLINIC | Facility: CLINIC | Age: 68
End: 2023-08-08
Payer: MEDICARE

## 2023-08-08 NOTE — TELEPHONE ENCOUNTER
----- Message from Natalie Flores LPN sent at 8/8/2023  9:08 AM CDT -----  Contact: [phone] @ patient    ----- Message -----  From: Natalie Flores LPN  Sent: 8/7/2023   6:03 PM CDT  To: Natalie Flores LPN      ----- Message -----  From: Giorgio Major  Sent: 8/7/2023   1:15 PM CDT  To: Stewart Espinoza    Good afternoon patient would like a call back to see if she can  a paper from the doc to get her handicap tag renewed. Please give patient a call back [phone]        
(0) independent

## 2023-08-22 ENCOUNTER — TELEPHONE (OUTPATIENT)
Dept: PRIMARY CARE CLINIC | Facility: CLINIC | Age: 68
End: 2023-08-22
Payer: MEDICARE

## 2023-08-22 DIAGNOSIS — Z12.31 ENCOUNTER FOR SCREENING MAMMOGRAM FOR MALIGNANT NEOPLASM OF BREAST: Primary | ICD-10-CM

## 2023-08-22 NOTE — TELEPHONE ENCOUNTER
----- Message from Martha Martinez sent at 8/22/2023 11:13 AM CDT -----  Contact: [phone]  Caller is requesting to schedule their annual screening mammogram appointment. Order is not listed in Epic.  Please enter order and contact patient to schedule.  Would the patient like a call back, or a response through their [facility] portal?:   call back

## 2023-08-30 ENCOUNTER — HOSPITAL ENCOUNTER (OUTPATIENT)
Dept: RADIOLOGY | Facility: HOSPITAL | Age: 68
Discharge: HOME | End: 2023-08-30
Attending: FAMILY MEDICINE
Payer: MEDICARE

## 2023-08-30 DIAGNOSIS — Z12.31 ENCOUNTER FOR SCREENING MAMMOGRAM FOR MALIGNANT NEOPLASM OF BREAST: ICD-10-CM

## 2023-08-30 PROCEDURE — 77067 MAMMO DIGITAL SCREENING BILAT WITH TOMO: ICD-10-PCS | Mod: 26,,, | Performed by: RADIOLOGY

## 2023-08-30 PROCEDURE — 77063 MAMMO DIGITAL SCREENING BILAT WITH TOMO: ICD-10-PCS | Mod: 26,,, | Performed by: RADIOLOGY

## 2023-08-30 PROCEDURE — 77063 BREAST TOMOSYNTHESIS BI: CPT | Mod: 26,,, | Performed by: RADIOLOGY

## 2023-08-30 PROCEDURE — 77067 SCR MAMMO BI INCL CAD: CPT | Mod: 26,,, | Performed by: RADIOLOGY

## 2023-08-30 PROCEDURE — 77067 SCR MAMMO BI INCL CAD: CPT | Mod: TC

## 2023-09-18 ENCOUNTER — PATIENT MESSAGE (OUTPATIENT)
Dept: PRIMARY CARE CLINIC | Facility: CLINIC | Age: 68
End: 2023-09-18
Payer: MEDICARE

## 2023-09-20 ENCOUNTER — OFFICE VISIT (OUTPATIENT)
Dept: PRIMARY CARE CLINIC | Facility: CLINIC | Age: 68
End: 2023-09-20
Payer: MEDICARE

## 2023-09-20 VITALS
TEMPERATURE: 97 F | HEART RATE: 77 BPM | DIASTOLIC BLOOD PRESSURE: 80 MMHG | HEIGHT: 62 IN | OXYGEN SATURATION: 97 % | WEIGHT: 220.56 LBS | RESPIRATION RATE: 18 BRPM | BODY MASS INDEX: 40.59 KG/M2 | SYSTOLIC BLOOD PRESSURE: 132 MMHG

## 2023-09-20 DIAGNOSIS — E78.5 HYPERLIPIDEMIA, UNSPECIFIED HYPERLIPIDEMIA TYPE: ICD-10-CM

## 2023-09-20 DIAGNOSIS — E11.65 TYPE 2 DIABETES MELLITUS WITH HYPERGLYCEMIA, WITHOUT LONG-TERM CURRENT USE OF INSULIN: ICD-10-CM

## 2023-09-20 DIAGNOSIS — J44.9 CHRONIC OBSTRUCTIVE PULMONARY DISEASE, UNSPECIFIED COPD TYPE: ICD-10-CM

## 2023-09-20 DIAGNOSIS — K21.9 GASTROESOPHAGEAL REFLUX DISEASE WITHOUT ESOPHAGITIS: ICD-10-CM

## 2023-09-20 DIAGNOSIS — G47.33 OSA ON CPAP: ICD-10-CM

## 2023-09-20 DIAGNOSIS — I70.0 THORACIC AORTIC ATHEROSCLEROSIS: ICD-10-CM

## 2023-09-20 DIAGNOSIS — L40.9 PSORIASIS: ICD-10-CM

## 2023-09-20 DIAGNOSIS — E66.01 MORBID OBESITY WITH BMI OF 40.0-44.9, ADULT: ICD-10-CM

## 2023-09-20 DIAGNOSIS — Z78.0 MENOPAUSE: ICD-10-CM

## 2023-09-20 DIAGNOSIS — I10 HYPERTENSION, UNSPECIFIED TYPE: Primary | ICD-10-CM

## 2023-09-20 DIAGNOSIS — Z23 FLU VACCINE NEED: ICD-10-CM

## 2023-09-20 DIAGNOSIS — N39.3 STRESS INCONTINENCE OF URINE: ICD-10-CM

## 2023-09-20 PROBLEM — N18.30 CHRONIC RENAL INSUFFICIENCY, STAGE III (MODERATE): Status: RESOLVED | Noted: 2020-03-05 | Resolved: 2023-09-20

## 2023-09-20 PROCEDURE — 3008F PR BODY MASS INDEX (BMI) DOCUMENTED: ICD-10-PCS | Mod: CPTII,S$GLB,, | Performed by: FAMILY MEDICINE

## 2023-09-20 PROCEDURE — 90694 FLU VACCINE - QUADRIVALENT - ADJUVANTED: ICD-10-PCS | Mod: S$GLB,,, | Performed by: FAMILY MEDICINE

## 2023-09-20 PROCEDURE — 3008F BODY MASS INDEX DOCD: CPT | Mod: CPTII,S$GLB,, | Performed by: FAMILY MEDICINE

## 2023-09-20 PROCEDURE — 99999 PR PBB SHADOW E&M-EST. PATIENT-LVL III: ICD-10-PCS | Mod: PBBFAC,,, | Performed by: FAMILY MEDICINE

## 2023-09-20 PROCEDURE — 3288F FALL RISK ASSESSMENT DOCD: CPT | Mod: CPTII,S$GLB,, | Performed by: FAMILY MEDICINE

## 2023-09-20 PROCEDURE — 4010F PR ACE/ARB THEARPY RXD/TAKEN: ICD-10-PCS | Mod: CPTII,S$GLB,, | Performed by: FAMILY MEDICINE

## 2023-09-20 PROCEDURE — 4010F ACE/ARB THERAPY RXD/TAKEN: CPT | Mod: CPTII,S$GLB,, | Performed by: FAMILY MEDICINE

## 2023-09-20 PROCEDURE — 1101F PT FALLS ASSESS-DOCD LE1/YR: CPT | Mod: CPTII,S$GLB,, | Performed by: FAMILY MEDICINE

## 2023-09-20 PROCEDURE — 3044F PR MOST RECENT HEMOGLOBIN A1C LEVEL <7.0%: ICD-10-PCS | Mod: CPTII,S$GLB,, | Performed by: FAMILY MEDICINE

## 2023-09-20 PROCEDURE — 3288F PR FALLS RISK ASSESSMENT DOCUMENTED: ICD-10-PCS | Mod: CPTII,S$GLB,, | Performed by: FAMILY MEDICINE

## 2023-09-20 PROCEDURE — 3075F PR MOST RECENT SYSTOLIC BLOOD PRESS GE 130-139MM HG: ICD-10-PCS | Mod: CPTII,S$GLB,, | Performed by: FAMILY MEDICINE

## 2023-09-20 PROCEDURE — 99214 OFFICE O/P EST MOD 30 MIN: CPT | Mod: S$GLB,,, | Performed by: FAMILY MEDICINE

## 2023-09-20 PROCEDURE — 3079F PR MOST RECENT DIASTOLIC BLOOD PRESSURE 80-89 MM HG: ICD-10-PCS | Mod: CPTII,S$GLB,, | Performed by: FAMILY MEDICINE

## 2023-09-20 PROCEDURE — 1101F PR PT FALLS ASSESS DOC 0-1 FALLS W/OUT INJ PAST YR: ICD-10-PCS | Mod: CPTII,S$GLB,, | Performed by: FAMILY MEDICINE

## 2023-09-20 PROCEDURE — 1126F AMNT PAIN NOTED NONE PRSNT: CPT | Mod: CPTII,S$GLB,, | Performed by: FAMILY MEDICINE

## 2023-09-20 PROCEDURE — 3044F HG A1C LEVEL LT 7.0%: CPT | Mod: CPTII,S$GLB,, | Performed by: FAMILY MEDICINE

## 2023-09-20 PROCEDURE — 99999 PR PBB SHADOW E&M-EST. PATIENT-LVL III: CPT | Mod: PBBFAC,,, | Performed by: FAMILY MEDICINE

## 2023-09-20 PROCEDURE — 1126F PR PAIN SEVERITY QUANTIFIED, NO PAIN PRESENT: ICD-10-PCS | Mod: CPTII,S$GLB,, | Performed by: FAMILY MEDICINE

## 2023-09-20 PROCEDURE — G0008 FLU VACCINE - QUADRIVALENT - ADJUVANTED: ICD-10-PCS | Mod: S$GLB,,, | Performed by: FAMILY MEDICINE

## 2023-09-20 PROCEDURE — 3075F SYST BP GE 130 - 139MM HG: CPT | Mod: CPTII,S$GLB,, | Performed by: FAMILY MEDICINE

## 2023-09-20 PROCEDURE — 90694 VACC AIIV4 NO PRSRV 0.5ML IM: CPT | Mod: S$GLB,,, | Performed by: FAMILY MEDICINE

## 2023-09-20 PROCEDURE — 3079F DIAST BP 80-89 MM HG: CPT | Mod: CPTII,S$GLB,, | Performed by: FAMILY MEDICINE

## 2023-09-20 PROCEDURE — 99214 PR OFFICE/OUTPT VISIT, EST, LEVL IV, 30-39 MIN: ICD-10-PCS | Mod: S$GLB,,, | Performed by: FAMILY MEDICINE

## 2023-09-20 PROCEDURE — G0008 ADMIN INFLUENZA VIRUS VAC: HCPCS | Mod: S$GLB,,, | Performed by: FAMILY MEDICINE

## 2023-09-20 RX ORDER — ALBUTEROL SULFATE 90 UG/1
2 AEROSOL, METERED RESPIRATORY (INHALATION) EVERY 4 HOURS PRN
Qty: 18 G | Refills: 5 | Status: SHIPPED | OUTPATIENT
Start: 2023-09-20

## 2023-09-20 RX ORDER — BUDESONIDE, GLYCOPYRROLATE, AND FORMOTEROL FUMARATE 160; 9; 4.8 UG/1; UG/1; UG/1
2 AEROSOL, METERED RESPIRATORY (INHALATION) 2 TIMES DAILY
Qty: 10.7 G | Refills: 2 | Status: SHIPPED | OUTPATIENT
Start: 2023-09-20 | End: 2024-03-20 | Stop reason: SDUPTHER

## 2023-09-20 RX ORDER — BUPROPION HYDROCHLORIDE 150 MG/1
150 TABLET ORAL DAILY
Qty: 90 TABLET | Refills: 3 | Status: SHIPPED | OUTPATIENT
Start: 2023-09-20

## 2023-09-20 NOTE — PROGRESS NOTES
Verified pt ID using name and . Allergies reviewed. Administered HD Flu Vaccine in LD per physician order using aseptic technique. Pt tolerated well with no adverse reactions noted.

## 2023-09-20 NOTE — PROGRESS NOTES
Subjective:       Patient ID: Magdalena Acevedo is a 68 y.o. female.    Chief Complaint: HPI:  67 yo WF in for 3 month checkup---eating well--+BM--ambulation--goes mainly to the grocery store--patient would like to have a screws--thinks could do more activities outside the house like going to the park with her grandchildren--family came in over the weekend was unable to go to the rodeo a smoothie sarina--can not walk from the parking area to arena.  Unable walk any significant distance  History of psoriasis          ROS:  Skin: + psoriasis--no medication at this point   HEENT: no  headache, no  ocular pain, blurred vision, diplopia, epistaxis,hoarseness change in voice,  No thyroid trouble    Lung: No pneumonia, asthma, Tb, wheezing, SOB, no smoking history of bronchitis/COPD--occasional bronchospasm on ProAir and Symbicort--does okay as long as does not exert herself  Heart: No chest pain, no ankle edema since on Lasix,no  MI, mendy murmur, +hypertension blood pressure,+  Hyperlipidemia---no stent bypass arrhythmia  Abdomen: No nausea, vomiting, diarrhea, constipation, ulcers, hepatitis,  melena, hematochezia, hematemesis history cholecystectomy  : no UTI, renal disease, stones + urinary incontinence with coughing same   GYN last mammogram last year Pap smear over 20 years  MS: no fractures, O/A, lupus, rheumatoid, gout left shoulder pain  + DDD cervical spine acts up on off takes Tylenol  Neuro: No dizziness, LOC, seizures   +diabetes Glu averages mainly in the 120-160 range, no anemia, +anxiety, + depression upset gets angry patient is on Zoloft but not using lorazepam or Ativan doing okay --anxiety trying to get money to replace root   , 1 child, unemployed,, lives alone    Objective:   Physical Exam:   General: Well nourished, well developed, no acute distress + morbid obesity  Skin: + Dry scaly skin especially on the legs scalp arms and back-psoriasis -  HEENT: Eyes PERRLA, EOM intact, nose clear ,  throat nonerythema ears TMs clear  NECK: Supple, no bruits, No JVD, no nodes  Lungs: Clear, no rales, rhonchi, nowheezing   Heart: Regular rate and rhythm, no murmurs, gallops, or rubs  Abdomen: flat, bowel sounds positive, no tenderness, or organomegaly   MS:  Tenderness especially left acromioclavicular joint and especially the left upper trapezius --hx cervical DDD --occas knee issues     Neuro: Alert, CN intact, oriented X 3 tremor of the hands bilaterally right greater than left--slight right hand weakness  Extremities: No cyanosis, clubbing, or edema         Assessment:       1. Hypertension, unspecified type    2. Chronic obstructive pulmonary disease, unspecified COPD type    3. Hyperlipidemia, unspecified hyperlipidemia type    4. Stress incontinence of urine    5. Psoriasis    6. Morbid obesity with BMI of 40.0-44.9, adult    7. Type 2 diabetes mellitus with hyperglycemia, without long-term current use of insulin    8. Gastroesophageal reflux disease without esophagitis    9. FOZIA on CPAP    10. Thoracic aortic atherosclerosis            Plan:       Hypertension, unspecified type    Chronic obstructive pulmonary disease, unspecified COPD type  -     albuterol (PROAIR HFA) 90 mcg/actuation inhaler; Inhale 2 puffs into the lungs every 4 (four) hours as needed for Wheezing or Shortness of Breath. Rescue  Dispense: 18 g; Refill: 5  -     BREZTRI AEROSPHERE 160-9-4.8 mcg/actuation HFAA; Inhale 2 puffs into the lungs 2 (two) times a day.  Dispense: 10.7 g; Refill: 2    Hyperlipidemia, unspecified hyperlipidemia type  -     CBC Auto Differential; Future; Expected date: 03/20/2024  -     Comprehensive Metabolic Panel; Future; Expected date: 03/20/2024  -     Lipid Panel; Future; Expected date: 03/20/2024    Stress incontinence of urine    Psoriasis    Morbid obesity with BMI of 40.0-44.9, adult    Type 2 diabetes mellitus with hyperglycemia, without long-term current use of insulin  -     Hemoglobin A1C; Future;  Expected date: 03/20/2024    Gastroesophageal reflux disease without esophagitis    FOZIA on CPAP    Thoracic aortic atherosclerosis    Other orders  -     buPROPion (WELLBUTRIN XL) 150 MG TB24 tablet; Take 1 tablet (150 mg total) by mouth once daily.  Dispense: 90 tablet; Refill: 3             Main Reason for Visit-  3 month follow-up  Mobility issues---will apply for scooter---morbid obesity--COPD---cervical DDD/osteoarthritis knees---tremor of the hands--weakness right hand grasps---fatigues quickly with walking any significant distance--stops patient from participating in family activities difficulty shopping   nocturnal muscle cramps--better  COPD on albuterol and Symbicort  Hypertension/hyperlipidemia needs to be on low-sodium low-fat diet exercise as tolerated did--trying get to ideal body weight Norvasc lisinopril for blood pressure Lipitor for hyperlipidemia ankle edema blood pressure 128/80  Type 2 diabetes on Glucotrol  GERD omeprazole  Anxiety depression on Zoloft--go to [place] [facility]  Type 2 diabetes--glucose 120-160--lab glucose 125-very good but hemoglobin A1c 7.4 need to increase diabetic Medicaid  Anxiety depression--aggravated by inability to get out of the house--would benefit from scoote  Lab CBCs CMP lipid TSH hemoglobin A1c  [facility] lung [facility]                                              Subjective:       Patient ID: Magdalena Aceevdo is a 68 y.o. female.    Chief Complaint: HPI:  68-year-old white female--in for annual physical exam--eating well--+BM--ambulating well  History of hypertension lisinopril 40 mg Lasix 40 mg  History hyperlipidemia on atorvastatin  History of asthma on albuterolBreztri  History of restless leg syndrome taking gabapentin and Zanaflex  History diabetes on Glucotrol glucose has been doing well      ROS:  Skin: + psoriasis no medication at this time    HEENT: no headache, no  ocular pain, blurred vision, diplopia,  epistaxis,nohoarseness change in voice,  No thyroid trouble    Lung: No pneumonia, asthma, Tb, wheezing, +SOB, no smoking history of bronchitis/COPD--occasional bronchospasm on ProAir and Briztri  Heart: No chest pain, no ankle edema since on Lasix,no  MI, mendy murmur, +hypertension blood pressure,+  Hyperlipidemia---no stent bypass arrhythmia--was staying with ankle edema but cardiologist took patient off of some medications and problem has resolved  Abdomen: No nausea, vomiting, diarrhea, constipation, ulcers, hepatitis,  melena, hematochezia, hematemesis history cholecystectomy  : no UTI, renal disease, stones + urinary incontinence with coughing same   GYN last mammogram due June 2021 Pap smear over 20 years  MS: no fractures, O/A, lupus, rheumatoid, gout left shoulder pain  + DDD cervical spine and lower back if stands to long burns --0ccas leg pain night   Neuro: No dizziness, LOC, seizures   +diabetes Glu 119-0ccas 135-200, no anemia, +anxiety, + depression upset gets angry patient is on Zoloft but not using lorazepam or Ativan doing okay --anxiety trying to get money to replace root   , 1 child, unemployed,, grandson stays with patient in every other week great grand children stay with patient    Objective:   Physical Exam:   General: Well nourished, well developed, no acute distress + morbid obesity  Skin: + Dry scaly skin especially on the legs scalp arms and back-psoriasis -  HEENT: Eyes PERRLA, EOM intact, nose clear discharge, throat nonerythema ears TMs clear  NECK: Supple, no bruits, No JVD, no nodes   Lungs: Clear, no rales, rhonchi, nowheezing +coarse cough but no rales rhonchi wheezes at this time--laryngiits   Heart: Regular rate and rhythm, no murmurs, gallops, or rubs  Abdomen: flat, bowel sounds positive, no tenderness, or organomegaly   MS:  Tenderness especially left acromioclavicular joint and especially the left upper trapezius --no significant change    Neuro: Alert, CN intact,  oriented X 3 tremor of the hands bilaterally right greater than left  Extremities: No cyanosis, clubbing, or edema         Assessment:       1. Hypertension, unspecified type    2. Chronic obstructive pulmonary disease, unspecified COPD type    3. Hyperlipidemia, unspecified hyperlipidemia type    4. Stress incontinence of urine    5. Psoriasis    6. Morbid obesity with BMI of 40.0-44.9, adult    7. Type 2 diabetes mellitus with hyperglycemia, without long-term current use of insulin    8. Gastroesophageal reflux disease without esophagitis    9. FOZIA on CPAP    10. Thoracic aortic atherosclerosis            Plan:       Hypertension, unspecified type    Chronic obstructive pulmonary disease, unspecified COPD type  -     albuterol (PROAIR HFA) 90 mcg/actuation inhaler; Inhale 2 puffs into the lungs every 4 (four) hours as needed for Wheezing or Shortness of Breath. Rescue  Dispense: 18 g; Refill: 5  -     BREZTRI AEROSPHERE 160-9-4.8 mcg/actuation HFAA; Inhale 2 puffs into the lungs 2 (two) times a day.  Dispense: 10.7 g; Refill: 2    Hyperlipidemia, unspecified hyperlipidemia type  -     CBC Auto Differential; Future; Expected date: 03/20/2024  -     Comprehensive Metabolic Panel; Future; Expected date: 03/20/2024  -     Lipid Panel; Future; Expected date: 03/20/2024    Stress incontinence of urine    Psoriasis    Morbid obesity with BMI of 40.0-44.9, adult    Type 2 diabetes mellitus with hyperglycemia, without long-term current use of insulin  -     Hemoglobin A1C; Future; Expected date: 03/20/2024    Gastroesophageal reflux disease without esophagitis    FOZIA on CPAP    Thoracic aortic atherosclerosis    Other orders  -     buPROPion (WELLBUTRIN XL) 150 MG TB24 tablet; Take 1 tablet (150 mg total) by mouth once daily.  Dispense: 90 tablet; Refill: 3            History of hypertension blood pressure 132/80 patient on lisinopril and Lasix  Hyperlipidemia on atorvastatin  Type 2 diabetes on Glucotrol glucose was 122  hemoglobin A1c 6.4 doing very well  History urinary incontinence   Hx depression --doing well on Wellbutrin 150 will increase to 300 if needed  History of leg pain patient on gabapentin and Zanaflex p.r.n.  Hx FOZIA on CPAP  Cervical DDD/anxiety/depression may need counseling if symptoms persist/cholecystectomy  Lab--CBCs CMP lipids hemoglobin A1cin 6 mo

## 2023-10-16 DIAGNOSIS — I10 HYPERTENSION, UNSPECIFIED TYPE: ICD-10-CM

## 2023-10-16 RX ORDER — ATORVASTATIN CALCIUM 20 MG/1
TABLET, FILM COATED ORAL
Qty: 90 TABLET | Refills: 3 | Status: SHIPPED | OUTPATIENT
Start: 2023-10-16

## 2023-10-16 NOTE — TELEPHONE ENCOUNTER
No care due was identified.  Health [facility] Embedded Care Due Messages. Reference number: [id number].   10/16/2023 9:28:22 AM CDT

## 2023-10-17 NOTE — TELEPHONE ENCOUNTER
Refill Decision Note   Magdalena Acevedo  is requesting a refill authorization.  Brief Assessment and Rationale for Refill:  Approve     Medication Therapy Plan:         Comments:     Note composed:8:30 PM 10/16/2023

## 2023-10-18 ENCOUNTER — PATIENT MESSAGE (OUTPATIENT)
Dept: CARDIOLOGY | Facility: CLINIC | Age: 68
End: 2023-10-18
Payer: MEDICARE

## 2023-12-05 RX ORDER — TRAZODONE HYDROCHLORIDE 50 MG/1
50 TABLET ORAL NIGHTLY
Qty: 90 TABLET | Refills: 3 | Status: SHIPPED | OUTPATIENT
Start: 2023-12-05

## 2023-12-05 NOTE — TELEPHONE ENCOUNTER
After Visit Summary   2018    Nolan Chowdary    MRN: [id number]           Patient Information     Date Of Birth          1995        Visit Information        Provider Department      2018 7:30 AM Niki Feldman MD [facility] Nose [facility]        Today's Diagnoses     Nausea    -  1    Vertigo           Follow-ups after your visit        Your next 10 appointments already scheduled     Oct 15, 2018  8:50 AM CDT   [facility]oc[facility] Rhodes [facility]   [facility] ([facility]  )    [address] [place] [id number] [phone]            Oct 26, 2018  9:30 AM CDT   [facility] with Jocelyn Rhodes PT   [facility] ([facility]  )    [address] [place] [id number] [phone]              Who to contact     Please call your clinic at [phone] to:    Ask questions about your health    Make or cancel appointments    Discuss your medicines    Learn about your test results    Speak to your doctor            Additional Information About Your Visit        MyChart Information     [employer] is an electronic gateway that provides easy, online access to your medical records. With [employer], you can request a clinic appointment, read your test results, renew a prescription or communicate with your care team.     To sign up for [employer] visit the website at [email or web address]   You will be asked to enter the access code listed below, as well as some personal information. Please follow the directions to create your username and password.     Your access code is: [email or web address]  Expires: 12/10/2018  6:30 AM     Your access code will  in 90 days. If you need help or a new code, please contact your [facility] or call [phone] for assistance.        Care EveryWhere ID     This is your Care EveryWhere ID. This  No care due was identified.  Health [facility] Embedded Care Due Messages. Reference number: [id number].   12/05/2023 9:22:23 AM CST   "could be used by other organizations to access your [facility] medical records  [id number]        Your Vitals Were     Height BMI (Body Mass Index)                1.702 m (5' 7\") 24.12 kg/m2           Blood Pressure from Last 3 Encounters:   03/22/16 117/64    Weight from Last 3 Encounters:   09/11/18 69.9 kg (154 lb)   03/22/16 60.5 kg (133 lb 6.4 oz)               Primary Care Provider    None Specified       No address on file        Equal Access to Services     NOHEMI PINEDA : Hadii aad ku hadasho Soomaali, waaxda luqadaha, qaybta kaalmada adeegyada, waxay conniein hayashvinn adeeg shyamsebastian cazares . So [facility] [phone].    ATENCIÓN: Si habla español, tiene a alexander disposición servicios gratuitos de asistencia lingüística. Llame al [phone].    We comply with applicable federal civil rights laws and [place] laws. We do not discriminate on the basis of race, color, national origin, age, disability, sex, sexual orientation, or gender identity.            Thank you!     Thank you for choosing [facility] E[facility]SE [facility]  for your care. Our goal is always to provide you with excellent care. Hearing back from our patients is one way we can continue to improve our services. Please take a few minutes to complete the written survey that you may receive in the mail after your visit with us. Thank you!             Your Updated Medication List - Protect others around you: Learn how to safely use, store and throw away your medicines at www.disposemymeds.org.          This list is accurate as of 9/11/18 11:59 PM.  Always use your most recent med list.                   Brand Name Dispense Instructions for use Diagnosis    hydrOXYzine 25 MG tablet    ATARAX    60 tablet    Take 1-2 tablets (25-50 mg) by mouth every 6 hours as needed for itching    Stress fracture of left tibia, initial encounter       ibuprofen 800 MG tablet    ADVIL/MOTRIN    90 tablet    Take 1 tablet (800 mg) by mouth every 8 hours as needed for pain (mild)    " Stress fracture of left tibia, initial encounter       MELATONIN PO           norethindrone-ethinyl estradiol 1-20 MG-MCG per tablet    JUNEL FE 1/20     Take 1 tablet by mouth daily        * oxyCODONE IR 5 MG tablet    ROXICODONE    80 tablet    Take 1-2 tablets (5-10 mg) by mouth every 3 hours    Stress fracture of left tibia, initial encounter       * oxyCODONE 10 MG 12 hr tablet    OXYCONTIN    30 tablet    Take 1 tablet (10 mg) by mouth every 12 hours    Stress fracture of left tibia, initial encounter       TYLENOL PO      Take 500 mg by mouth        * Notice:  This list has 2 medication(s) that are the same as other medications prescribed for you. Read the directions carefully, and ask your doctor or other care provider to review them with you.

## 2023-12-05 NOTE — TELEPHONE ENCOUNTER
Refill Decision Note   Magdalena Acevedo  is requesting a refill authorization.  Brief Assessment and Rationale for Refill:  Approve     Medication Therapy Plan:         Comments:     Note composed:2:18 PM 12/05/2023

## 2023-12-18 DIAGNOSIS — E11.65 TYPE 2 DIABETES MELLITUS WITH HYPERGLYCEMIA, WITHOUT LONG-TERM CURRENT USE OF INSULIN: ICD-10-CM

## 2023-12-18 RX ORDER — GLIPIZIDE 5 MG/1
TABLET ORAL
Qty: 270 TABLET | Refills: 0 | Status: SHIPPED | OUTPATIENT
Start: 2023-12-18 | End: 2023-12-30

## 2023-12-18 NOTE — TELEPHONE ENCOUNTER
Care Due:                  Date            Visit Type   Department     Provider  --------------------------------------------------------------------------------                                EP -                              PRIMARY      [facility]  Last Visit: 09-      CARE ([facility])   PRIMARY CARE   Jaime William  Next Visit: None Scheduled  None         None Found                                                            Last  Test          Frequency    Reason                     Performed    Due Date  --------------------------------------------------------------------------------    HBA1C.......  6 months...  glipiZIDE................  09-   03-    Health [facility] Embedded Care Due Messages. Reference number: [id number].   12/18/2023 4:44:41 PM CST

## 2023-12-18 NOTE — TELEPHONE ENCOUNTER
Refill Decision Note   Magdalena Acevedo  is requesting a refill authorization.  Brief Assessment and Rationale for Refill:  Approve     Medication Therapy Plan:         Comments:     Note composed:3:41 PM 12/18/2023

## 2023-12-29 DIAGNOSIS — E11.65 TYPE 2 DIABETES MELLITUS WITH HYPERGLYCEMIA, WITHOUT LONG-TERM CURRENT USE OF INSULIN: ICD-10-CM

## 2023-12-30 RX ORDER — GLIPIZIDE 5 MG/1
TABLET ORAL
Qty: 270 TABLET | Refills: 0 | Status: SHIPPED | OUTPATIENT
Start: 2023-12-30

## 2023-12-30 NOTE — TELEPHONE ENCOUNTER
No care due was identified.  Health [facility] Embedded Care Due Messages. Reference number: [id number].   12/29/2023 6:37:54 PM CST

## 2023-12-31 NOTE — TELEPHONE ENCOUNTER
Refill Decision Note   Magdalena Acevedo  is requesting a refill authorization.  Brief Assessment and Rationale for Refill:  Approve     Medication Therapy Plan:         Comments:     Note composed:11:31 PM 12/30/2023

## 2024-01-02 NOTE — TELEPHONE ENCOUNTER
Refill Routing Note   Medication(s) are not appropriate for processing by [facility] for the following reason(s):        No active prescription written by provider    [facility] action(s):  Defer        Medication Therapy Plan: Note from LPN states pt said refill request for Lisinopril should go to Dr. William      Appointments  past 12m or future 3m with PCP    Date Provider   Last Visit   9/20/2023 Jaime William MD   Next Visit   3/20/2024 Jaime William MD   ED visits in past 90 days: 0        Note composed:4:37 PM 01/02/2024

## 2024-01-02 NOTE — TELEPHONE ENCOUNTER
Spoke with patient, she said refill request for lisinopril should go to Dr. William.  Informed patient I will send request to his office.

## 2024-01-02 NOTE — TELEPHONE ENCOUNTER
No care due was identified.  Health [facility] Embedded Care Due Messages. Reference number: [id number].   1/02/2024 4:00:13 PM CST

## 2024-01-03 RX ORDER — LISINOPRIL 40 MG/1
40 TABLET ORAL DAILY
Qty: 90 TABLET | Refills: 3 | Status: SHIPPED | OUTPATIENT
Start: 2024-01-03

## 2024-01-08 DIAGNOSIS — G62.9 PERIPHERAL POLYNEUROPATHY: ICD-10-CM

## 2024-01-08 RX ORDER — GABAPENTIN 300 MG/1
CAPSULE ORAL
Qty: 90 CAPSULE | Refills: 2 | Status: SHIPPED | OUTPATIENT
Start: 2024-01-08

## 2024-01-08 NOTE — TELEPHONE ENCOUNTER
No care due was identified.  [facility] Embedded Care Due Messages. Reference number: [id number].   1/08/2024 11:29:39 AM CST

## 2024-01-11 ENCOUNTER — PATIENT OUTREACH (OUTPATIENT)
Dept: ADMINISTRATIVE | Facility: HOSPITAL | Age: 69
End: 2024-01-11
Payer: MEDICARE

## 2024-01-11 NOTE — PROGRESS NOTES
Health Maintenance Due   Topic Date Due    Shingles Vaccine (1 of 2) Never done    RSV Vaccine (Age 60+ and Pregnant patients) (1 - 1-dose 60+ series) Never done    LDCT Lung Screen  2023    DEXA Scan  06/15/2023    Diabetes Urine Screening  2023    COVID-19 Vaccine ( season) 2023    Foot Exam  2023     Population Health Chart Review & Patient Outreach Details      Further Action Needed If Patient Returns Outreach:      MA Gap Report  reviewed. Diabetic labs reviewed. eGFR done 9/15/2023. uACR scheduled with lab for 3/15/2024. Order linked.       Updates Requested / Reviewed:     [x]  Care Everywhere    []     []  External Sources ([facility], [facility], DIS, etc.)    [] [facility]   [] [facility]   [] Other:    [x]  Care Team Updated   []  Removed  or Duplicate Orders   [x]  Immunization Reconciliation Completed / Queried    [] [place]   [] [place]   [] [place]   [] [place]      Health Maintenance Topics Addressed and Outreach Outcomes / Actions Taken:             Breast Cancer Screening []  Mammogram Order Placed    []  Mammogram Screening Scheduled    []  External Records Requested & Care Team Updated if Applicable    []  External Records Uploaded & Care Team Updated if Applicable    []  Pt Declined Scheduling Mammogram    []  Pt Will Schedule with External Provider / Order Routed & Care Team Updated if Applicable              Cervical Cancer Screening []  Pap Smear Scheduled in Primary Care or OBGYN    []  External Records Requested & Care Team Updated if Applicable       []  External Records Uploaded, Care Team Updated, & History Updated if Applicable    []  Patient Declined Scheduling Pap Smear    []  Patient Will Schedule with External Provider & Care Team Updated if Applicable                  Colorectal Cancer Screening []  Colonoscopy Case Request / Referral / Home Test Order Placed    []  External Records Requested & Care Team Updated if Applicable    []   External Records Uploaded, Care Team Updated, & History Updated if Applicable    []  Patient Declined Completing Colon Cancer Screening    []  Patient Will Schedule with External Provider & Care Team Updated if Applicable    []  Fit Kit Mailed (add the SmartPhrase under additional notes)    []  Reminded Patient to Complete Home Test                Diabetic Eye Exam []  Eye Exam Screening Order Placed    []  Eye Camera Scheduled or Optometry/Ophthalmology Referral Placed    []  External Records Requested & Care Team Updated if Applicable    []  External Records Uploaded, Care Team Updated, & History Updated if Applicable    []  Patient Declined Scheduling Eye Exam    []  Patient Will Schedule with External Provider & Care Team Updated if Applicable             Blood Pressure Control []  Primary Care Follow Up Visit Scheduled     []  Remote Blood Pressure Reading Captured    []  Patient Declined Remote Reading or Scheduling Appt - Escalated to PCP    []  Patient Will Call Back or Send Portal Message with Reading                 HbA1c & Other Labs [x]  Overdue Lab(s) Ordered    [x]  Overdue Lab(s) Scheduled    []  External Records Uploaded & Care Team Updated if Applicable    []  Primary Care Follow Up Visit Scheduled     []  Reminded Patient to Complete A1c Home Test    []  Patient Declined Scheduling Labs or Will Call Back to Schedule    []  Patient Will Schedule with External Provider / Order Routed, & Care Team Updated if Applicable           Primary Care Appointment []  Primary Care Appt Scheduled    []  Patient Declined Scheduling or Will Call Back to Schedule    []  Pt Established with External Provider, Updated Care Team, & Informed Pt to Notify Payor if Applicable           Medication Adherence /    Statin Use []  Primary Care Appointment Scheduled    []  Patient Reminded to  Prescription    []  Patient Declined, Provider Notified if Needed    []  Sent Provider Message to Review to Evaluate Pt for  Statin, Add Exclusion Dx Codes, Document   Exclusion in Problem List, Change Statin Intensity Level to Moderate or High Intensity if Applicable                Osteoporosis Screening []  Dexa Order Placed    []  Dexa Appointment Scheduled    []  External Records Requested & Care Team Updated    []  External Records Uploaded, Care Team Updated, & History Updated if Applicable    []  Patient Declined Scheduling Dexa or Will Call Back to Schedule    []  Patient Will Schedule with External Provider / Order Routed & Care Team Updated if Applicable       Additional Notes:

## 2024-03-20 ENCOUNTER — OFFICE VISIT (OUTPATIENT)
Dept: PRIMARY CARE CLINIC | Facility: CLINIC | Age: 69
End: 2024-03-20
Payer: MEDICARE

## 2024-03-20 VITALS
OXYGEN SATURATION: 98 % | RESPIRATION RATE: 18 BRPM | WEIGHT: 217.13 LBS | BODY MASS INDEX: 39.96 KG/M2 | HEART RATE: 85 BPM | HEIGHT: 62 IN | DIASTOLIC BLOOD PRESSURE: 74 MMHG | SYSTOLIC BLOOD PRESSURE: 128 MMHG

## 2024-03-20 DIAGNOSIS — J44.9 CHRONIC OBSTRUCTIVE PULMONARY DISEASE, UNSPECIFIED COPD TYPE: ICD-10-CM

## 2024-03-20 DIAGNOSIS — E11.65 TYPE 2 DIABETES MELLITUS WITH HYPERGLYCEMIA, WITHOUT LONG-TERM CURRENT USE OF INSULIN: ICD-10-CM

## 2024-03-20 DIAGNOSIS — F32.0 CURRENT MILD EPISODE OF MAJOR DEPRESSIVE DISORDER WITHOUT PRIOR EPISODE: ICD-10-CM

## 2024-03-20 DIAGNOSIS — I70.0 THORACIC AORTIC ATHEROSCLEROSIS: ICD-10-CM

## 2024-03-20 DIAGNOSIS — G89.29 CHRONIC PAIN OF RIGHT KNEE: Primary | ICD-10-CM

## 2024-03-20 DIAGNOSIS — G47.33 OSA ON CPAP: ICD-10-CM

## 2024-03-20 DIAGNOSIS — E11.42 TYPE 2 DIABETES MELLITUS WITH DIABETIC POLYNEUROPATHY, WITHOUT LONG-TERM CURRENT USE OF INSULIN: ICD-10-CM

## 2024-03-20 DIAGNOSIS — Z12.2 ENCOUNTER FOR SCREENING FOR LUNG CANCER: ICD-10-CM

## 2024-03-20 DIAGNOSIS — S39.012D LUMBOSACRAL STRAIN, SUBSEQUENT ENCOUNTER: ICD-10-CM

## 2024-03-20 DIAGNOSIS — M25.561 CHRONIC PAIN OF RIGHT KNEE: Primary | ICD-10-CM

## 2024-03-20 DIAGNOSIS — M25.552 LEFT HIP PAIN: ICD-10-CM

## 2024-03-20 DIAGNOSIS — E66.01 MORBID OBESITY WITH BMI OF 40.0-44.9, ADULT: ICD-10-CM

## 2024-03-20 DIAGNOSIS — K21.9 GASTROESOPHAGEAL REFLUX DISEASE WITHOUT ESOPHAGITIS: ICD-10-CM

## 2024-03-20 PROBLEM — S39.012A LUMBOSACRAL STRAIN: Status: ACTIVE | Noted: 2024-03-20

## 2024-03-20 PROCEDURE — 99999 PR PBB SHADOW E&M-EST. PATIENT-LVL V: CPT | Mod: PBBFAC,,, | Performed by: FAMILY MEDICINE

## 2024-03-20 PROCEDURE — 99214 OFFICE O/P EST MOD 30 MIN: CPT | Mod: S$GLB,,, | Performed by: FAMILY MEDICINE

## 2024-03-20 RX ORDER — BUDESONIDE, GLYCOPYRROLATE, AND FORMOTEROL FUMARATE 160; 9; 4.8 UG/1; UG/1; UG/1
2 AEROSOL, METERED RESPIRATORY (INHALATION) 2 TIMES DAILY
Qty: 10.7 G | Refills: 5 | Status: SHIPPED | OUTPATIENT
Start: 2024-03-20 | End: 2024-04-17 | Stop reason: SDUPTHER

## 2024-03-20 RX ORDER — IBUPROFEN 600 MG/1
600 TABLET ORAL EVERY 6 HOURS PRN
Qty: 100 TABLET | Refills: 5 | Status: SHIPPED | OUTPATIENT
Start: 2024-03-20

## 2024-03-20 RX ORDER — CYCLOBENZAPRINE HCL 10 MG
TABLET ORAL
Qty: 30 TABLET | Refills: 5 | Status: SHIPPED | OUTPATIENT
Start: 2024-03-20

## 2024-03-20 RX ORDER — BUDESONIDE, GLYCOPYRROLATE, AND FORMOTEROL FUMARATE 160; 9; 4.8 UG/1; UG/1; UG/1
2 AEROSOL, METERED RESPIRATORY (INHALATION) 2 TIMES DAILY
Qty: 10.7 G | Refills: 5 | Status: SHIPPED | OUTPATIENT
Start: 2024-03-20 | End: 2024-03-20 | Stop reason: SDUPTHER

## 2024-03-20 RX ORDER — BUDESONIDE, GLYCOPYRROLATE, AND FORMOTEROL FUMARATE 160; 9; 4.8 UG/1; UG/1; UG/1
2 AEROSOL, METERED RESPIRATORY (INHALATION) 2 TIMES DAILY
Qty: 10.7 G | Refills: 5 | Status: SHIPPED | OUTPATIENT
Start: 2024-03-20 | End: 2024-03-20

## 2024-03-20 NOTE — PROGRESS NOTES
Subjective:       Patient ID: Magdalena Acevedo is a 69 y.o. female.    Chief Complaint: HPI:  68 yo WF in for six-month checkup right knee pain left hip pain  Eating okay--+BM--ambulating--problems especially with the right knee--left hip--few weeks ago patient went to the emergency room because could put her left foot down--problems getting in out of the car--went to the emergency room--told with sciatica. Txed with steroids and pain medication.  Right knee pain since November.  No trauma no excessive activity no lupus rheumatoid or gout  Lab reviewed CBCs CMP lipids hemoglobin A1c urine protein hemoglobin A1c 6.4 glucose 83 triglyceride 186 was 220 improve all other lab normal  Hx COPD gets short of breath whenever tries to be active has ProAir and Breztri  History psoriasis-the same  History of hypertension blood pressure 128/74 patient on lisinopril 40 Lasix 40  History hyperlipidemia on atorvastatin 20  2 diabetes on glipizide unable to tolerate metformin hemoglobin A1c 6.4 glucose 83  History anxiety depression on Wellbutrin          ROS:  Skin: + psoriasis--no medication at this point same    HEENT: no  headache, no  ocular pain, blurred vision, diplopia, epistaxis,hoarseness change in voice,  No thyroid trouble    Lung: No pneumonia, asthma, Tb, wheezing, SOB, no smoking history of bronchitis/COPD--occasional bronchospasm on ProAir and Symbicort--does okay as long as does not exert herself  Heart: No chest pain, no ankle edema since on Lasix,no  MI, mendy murmur, +hypertension blood pressure,+  Hyperlipidemia---no stent bypass arrhythmia  Abdomen: No nausea, vomiting, diarrhea, constipation, ulcers, hepatitis,  melena, hematochezia, hematemesis history cholecystectomy  : no UTI, renal disease, stones + urinary incontinence with coughing same   GYN last mammogram last year Pap smear over 20 years  MS: no fractures, O/A, lupus, rheumatoid, gout left shoulder pain  + DDD cervical spine acts up on off  takes Tylenol  Neuro: No dizziness, LOC, seizures   +diabetes Glu averages mainly in the 120-160 range, no anemia, +anxiety, + depression upset gets angry patient is on Zoloft but not using lorazepam or Ativan doing okay --anxiety trying to get money to replace root   , 1 child, unemployed,, lives alone    Objective:   Physical Exam:   General: Well nourished, well developed, no acute distress + morbid obesity  Skin: + Dry scaly skin especially on the legs scalp arms and back-psoriasis -  HEENT: Eyes PERRLA, EOM intact, nose clear , throat nonerythema ears TMs clear  NECK: Supple, no bruits, No JVD, no nodes  Lungs: Clear, no rales, rhonchi, nowheezing   Heart: Regular rate and rhythm, no murmurs, gallops, or rubs  Abdomen: flat, bowel sounds positive, no tenderness, or organomegaly   MS:  Tenderness especially left acromioclavicular joint and especially the left upper trapezius --hx cervical DDD --occas knee issues     Neuro: Alert, CN intact, oriented X 3 tremor of the hands bilaterally right greater than left--slight right hand weakness  Extremities: No cyanosis, clubbing, or edema   40 exam within normal limits      Assessment:       1. Chronic pain of right knee    2. Type 2 diabetes mellitus with hyperglycemia, without long-term current use of insulin    3. Encounter for screening for lung cancer    4. Chronic obstructive pulmonary disease, unspecified COPD type    5. Left hip pain    6. Lumbosacral strain, subsequent encounter    7. Morbid obesity with BMI of 40.0-44.9, adult    8. Gastroesophageal reflux disease without esophagitis    9. FOZIA on CPAP    10. Current mild episode of major depressive disorder without prior episode    11. Type 2 diabetes mellitus with diabetic polyneuropathy, without long-term current use of insulin    12. Thoracic aortic atherosclerosis            Plan:       Chronic pain of right knee  -     X-Ray Knee 3 View Right; Future; Expected date: 03/20/2024  -     Ambulatory  referral/consult to Orthopedics; Future; Expected date: 03/27/2024    Type 2 diabetes mellitus with hyperglycemia, without long-term current use of insulin  -     Foot Exam Performed    Encounter for screening for lung cancer  -     CT Chest Lung Screening Low Dose; Future; Expected date: 03/20/2024    Chronic obstructive pulmonary disease, unspecified COPD type  -     BREZTRI AEROSPHERE 160-9-4.8 mcg/actuation HFAA; Inhale 2 puffs into the lungs 2 (two) times a day.  Dispense: 10.7 g; Refill: 5    Left hip pain  -     X-Ray Hip 2 or 3 views Left (with Pelvis when performed); Future; Expected date: 03/20/2024  -     Ambulatory referral/consult to Orthopedics; Future; Expected date: 03/27/2024    Lumbosacral strain, subsequent encounter  -     X-Ray Lumbar Spine 5 View; Future; Expected date: 03/20/2024    Morbid obesity with BMI of 40.0-44.9, adult    Gastroesophageal reflux disease without esophagitis    FOZIA on CPAP    Current mild episode of major depressive disorder without prior episode    Type 2 diabetes mellitus with diabetic polyneuropathy, without long-term current use of insulin  -     CBC Auto Differential; Future; Expected date: 09/20/2024  -     Comprehensive Metabolic Panel; Future; Expected date: 09/20/2024  -     Lipid Panel; Future; Expected date: 09/20/2024  -     Hemoglobin A1C; Future; Expected date: 09/20/2024    Thoracic aortic atherosclerosis    Other orders  -     ibuprofen (ADVIL,MOTRIN) 600 MG tablet; Take 1 tablet (600 mg total) by mouth every 6 (six) hours as needed for Pain.  Dispense: 100 tablet; Refill: 5  -     cyclobenzaprine (FLEXERIL) 10 MG tablet; 1 p.o. q.h.s. p.r.n. muscle spasm can increase to q.8 hours p.r.n. muscle spasm if needed but will make sleepy  Dispense: 30 tablet; Refill: 5             Main Reason for Visit-  3 month follow-up  Lab reviewed  Foot exam  Multiple joint pain lumbosacral spine/left hip/right knee--x-ray lumbar spine/x-ray left hip/x-ray right  knee--ibuprofen 600 mg q.6 hours for pain--Flexeril 10 mg q.h.s. for muscle spasm---Moist heat theragesic range of motion exercise--see orthopedist evaluate hip and knee pain--patient may need physical therapy--probably needs right knee injected in possibly left hip but appears to be more sacroiliitis  Mobility issues---will apply for scooter---morbid obesity--COPD---cervical DDD/osteoarthritis knees---tremor of the hands--weakness right hand grasps---fatigues quickly with walking any significant distance--stops patient from participating in family activities difficulty shopping   nocturnal muscle cramps--better  COPD on albuterol and breztri  Hypertension/hyperlipidemia needs to be on low-sodium low-fat diet exercise as tolerated did--trying get to ideal body weight Norvasc lisinopril for blood pressure Lipitor for hyperlipidemia ankle edema blood pressure 128/74  Type 2 diabetes on Glucotrol glucose 83 hemoglobin A1c-6.4--triglyceride 186 was 220  GERD omeprazole  Anxiety depression on Zoloft--go to [facility] -aggravated by inability to get out of the house--would benefit from scoote  Lab CBCs CMP lipid TSH hemoglobin A1c in 6 mo

## 2024-03-21 ENCOUNTER — TELEPHONE (OUTPATIENT)
Dept: PRIMARY CARE CLINIC | Facility: CLINIC | Age: 69
End: 2024-03-21
Payer: MEDICARE

## 2024-03-21 NOTE — TELEPHONE ENCOUNTER
----- Message from Vero Lyon sent at 3/20/2024  3:34 PM CDT -----  Contact: [phone]  Patient called, said that rx BREZTRI AEROSPHERE 160-9-4.8 mcg/actuation HFAA was sent to the wrong pharmacy. Please sent it to [facility] . Please sent an e-scrip.

## 2024-03-27 ENCOUNTER — OFFICE VISIT (OUTPATIENT)
Dept: ORTHOPEDICS | Facility: CLINIC | Age: 69
End: 2024-03-27
Payer: MEDICARE

## 2024-03-27 VITALS
SYSTOLIC BLOOD PRESSURE: 117 MMHG | HEART RATE: 97 BPM | DIASTOLIC BLOOD PRESSURE: 62 MMHG | BODY MASS INDEX: 39.64 KG/M2 | WEIGHT: 216.69 LBS

## 2024-03-27 DIAGNOSIS — M25.561 CHRONIC PAIN OF RIGHT KNEE: ICD-10-CM

## 2024-03-27 DIAGNOSIS — G89.29 CHRONIC PAIN OF RIGHT KNEE: ICD-10-CM

## 2024-03-27 DIAGNOSIS — M25.552 LEFT HIP PAIN: ICD-10-CM

## 2024-03-27 PROCEDURE — 20610 DRAIN/INJ JOINT/BURSA W/O US: CPT | Mod: RT,S$GLB,, | Performed by: ORTHOPAEDIC SURGERY

## 2024-03-27 PROCEDURE — 99204 OFFICE O/P NEW MOD 45 MIN: CPT | Mod: 25,S$GLB,, | Performed by: ORTHOPAEDIC SURGERY

## 2024-03-27 PROCEDURE — 99999 PR PBB SHADOW E&M-EST. PATIENT-LVL IV: CPT | Mod: PBBFAC,,, | Performed by: ORTHOPAEDIC SURGERY

## 2024-03-27 RX ORDER — TRIAMCINOLONE ACETONIDE 40 MG/ML
40 INJECTION, SUSPENSION INTRA-ARTICULAR; INTRAMUSCULAR
Status: DISCONTINUED | OUTPATIENT
Start: 2024-03-27 | End: 2024-03-27 | Stop reason: HOSPADM

## 2024-03-27 RX ADMIN — TRIAMCINOLONE ACETONIDE 40 MG: 40 INJECTION, SUSPENSION INTRA-ARTICULAR; INTRAMUSCULAR at 01:03

## 2024-03-27 NOTE — PROCEDURES
Large Joint Aspiration/Injection: R knee    Date/Time: 3/27/2024 1:30 PM    Performed by: Adi Anand MD  Authorized by: Adi Anand MD    Consent Done?:  Yes (Verbal)  Indications:  Pain and arthritis  Site marked: the procedure site was marked    Timeout: prior to procedure the correct patient, procedure, and site was verified    Prep: patient was prepped and draped in usual sterile fashion    Local anesthetic:  Lidocaine 1% without epinephrine  Anesthetic total (ml):  3      Details:  Needle Size:  22 G  Approach:  Anterolateral  Location:  Knee  Site:  R knee  Medications:  40 mg triamcinolone acetonide 40 mg/mL  Patient tolerance:  Patient tolerated the procedure well with no immediate complications

## 2024-03-27 NOTE — PROGRESS NOTES
Patient ID: Magdalena Acevedo is a 69 y.o. female    Chief Complaint:   Chief Complaint   Patient presents with    Right Knee - Pain       History of Present Illness:    Pleasant 69-year-old female here for evaluation of right knee pain.  Reports 2 to three-month history of right knee pain which began atraumatically.  Pain is mostly in the medial aspect of the knee.  No significant mechanical symptoms.  Does report some swelling of the right knee.  Has not had any recent injections or physical therapy.  Denies any lower back pain or paresthesias. + diabetic    PAST MEDICAL HISTORY:   Past Medical History:   Diagnosis Date    Arthritis     Chronic bronchitis     COPD (chronic obstructive pulmonary disease)     Coronary artery disease involving native coronary artery of native heart without angina pectoris 5/11/2022    Diabetes mellitus     GERD (gastroesophageal reflux disease)     Hyperlipidemia     Hypertension     Mild nonproliferative diabetic retinopathy of both eyes associated with type 2 diabetes mellitus 7/13/2023    Pulmonary nodule     Sleep apnea      PAST SURGICAL HISTORY:   Past Surgical History:   Procedure Laterality Date    CHOLECYSTECTOMY      COLONOSCOPY N/A 7/20/2020    Procedure: COLONOSCOPY;  Surgeon: Anny Acevedo MD;  Location: [facility];  Service: Endoscopy;  Laterality: N/A;    COLONOSCOPY W/ BIOPSIES  07/20/2020    colonoscopy w/ biopsy per  7/20/2020    NASAL SEPTOPLASTY Bilateral 2/8/2022    Procedure: SEPTOPLASTY, NOSE;  Surgeon: Karri Berrios MD;  Location: [facility] OR;  Service: ENT;  Laterality: Bilateral;  DR. HUNT CASE IS 1 HOUR    sinoplasty      TUBAL LIGATION       FAMILY HISTORY:   Family History   Problem Relation Age of Onset    Cancer Mother     Diabetes Mother     Heart disease Father     Cancer Sister     Diabetes Sister     Cancer Sister     Breast cancer Sister      SOCIAL HISTORY:   Social History     Occupational History    Not on file   Tobacco Use     Smoking status: Former     Current packs/day: 0.00     Average packs/day: 2.0 packs/day for 45.0 years (90.0 ttl pk-yrs)     Types: Cigarettes     Start date: 1972     Quit date: 2016     Years since quittin.5    Smokeless tobacco: Former   Substance and Sexual Activity    Alcohol use: No    Drug use: No    Sexual activity: Yes        MEDICATIONS:   Current Outpatient Medications:     acetaminophen (TYLENOL) 650 MG TbSR, Take 1 tablet (650 mg total) by mouth every 8 (eight) hours as needed (pain)., Disp: 30 tablet, Rfl: 0    albuterol (PROAIR HFA) 90 mcg/actuation inhaler, Inhale 2 puffs into the lungs every 4 (four) hours as needed for Wheezing or Shortness of Breath. Rescue, Disp: 18 g, Rfl: 5    atorvastatin (LIPITOR) 20 MG tablet, Take one tablet by mouth every night at bedtime, Disp: 90 tablet, Rfl: 3    blood sugar diagnostic (TRUE METRIX GLUCOSE TEST STRIP) Strp, 1 strip by Misc.(Non-Drug; Combo Route) route once daily., Disp: 100 strip, Rfl: 3    blood sugar diagnostic Strp, To check BG 2 times daily, to use with insurance preferred meter, Disp: 100 strip, Rfl: 11    blood-glucose meter (TRUE METRIX AIR GLUCOSE METER) kit, Use as instructed, Disp: 1 each, Rfl: 0    BREZTRI AEROSPHERE 160-9-4.8 mcg/actuation HFAA, Inhale 2 puffs into the lungs 2 (two) times a day., Disp: 10.7 g, Rfl: 5    buPROPion (WELLBUTRIN XL) 150 MG TB24 tablet, Take 1 tablet (150 mg total) by mouth once daily., Disp: 90 tablet, Rfl: 3    cyclobenzaprine (FLEXERIL) 10 MG tablet, 1 p.o. q.h.s. p.r.n. muscle spasm can increase to q.8 hours p.r.n. muscle spasm if needed but will make sleepy, Disp: 30 tablet, Rfl: 5    FLUAD QUAD ,65Y UP,,PF, 60 mcg (15 mcg x 4)/0.5 mL Syrg, , Disp: , Rfl:     fluticasone propionate (FLONASE) 50 mcg/actuation nasal spray, 2 sprays (100 mcg total) by Each Nostril route once daily., Disp: 16 g, Rfl: 5    furosemide (LASIX) 40 MG tablet, TAKE 1 TABLET BY MOUTH IN THE MORNING AS NEEDED ANKLE  WITH EDEMA ONLY, Disp: 90 tablet, Rfl: 3    gabapentin (NEURONTIN) 300 MG capsule, TAKE ONE CAPSULE BY MOUTH TWICE A DAY FOR RESTLESS LEG SYNDROME. MAY INCREASE TO 3 TIMES DAILY, Disp: 90 capsule, Rfl: 2    glipiZIDE (GLUCOTROL) 5 MG tablet, TAKE 2 TABLETS (10 MG TOTAL) BY MOUTH ONCE DAILY AND 1 TABLET (5 MG TOTAL) EVERY EVENING., Disp: 270 tablet, Rfl: 0    guaiFENesin-codeine 100-10 mg/5 ml (TUSSI-ORGANIDIN NR)  mg/5 mL syrup, Take 5 mLs by mouth every 6 (six) hours as needed., Disp: 180 mL, Rfl: 0    ibuprofen (ADVIL,MOTRIN) 600 MG tablet, Take 1 tablet (600 mg total) by mouth every 6 (six) hours as needed for Pain., Disp: 100 tablet, Rfl: 5    lancets 33 gauge Misc, 1 lancet by Misc.(Non-Drug; Combo Route) route once daily., Disp: 100 each, Rfl: 3    lancets Misc, To check BG 2 times daily, to use with insurance preferred meter, Disp: 100 each, Rfl: 11    LIDOcaine (LIDODERM) 5 %, Place 1 patch onto the skin once daily. Remove & Discard patch within 12 hours or as directed by MD, Disp: 15 patch, Rfl: 0    lisinopriL (PRINIVIL,ZESTRIL) 40 MG tablet, Take 1 tablet (40 mg total) by mouth once daily., Disp: 90 tablet, Rfl: 3    potassium chloride (MICRO-K) 10 MEQ CpSR, TAKE 1 CAPSULE BY MOUTH IN THE MORNING WITH LASIX ONLY, Disp: 90 capsule, Rfl: 3    predniSONE (DELTASONE) 5 MG tablet, 4 po qd x 2, 3 po qd x2, 2 po qd x2, 1 po qd x2, Disp: 20 tablet, Rfl: 0    tiZANidine (ZANAFLEX) 4 MG tablet, Take one tablet by mouth every night at bedtime as needed for muscle spasms, Disp: 30 tablet, Rfl: 0    traZODone (DESYREL) 50 MG tablet, Take 1 tablet (50 mg total) by mouth every evening., Disp: 90 tablet, Rfl: 3  ALLERGIES:   Review of patient's allergies indicates:   Allergen Reactions    Augmentin [amoxicillin-pot clavulanate] Hives and Rash         Physical Exam     Vitals:    03/27/24 1322   BP: 117/62   Pulse: 97     Alert and oriented to person, place and time. No acute distress. Well-groomed, not ill  appearing. Pupils round and reactive, normal respiratory effort, no audible wheezing.     GENERAL:  A well-developed, well-nourished 69 y.o. female who is alert and       oriented in no acute distress.      Gait: She  walks with a antalgic gait.                   EXTREMITIES:  Examination of lower extremities reveals there is no visible mass or deformity.        Right knee:  ROM 5-130    Ligamentously stable to varus/valgus stress.    Anterior and posterior drawers negative.    No pain over pes bursa.    No warmth    No erythema    Effusion Yes    medial joint line tenderness    Negative Patellofemoral grind/crepitus     The skin over both lower extremities is normal and unremarkable.  She has a  painless range of motion of the hips and ankles bilaterally.   Sensation is intact in both lower extremities.    There are no motor deficits in the lower extremities bilaterally.   Pedal pulses are palpable distally bilaterally.    She has no calf tenderness to palpation nor edema.       Imaging:       X-Ray: I have reviewed all pertinent results/findings and my personal findings are:  Moderate tricompartmental right knee DJD with mild varus deformity.  Subchondral sclerosis.      Assessment & Plan    Chronic pain of right knee  -     Ambulatory referral/consult to Orthopedics    Left hip pain  -     Ambulatory referral/consult to Orthopedics         I made the decision to obtain old records of the patient including previous notes and imaging. New imaging, if ordered today of the extremity or extremities, were evaluated. I independently reviewed and interpreted the radiographs and/or MRIs/CT scan today as well as prior imaging. I also reviewed the referring provider's documentation as well as any pertinent labs, tests and imaging.     Magdalena Acevedo is a 69 y.o. female with right knee DJD    Treatment options were discussed in detail with the patient. We discussed multiple options including non-operative and operative  treatment options.   Non-operatively we discussed bracing, physical therapy and injections. Operatively we discussed TKA and the expectations of surgery long term as well as the rehabilitation associated with surgery as well as risks and benefits of surgery.     After shared medical decision-making with the patient, patient would like to proceed with a trial of:     Trial of Anti-Inflammatories  Corticosteroid Injection right knee     We can consider Visco if pain is refractory to conservative treatment    All questions were answered and patient is agreeable to the above plan.

## 2024-04-13 DIAGNOSIS — E11.65 TYPE 2 DIABETES MELLITUS WITH HYPERGLYCEMIA, WITHOUT LONG-TERM CURRENT USE OF INSULIN: ICD-10-CM

## 2024-04-14 RX ORDER — GLIPIZIDE 5 MG/1
TABLET ORAL
Qty: 270 TABLET | Refills: 1 | Status: SHIPPED | OUTPATIENT
Start: 2024-04-14

## 2024-04-14 NOTE — TELEPHONE ENCOUNTER
Refill Decision Note   Magdalena Acevedo  is requesting a refill authorization.  Brief Assessment and Rationale for Refill:  Approve     Medication Therapy Plan:         Comments:     Note composed:6:00 PM 04/14/2024

## 2024-04-14 NOTE — TELEPHONE ENCOUNTER
No care due was identified.  [facility] Embedded Care Due Messages. Reference number: [id number].   4/13/2024 10:50:45 PM CDT

## 2024-04-15 ENCOUNTER — TELEPHONE (OUTPATIENT)
Dept: PRIMARY CARE CLINIC | Facility: CLINIC | Age: 69
End: 2024-04-15
Payer: MEDICARE

## 2024-04-15 NOTE — TELEPHONE ENCOUNTER
----- Message from Giorgio Major sent at 4/15/2024  8:13 AM CDT -----  Contact: [phone]@patient  Good morning patient would like a call back to discuss her possible having a  seizure on 4/12. Patient says she doesn't know if she needs a apt to see the doc and wants to discuss it with him. Please call to advise [phone]

## 2024-04-17 ENCOUNTER — OFFICE VISIT (OUTPATIENT)
Dept: PRIMARY CARE CLINIC | Facility: CLINIC | Age: 69
End: 2024-04-17
Payer: MEDICARE

## 2024-04-17 VITALS
RESPIRATION RATE: 18 BRPM | WEIGHT: 217.13 LBS | DIASTOLIC BLOOD PRESSURE: 80 MMHG | HEART RATE: 97 BPM | OXYGEN SATURATION: 96 % | HEIGHT: 62 IN | SYSTOLIC BLOOD PRESSURE: 130 MMHG | BODY MASS INDEX: 39.96 KG/M2

## 2024-04-17 DIAGNOSIS — L40.9 PSORIASIS: ICD-10-CM

## 2024-04-17 DIAGNOSIS — R25.1 TREMOR: ICD-10-CM

## 2024-04-17 DIAGNOSIS — I25.10 CORONARY ARTERY DISEASE INVOLVING NATIVE CORONARY ARTERY OF NATIVE HEART WITHOUT ANGINA PECTORIS: ICD-10-CM

## 2024-04-17 DIAGNOSIS — H53.8 BLURRED VISION: ICD-10-CM

## 2024-04-17 DIAGNOSIS — G47.33 OSA ON CPAP: ICD-10-CM

## 2024-04-17 DIAGNOSIS — G44.209 ACUTE NON INTRACTABLE TENSION-TYPE HEADACHE: Primary | ICD-10-CM

## 2024-04-17 DIAGNOSIS — K21.9 GASTROESOPHAGEAL REFLUX DISEASE WITHOUT ESOPHAGITIS: ICD-10-CM

## 2024-04-17 DIAGNOSIS — G89.29 CHRONIC INTRACTABLE HEADACHE, UNSPECIFIED HEADACHE TYPE: ICD-10-CM

## 2024-04-17 DIAGNOSIS — J44.9 CHRONIC OBSTRUCTIVE PULMONARY DISEASE, UNSPECIFIED COPD TYPE: ICD-10-CM

## 2024-04-17 DIAGNOSIS — E78.00 PURE HYPERCHOLESTEROLEMIA: ICD-10-CM

## 2024-04-17 DIAGNOSIS — E11.65 TYPE 2 DIABETES MELLITUS WITH HYPERGLYCEMIA, WITHOUT LONG-TERM CURRENT USE OF INSULIN: ICD-10-CM

## 2024-04-17 DIAGNOSIS — F32.9 REACTIVE DEPRESSION: ICD-10-CM

## 2024-04-17 DIAGNOSIS — R55 SYNCOPE AND COLLAPSE: ICD-10-CM

## 2024-04-17 DIAGNOSIS — I10 HYPERTENSION, UNSPECIFIED TYPE: ICD-10-CM

## 2024-04-17 DIAGNOSIS — R51.9 CHRONIC INTRACTABLE HEADACHE, UNSPECIFIED HEADACHE TYPE: ICD-10-CM

## 2024-04-17 PROCEDURE — 3061F NEG MICROALBUMINURIA REV: CPT | Mod: CPTII,S$GLB,, | Performed by: FAMILY MEDICINE

## 2024-04-17 PROCEDURE — 3288F FALL RISK ASSESSMENT DOCD: CPT | Mod: CPTII,S$GLB,, | Performed by: FAMILY MEDICINE

## 2024-04-17 PROCEDURE — 1126F AMNT PAIN NOTED NONE PRSNT: CPT | Mod: CPTII,S$GLB,, | Performed by: FAMILY MEDICINE

## 2024-04-17 PROCEDURE — 3075F SYST BP GE 130 - 139MM HG: CPT | Mod: CPTII,S$GLB,, | Performed by: FAMILY MEDICINE

## 2024-04-17 PROCEDURE — 3044F HG A1C LEVEL LT 7.0%: CPT | Mod: CPTII,S$GLB,, | Performed by: FAMILY MEDICINE

## 2024-04-17 PROCEDURE — 3066F NEPHROPATHY DOC TX: CPT | Mod: CPTII,S$GLB,, | Performed by: FAMILY MEDICINE

## 2024-04-17 PROCEDURE — 1159F MED LIST DOCD IN RCRD: CPT | Mod: CPTII,S$GLB,, | Performed by: FAMILY MEDICINE

## 2024-04-17 PROCEDURE — 1101F PT FALLS ASSESS-DOCD LE1/YR: CPT | Mod: CPTII,S$GLB,, | Performed by: FAMILY MEDICINE

## 2024-04-17 PROCEDURE — 99214 OFFICE O/P EST MOD 30 MIN: CPT | Mod: S$GLB,,, | Performed by: FAMILY MEDICINE

## 2024-04-17 PROCEDURE — 3079F DIAST BP 80-89 MM HG: CPT | Mod: CPTII,S$GLB,, | Performed by: FAMILY MEDICINE

## 2024-04-17 PROCEDURE — 99999 PR PBB SHADOW E&M-EST. PATIENT-LVL V: CPT | Mod: PBBFAC,,, | Performed by: FAMILY MEDICINE

## 2024-04-17 PROCEDURE — 3008F BODY MASS INDEX DOCD: CPT | Mod: CPTII,S$GLB,, | Performed by: FAMILY MEDICINE

## 2024-04-17 PROCEDURE — 4010F ACE/ARB THERAPY RXD/TAKEN: CPT | Mod: CPTII,S$GLB,, | Performed by: FAMILY MEDICINE

## 2024-04-17 RX ORDER — BUDESONIDE, GLYCOPYRROLATE, AND FORMOTEROL FUMARATE 160; 9; 4.8 UG/1; UG/1; UG/1
2 AEROSOL, METERED RESPIRATORY (INHALATION) 2 TIMES DAILY
Qty: 10.7 G | Refills: 5 | Status: SHIPPED | OUTPATIENT
Start: 2024-04-17 | End: 2024-06-05 | Stop reason: SDUPTHER

## 2024-04-17 RX ORDER — BUTALBITAL, ACETAMINOPHEN AND CAFFEINE 50; 325; 40 MG/1; MG/1; MG/1
TABLET ORAL
Qty: 30 TABLET | Refills: 1 | Status: SHIPPED | OUTPATIENT
Start: 2024-04-17

## 2024-04-17 NOTE — PROGRESS NOTES
Subjective:       Patient ID: Magdalena Acevedo is a 69 y.o. female.    Chief Complaint: HPI:  70 yo WF in for follow up friday had some type seizure --not sure what brought it on--sat down to eat---took 2 bites--eyes seem to be rolling was unable to focus--in real dizzy--had to close her eyes so she could get up and get to the living room--laid on couch x 5-10 min--when set back up was no longer dizzy but all had for energy had left her body--made it to bedroom slept 3 hrs.  Patient felt like was seizure because her little sister used to have seizures and that is how she felt when she came out of a seizure.  Left patient with a severe headache. Sunday GALLO eased up --still has a headache but not as severe as it was. Pt did not go to ER pt did not want drive --and last time called son for ride was aggravating. He told pt why don't you just call ambulance.  HA frontal area --quality Fri--Sun --was severe like head was going to explode--now more of a dull ache in the frontal area--freq constant .  No head trauma  Dizziness only lasted for few minutes from when initial visual problems occurred till she laid on the sofa for few minutes-=-dizziness.  Before patient went in laid in her bed.  Patient was not able to focus felt like eyes were going in different directions.  No fever--+runny nose--no sore throat--+cough--has cough and runny nose all the time.  Runny nose and cough seems more like allergies  No history of a stroke in the past no loss of consciousness--no urinary or bowel incontinence--did not bite her tongue.  Checked Glu after 3 hr nap was 116  Patient has history of tremor for the past few years has seen a neurologist told there was nothing wrong tremors got worse to where has difficulty holding a cup of coffee or pouring coffee from a pipes      Office visit 03/20/2024 70 yo WF in for six-month checkup right knee pain left hip pain  Eating okay--+BM--ambulating--problems especially with the right  knee--left hip--few weeks ago patient went to the emergency room because could put her left foot down--problems getting in out of the car--went to the emergency room--told with sciatica. Txed with steroids and pain medication.  Right knee pain since November.  No trauma no excessive activity no lupus rheumatoid or gout  Lab reviewed CBCs CMP lipids hemoglobin A1c urine protein hemoglobin A1c 6.4 glucose 83 triglyceride 186 was 220 improve all other lab normal  Hx COPD gets short of breath whenever tries to be active has ProAir and Breztri  History psoriasis-the same  History of hypertension blood pressure 128/74 patient on lisinopril 40 Lasix 40  History hyperlipidemia on atorvastatin 20  2 diabetes on glipizide unable to tolerate metformin hemoglobin A1c 6.4 glucose 83  History anxiety depression on Wellbutrin          ROS:  Skin: + psoriasis--no medication at this point same    HEENT: +  headache see HPI , no  ocular pain,+ blurred vision,not sure  diplopia eyes in different directions ,no  epistaxis,hoarseness change in voice,  No thyroid trouble    Lung: No pneumonia, asthma, Tb, wheezing, SOB, no smoking history of bronchitis/COPD--occasional bronchospasm on ProAir and Breztri-does okay as long as does not exert herself  Heart: No chest pain, no ankle edema since on Lasix,no  MI, mendy murmur, +hypertension blood pressure,+  Hyperlipidemia---no stent bypass arrhythmia  Abdomen: No nausea, vomiting, diarrhea, constipation, ulcers, hepatitis,  melena, hematochezia, hematemesis history cholecystectomy  : no UTI, renal disease, stones + urinary incontinence with coughing same   GYN last mammogram last year Pap smear over 20 years  MS: no fractures, O/A, lupus, rheumatoid, gout left shoulder pain  + DDD cervical spine acts up on off takes Tylenol  Neuro: No dizziness, LOC, seizures + tremor   +diabetes Glu averages mainly in the 120-160 range, no anemia, +anxiety, + depression upset gets angry patient is on Zoloft  but not using lorazepam or Ativan doing okay --anxiety trying to get money to replace root   , 1 child, unemployed,, lives alone    Objective:   Physical Exam:   General: Well nourished, well developed, no acute distress + morbid obesity  Skin: + Dry scaly skin especially on the legs scalp arms and back-psoriasis -  HEENT: Eyes PERRLA, EOM intact, nose clear , throat nonerythema ears TMs clear  NECK: Supple, no bruits, No JVD, no nodes  Lungs: Clear, no rales, rhonchi, nowheezing   Heart: Regular rate and rhythm, no murmurs, gallops, or rubs  Abdomen: flat, bowel sounds positive, no tenderness, or organomegaly   MS:  Tenderness especially left acromioclavicular joint and especially the left upper trapezius --hx cervical DDD --occas knee issues     Neuro: Alert, CN intact, oriented X 3 tremor of the hands bilaterally right greater than left--slight right hand weakness--with hand grasps--some weakness with opposition thumb index thumb 5th digit some weakness flexion extension of the forearm---has slightly unsteady gait--falls with heel-toe walking--unable to do Romberg unable to put heels together  Extremities: No cyanosis, clubbing, or edema   40 exam within normal limits--      Assessment:       1. Acute non intractable tension-type headache    2. Type 2 diabetes mellitus with hyperglycemia, without long-term current use of insulin    3. Blurred vision    4. Chronic intractable headache, unspecified headache type    5. Chronic obstructive pulmonary disease, unspecified COPD type    6. Coronary artery disease involving native coronary artery of native heart without angina pectoris    7. Reactive depression    8. Gastroesophageal reflux disease without esophagitis    9. Hypertension, unspecified type    10. Pure hypercholesterolemia    11. FOZIA on CPAP    12. Psoriasis    13. Tremor    14. Syncope and collapse              Plan:       Acute non intractable tension-type headache  -     Sedimentation rate; Future;  Expected date: 04/17/2024  -     Rheumatoid Factor; Future; Expected date: 04/17/2024  -     JUVENAL Screen w/Reflex; Future; Expected date: 04/17/2024  -     C-reactive protein; Future; Expected date: 04/17/2024  -     MRI Brain W WO Contrast; Future; Expected date: 04/17/2024  -     US Carotid Bilateral; Future; Expected date: 04/17/2024  -     Ambulatory referral/consult to Neurology; Future; Expected date: 04/24/2024    Type 2 diabetes mellitus with hyperglycemia, without long-term current use of insulin  -     CBC Auto Differential; Future; Expected date: 04/17/2024  -     Comprehensive Metabolic Panel; Future; Expected date: 04/17/2024    Blurred vision    Chronic intractable headache, unspecified headache type    Chronic obstructive pulmonary disease, unspecified COPD type    Coronary artery disease involving native coronary artery of native heart without angina pectoris    Reactive depression    Gastroesophageal reflux disease without esophagitis    Hypertension, unspecified type    Pure hypercholesterolemia    FOZIA on CPAP    Psoriasis    Tremor  -     Ambulatory referral/consult to Neurology; Future; Expected date: 04/24/2024    Syncope and collapse  -     US Carotid Bilateral; Future; Expected date: 04/17/2024               Main Reason for Visit-  Episode--eyes rolling around in her head--dysconjugate gaze--blurred vision--dizziness--with severe headache for 2-3 days now with a dull aching headache in the frontal area---fatigue after the episode occurred had a sister who had seizures patient worried may have had a seizure--did not bite her tongue did not have urinary or bowel incontinence no loss of consciousness no real--tonus or clonus doubt was a seizure--discuss possibility of strokes  History of a tremor patient was seen by neurologist in the past told it was nothing wrong with her tremors gotten progressively worse has difficulty holding a cup of coffee pouring a cup of coffee  Has unsteady  gait--positive Romberg heel toe falls to the left--needs to see neurologist--MRI the brain--carotid ultrasound--lab CBCs CMP sed rate CRP JUVENAL rheumatoid factor  If symptoms get worse patient told to go to the emergency room at [facility]  History of diabetes most recent lab hemoglobin A1c 6.4 patient check glucose right after waking up and was 116  Other medical issues  Multiple joint pain lumbosacral spine/left hip/right knee--x-ray lumbar spine/x-ray left hip/x-ray right knee--ibuprofen 600 mg q.6 hours for pain--Flexeril 10 mg q.h.s. for muscle spasm---Moist heat theragesic range of motion exercise--see orthopedist evaluate hip and knee pain--patient may need physical therapy--probably needs right knee injected in possibly left hip but appears to be more sacroiliitis  Mobility issues---will apply for scooter---morbid obesity--COPD---cervical DDD/osteoarthritis knees---tremor of the hands--weakness right hand grasps---fatigues quickly with walking any significant distance--stops patient from participating in family activities difficulty shopping   nocturnal muscle cramps--better  COPD on albuterol and breztri  Hypertension/hyperlipidemia needs to be on low-sodium low-fat diet exercise as tolerated did--trying get to ideal body weight Norvasc lisinopril for blood pressure Lipitor for hyperlipidemia ankle edema blood pressure 130/80b   Type 2 diabetes on Glucotrol glucose 83 hemoglobin A1c-6.4--triglyceride 186 was 220  GERD omeprazole  Anxiety depression on Zoloft--go to [place] [facility] -aggravated by inability to get out of the house--would benefit from scoote  Lab CBCs CMP lipid TSH hemoglobin A1c in 6 mo   Lab now CBCs CMP sed rate CRP JUVENAL rheumatoid factor MRI the brain carotid ultrasound see Neurology  Fioricet for headache  To ER if symptoms get worse or if develops slurred speech staggered gait confusion  Return in 1 week

## 2024-04-18 ENCOUNTER — TELEPHONE (OUTPATIENT)
Dept: PRIMARY CARE CLINIC | Facility: CLINIC | Age: 69
End: 2024-04-18
Payer: MEDICARE

## 2024-04-18 NOTE — TELEPHONE ENCOUNTER
----- Message from Berta Prescott sent at 4/17/2024  4:16 PM CDT -----  Contact: Patient, [phone]  Calling because Dr William told her to be seen again in 1 week. Please call her to schedule. Thanks.

## 2024-04-24 ENCOUNTER — OFFICE VISIT (OUTPATIENT)
Dept: PRIMARY CARE CLINIC | Facility: CLINIC | Age: 69
End: 2024-04-24
Payer: MEDICARE

## 2024-04-24 VITALS
RESPIRATION RATE: 18 BRPM | BODY MASS INDEX: 40.48 KG/M2 | OXYGEN SATURATION: 98 % | HEIGHT: 62 IN | SYSTOLIC BLOOD PRESSURE: 120 MMHG | HEART RATE: 81 BPM | DIASTOLIC BLOOD PRESSURE: 70 MMHG | WEIGHT: 220 LBS

## 2024-04-24 DIAGNOSIS — F32.0 CURRENT MILD EPISODE OF MAJOR DEPRESSIVE DISORDER WITHOUT PRIOR EPISODE: ICD-10-CM

## 2024-04-24 DIAGNOSIS — D72.829 LEUKOCYTOSIS, UNSPECIFIED TYPE: ICD-10-CM

## 2024-04-24 DIAGNOSIS — E78.00 PURE HYPERCHOLESTEROLEMIA: ICD-10-CM
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
## 2024-04-24 DIAGNOSIS — K21.9 GASTROESOPHAGEAL REFLUX DISEASE WITHOUT ESOPHAGITIS: ICD-10-CM

## 2024-04-24 DIAGNOSIS — R25.1 TREMOR: ICD-10-CM

## 2024-04-24 DIAGNOSIS — J44.9 CHRONIC OBSTRUCTIVE PULMONARY DISEASE, UNSPECIFIED COPD TYPE: ICD-10-CM

## 2024-04-24 DIAGNOSIS — S39.012D LUMBOSACRAL STRAIN, SUBSEQUENT ENCOUNTER: ICD-10-CM

## 2024-04-24 DIAGNOSIS — R55 SYNCOPE, UNSPECIFIED SYNCOPE TYPE: Primary | ICD-10-CM

## 2024-04-24 DIAGNOSIS — R29.898 WEAKNESS OF BOTH LOWER EXTREMITIES: ICD-10-CM

## 2024-04-24 DIAGNOSIS — Z87.891 HISTORY OF SMOKING GREATER THAN 50 PACK YEARS: ICD-10-CM

## 2024-04-24 DIAGNOSIS — I10 HYPERTENSION, UNSPECIFIED TYPE: ICD-10-CM

## 2024-04-24 PROCEDURE — 3044F HG A1C LEVEL LT 7.0%: CPT | Mod: CPTII,S$GLB,, | Performed by: FAMILY MEDICINE

## 2024-04-24 PROCEDURE — 3074F SYST BP LT 130 MM HG: CPT | Mod: CPTII,S$GLB,, | Performed by: FAMILY MEDICINE

## 2024-04-24 PROCEDURE — 99999 PR PBB SHADOW E&M-EST. PATIENT-LVL V: CPT | Mod: PBBFAC,,, | Performed by: FAMILY MEDICINE

## 2024-04-24 PROCEDURE — 3066F NEPHROPATHY DOC TX: CPT | Mod: CPTII,S$GLB,, | Performed by: FAMILY MEDICINE

## 2024-04-24 PROCEDURE — 3008F BODY MASS INDEX DOCD: CPT | Mod: CPTII,S$GLB,, | Performed by: FAMILY MEDICINE

## 2024-04-24 PROCEDURE — 1101F PT FALLS ASSESS-DOCD LE1/YR: CPT | Mod: CPTII,S$GLB,, | Performed by: FAMILY MEDICINE

## 2024-04-24 PROCEDURE — 99214 OFFICE O/P EST MOD 30 MIN: CPT | Mod: S$GLB,,, | Performed by: FAMILY MEDICINE

## 2024-04-24 PROCEDURE — 3061F NEG MICROALBUMINURIA REV: CPT | Mod: CPTII,S$GLB,, | Performed by: FAMILY MEDICINE

## 2024-04-24 PROCEDURE — 3288F FALL RISK ASSESSMENT DOCD: CPT | Mod: CPTII,S$GLB,, | Performed by: FAMILY MEDICINE

## 2024-04-24 PROCEDURE — 3078F DIAST BP <80 MM HG: CPT | Mod: CPTII,S$GLB,, | Performed by: FAMILY MEDICINE

## 2024-04-24 PROCEDURE — 4010F ACE/ARB THERAPY RXD/TAKEN: CPT | Mod: CPTII,S$GLB,, | Performed by: FAMILY MEDICINE

## 2024-04-24 PROCEDURE — 1159F MED LIST DOCD IN RCRD: CPT | Mod: CPTII,S$GLB,, | Performed by: FAMILY MEDICINE

## 2024-04-24 PROCEDURE — 1125F AMNT PAIN NOTED PAIN PRSNT: CPT | Mod: CPTII,S$GLB,, | Performed by: FAMILY MEDICINE

## 2024-04-24 NOTE — PROGRESS NOTES
Subjective:       Patient ID: Magdalena Acevedo is a 69 y.o. female.    Chief Complaint: HPI: 68 yo WF in forn 1 week follow up and lab --history of syncopal episode versus seizure--04/12/2024---[place]ams still has them not as severe --dizziness depends on how long up--fatigues easily  Yesterday went to the grocery store and gets real tired--left side lower back kept popping. No more seizures --HA better --back pain x years--thinks just had MRI back   Neurology has not called about appt Needs EEG   Lab CBCs CMP--within normal limits but slight increase in white count 14.8 patient states usually runs high WBCs sed rate 49 patient sed rate stays elevated highest was 77 a few years ago CRP 17.9 slight increase JUVENAL neg  rheumatoid factor neg   History MRI of the lumbar spine 2021 showed arthritis with bulging disc L5-S1  MRI the brain and carotid ultrasound or ordered and results are pending  History tremor in the past was seen by Neurology told was benign  Hx COPD gets short of breath whenever tries to be active has ProAir and Breztri  History psoriasis-the same  History of hypertension blood pressure 120/70 patient on lisinopril 40 Lasix 40  History hyperlipidemia on atorvastatin 20  2 diabetes on glipizide unable to tolerate metformin glucose 88  History anxiety depression on Wellbutrin          ROS:  No significant change except for history of present illness  Skin: + psoriasis--no medication at this point same    HEENT: +  headache see HPI , no  ocular pain,+ blurred vision,not sure  diplopia eyes in different directions ,no  epistaxis,hoarseness change in voice,  No thyroid trouble    Lung: No pneumonia, asthma, Tb, wheezing, SOB, no smoking history of bronchitis/COPD--occasional bronchospasm on ProAir and Breztri-does okay as long as does not exert herself  Heart: No chest pain, no ankle edema since on Lasix,no  MI, mendy murmur, +hypertension blood pressure,+  Hyperlipidemia---no stent bypass  arrhythmia  Abdomen: No nausea, vomiting, diarrhea, constipation, ulcers, hepatitis,  melena, hematochezia, hematemesis history cholecystectomy  : no UTI, renal disease, stones + urinary incontinence with coughing same   GYN last mammogram last year Pap smear over 20 years  MS: no fractures, O/A, lupus, rheumatoid, gout left shoulder pain  + DDD cervical spine acts up on off takes Tylenol  Neuro: No dizziness, LOC, seizures + tremor   +diabetes Glu averages mainly in the 120-160 range, no anemia, +anxiety, + depression upset gets angry patient is on Zoloft but not using lorazepam or Ativan doing okay --anxiety trying to get money to replace root   , 1 child, unemployed,, lives alone    Objective:   Physical Exam:   General: Well nourished, well developed, no acute distress + morbid obesity  Skin: + Dry scaly skin especially on the legs scalp arms and back-psoriasis -  HEENT: Eyes PERRLA, EOM intact, nose clear , throat nonerythema ears TMs clear  NECK: Supple, no bruits, No JVD, no nodes  Lungs: Clear, no rales, rhonchi, nowheezing   Heart: Regular rate and rhythm, no murmurs, gallops, or rubs  Abdomen: flat, bowel sounds positive, no tenderness, or organomegaly   MS:  Tenderness especially left acromioclavicular joint and especially the left upper trapezius --hx cervical DDD --occas knee issues     Neuro: Alert, CN intact, oriented X 3 tremor of the hands bilaterally right greater than left--slight right hand weakness--with hand grasps--some weakness with opposition thumb index thumb 5th digit some weakness flexion extension of the forearm---has slightly unsteady gait--falls with heel-toe walking--unable to do Romberg unable to put heels together  Extremities: No cyanosis, clubbing, or edema   40 exam within normal limits--      Assessment:       1. Syncope, unspecified syncope type    2. Tremor    3. Weakness of both lower extremities    4. Leukocytosis, unspecified type    5. Lumbosacral strain, subsequent  encounter    6. Hypertension, unspecified type    7. Pure hypercholesterolemia    8. History of smoking greater than 50 pack years    9. Gastroesophageal reflux disease without esophagitis    10. Fall, subsequent encounter    11. Current mild episode of major depressive disorder without prior episode    12. Chronic obstructive pulmonary disease, unspecified COPD type                Plan:       Syncope, unspecified syncope type    Tremor    Weakness of both lower extremities    Leukocytosis, unspecified type    Lumbosacral strain, subsequent encounter    Hypertension, unspecified type    Pure hypercholesterolemia    History of smoking greater than 50 pack years    Gastroesophageal reflux disease without esophagitis    Fall, subsequent encounter    Current mild episode of major depressive disorder without prior episode    Chronic obstructive pulmonary disease, unspecified COPD type                 Main Reason for Visit-  Episode--Syncope vs seizure---eyes rolling around in her head--dysconjugate gaze--blurred vision--dizziness--with severe headache for 2-3 days now with a dull aching headache in the frontal area---fatigue after the episode occurred had a sister who had seizures patient worried may have had a seizure--did not bite her tongue did not have urinary or bowel incontinence no loss of consciousness no real--tonus or clonus doubt was a seizure--discuss possibility of strokes--MRI Brain and Carotid US scheduled not done   History of a tremor patient was seen by neurologist in the past told it was nothing wrong with her tremors gotten progressively worse has difficulty holding a cup of coffee pouring a cup of coffee  Has unsteady gait--positive Romberg heel toe falls to the left--needs to see neurosurgery--in weakness of the lower legs--had MRI 2021 showing arthritis and bulging disc L5-S1 may benefit from epidural steroid injection  History of diabetes Glu 88  Osteoarthritis in the knees had right knee injected  doing extremely well nocturnal muscle cramps--better  COPD on albuterol and breztri  Hypertension/hyperlipidemia needs to be on low-sodium low-fat diet exercise as tolerated did--trying get to ideal body weight Norvasc lisinopril for blood pressure Lipitor for hyperlipidemia ankle edema  GERD omeprazole  Sed rate 49 had been 77 in the past CRP 17 patient may benefit from seeing Rheumatology if no relief with neurosurgery in orthopedist  Anxiety depression on Zoloft--go to [place] [facility] -aggravated by inability to get out of the house--would benefit from scoote  Lab CBCs CMP lipid hemoglobin A1c in 6 mo   Neurology consult--evaluate syncopal episode--evaluate MRI carotid ultrasound patient could benefit from EEG with sister with possible history seizure disorder  Neurosurgery consult--history of falls--unsteady gait--chronic low back pain--had MRI 2021 showing arthritis and bulging disc L5-S1 may benefit from epidural steroid injections  Fioricet for headache  To ER if symptoms get worse or if develops slurred speech staggered gait confusion  Return in 6 week--if still okay then Q six-month

## 2024-04-25 ENCOUNTER — TELEPHONE (OUTPATIENT)
Dept: NEUROLOGY | Facility: CLINIC | Age: 69
End: 2024-04-25
Payer: MEDICARE

## 2024-04-29 ENCOUNTER — TELEPHONE (OUTPATIENT)
Dept: PRIMARY CARE CLINIC | Facility: CLINIC | Age: 69
End: 2024-04-29
Payer: MEDICARE

## 2024-04-29 NOTE — TELEPHONE ENCOUNTER
----- Message from Bren Dodson sent at 4/29/2024  9:25 AM CDT -----  Contact: [phone] Patient  1MEDICALADVICE     Patient is calling for Medical Advice regarding: Sinus infection, runny nose, cough, congestion, milky color phlegm    How long has patient had these symptoms: 04/26/2024    Pharmacy name and phone#:   [id number] - [place], [place] - [address]  [address]  [place] [address]  Phone: [phone] Fax: [phone]        Would like response via [facility]:  Call Back please. Pt is asking if Dr Jaime William can send in something for her sinus infection please. Thank you    Comments:

## 2024-04-30 RX ORDER — AZITHROMYCIN 250 MG/1
TABLET, FILM COATED ORAL
Qty: 6 TABLET | Refills: 0 | Status: SHIPPED | OUTPATIENT
Start: 2024-04-30 | End: 2024-05-04

## 2024-04-30 RX ORDER — PROMETHAZINE HYDROCHLORIDE AND DEXTROMETHORPHAN HYDROBROMIDE 6.25; 15 MG/5ML; MG/5ML
5 SYRUP ORAL EVERY 6 HOURS PRN
Qty: 180 ML | Refills: 1 | Status: SHIPPED | OUTPATIENT
Start: 2024-04-30

## 2024-05-01 ENCOUNTER — TELEPHONE (OUTPATIENT)
Dept: PRIMARY CARE CLINIC | Facility: CLINIC | Age: 69
End: 2024-05-01
Payer: MEDICARE

## 2024-05-01 DIAGNOSIS — Z79.4 TYPE 2 DIABETES MELLITUS WITHOUT COMPLICATION, WITH LONG-TERM CURRENT USE OF INSULIN: ICD-10-CM

## 2024-05-01 DIAGNOSIS — K21.9 GASTROESOPHAGEAL REFLUX DISEASE, UNSPECIFIED WHETHER ESOPHAGITIS PRESENT: ICD-10-CM

## 2024-05-01 DIAGNOSIS — E11.9 TYPE 2 DIABETES MELLITUS WITHOUT COMPLICATION, WITH LONG-TERM CURRENT USE OF INSULIN: ICD-10-CM

## 2024-05-01 RX ORDER — FUROSEMIDE 40 MG/1
TABLET ORAL
Qty: 90 TABLET | Refills: 3 | Status: SHIPPED | OUTPATIENT
Start: 2024-05-01

## 2024-05-01 RX ORDER — POTASSIUM CHLORIDE 750 MG/1
CAPSULE, EXTENDED RELEASE ORAL
Qty: 90 CAPSULE | Refills: 3 | Status: SHIPPED | OUTPATIENT
Start: 2024-05-01

## 2024-05-01 NOTE — TELEPHONE ENCOUNTER
No care due was identified.  [facility] Embedded Care Due Messages. Reference number: [id number].   5/01/2024 7:56:59 AM CDT

## 2024-05-01 NOTE — TELEPHONE ENCOUNTER
----- Message from Liv Pacheco sent at 4/30/2024  8:14 AM CDT -----  Contact: [phone]  1MEDICALADVICE     Patient is calling for Medical Advice regarding:has a sinus infection     How long has patient had these symptoms:since Friday     Pharmacy name and phone#:  [id number] - [place], [place] - [address]  [address]  [place] [address]  Phone: [phone] Fax: [phone]         Would like response via [facility]:  no     Comments:  Pt states she was seen on Wednesday and is asking if the dr can call her in something for this

## 2024-05-01 NOTE — TELEPHONE ENCOUNTER
Refill Routing Note   Medication(s) are not appropriate for processing by [facility] for the following reason(s):        Outside of protocol: PRN use outside of [facility] protocol     [facility] action(s):  Route               Appointments  past 12m or future 3m with PCP    Date Provider   Last Visit   4/24/2024 Jaime William MD   Next Visit   6/5/2024 Jaime William MD   ED visits in past 90 days: 0        Note composed:5:33 PM 05/01/2024

## 2024-05-06 ENCOUNTER — OFFICE VISIT (OUTPATIENT)
Dept: NEUROSURGERY | Facility: CLINIC | Age: 69
End: 2024-05-06
Payer: MEDICARE

## 2024-05-06 VITALS
WEIGHT: 221.88 LBS | DIASTOLIC BLOOD PRESSURE: 85 MMHG | SYSTOLIC BLOOD PRESSURE: 120 MMHG | BODY MASS INDEX: 40.58 KG/M2 | TEMPERATURE: 98 F

## 2024-05-06 DIAGNOSIS — S39.012D LUMBOSACRAL STRAIN, SUBSEQUENT ENCOUNTER: ICD-10-CM

## 2024-05-06 DIAGNOSIS — M48.07 SPINAL STENOSIS, LUMBOSACRAL REGION: Primary | ICD-10-CM

## 2024-05-06 PROCEDURE — 3079F DIAST BP 80-89 MM HG: CPT | Mod: CPTII,S$GLB,, | Performed by: NURSE PRACTITIONER

## 2024-05-06 PROCEDURE — 1101F PT FALLS ASSESS-DOCD LE1/YR: CPT | Mod: CPTII,S$GLB,, | Performed by: NURSE PRACTITIONER

## 2024-05-06 PROCEDURE — 3008F BODY MASS INDEX DOCD: CPT | Mod: CPTII,S$GLB,, | Performed by: NURSE PRACTITIONER

## 2024-05-06 PROCEDURE — 1159F MED LIST DOCD IN RCRD: CPT | Mod: CPTII,S$GLB,, | Performed by: NURSE PRACTITIONER

## 2024-05-06 PROCEDURE — 4010F ACE/ARB THERAPY RXD/TAKEN: CPT | Mod: CPTII,S$GLB,, | Performed by: NURSE PRACTITIONER

## 2024-05-06 PROCEDURE — 3066F NEPHROPATHY DOC TX: CPT | Mod: CPTII,S$GLB,, | Performed by: NURSE PRACTITIONER

## 2024-05-06 PROCEDURE — 1160F RVW MEDS BY RX/DR IN RCRD: CPT | Mod: CPTII,S$GLB,, | Performed by: NURSE PRACTITIONER

## 2024-05-06 PROCEDURE — 3044F HG A1C LEVEL LT 7.0%: CPT | Mod: CPTII,S$GLB,, | Performed by: NURSE PRACTITIONER

## 2024-05-06 PROCEDURE — 3074F SYST BP LT 130 MM HG: CPT | Mod: CPTII,S$GLB,, | Performed by: NURSE PRACTITIONER

## 2024-05-06 PROCEDURE — 3288F FALL RISK ASSESSMENT DOCD: CPT | Mod: CPTII,S$GLB,, | Performed by: NURSE PRACTITIONER

## 2024-05-06 PROCEDURE — 99204 OFFICE O/P NEW MOD 45 MIN: CPT | Mod: S$GLB,,, | Performed by: NURSE PRACTITIONER

## 2024-05-06 PROCEDURE — 3061F NEG MICROALBUMINURIA REV: CPT | Mod: CPTII,S$GLB,, | Performed by: NURSE PRACTITIONER

## 2024-05-06 PROCEDURE — 99999 PR PBB SHADOW E&M-EST. PATIENT-LVL V: CPT | Mod: PBBFAC,,, | Performed by: NURSE PRACTITIONER

## 2024-05-06 PROCEDURE — 1125F AMNT PAIN NOTED PAIN PRSNT: CPT | Mod: CPTII,S$GLB,, | Performed by: NURSE PRACTITIONER

## 2024-05-06 NOTE — PROGRESS NOTES
"Neurosurgery  History & Physical    SUBJECTIVE:     History of Present Illness: Magdalena Acevedo is a 69 y.o. female being seen in clinic today as a referral from her PCP to discuss concerns with progressive left-sided lumbar radiculopathy. States that she has struggled with intermittent "sciatic" pain that was improved with steroids, Tylenol, and muscle relaxer. Her current pain has been lingering longer. Describes the pain as aching across the low back with radiation down the posterior aspect of the left leg. Pain is worse in the morning. Rates the pain as a 3/10. Aggravating factors include standing, walking, and bending. Alleviating factors include rest. Denies weakness, numbness or tingling, b/b dysfunction, saddle anesthesia, or gait instability. She has not obtained PT or injections.       Review of patient's allergies indicates:   Allergen Reactions    Augmentin [amoxicillin-pot clavulanate] Hives and Rash       Current Outpatient Medications   Medication Sig Dispense Refill    acetaminophen (TYLENOL) 650 MG TbSR Take 1 tablet (650 mg total) by mouth every 8 (eight) hours as needed (pain). 30 tablet 0    albuterol (PROAIR HFA) 90 mcg/actuation inhaler Inhale 2 puffs into the lungs every 4 (four) hours as needed for Wheezing or Shortness of Breath. Rescue 18 g 5    atorvastatin (LIPITOR) 20 MG tablet Take one tablet by mouth every night at bedtime 90 tablet 3    blood sugar diagnostic (TRUE METRIX GLUCOSE TEST STRIP) Strp 1 strip by Misc.(Non-Drug; Combo Route) route once daily. 100 strip 3    blood sugar diagnostic Strp To check BG 2 times daily, to use with insurance preferred meter 100 strip 11    blood-glucose meter (TRUE METRIX AIR GLUCOSE METER) kit Use as instructed 1 each 0    BREZTRI AEROSPHERE 160-9-4.8 mcg/actuation HFAA Inhale 2 puffs into the lungs 2 (two) times a day. 10.7 g 5    buPROPion (WELLBUTRIN XL) 150 MG TB24 tablet Take 1 tablet (150 mg total) by mouth once daily. 90 tablet 3    " butalbital-acetaminophen-caffeine -40 mg (FIORICET, ESGIC) -40 mg per tablet 1 p.o. b.i.d. p.r.n. headaches 30 tablet 1    cyclobenzaprine (FLEXERIL) 10 MG tablet 1 p.o. q.h.s. p.r.n. muscle spasm can increase to q.8 hours p.r.n. muscle spasm if needed but will make sleepy 30 tablet 5    FLUAD QUAD 2022-23,65Y UP,,PF, 60 mcg (15 mcg x 4)/0.5 mL Syrg       fluticasone propionate (FLONASE) 50 mcg/actuation nasal spray 2 sprays (100 mcg total) by Each Nostril route once daily. 16 g 5    furosemide (LASIX) 40 MG tablet TAKE 1 TABLET BY MOUTH IN THE MORNING AS NEEDED ANKLE WITH EDEMA ONLY 90 tablet 3    gabapentin (NEURONTIN) 300 MG capsule TAKE ONE CAPSULE BY MOUTH TWICE A DAY FOR RESTLESS LEG SYNDROME. MAY INCREASE TO 3 TIMES DAILY 90 capsule 2    glipiZIDE (GLUCOTROL) 5 MG tablet TAKE 2 TABLETS (10 MG TOTAL) BY MOUTH ONCE DAILY AND 1 TABLET (5 MG TOTAL) EVERY EVENING. 270 tablet 1    guaiFENesin-codeine 100-10 mg/5 ml (TUSSI-ORGANIDIN NR)  mg/5 mL syrup Take 5 mLs by mouth every 6 (six) hours as needed. 180 mL 0    ibuprofen (ADVIL,MOTRIN) 600 MG tablet Take 1 tablet (600 mg total) by mouth every 6 (six) hours as needed for Pain. 100 tablet 5    lancets 33 gauge Misc 1 lancet by Misc.(Non-Drug; Combo Route) route once daily. 100 each 3    lancets Misc To check BG 2 times daily, to use with insurance preferred meter 100 each 11    LIDOcaine (LIDODERM) 5 % Place 1 patch onto the skin once daily. Remove & Discard patch within 12 hours or as directed by MD 15 patch 0    lisinopriL (PRINIVIL,ZESTRIL) 40 MG tablet Take 1 tablet (40 mg total) by mouth once daily. 90 tablet 3    potassium chloride (MICRO-K) 10 MEQ CpSR TAKE 1 CAPSULE BY MOUTH IN THE MORNING WITH LASIX ONLY 90 capsule 3    predniSONE (DELTASONE) 5 MG tablet 4 po qd x 2, 3 po qd x2, 2 po qd x2, 1 po qd x2 20 tablet 0    promethazine-dextromethorphan (PROMETHAZINE-DM) 6.25-15 mg/5 mL Syrp Take 5 mLs by mouth every 6 (six) hours as needed  (cough). 180 mL 1    tiZANidine (ZANAFLEX) 4 MG tablet Take one tablet by mouth every night at bedtime as needed for muscle spasms 30 tablet 0    traZODone (DESYREL) 50 MG tablet Take 1 tablet (50 mg total) by mouth every evening. 90 tablet 3     No current facility-administered medications for this visit.       Past Medical History:   Diagnosis Date    Arthritis     Chronic bronchitis     COPD (chronic obstructive pulmonary disease)     Coronary artery disease involving native coronary artery of native heart without angina pectoris 2022    Diabetes mellitus     GERD (gastroesophageal reflux disease)     Hyperlipidemia     Hypertension     Mild nonproliferative diabetic retinopathy of both eyes associated with type 2 diabetes mellitus 2023    Pulmonary nodule     Sleep apnea     Syncope 2024     Past Surgical History:   Procedure Laterality Date    CHOLECYSTECTOMY      COLONOSCOPY N/A 2020    Procedure: COLONOSCOPY;  Surgeon: Anny Acevedo MD;  Location: [facility] ENDO;  Service: Endoscopy;  Laterality: N/A;    COLONOSCOPY W/ BIOPSIES  2020    colonoscopy w/ biopsy per  2020    NASAL SEPTOPLASTY Bilateral 2022    Procedure: SEPTOPLASTY, NOSE;  Surgeon: Karri Berrios MD;  Location: [facility] OR;  Service: ENT;  Laterality: Bilateral;  DR. HUNT CASE IS 1 HOUR    sinoplasty      TUBAL LIGATION       Family History       Problem Relation (Age of Onset)    Breast cancer Sister    Cancer Mother, Sister, Sister    Diabetes Mother, Sister    Heart disease Father          Social History     Socioeconomic History    Marital status:    Tobacco Use    Smoking status: Former     Current packs/day: 0.00     Average packs/day: 2.0 packs/day for 45.0 years (90.0 ttl pk-yrs)     Types: Cigarettes     Start date: 1972     Quit date: 2016     Years since quittin.6    Smokeless tobacco: Former   Substance and Sexual Activity    Alcohol use: No    Drug use: No    Sexual  activity: Yes       Review of Systems    OBJECTIVE:     Vital Signs  Temp: 97.9 °F (36.6 °C)  Pulse: (P) 93  BP: 120/85  Pain Score:   3  Weight: 100.7 kg (221 lb 14.3 oz)  Body mass index is 40.58 kg/m².      Neurosurgery Physical Exam  General: well developed, well nourished, no distress.   Head: normocephalic, atraumatic  Neurologic: Alert and oriented. Thought content appropriate.  GCS: Motor: 6/Verbal: 5/Eyes: 4 GCS Total: 15  Mental Status: Awake, Alert, Oriented x 4  Language: No aphasia  Speech: No dysarthria  Cranial nerves: face symmetric, tongue midline, CN II-XII grossly intact.   Eyes: pupils equal, round, reactive to light with accomodation, EOMI.   Pulmonary: normal respirations, no signs of respiratory distress  Abdomen: soft, non-distended  Skin: Skin is warm, dry and intact.  Sensory: intact to light touch throughout  Motor Strength:Moves all extremities spontaneously with good tone.    Strength  Deltoids Triceps Biceps Wrist Extension Wrist Flexion Hand    Upper: R 5/5 5/5 5/5 5/5 5/5 5/5    L 5/5 5/5 5/5 5/5 5/5 5/5     HF KE KF DF PF EHL   Lower: R 5/5 4/5 4/5 5/5 5/5 5/5    L 4/5 4/5 4/5 5/5 5/5 5/5     Cerebellar:   Gait antalgic     Cervical:   ROM: Full with flexion, extension, lateral rotation and ear-to-shoulder bend.   Midline TTP: Negative.     Thoracic:  Midline TTP: Negative.     Lumbar:  Midline TTP: Positive  Straight Leg Test: Positive LEFT.    Diagnostic Results:  I have personally reviewed the x-ray lumbar spine dated 3/20/24 shows degenerative changes most pronounced L5-S1. Grade 1 anterolisthesis noted at L5-S1.     ASSESSMENT/PLAN:     Magdalena Acevedo is a 69 y.o. female seen in clinic today as a referral from her PCP to discuss concerns with progressive left-sided lumbar radiculopathy. I have ordered a CT lumbar spine to better evaluate for stenosis. Referrals to PT and pain management placed.   I would like the patient to follow-up in clinic as needed. I have  encouraged her to contact the clinic with any questions, concerns, or adverse clinical changes. She verbalized understanding.      SOCORRO Fan-JOSE L  Neurosurgery  [facility]. [place].      Note dictated with voice recognition software, please excuse any grammatical errors.

## 2024-05-21 NOTE — TELEPHONE ENCOUNTER
----- Message from Mahad Dykes sent at 5/21/2024  3:21 PM CDT -----  Contact: Pt  [phone]  Requesting an RX refill or new RX.  Is this a refill or new RX: New  RX name and strength (copy/paste from chart):  blood-glucose meter (TRUE METRIX AIR GLUCOSE METER) kit  Is this a 30 day or 90 day RX:   Pharmacy name and phone # (copy/paste from chart):  [facility] fax # [phone]  The doctors have asked that we provide their patients with the following 2 reminders -- prescription refills can take up to 72 hours, and a friendly reminder that in the future you can use your [facility] account to request refills: yes

## 2024-05-21 NOTE — TELEPHONE ENCOUNTER
No care due was identified.  [facility] Embedded Care Due Messages. Reference number: [id number].   5/21/2024 4:22:01 PM CDT

## 2024-05-28 ENCOUNTER — TELEPHONE (OUTPATIENT)
Dept: NEUROLOGY | Facility: CLINIC | Age: 69
End: 2024-05-28
Payer: MEDICARE

## 2024-05-28 NOTE — TELEPHONE ENCOUNTER
I spoke with patient, Dr Maciel will not be in clinic on 7/22/24, offered to schedule a virtual on a different day, patient declined to reschedule.  Patient also declined my offer to look for a neurology appt @ a different location.

## 2024-06-03 NOTE — TELEPHONE ENCOUNTER
No care due was identified.  [facility] Embedded Care Due Messages. Reference number: [id number].   6/03/2024 11:54:29 AM CDT

## 2024-06-05 ENCOUNTER — OFFICE VISIT (OUTPATIENT)
Dept: PRIMARY CARE CLINIC | Facility: CLINIC | Age: 69
End: 2024-06-05
Payer: MEDICARE

## 2024-06-05 ENCOUNTER — TELEPHONE (OUTPATIENT)
Dept: PRIMARY CARE CLINIC | Facility: CLINIC | Age: 69
End: 2024-06-05

## 2024-06-05 VITALS
BODY MASS INDEX: 40.3 KG/M2 | RESPIRATION RATE: 18 BRPM | HEIGHT: 62 IN | SYSTOLIC BLOOD PRESSURE: 130 MMHG | OXYGEN SATURATION: 96 % | WEIGHT: 219 LBS | DIASTOLIC BLOOD PRESSURE: 76 MMHG | HEART RATE: 97 BPM

## 2024-06-05 DIAGNOSIS — E11.42 TYPE 2 DIABETES MELLITUS WITH DIABETIC POLYNEUROPATHY, WITHOUT LONG-TERM CURRENT USE OF INSULIN: ICD-10-CM

## 2024-06-05 DIAGNOSIS — G89.29 CHRONIC INTRACTABLE HEADACHE, UNSPECIFIED HEADACHE TYPE: Primary | ICD-10-CM
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
## 2024-06-05 DIAGNOSIS — K21.9 GASTROESOPHAGEAL REFLUX DISEASE WITHOUT ESOPHAGITIS: ICD-10-CM

## 2024-06-05 DIAGNOSIS — G47.33 OSA ON CPAP: ICD-10-CM

## 2024-06-05 DIAGNOSIS — E78.00 PURE HYPERCHOLESTEROLEMIA: ICD-10-CM

## 2024-06-05 DIAGNOSIS — I10 HYPERTENSION, UNSPECIFIED TYPE: ICD-10-CM

## 2024-06-05 DIAGNOSIS — E11.65 TYPE 2 DIABETES MELLITUS WITH HYPERGLYCEMIA, WITHOUT LONG-TERM CURRENT USE OF INSULIN: ICD-10-CM

## 2024-06-05 DIAGNOSIS — R29.898 WEAKNESS OF BOTH LOWER EXTREMITIES: ICD-10-CM

## 2024-06-05 DIAGNOSIS — R51.9 CHRONIC INTRACTABLE HEADACHE, UNSPECIFIED HEADACHE TYPE: Primary | ICD-10-CM

## 2024-06-05 DIAGNOSIS — J44.9 CHRONIC OBSTRUCTIVE PULMONARY DISEASE, UNSPECIFIED COPD TYPE: ICD-10-CM

## 2024-06-05 DIAGNOSIS — M25.561 CHRONIC PAIN OF RIGHT KNEE: ICD-10-CM

## 2024-06-05 DIAGNOSIS — R25.1 TREMOR: ICD-10-CM

## 2024-06-05 DIAGNOSIS — G89.29 CHRONIC PAIN OF RIGHT KNEE: ICD-10-CM

## 2024-06-05 DIAGNOSIS — S39.012D LUMBOSACRAL STRAIN, SUBSEQUENT ENCOUNTER: ICD-10-CM

## 2024-06-05 PROCEDURE — 3066F NEPHROPATHY DOC TX: CPT | Mod: CPTII,S$GLB,, | Performed by: FAMILY MEDICINE

## 2024-06-05 PROCEDURE — 4010F ACE/ARB THERAPY RXD/TAKEN: CPT | Mod: CPTII,S$GLB,, | Performed by: FAMILY MEDICINE

## 2024-06-05 PROCEDURE — 1159F MED LIST DOCD IN RCRD: CPT | Mod: CPTII,S$GLB,, | Performed by: FAMILY MEDICINE

## 2024-06-05 PROCEDURE — 1101F PT FALLS ASSESS-DOCD LE1/YR: CPT | Mod: CPTII,S$GLB,, | Performed by: FAMILY MEDICINE

## 2024-06-05 PROCEDURE — 99999 PR PBB SHADOW E&M-EST. PATIENT-LVL V: CPT | Mod: PBBFAC,,, | Performed by: FAMILY MEDICINE

## 2024-06-05 PROCEDURE — 3078F DIAST BP <80 MM HG: CPT | Mod: CPTII,S$GLB,, | Performed by: FAMILY MEDICINE

## 2024-06-05 PROCEDURE — 3008F BODY MASS INDEX DOCD: CPT | Mod: CPTII,S$GLB,, | Performed by: FAMILY MEDICINE

## 2024-06-05 PROCEDURE — 3061F NEG MICROALBUMINURIA REV: CPT | Mod: CPTII,S$GLB,, | Performed by: FAMILY MEDICINE

## 2024-06-05 PROCEDURE — 3044F HG A1C LEVEL LT 7.0%: CPT | Mod: CPTII,S$GLB,, | Performed by: FAMILY MEDICINE

## 2024-06-05 PROCEDURE — 3288F FALL RISK ASSESSMENT DOCD: CPT | Mod: CPTII,S$GLB,, | Performed by: FAMILY MEDICINE

## 2024-06-05 PROCEDURE — 1126F AMNT PAIN NOTED NONE PRSNT: CPT | Mod: CPTII,S$GLB,, | Performed by: FAMILY MEDICINE

## 2024-06-05 PROCEDURE — 99214 OFFICE O/P EST MOD 30 MIN: CPT | Mod: S$GLB,,, | Performed by: FAMILY MEDICINE

## 2024-06-05 PROCEDURE — 3075F SYST BP GE 130 - 139MM HG: CPT | Mod: CPTII,S$GLB,, | Performed by: FAMILY MEDICINE

## 2024-06-05 RX ORDER — BUDESONIDE, GLYCOPYRROLATE, AND FORMOTEROL FUMARATE 160; 9; 4.8 UG/1; UG/1; UG/1
2 AEROSOL, METERED RESPIRATORY (INHALATION) 2 TIMES DAILY
Qty: 10.7 G | Refills: 5 | Status: SHIPPED | OUTPATIENT
Start: 2024-06-05

## 2024-06-05 RX ORDER — INSULIN PUMP SYRINGE, 3 ML
EACH MISCELLANEOUS
Qty: 1 EACH | Refills: 0 | Status: CANCELLED | OUTPATIENT
Start: 2024-06-05 | End: 2028-09-30

## 2024-06-05 RX ORDER — INSULIN PUMP SYRINGE, 3 ML
EACH MISCELLANEOUS
Qty: 1 EACH | Refills: 0 | Status: SHIPPED | OUTPATIENT
Start: 2024-06-05 | End: 2028-09-30

## 2024-06-05 NOTE — TELEPHONE ENCOUNTER
----- Message from Dasia Mcmahan sent at 6/5/2024  4:09 PM CDT -----  Contact: [phone]  Type: Orders Request    What orders/ testing are being requested? Glucose Meter .     Is there a future appointment scheduled for the patient with PCP? Just got there     When? Today     Would you prefer a response via [employer]?    Comments:   Need to be send to [facility]. Not [facility]    Thank you

## 2024-06-05 NOTE — PROGRESS NOTES
Subjective:       Patient ID: Magdalena Acevedo is a 69 y.o. female.    Chief Complaint: HPI:  70 yo WF in for 3 mo appt -- in for follow-up possible syncopal episode versus seizure 04/12/2024-- no further episode-- headaches still getting them but not as severe as they were.  Pt did not see neurologist --canceled appt x 3 --EEG not done    CT scan of the lumbar spine showed-- spondylosis with spinal stenosis last 2 lumbar vertebra with anterior listhesis lumbar spine grade 1 also noted to have a stone in the right kidney with some possible inflammation chronic at the ureteropelvic junction. Patient is totally asymptomatic for any type of renal problems or  problems.   Patient going to rehab for her back.   Eating well-- +BM-- ambulation-- legs get weak and back starts burning  Hx tremor --HA-- and syncopal episode versus seizure-- needs to see neurologist to evaluate all 3 and get EEG to rule out seizures   Hypertension blood pressure 130/76 on lisinopril and Lasix   Hyperlipidemia on atorvastatin   Diabetes on glipizide glucose doing well    Anxiety depression on Wellbutrin   COPD has ProAirand b sed rate was elevated at 4reztri -- doing pretty well  Psorisis-- same as always   Patient did see neurosurgeon but told could do anything that she can do at [facility] over here-- had hope that she might consider epidural steroid injections if they were appropriate   Patient  states also had had right knee injected and was doing fairly well starting to get sore again but too soon for another injection   Last lab-- CBCs CMP within normal limits except for increased white count 14.8--- sed rate 49-- CRP 17.9 both slightly elevated but rheumatoid factor JUVENAL negative-- needs follow-up with orthopedist needs neurology appointment          ROS:   Skin: + psoriasis--no medication at this point same    HEENT: +  headache see HPI , no  ocular pain,+ blurred vision,not sure  diplopia eyes in different directions ,no   epistaxis,hoarseness change in voice,  No thyroid trouble    Lung: No pneumonia, asthma, Tb, wheezing, SOB, no smoking history of bronchitis/COPD--occasional bronchospasm on ProAir and Breztri-does okay as long as does not exert herself  Heart: No chest pain, no ankle edema since on Lasix,no  MI, mendy murmur, +hypertension blood pressure,+  Hyperlipidemia---no stent bypass arrhythmia  Abdomen: No nausea, vomiting, diarrhea, constipation, ulcers, hepatitis,  melena, hematochezia, hematemesis history cholecystectomy  : no UTI, renal disease, stones + urinary incontinence with coughing same   GYN last mammogram last year Pap smear over 20 years  MS: no fractures, O/A, lupus, rheumatoid, gout left shoulder pain  + DDD cervical spine acts up on off takes Tylenol  Neuro: No dizziness, LOC,??? Seizures vs syncopal episodes  + tremor   +diabetes Glu averages mainly in the 120-160 range, no anemia, +anxiety, + depression upset gets angry patient is on Zoloft but not using lorazepam or Ativan doing okay --anxiety trying to get money to replace root   , 1 child, unemployed,, lives alone    Objective:   Physical Exam:   General: Well nourished, well developed, no acute distress + morbid obesity  Skin: + Dry scaly skin especially on the legs scalp arms and back-psoriasis -  HEENT: Eyes PERRLA, EOM intact, nose clear , throat nonerythema ears TMs clear  NECK: Supple, no bruits, No JVD, no nodes  Lungs: Clear, no rales, rhonchi, nowheezing   Heart: Regular rate and rhythm, no murmurs, gallops, or rubs  Abdomen: flat, bowel sounds positive, no tenderness, or organomegaly   MS:  Tenderness especially left acromioclavicular joint and especially the left upper trapezius --hx cervical DDD --occas knee issues     Neuro: Alert, CN intact, oriented X 3 tremor of the hands bilaterally right greater than left--slight right hand weakness--with hand grasps--some weakness with opposition thumb index thumb 5th digit some weakness  flexion extension of the forearm---has slightly unsteady gait--falls with heel-toe walking--unable to do Romberg unable to put heels together  Extremities: No cyanosis, clubbing, or edema   40 exam within normal limits--      Assessment:       1. Chronic intractable headache, unspecified headache type    2. Chronic obstructive pulmonary disease, unspecified COPD type    3. Type 2 diabetes mellitus with diabetic polyneuropathy, without long-term current use of insulin    4. Chronic pain of right knee    5. Gastroesophageal reflux disease without esophagitis    6. Fall, subsequent encounter    7. Pure hypercholesterolemia    8. Hypertension, unspecified type    9. Lumbosacral strain, subsequent encounter    10. FOZIA on CPAP    11. Tremor    12. Type 2 diabetes mellitus with hyperglycemia, without long-term current use of insulin    13. Weakness of both lower extremities                Plan:       Chronic intractable headache, unspecified headache type  -     Ambulatory referral/consult to Neurology; Future; Expected date: 06/12/2024    Chronic obstructive pulmonary disease, unspecified COPD type  -     [facility]4.[facility]/actuation HFAA; Inhale 2 puffs into the lungs 2 (two) times a day.  Dispense: 10.7 g; Refill: 5    Type 2 diabetes mellitus with diabetic polyneuropathy, without long-term current use of insulin  -     blood-glucose meter (TRUE METRIX AIR GLUCOSE METER) kit; Use as instructed  Dispense: 1 each; Refill: 0  -     CBC Auto Differential; Future; Expected date: 12/05/2024  -     Comprehensive Metabolic Panel; Future; Expected date: 12/05/2024  -     Hemoglobin A1C; Future; Expected date: 12/05/2024  -     Lipid Panel; Future; Expected date: 12/05/2024  -     TSH; Future; Expected date: 12/05/2024  -     T4, Free; Future; Expected date: 12/05/2024    Chronic pain of right knee  -     Sedimentation rate; Future; Expected date: 06/05/2024  -     C-reactive protein; Future; Expected date:  06/05/2024    Gastroesophageal reflux disease without esophagitis    Fall, subsequent encounter    Pure hypercholesterolemia    Hypertension, unspecified type    Lumbosacral strain, subsequent encounter    FOZIA on CPAP    Tremor  -     Ambulatory referral/consult to Neurology; Future; Expected date: 06/12/2024    Type 2 diabetes mellitus with hyperglycemia, without long-term current use of insulin    Weakness of both lower extremities  -     Ambulatory referral/consult to Neurology; Future; Expected date: 06/12/2024                 Main Reason for Visit-   Neurology appointment canceled x3--- needs evaluation of headaches/-- tremor/-- syncopal versus seizure episode with EEG-- after 3rd appointment was canceled patient said forget it will retry to set up appoint with different neurologist--Syncope vs seizure---eyes rolling around in her head--dysconjugate gaze--blurred vision--dizziness--with severe headache for 2-3 days now with a dull aching headache in the frontal area---fatigue after the episode occurred had a sister who had seizures patient worried may have had a seizure--did not bite her tongue did not have urinary or bowel incontinence no loss of consciousness no real--tonus or clonus doubt was a seizure--discuss possibility of strokes--MRI Brain and Carotid US scheduled not done   History of a tremor patient was seen by neurologist in the past told it was nothing wrong with her tremors gotten progressively worse has difficulty holding a cup of coffee pouring a cup of coffee   Osteoarthritis right knee had knee injected was doing well starting to get sore again but too soon for another  steroid injection  History of diabetes Glu 88  COPD on albuterol and breztri  Hypertension/hyperlipidemia needs to be on low-sodium low-fat diet exercise as tolerated did--trying get to ideal body weight Norvasc lisinopril for blood pressure Lipitor for hyperlipidemia ankle edema  GERD omeprazole  Sed rate 49 had been 77 --  normal JUVENAL and rheumatoid factor--- keep appoint with orthopedist Neurology if all negative may consider rheumatology consult  Anxiety depression on Zoloft--go to [place] [facility] -aggravated by inability to get out of the house--would benefit from scoote  Lab CBCs CMP lipid hemoglobin A1c T4 TSH and repeat sed rate and CRP in 6 mo

## 2024-06-12 DIAGNOSIS — G62.9 PERIPHERAL POLYNEUROPATHY: ICD-10-CM

## 2024-06-12 RX ORDER — GABAPENTIN 300 MG/1
CAPSULE ORAL
Qty: 90 CAPSULE | Refills: 5 | Status: SHIPPED | OUTPATIENT
Start: 2024-06-12

## 2024-06-12 NOTE — TELEPHONE ENCOUNTER
No care due was identified.  [facility] Embedded Care Due Messages. Reference number: [id number].   6/12/2024 8:21:37 AM CDT

## 2024-06-20 ENCOUNTER — OFFICE VISIT (OUTPATIENT)
Dept: PAIN MEDICINE | Facility: CLINIC | Age: 69
End: 2024-06-20
Payer: MEDICARE

## 2024-06-20 ENCOUNTER — TELEPHONE (OUTPATIENT)
Dept: ORTHOPEDICS | Facility: CLINIC | Age: 69
End: 2024-06-20
Payer: MEDICARE

## 2024-06-20 VITALS
SYSTOLIC BLOOD PRESSURE: 132 MMHG | WEIGHT: 218.38 LBS | HEART RATE: 97 BPM | BODY MASS INDEX: 39.94 KG/M2 | DIASTOLIC BLOOD PRESSURE: 85 MMHG

## 2024-06-20 DIAGNOSIS — M48.07 SPINAL STENOSIS, LUMBOSACRAL REGION: ICD-10-CM

## 2024-06-20 DIAGNOSIS — M47.816 LUMBAR SPONDYLOSIS: ICD-10-CM

## 2024-06-20 DIAGNOSIS — L40.9 PSORIASIS: ICD-10-CM

## 2024-06-20 DIAGNOSIS — S39.012D LUMBOSACRAL STRAIN, SUBSEQUENT ENCOUNTER: ICD-10-CM

## 2024-06-20 DIAGNOSIS — M47.897 OTHER OSTEOARTHRITIS OF SPINE, LUMBOSACRAL REGION: ICD-10-CM

## 2024-06-20 DIAGNOSIS — M51.36 DDD (DEGENERATIVE DISC DISEASE), LUMBAR: Primary | ICD-10-CM

## 2024-06-20 PROBLEM — M51.369 DDD (DEGENERATIVE DISC DISEASE), LUMBAR: Status: ACTIVE | Noted: 2024-06-20

## 2024-06-20 PROCEDURE — 1159F MED LIST DOCD IN RCRD: CPT | Mod: CPTII,S$GLB,, | Performed by: PAIN MEDICINE

## 2024-06-20 PROCEDURE — 3288F FALL RISK ASSESSMENT DOCD: CPT | Mod: CPTII,S$GLB,, | Performed by: PAIN MEDICINE

## 2024-06-20 PROCEDURE — 4010F ACE/ARB THERAPY RXD/TAKEN: CPT | Mod: CPTII,S$GLB,, | Performed by: PAIN MEDICINE

## 2024-06-20 PROCEDURE — 3044F HG A1C LEVEL LT 7.0%: CPT | Mod: CPTII,S$GLB,, | Performed by: PAIN MEDICINE

## 2024-06-20 PROCEDURE — 1160F RVW MEDS BY RX/DR IN RCRD: CPT | Mod: CPTII,S$GLB,, | Performed by: PAIN MEDICINE

## 2024-06-20 PROCEDURE — 3079F DIAST BP 80-89 MM HG: CPT | Mod: CPTII,S$GLB,, | Performed by: PAIN MEDICINE

## 2024-06-20 PROCEDURE — 3075F SYST BP GE 130 - 139MM HG: CPT | Mod: CPTII,S$GLB,, | Performed by: PAIN MEDICINE

## 2024-06-20 PROCEDURE — 99999 PR PBB SHADOW E&M-EST. PATIENT-LVL V: CPT | Mod: PBBFAC,,, | Performed by: PAIN MEDICINE

## 2024-06-20 PROCEDURE — 1101F PT FALLS ASSESS-DOCD LE1/YR: CPT | Mod: CPTII,S$GLB,, | Performed by: PAIN MEDICINE

## 2024-06-20 PROCEDURE — 1125F AMNT PAIN NOTED PAIN PRSNT: CPT | Mod: CPTII,S$GLB,, | Performed by: PAIN MEDICINE

## 2024-06-20 PROCEDURE — 3066F NEPHROPATHY DOC TX: CPT | Mod: CPTII,S$GLB,, | Performed by: PAIN MEDICINE

## 2024-06-20 PROCEDURE — 99204 OFFICE O/P NEW MOD 45 MIN: CPT | Mod: S$GLB,,, | Performed by: PAIN MEDICINE

## 2024-06-20 PROCEDURE — 3061F NEG MICROALBUMINURIA REV: CPT | Mod: CPTII,S$GLB,, | Performed by: PAIN MEDICINE

## 2024-06-20 PROCEDURE — 3008F BODY MASS INDEX DOCD: CPT | Mod: CPTII,S$GLB,, | Performed by: PAIN MEDICINE

## 2024-06-20 NOTE — PROGRESS NOTES
Subjective:     Patient ID: Magdalena Acevedo is a 69 y.o. female    Chief Complaint: Low-back Pain      Referred by: Sussy Mao NP      HPI:    Initial Encounter (6/20/24):  Magdalena Acevedo is a 69 y.o. female who presents today with chronic left-sided low back pain.  This pain has been present for years.  No specific inciting events or injuries noted.  Pain is located the left lower lumbar/lumbosacral region.  Pain does not radiate.  She denies any associated numbness, tingling, weakness, bowel bladder dysfunction.  Pain is constant and worsened with activity.  Of note, patient does have chronic right knee pain.  She is followed by Orthopedic surgery for this problem.  Also, patient has a history of psoriasis.  She has not currently under any treatment for this and has not seen a provider regarding her psoriasis in years.   This pain is described in detail below.    Physical Therapy:  Yes.  Not effective.    Non-pharmacologic Treatment:  Rest helps         TENS?  No    Pain Medications:         Currently taking:  Flexeril, Tylenol, gabapentin, ibuprofen, tizanidine    Has tried in the past:  Lidocaine patches    Has not tried:  Opioids, TCAs, SNRIs    Blood thinners:  None    Interventional Therapies:  None    Relevant Surgeries:  None    Affecting sleep?  Yes    Affecting daily activities? yes    Depressive symptoms? No          SI/HI? No    Work status: Retired    Pain Scores:    Best:       3/10  Worst:     10/10  Usually:   5/10  Today:    7/10    Pain Disability Index  Family/Home Responsibilities:: 4  Recreation:: 9  Social Activity:: 9  Occupation:: 9  Sexual Behavior:: 0  Self Care:: 2  Life-Support Activities:: 5  Pain Disability Index (PDI): 38    Review of Systems   Constitutional:  Negative for activity change, appetite change, chills, fatigue, fever and unexpected weight change.   HENT:  Negative for hearing loss.    Eyes:  Negative for visual disturbance.   Respiratory:  Negative for  chest tightness and shortness of breath.    Cardiovascular:  Negative for chest pain.   Gastrointestinal:  Negative for abdominal pain, constipation, diarrhea, nausea and vomiting.   Genitourinary:  Negative for difficulty urinating.   Musculoskeletal:  Positive for arthralgias, back pain, gait problem and myalgias. Negative for neck pain.   Skin:  Positive for rash.   Neurological:  Negative for dizziness, weakness, light-headedness, numbness and headaches.   Psychiatric/Behavioral:  Positive for sleep disturbance. Negative for hallucinations and suicidal ideas. The patient is not nervous/anxious.        Past Medical History:   Diagnosis Date    Arthritis     Chronic bronchitis     COPD (chronic obstructive pulmonary disease)     Coronary artery disease involving native coronary artery of native heart without angina pectoris 5/11/2022    Diabetes mellitus     GERD (gastroesophageal reflux disease)     Hyperlipidemia     Hypertension     Mild nonproliferative diabetic retinopathy of both eyes associated with type 2 diabetes mellitus 7/13/2023    Pulmonary nodule     Sleep apnea     Syncope 4/24/2024       Past Surgical History:   Procedure Laterality Date    CHOLECYSTECTOMY      COLONOSCOPY N/A 7/20/2020    Procedure: COLONOSCOPY;  Surgeon: Anny Acevedo MD;  Location: [facility];  Service: Endoscopy;  Laterality: N/A;    COLONOSCOPY W/ BIOPSIES  07/20/2020    colonoscopy w/ biopsy per  7/20/2020    NASAL SEPTOPLASTY Bilateral 2/8/2022    Procedure: SEPTOPLASTY, NOSE;  Surgeon: Karri Berrios MD;  Location: [facility] OR;  Service: ENT;  Laterality: Bilateral;   SAID CASE IS 1 HOUR    sinoplasty      TUBAL LIGATION         Social History     Socioeconomic History    Marital status:    Tobacco Use    Smoking status: Former     Current packs/day: 0.00     Average packs/day: 2.0 packs/day for 45.0 years (90.0 ttl pk-yrs)     Types: Cigarettes     Start date: 4/11/1972     Quit date: 8/31/2016      Years since quittin.8    Smokeless tobacco: Former   Substance and Sexual Activity    Alcohol use: No    Drug use: No    Sexual activity: Yes       Review of patient's allergies indicates:   Allergen Reactions    Augmentin [amoxicillin-pot clavulanate] Hives and Rash       Current Outpatient Medications on File Prior to Visit   Medication Sig Dispense Refill    acetaminophen (TYLENOL) 650 MG TbSR Take 1 tablet (650 mg total) by mouth every 8 (eight) hours as needed (pain). 30 tablet 0    albuterol (PROAIR HFA) 90 mcg/actuation inhaler Inhale 2 puffs into the lungs every 4 (four) hours as needed for Wheezing or Shortness of Breath. Rescue 18 g 5    atorvastatin (LIPITOR) 20 MG tablet Take one tablet by mouth every night at bedtime 90 tablet 3    blood sugar diagnostic (TRUE METRIX GLUCOSE TEST STRIP) Strp 1 strip by Misc.(Non-Drug; Combo Route) route once daily. 100 strip 3    blood sugar diagnostic Strp To check BG 2 times daily, to use with insurance preferred meter 100 strip 11    blood-glucose meter (TRUE METRIX AIR GLUCOSE METER) kit Use as instructed 1 each 0    BREZTRI AEROSPHERE 160-9-4.8 mcg/actuation HFAA Inhale 2 puffs into the lungs 2 (two) times a day. 10.7 g 5    buPROPion (WELLBUTRIN XL) 150 MG TB24 tablet Take 1 tablet (150 mg total) by mouth once daily. 90 tablet 3    butalbital-acetaminophen-caffeine -40 mg (FIORICET, ESGIC) -40 mg per tablet 1 p.o. b.i.d. p.r.n. headaches 30 tablet 1    cyclobenzaprine (FLEXERIL) 10 MG tablet 1 p.o. q.h.s. p.r.n. muscle spasm can increase to q.8 hours p.r.n. muscle spasm if needed but will make sleepy 30 tablet 5    fluticasone propionate (FLONASE) 50 mcg/actuation nasal spray 2 sprays (100 mcg total) by Each Nostril route once daily. 16 g 5    furosemide (LASIX) 40 MG tablet TAKE 1 TABLET BY MOUTH IN THE MORNING AS NEEDED ANKLE WITH EDEMA ONLY 90 tablet 3    gabapentin (NEURONTIN) 300 MG capsule TAKE ONE CAPSULE BY MOUTH TWICE A DAY FOR RESTLESS  LEG SYNDROME. MAY INCREASE TO 3 TIMES DAILY 90 capsule 5    glipiZIDE (GLUCOTROL) 5 MG tablet TAKE 2 TABLETS (10 MG TOTAL) BY MOUTH ONCE DAILY AND 1 TABLET (5 MG TOTAL) EVERY EVENING. 270 tablet 1    ibuprofen (ADVIL,MOTRIN) 600 MG tablet Take 1 tablet (600 mg total) by mouth every 6 (six) hours as needed for Pain. 100 tablet 5    lancets 33 gauge Misc 1 lancet by Misc.(Non-Drug; Combo Route) route once daily. 100 each 3    lancets Misc To check BG 2 times daily, to use with insurance preferred meter 100 each 11    lisinopriL (PRINIVIL,ZESTRIL) 40 MG tablet Take 1 tablet (40 mg total) by mouth once daily. 90 tablet 3    potassium chloride (MICRO-K) 10 MEQ CpSR TAKE 1 CAPSULE BY MOUTH IN THE MORNING WITH LASIX ONLY 90 capsule 3    tiZANidine (ZANAFLEX) 4 MG tablet Take one tablet by mouth every night at bedtime as needed for muscle spasms 30 tablet 0    traZODone (DESYREL) 50 MG tablet Take 1 tablet (50 mg total) by mouth every evening. 90 tablet 3    FLUAD QUAD 2022-23,65Y UP,,PF, 60 mcg (15 mcg x 4)/0.5 mL Syrg       [DISCONTINUED] guaiFENesin-codeine 100-10 mg/5 ml (TUSSI-ORGANIDIN NR)  mg/5 mL syrup Take 5 mLs by mouth every 6 (six) hours as needed. 180 mL 0    [DISCONTINUED] LIDOcaine (LIDODERM) 5 % Place 1 patch onto the skin once daily. Remove & Discard patch within 12 hours or as directed by MD (Patient not taking: Reported on 6/20/2024) 15 patch 0    [DISCONTINUED] predniSONE (DELTASONE) 5 MG tablet 4 po qd x 2, 3 po qd x2, 2 po qd x2, 1 po qd x2 20 tablet 0    [DISCONTINUED] promethazine-dextromethorphan (PROMETHAZINE-DM) 6.25-15 mg/5 mL Syrp Take 5 mLs by mouth every 6 (six) hours as needed (cough). 180 mL 1     No current facility-administered medications on file prior to visit.       Objective:      /85   Pulse 97   Wt 99 kg (218 lb 5.9 oz)   LMP  (LMP Unknown)   BMI 39.94 kg/m²     Exam:  GEN:  Well developed, well nourished.  No acute distress.  Normal pain behavior.  HEENT:  No trauma.   Mucous membranes moist.  Nares patent bilaterally.  PSYCH: Normal affect. Thought content appropriate.  CHEST:  Breathing symmetric.  No audible wheezing.  ABD: Soft, non-distended.  SKIN:  Warm, pink, dry.  No rash on exposed areas.    EXT:  No cyanosis, clubbing, or edema.  No color change or changes in nail or hair growth.  NEURO/MUSCULOSKELETAL:  Fully alert, oriented, and appropriate. Speech normal rasheed. No cranial nerve deficits.   Gait:  Antalgic.  No trendelenburg sign bilaterally.   Motor Strength:  5/5 motor strength throughout lower extremities.   Sensory:  No sensory deficit in the lower extremities.   Reflexes:  2+ and symmetric patellar DTRs.  Unable to elicit bilateral Achilles DTRs.  No clonus or spasticity.  L-Spine:  Limited ROM with pain on flexion and extension.  Positive pain with axial/facet loading bilaterally.  Negative SLR bilaterally.    SI Joint/Hip:  Positive HERRERA on the left.  Positive Gaenslen on the left.  Positive thigh thrust on the left.  Positive TTP over left lower lumbar paraspinal, left SI joint      Imaging:    Narrative & Impression    EXAMINATION:  CT LUMBAR SPINE WITHOUT CONTRAST     CLINICAL HISTORY:  Spinal stenosis, lumbar;  Strain of muscle, fascia and tendon of lower back, subsequent encounter     TECHNIQUE:  Low-dose axial, sagittal and coronal reformations are obtained through the lumbar spine.  Contrast was not administered.     COMPARISON:  07/29/2021     FINDINGS:  There is no fracture or bone destruction.  There is grade 1 anterolisthesis of L4 on L5 and L5 on S1.  Otherwise, sagittal alignment is maintained.  Mild intervertebral disc space narrowing at L5-S1.  The remaining intervertebral discs and vertebral body heights are maintained.  There is lumbarization of the sacrum with a S1-S2 disc.     T12-L1: Mild disc bulge with left paracentral protrusion.  No significant central canal stenosis or neural foraminal narrowing.     L1-2: No significant central  canal stenosis or neural foraminal narrowing.     L2-3: Mild broad-based disc bulge.  No significant central canal stenosis or neural foraminal narrowing.     L3-4: Mild diffuse disc bulge with mild facet arthropathy.  No significant central canal stenosis or neural foraminal narrowing.     L4-5: Moderate facet arthropathy contributing to minimal grade 1 anterolisthesis with uncovering of the disc.  No significant central canal stenosis or neural foraminal narrowing.     L5-S1: Severe facet arthropathy right greater than left.  Grade 1 anterolisthesis with uncovering of the disc.  No significant central canal stenosis or neural foraminal narrowing.     Review of the retroperitoneal soft tissues shows a small adrenal nodule on the left likely an adenoma.  There is dilatation of the right renal pelvis with multiple stones possibly relating to chronic UPJ obstruction.  There are aortic calcifications.     There are symmetric degenerative changes of the sacroiliac joints.     Impression:     Lumbar spondylosis primarily in the form of moderate/severe facet arthropathy at the lower 2 lumbar levels contributing to grade 1 anterolisthesis.  No high-grade central canal stenosis or neural foraminal narrowing.  Specific details at each level discussed above.     Right-sided renal stones with dilatation of the right renal pelvis may relate to chronic UPJ obstruction.        Electronically signed by:Satish Marcus MD  Date:                                            05/20/2024  Time:                                           17:16       Assessment:       Encounter Diagnoses   Name Primary?    Lumbosacral strain, subsequent encounter     Spinal stenosis, lumbosacral region     DDD (degenerative disc disease), lumbar Yes    Lumbar spondylosis     Psoriasis     Other osteoarthritis of spine, lumbosacral region          Plan:       Magdalena was seen today for low-back pain.    Diagnoses and all orders for this visit:    DDD  (degenerative disc disease), lumbar    Lumbosacral strain, subsequent encounter  -     Ambulatory referral/consult to Pain Clinic    Spinal stenosis, lumbosacral region  -     Ambulatory referral/consult to Pain Clinic    Lumbar spondylosis    Psoriasis  -     Ambulatory referral/consult to Dermatology; Future    Other osteoarthritis of spine, lumbosacral region        Magdalena Acevedo is a 69 y.o. female with chronic left-sided low back pain.  Exact etiology is unclear.  Does seem consistent with left sacroiliac joint dysfunction.  Can not rule out lower lumbar facet mediated pain either.  Not having overt radicular symptoms.  Does have significant degeneration of the lower lumbar facets bilaterally as well as degenerative changes of both sacroiliac joints.    Pertinent imaging studies reviewed by me. Imaging results were discussed with patient.  Referral to dermatology to discuss potential treatments for psoriasis.    Schedule for left sacroiliac joint steroid injection under fluoroscopy.    Return to clinic after procedure to discuss results.  May consider lumbar medial branch blocks/RFA if pain persists or worsens.            This note was created by combination of typed  and M-Modal dictation. Transcription and phonetic errors may be present.  If there are any questions, please contact me.

## 2024-06-20 NOTE — TELEPHONE ENCOUNTER
----- Message from Leti French LPN sent at 6/20/2024 11:54 AM CDT -----    ----- Message -----  From: Michaela Bernstein  Sent: 6/20/2024  11:53 AM CDT  To: [facility] Staff    Type:  Sooner Apoointment Request    Caller is requesting a sooner appointment.  Caller declined first available appointment listed below.  Caller will not accept being placed on the waitlist and is requesting a message be sent to doctor.  Name of Caller:pt  When is the first available appointment?August  Symptoms:knee pain   Would the patient rather a call back or a response via [facility]? call  Best Call Back Number: [phone]  Additional Information: pt states injection wore off and pain in back in knee

## 2024-06-24 ENCOUNTER — TELEPHONE (OUTPATIENT)
Dept: PAIN MEDICINE | Facility: CLINIC | Age: 69
End: 2024-06-24
Payer: MEDICARE

## 2024-06-24 ENCOUNTER — TELEPHONE (OUTPATIENT)
Dept: ORTHOPEDICS | Facility: CLINIC | Age: 69
End: 2024-06-24
Payer: MEDICARE

## 2024-06-24 NOTE — TELEPHONE ENCOUNTER
----- Message from Bhavana Basurto RN sent at 6/24/2024  9:47 AM CDT -----  Regarding: FW: pt advice  Contact: [phone]    ----- Message -----  From: Juma Douglass  Sent: 6/24/2024   9:47 AM CDT  To: Mikaela Vela Staff  Subject: pt advice                                        Pt is calling to get day and time of her next procedure. Pt has to get ride set up for the procedure that's why pt is calling tin on today.

## 2024-06-24 NOTE — TELEPHONE ENCOUNTER
Spoke with patient. She was reminded of the date of the expected procedure, 7/18/24. Inquiring for her to make arrangements for transportation for the day of the procedure. No further issues discussed.

## 2024-06-24 NOTE — TELEPHONE ENCOUNTER
----- Message from Leti French LPN sent at 6/24/2024 10:00 AM CDT -----    ----- Message -----  From: Lesia Pete  Sent: 6/24/2024   9:55 AM CDT  To: [facility] Staff    Type:  Needs Medical Advice    Who Called:  Pt   Symptoms (please be specific): Right knee pain  Would the patient rather a call back or a response via [facility]? Callback   Best Call Back Number: [phone]  Additional Information:  pt request appt

## 2024-06-24 NOTE — TELEPHONE ENCOUNTER
Returned the pt's call to let her know that she is already scheduled to be seen for her Rt. Knee pain. She do not need and x-ray. No answer left a detailed voicemail explaining everything.

## 2024-07-05 ENCOUNTER — OFFICE VISIT (OUTPATIENT)
Dept: ORTHOPEDICS | Facility: CLINIC | Age: 69
End: 2024-07-05
Payer: MEDICARE

## 2024-07-05 VITALS
HEART RATE: 91 BPM | SYSTOLIC BLOOD PRESSURE: 134 MMHG | BODY MASS INDEX: 40.16 KG/M2 | HEIGHT: 62 IN | WEIGHT: 218.25 LBS | DIASTOLIC BLOOD PRESSURE: 85 MMHG

## 2024-07-05 DIAGNOSIS — G89.29 CHRONIC PAIN OF RIGHT KNEE: Primary | ICD-10-CM

## 2024-07-05 DIAGNOSIS — M25.561 CHRONIC PAIN OF RIGHT KNEE: Primary | ICD-10-CM

## 2024-07-05 PROCEDURE — 99999 PR PBB SHADOW E&M-EST. PATIENT-LVL IV: CPT | Mod: PBBFAC,,,

## 2024-07-05 RX ORDER — TRIAMCINOLONE ACETONIDE 40 MG/ML
40 INJECTION, SUSPENSION INTRA-ARTICULAR; INTRAMUSCULAR
Status: COMPLETED | OUTPATIENT
Start: 2024-07-05 | End: 2024-07-05

## 2024-07-05 RX ORDER — TRIAMCINOLONE ACETONIDE 40 MG/ML
40 INJECTION, SUSPENSION INTRA-ARTICULAR; INTRAMUSCULAR
Status: DISCONTINUED | OUTPATIENT
Start: 2024-07-05 | End: 2024-07-05 | Stop reason: HOSPADM

## 2024-07-05 RX ADMIN — TRIAMCINOLONE ACETONIDE 40 MG: 40 INJECTION, SUSPENSION INTRA-ARTICULAR; INTRAMUSCULAR at 09:07

## 2024-07-05 RX ADMIN — TRIAMCINOLONE ACETONIDE 40 MG: 40 INJECTION, SUSPENSION INTRA-ARTICULAR; INTRAMUSCULAR at 10:07

## 2024-07-05 NOTE — PROCEDURES
Large Joint Aspiration/Injection: R knee    Date/Time: 7/5/2024 9:30 AM    Performed by: Jessica Joshi PA-C  Authorized by: Jessica Joshi PA-C    Consent Done?:  Yes (Verbal)  Indications:  Pain and arthritis  Site marked: the procedure site was marked    Timeout: prior to procedure the correct patient, procedure, and site was verified    Prep: patient was prepped and draped in usual sterile fashion      Local anesthesia used?: Yes    Local anesthetic:  Bupivacaine 0.25% without epinephrine and topical anesthetic  Anesthetic total (ml):  4      Details:  Needle Size:  22 G  Ultrasonic Guidance for needle placement?: No    Approach:  Anterolateral  Location:  Knee  Site:  R knee  Medications:  40 mg triamcinolone acetonide 40 mg/mL  Patient tolerance:  Patient tolerated the procedure well with no immediate complications

## 2024-07-05 NOTE — PROGRESS NOTES
Patient ID: Magdalena Acevedo is a 69 y.o. female    CC: right knee pain      History of Present Illness:    Magdalena Acevedo presents to clinic for right knee pain. Patient denies known ARY. The pain started 7 months ago and is becoming progressively worse.  Pain is located over (points to) medial knee. She reports that the pain is a  6/10 aching and constant pain toda. The pain is affecting ADLs and limiting desired level of activity. Denies numbness, tingling, radiation and inability to bear weight. She does see pain management, has SI joint problems at baseline. Scheduled for injection on 7/18/2024. She saw Dr. Anand in March and had IA R knee CSI with significant improvement for about 2.5 months. Interested in repeating this today.     Occupation: retired    Ambulating: unassisted  Diabetic: +   Lab Results   Component Value Date    HGBA1C 6.4 (H) 03/15/2024     Smoking: past  Hx of DVT/PE: no    PAST MEDICAL HISTORY:   Past Medical History:   Diagnosis Date    Arthritis     Chronic bronchitis     COPD (chronic obstructive pulmonary disease)     Coronary artery disease involving native coronary artery of native heart without angina pectoris 5/11/2022    Diabetes mellitus     GERD (gastroesophageal reflux disease)     Hyperlipidemia     Hypertension     Mild nonproliferative diabetic retinopathy of both eyes associated with type 2 diabetes mellitus 7/13/2023    Pulmonary nodule     Sleep apnea     Syncope 4/24/2024     PAST SURGICAL HISTORY:   Past Surgical History:   Procedure Laterality Date    CHOLECYSTECTOMY      COLONOSCOPY N/A 7/20/2020    Procedure: COLONOSCOPY;  Surgeon: Anny Acevedo MD;  Location: [facility] ENDO;  Service: Endoscopy;  Laterality: N/A;    COLONOSCOPY W/ BIOPSIES  07/20/2020    colonoscopy w/ biopsy per  7/20/2020    NASAL SEPTOPLASTY Bilateral 2/8/2022    Procedure: SEPTOPLASTY, NOSE;  Surgeon: Karri Berrios MD;  Location: [facility] OR;  Service: ENT;  Laterality:  Bilateral;  DR. HUNT CASE IS 1 HOUR    sinoplasty      TUBAL LIGATION       FAMILY HISTORY:   Family History   Problem Relation Name Age of Onset    Cancer Mother      Diabetes Mother      Heart disease Father      Cancer Sister      Diabetes Sister      Cancer Sister      Breast cancer Sister       SOCIAL HISTORY:   Social History     Occupational History    Not on file   Tobacco Use    Smoking status: Former     Current packs/day: 0.00     Average packs/day: 2.0 packs/day for 45.0 years (90.0 ttl pk-yrs)     Types: Cigarettes     Start date: 1972     Quit date: 2016     Years since quittin.8    Smokeless tobacco: Former   Substance and Sexual Activity    Alcohol use: No    Drug use: No    Sexual activity: Yes        MEDICATIONS:   Current Outpatient Medications:     acetaminophen (TYLENOL) 650 MG TbSR, Take 1 tablet (650 mg total) by mouth every 8 (eight) hours as needed (pain)., Disp: 30 tablet, Rfl: 0    albuterol (PROAIR HFA) 90 mcg/actuation inhaler, Inhale 2 puffs into the lungs every 4 (four) hours as needed for Wheezing or Shortness of Breath. Rescue, Disp: 18 g, Rfl: 5    atorvastatin (LIPITOR) 20 MG tablet, Take one tablet by mouth every night at bedtime, Disp: 90 tablet, Rfl: 3    blood sugar diagnostic (TRUE METRIX GLUCOSE TEST STRIP) Strp, 1 strip by Misc.(Non-Drug; Combo Route) route once daily., Disp: 100 strip, Rfl: 3    blood sugar diagnostic Strp, To check BG 2 times daily, to use with insurance preferred meter, Disp: 100 strip, Rfl: 11    blood-glucose meter (TRUE METRIX AIR GLUCOSE METER) kit, Use as instructed, Disp: 1 each, Rfl: 0    BREZTRI AEROSPHERE 160-9-4.8 mcg/actuation HFAA, Inhale 2 puffs into the lungs 2 (two) times a day., Disp: 10.7 g, Rfl: 5    buPROPion (WELLBUTRIN XL) 150 MG TB24 tablet, Take 1 tablet (150 mg total) by mouth once daily., Disp: 90 tablet, Rfl: 3    butalbital-acetaminophen-caffeine -40 mg (FIORICET, ESGIC) -40 mg per tablet, 1 p.o. b.i.d.  p.r.n. headaches, Disp: 30 tablet, Rfl: 1    cyclobenzaprine (FLEXERIL) 10 MG tablet, 1 p.o. q.h.s. p.r.n. muscle spasm can increase to q.8 hours p.r.n. muscle spasm if needed but will make sleepy, Disp: 30 tablet, Rfl: 5    FLUAD QUAD 2022-23,65Y UP,,PF, 60 mcg (15 mcg x 4)/0.5 mL Syrg, , Disp: , Rfl:     fluticasone propionate (FLONASE) 50 mcg/actuation nasal spray, 2 sprays (100 mcg total) by Each Nostril route once daily., Disp: 16 g, Rfl: 5    furosemide (LASIX) 40 MG tablet, TAKE 1 TABLET BY MOUTH IN THE MORNING AS NEEDED ANKLE WITH EDEMA ONLY, Disp: 90 tablet, Rfl: 3    gabapentin (NEURONTIN) 300 MG capsule, TAKE ONE CAPSULE BY MOUTH TWICE A DAY FOR RESTLESS LEG SYNDROME. MAY INCREASE TO 3 TIMES DAILY, Disp: 90 capsule, Rfl: 5    glipiZIDE (GLUCOTROL) 5 MG tablet, TAKE 2 TABLETS (10 MG TOTAL) BY MOUTH ONCE DAILY AND 1 TABLET (5 MG TOTAL) EVERY EVENING., Disp: 270 tablet, Rfl: 1    ibuprofen (ADVIL,MOTRIN) 600 MG tablet, Take 1 tablet (600 mg total) by mouth every 6 (six) hours as needed for Pain., Disp: 100 tablet, Rfl: 5    lancets 33 gauge Misc, 1 lancet by Misc.(Non-Drug; Combo Route) route once daily., Disp: 100 each, Rfl: 3    lancets Misc, To check BG 2 times daily, to use with insurance preferred meter, Disp: 100 each, Rfl: 11    lisinopriL (PRINIVIL,ZESTRIL) 40 MG tablet, Take 1 tablet (40 mg total) by mouth once daily., Disp: 90 tablet, Rfl: 3    potassium chloride (MICRO-K) 10 MEQ CpSR, TAKE 1 CAPSULE BY MOUTH IN THE MORNING WITH LASIX ONLY, Disp: 90 capsule, Rfl: 3    tiZANidine (ZANAFLEX) 4 MG tablet, Take one tablet by mouth every night at bedtime as needed for muscle spasms, Disp: 30 tablet, Rfl: 0    traZODone (DESYREL) 50 MG tablet, Take 1 tablet (50 mg total) by mouth every evening., Disp: 90 tablet, Rfl: 3  ALLERGIES:   Review of patient's allergies indicates:   Allergen Reactions    Augmentin [amoxicillin-pot clavulanate] Hives and Rash         Physical Exam     Vitals:    07/05/24 0928    BP: 134/85   Pulse: 91     Alert and oriented to person, place and time. No acute distress. Well-groomed, not ill appearing. Pupils round and reactive, normal respiratory effort, no audible wheezing.     GENERAL:  A well-developed, well-nourished 69 y.o. female who is alert and       oriented in no acute distress.      Gait: She  walks with a normal gait.                   EXTREMITIES:  Examination of lower extremities reveals there is no visible mass or deformity.    Right knee:      ROM 5-130                          Ligamentously stable to varus/valgus stress.                          Anterior and posterior drawers negative.                          No pain over pes bursa.                          No warmth                          No erythema                          Effusion Yes                          medial joint line tenderness                          Negative Patellofemoral grind/crepitus     The skin over both lower extremities is normal and unremarkable.  She has a  painless range of motion of the hips and ankles bilaterally.   Sensation is intact in both lower extremities.    There are no motor deficits in the lower extremities bilaterally.   Pedal pulses are palpable distally bilaterally.    She has no calf tenderness to palpation nor edema.    Imaging:     X-Ray: I have reviewed all pertinent results/findings and my personal findings are:  Moderate tricompartmental right knee DJD with mild varus deformity.  Subchondral sclerosis.     Assessment & Plan    Chronic pain of right knee         I made the decision to obtain old records of the patient including previous notes and imaging. New imaging was ordered today of the extremity or extremities evaluated. I independently reviewed and interpreted the radiographs and/or MRIs/CT scan today as well as prior imaging.    We discussed at length different treatment options including conservative vs surgical management. These include anti-inflammatories,  acetaminophen, rest, ice, heat, formal physical therapy including strengthening and stretching exercises, home exercise programs, injections, dry needling, and finally surgical intervention.      Patient returns today for follow up of right knee pain.  She previously had injection with good relief for 2.5 months.  She would like to repeat this today.  Right knee CSI given, post-injection instructions reviewed.  If no improvement, may consider viscosupplementation.    Patient was advised to monitor her blood sugars closely over the next several days. The steroid may cause a rise in them. If her glucose levels rise to a point she is uncomfortable or she is unable to control them she is to contact her PCP or go immediately to the emergency department.     Follow up:  If symptoms worsen or fail to improve  X-rays next visit:  None    All questions were answered and patient is agreeable to the above plan.

## 2024-07-21 DIAGNOSIS — I10 HYPERTENSION, UNSPECIFIED TYPE: ICD-10-CM

## 2024-07-21 RX ORDER — ATORVASTATIN CALCIUM 20 MG/1
TABLET, FILM COATED ORAL
Qty: 90 TABLET | Refills: 2 | Status: SHIPPED | OUTPATIENT
Start: 2024-07-21

## 2024-07-22 NOTE — TELEPHONE ENCOUNTER
Care Due:                  Date            Visit Type   Department     Provider  --------------------------------------------------------------------------------                                EP -                              PRIMARY [facility]  Last Visit: 06-      CARE ([facility])   PRIMARY [facility]   [facility]ime William                              [facility]  Next Visit: 12-      CARE ([facility])   PRIMARY CARE   Jaime William                                                            Last  Test          Frequency    Reason                     Performed    Due Date  --------------------------------------------------------------------------------    HBA1C.......  6 months...  glipiZIDE................  03-   09-    [facility] Embedded Care Due Messages. Reference number: [id number].   7/21/2024 9:17:45 PM CDT

## 2024-07-22 NOTE — TELEPHONE ENCOUNTER
Provider Staff:  Action required for this patient    Requires labs      Please see care gap opportunities below in Care Due Message.    Thanks!  [facility]     Appointments      Date Provider   Last Visit   6/5/2024 Jaime William MD   Next Visit   12/9/2024 Jaime William MD     Refill Decision Note   Magdalena Acevedo  is requesting a refill authorization.    Brief Assessment and Rationale for Refill:   Approve       Medication Therapy Plan:          Comments:     Note composed:9:44 PM 07/21/2024

## 2024-07-30 ENCOUNTER — OFFICE VISIT (OUTPATIENT)
Dept: PAIN MEDICINE | Facility: CLINIC | Age: 69
End: 2024-07-30
Payer: MEDICARE

## 2024-07-30 VITALS — SYSTOLIC BLOOD PRESSURE: 126 MMHG | DIASTOLIC BLOOD PRESSURE: 82 MMHG | HEART RATE: 93 BPM

## 2024-07-30 DIAGNOSIS — M51.36 DDD (DEGENERATIVE DISC DISEASE), LUMBAR: Primary | ICD-10-CM

## 2024-07-30 DIAGNOSIS — M47.816 LUMBAR SPONDYLOSIS: ICD-10-CM

## 2024-07-30 DIAGNOSIS — M48.07 SPINAL STENOSIS, LUMBOSACRAL REGION: ICD-10-CM

## 2024-07-30 DIAGNOSIS — M46.1 SACROILIITIS: ICD-10-CM

## 2024-07-30 PROCEDURE — 3066F NEPHROPATHY DOC TX: CPT | Mod: CPTII,S$GLB,,

## 2024-07-30 PROCEDURE — 3079F DIAST BP 80-89 MM HG: CPT | Mod: CPTII,S$GLB,,

## 2024-07-30 PROCEDURE — 3288F FALL RISK ASSESSMENT DOCD: CPT | Mod: CPTII,S$GLB,,

## 2024-07-30 PROCEDURE — 1101F PT FALLS ASSESS-DOCD LE1/YR: CPT | Mod: CPTII,S$GLB,,

## 2024-07-30 PROCEDURE — 3061F NEG MICROALBUMINURIA REV: CPT | Mod: CPTII,S$GLB,,

## 2024-07-30 PROCEDURE — 99214 OFFICE O/P EST MOD 30 MIN: CPT | Mod: S$GLB,,,

## 2024-07-30 PROCEDURE — 99999 PR PBB SHADOW E&M-EST. PATIENT-LVL IV: CPT | Mod: PBBFAC,,,

## 2024-07-30 PROCEDURE — 4010F ACE/ARB THERAPY RXD/TAKEN: CPT | Mod: CPTII,S$GLB,,

## 2024-07-30 PROCEDURE — 1126F AMNT PAIN NOTED NONE PRSNT: CPT | Mod: CPTII,S$GLB,,

## 2024-07-30 PROCEDURE — 1159F MED LIST DOCD IN RCRD: CPT | Mod: CPTII,S$GLB,,

## 2024-07-30 PROCEDURE — 3074F SYST BP LT 130 MM HG: CPT | Mod: CPTII,S$GLB,,

## 2024-07-30 PROCEDURE — 3044F HG A1C LEVEL LT 7.0%: CPT | Mod: CPTII,S$GLB,,

## 2024-07-30 NOTE — PROGRESS NOTES
Subjective:     Patient ID: Magdalena Acevedo is a 69 y.o. female    Chief Complaint: No chief complaint on file.      Referred by: No ref. provider found      HPI:    Interval History PA (07/30/2024):  Patient returns to clinic for follow up of chronic lower back pain.  Patient is s/p left sacroiliac joint steroid injection performed on 07/18/2024.  Patient does note some mild improvement specifically with pain when standing up from a seated position or getting out of bed.  States no longer having a sharp pain over left lower lumbosacral region as she was before.  However the majority of her pain is mostly unchanged.  She continues to note significant pain/symptoms located across her lower lumbar paraspinal region.  Denies significant radiation into her lower extremities.  Denies any numbness, tingling, weakness, bowel or bladder dysfunction.  Patient's pain is constant, worsened with activity.  Describes as a constant achy pain across her lower lumbar.  This pain is worsened with standing or walking for prolonged periods.    Initial Encounter (6/20/24):  Magdalena Acevedo is a 69 y.o. female who presents today with chronic left-sided low back pain.  This pain has been present for years.  No specific inciting events or injuries noted.  Pain is located the left lower lumbar/lumbosacral region.  Pain does not radiate.  She denies any associated numbness, tingling, weakness, bowel bladder dysfunction.  Pain is constant and worsened with activity.  Of note, patient does have chronic right knee pain.  She is followed by Orthopedic surgery for this problem.  Also, patient has a history of psoriasis.  She has not currently under any treatment for this and has not seen a provider regarding her psoriasis in years.   This pain is described in detail below.    Physical Therapy:  Yes.  Not effective.    Non-pharmacologic Treatment:  Rest helps         TENS?  No    Pain Medications:         Currently taking:  Flexeril,  Tylenol, gabapentin, ibuprofen, tizanidine    Has tried in the past:  Lidocaine patches    Has not tried:  Opioids, TCAs, SNRIs    Blood thinners:  None    Interventional Therapies:    07/18/2024 - left sacroiliac joint steroid injection - mild relief of left lower lumbosacral pain    Relevant Surgeries:  None    Affecting sleep?  Yes    Affecting daily activities? yes    Depressive symptoms? No          SI/HI? No    Work status: Retired    Pain Scores:    Best:       3/10  Worst:     10/10  Usually:   5/10  Today:    7/10         Review of Systems   Constitutional:  Negative for activity change, appetite change, chills, fatigue, fever and unexpected weight change.   HENT:  Negative for hearing loss.    Eyes:  Negative for visual disturbance.   Respiratory:  Negative for chest tightness and shortness of breath.    Cardiovascular:  Negative for chest pain.   Gastrointestinal:  Negative for abdominal pain, constipation, diarrhea, nausea and vomiting.   Genitourinary:  Negative for difficulty urinating.   Musculoskeletal:  Positive for arthralgias, back pain, gait problem and myalgias. Negative for neck pain.   Skin:  Positive for rash.   Neurological:  Negative for dizziness, weakness, light-headedness, numbness and headaches.   Psychiatric/Behavioral:  Positive for sleep disturbance. Negative for hallucinations and suicidal ideas. The patient is not nervous/anxious.        Past Medical History:   Diagnosis Date    Arthritis     Chronic bronchitis     COPD (chronic obstructive pulmonary disease)     Coronary artery disease involving native coronary artery of native heart without angina pectoris 5/11/2022    Diabetes mellitus     GERD (gastroesophageal reflux disease)     Hyperlipidemia     Hypertension     Mild nonproliferative diabetic retinopathy of both eyes associated with type 2 diabetes mellitus 7/13/2023    Pulmonary nodule     Sleep apnea     Syncope 4/24/2024       Past Surgical History:   Procedure  Laterality Date    APPENDECTOMY      CHOLECYSTECTOMY      COLONOSCOPY N/A 2020    Procedure: COLONOSCOPY;  Surgeon: Anny Acevedo MD;  Location: [facility] ENDO;  Service: Endoscopy;  Laterality: N/A;    COLONOSCOPY W/ BIOPSIES  2020    colonoscopy w/ biopsy per  2020    INJECTION, SACROILIAC JOINT Left 2024    Procedure: INJECTION,SACROILIAC JOINT;  Surgeon: Dane Al Jr., MD;  Location: [facility] PAIN MGMT;  Service: Pain Management;  Laterality: Left;    NASAL SEPTOPLASTY Bilateral 2022    Procedure: SEPTOPLASTY, NOSE;  Surgeon: Karri Berrios MD;  Location: [facility] OR;  Service: ENT;  Laterality: Bilateral;  DR. HUNT CASE IS 1 HOUR    SACROILIAC JOINT INJECTION Left 2024    sinoplasty      TUBAL LIGATION         Social History     Socioeconomic History    Marital status:    Tobacco Use    Smoking status: Former     Current packs/day: 0.00     Average packs/day: 2.0 packs/day for 45.0 years (90.0 ttl pk-yrs)     Types: Cigarettes     Start date: 1972     Quit date: 2016     Years since quittin.9    Smokeless tobacco: Former   Substance and Sexual Activity    Alcohol use: No    Drug use: No    Sexual activity: Yes       Review of patient's allergies indicates:   Allergen Reactions    Augmentin [amoxicillin-pot clavulanate] Hives and Rash       Current Outpatient Medications on File Prior to Visit   Medication Sig Dispense Refill    acetaminophen (TYLENOL) 650 MG TbSR Take 1 tablet (650 mg total) by mouth every 8 (eight) hours as needed (pain). 30 tablet 0    albuterol (PROAIR HFA) 90 mcg/actuation inhaler Inhale 2 puffs into the lungs every 4 (four) hours as needed for Wheezing or Shortness of Breath. Rescue 18 g 5    atorvastatin (LIPITOR) 20 MG tablet TAKE ONE TABLET BY MOUTH EVERY NIGHT AT BEDTIME 90 tablet 2    blood sugar diagnostic (TRUE METRIX GLUCOSE TEST STRIP) Strp 1 strip by Misc.(Non-Drug; Combo Route) route once daily. 100 strip 3     blood sugar diagnostic Strp To check BG 2 times daily, to use with insurance preferred meter 100 strip 11    blood-glucose meter (TRUE METRIX AIR GLUCOSE METER) kit Use as instructed 1 each 0    BREZTRI AEROSPHERE 160-9-4.8 mcg/actuation HFAA Inhale 2 puffs into the lungs 2 (two) times a day. 10.7 g 5    buPROPion (WELLBUTRIN XL) 150 MG TB24 tablet Take 1 tablet (150 mg total) by mouth once daily. 90 tablet 3    butalbital-acetaminophen-caffeine -40 mg (FIORICET, ESGIC) -40 mg per tablet 1 p.o. b.i.d. p.r.n. headaches 30 tablet 1    cyclobenzaprine (FLEXERIL) 10 MG tablet 1 p.o. q.h.s. p.r.n. muscle spasm can increase to q.8 hours p.r.n. muscle spasm if needed but will make sleepy 30 tablet 5    furosemide (LASIX) 40 MG tablet TAKE 1 TABLET BY MOUTH IN THE MORNING AS NEEDED ANKLE WITH EDEMA ONLY 90 tablet 3    gabapentin (NEURONTIN) 300 MG capsule TAKE ONE CAPSULE BY MOUTH TWICE A DAY FOR RESTLESS LEG SYNDROME. MAY INCREASE TO 3 TIMES DAILY 90 capsule 5    glipiZIDE (GLUCOTROL) 5 MG tablet TAKE 2 TABLETS (10 MG TOTAL) BY MOUTH ONCE DAILY AND 1 TABLET (5 MG TOTAL) EVERY EVENING. 270 tablet 1    ibuprofen (ADVIL,MOTRIN) 600 MG tablet Take 1 tablet (600 mg total) by mouth every 6 (six) hours as needed for Pain. 100 tablet 5    lancets 33 gauge Misc 1 lancet by Misc.(Non-Drug; Combo Route) route once daily. 100 each 3    lancets [facility] To check BG 2 times daily, to use with insurance preferred meter 100 each 11    lisinopriL (PRINIVIL,ZESTRIL) 40 MG tablet Take 1 tablet (40 mg total) by mouth once daily. 90 tablet 3    potassium chloride (MICRO-K) 10 MEQ CpSR TAKE 1 CAPSULE BY MOUTH IN THE MORNING WITH LASIX ONLY 90 capsule 3    tiZANidine (ZANAFLEX) 4 MG tablet Take one tablet by mouth every night at bedtime as needed for muscle spasms 30 tablet 0    traZODone (DESYREL) 50 MG tablet Take 1 tablet (50 mg total) by mouth every evening. 90 tablet 3     No current facility-administered medications on file prior  to visit.       Objective:      /82   Pulse 93   LMP  (LMP Unknown)     Exam:  GEN:  Well developed, well nourished.  No acute distress.  Normal pain behavior.  HEENT:  No trauma.  Mucous membranes moist.  Nares patent bilaterally.  PSYCH: Normal affect. Thought content appropriate.  CHEST:  Breathing symmetric.  No audible wheezing.  ABD: Soft, non-distended.  SKIN:  Warm, pink, dry.  No rash on exposed areas.    EXT:  No cyanosis, clubbing, or edema.  No color change or changes in nail or hair growth.  NEURO/MUSCULOSKELETAL:  Fully alert, oriented, and appropriate. Speech normal rasheed. No cranial nerve deficits.   Gait:  Antalgic.  No trendelenburg sign bilaterally.   Motor Strength:  5/5 motor strength throughout lower extremities.   L-Spine:  Limited ROM with pain on extension more than flexion.  Positive pain with axial/facet loading bilaterally.  Negative SLR bilaterally.    Positive TTP over lower lumbar paraspinal.      Imaging:    Narrative & Impression    EXAMINATION:  CT LUMBAR SPINE WITHOUT CONTRAST     CLINICAL HISTORY:  Spinal stenosis, lumbar;  Strain of muscle, fascia and tendon of lower back, subsequent encounter     TECHNIQUE:  Low-dose axial, sagittal and coronal reformations are obtained through the lumbar spine.  Contrast was not administered.     COMPARISON:  07/29/2021     FINDINGS:  There is no fracture or bone destruction.  There is grade 1 anterolisthesis of L4 on L5 and L5 on S1.  Otherwise, sagittal alignment is maintained.  Mild intervertebral disc space narrowing at L5-S1.  The remaining intervertebral discs and vertebral body heights are maintained.  There is lumbarization of the sacrum with a S1-S2 disc.     T12-L1: Mild disc bulge with left paracentral protrusion.  No significant central canal stenosis or neural foraminal narrowing.     L1-2: No significant central canal stenosis or neural foraminal narrowing.     L2-3: Mild broad-based disc bulge.  No significant central  canal stenosis or neural foraminal narrowing.     L3-4: Mild diffuse disc bulge with mild facet arthropathy.  No significant central canal stenosis or neural foraminal narrowing.     L4-5: Moderate facet arthropathy contributing to minimal grade 1 anterolisthesis with uncovering of the disc.  No significant central canal stenosis or neural foraminal narrowing.     L5-S1: Severe facet arthropathy right greater than left.  Grade 1 anterolisthesis with uncovering of the disc.  No significant central canal stenosis or neural foraminal narrowing.     Review of the retroperitoneal soft tissues shows a small adrenal nodule on the left likely an adenoma.  There is dilatation of the right renal pelvis with multiple stones possibly relating to chronic UPJ obstruction.  There are aortic calcifications.     There are symmetric degenerative changes of the sacroiliac joints.     Impression:     Lumbar spondylosis primarily in the form of moderate/severe facet arthropathy at the lower 2 lumbar levels contributing to grade 1 anterolisthesis.  No high-grade central canal stenosis or neural foraminal narrowing.  Specific details at each level discussed above.     Right-sided renal stones with dilatation of the right renal pelvis may relate to chronic UPJ obstruction.        Electronically signed by:Satish Marcus MD  Date:                                            05/20/2024  Time:                                           17:16       Assessment:       Encounter Diagnoses   Name Primary?    DDD (degenerative disc disease), lumbar Yes    Lumbar spondylosis     Sacroiliitis     Spinal stenosis, lumbosacral region            Plan:       Magdalena was seen today for follow-up.    Diagnoses and all orders for this visit:    DDD (degenerative disc disease), lumbar    Lumbar spondylosis    Sacroiliitis    Spinal stenosis, lumbosacral region          Magdalena Acevedo is a 69 y.o. female with chronic left-sided low back pain.  Exact  etiology is unclear.  Does seem consistent with left sacroiliac joint dysfunction.  Can not rule out lower lumbar facet mediated pain either.  Not having overt radicular symptoms.  Does have significant degeneration of the lower lumbar facets bilaterally as well as degenerative changes of both sacroiliac joints.  Mild relief with left sacroiliac joint steroid injection.    Pertinent imaging studies reviewed by me. Imaging results were discussed with patient.  Patient is s/p left sacroiliac joint steroid injection with mild relief.  She does note specifically having improvement with left lower lumbosacral pain however the majority of her pain is unchanged.  Schedule for bilateral L3, L4, L5 medial branch blocks.  If successful can proceed with radiofrequency ablation.  Stressed the importance of maintaining regular home exercise program and being mindful of how they use their back throughout the day.  Patient expressed understanding and agreement.  Return to clinic after procedure to discuss efficacy.      Neville Hernandez PA-C  [facility]  Interventional Pain Management   07/30/2024    This note was created by combination of typed  and M-Modal dictation.  Transcription and phonetic errors may be present.  If there are any questions, please contact me.

## 2024-07-31 ENCOUNTER — PATIENT OUTREACH (OUTPATIENT)
Dept: ADMINISTRATIVE | Facility: HOSPITAL | Age: 69
End: 2024-07-31
Payer: MEDICARE

## 2024-07-31 NOTE — PROGRESS NOTES
Health Maintenance Due   Topic Date Due    Eye Exam  06/15/2024    Mammogram  08/30/2024        Chart review done.   HM updated.   Immunizations reviewed & updated.   Care Everywhere updated.

## 2024-08-13 ENCOUNTER — PATIENT OUTREACH (OUTPATIENT)
Dept: ADMINISTRATIVE | Facility: HOSPITAL | Age: 69
End: 2024-08-13
Payer: MEDICARE

## 2024-08-13 DIAGNOSIS — Z12.31 BREAST CANCER SCREENING BY MAMMOGRAM: Primary | ICD-10-CM

## 2024-08-13 NOTE — PROGRESS NOTES
Health Maintenance Due   Topic Date Due    Eye Exam  06/15/2024    Mammogram  08/30/2024     Immunizations - reviewed and updated   Care Everywhere - triggered   Care Teams - updated   Outreach - Breast Cancer Gap Report reviewed. Mammogram screening due as of 8/30/2024, order placed  ROSALINDA sent to Dr. Eubanks for most recent diabetic eye exam records

## 2024-08-13 NOTE — LETTER
AUTHORIZATION FOR RELEASE OF   CONFIDENTIAL INFORMATION    Dear Dr. Eubanks,    We are seeing Magdalena Acevedo, date of birth 1955, in the clinic at [facility]. Jaiem William MD is the patient's PCP. Magdalena Acevedo has an outstanding lab/procedure at the time we reviewed her chart. In order to help keep her health information updated, she has authorized us to request the following medical record(s):        (  )  MAMMOGRAM                                      (  )  COLONOSCOPY      (  )  PAP SMEAR                                          (  )  OUTSIDE LAB RESULTS     (  )  DEXA SCAN                                          (X)  EYE EXAM            (  )  FOOT EXAM                                          (  )  ENTIRE RECORD     (  )  OUTSIDE IMMUNIZATIONS                 (  )  _______________       ATTN: ELIZABETH     Please fax records to Jaime William MD, [phone]     If you have any questions, please contact Elizabeth at [phone].           Patient Name: Magdalena Acevedo  : 1955  Patient Phone #: [phone]

## 2024-08-14 ENCOUNTER — OFFICE VISIT (OUTPATIENT)
Dept: PRIMARY CARE CLINIC | Facility: CLINIC | Age: 69
End: 2024-08-14
Payer: MEDICARE

## 2024-08-14 ENCOUNTER — CLINICAL SUPPORT (OUTPATIENT)
Dept: PRIMARY CARE CLINIC | Facility: CLINIC | Age: 69
End: 2024-08-14
Payer: MEDICARE

## 2024-08-14 VITALS
SYSTOLIC BLOOD PRESSURE: 124 MMHG | HEIGHT: 62 IN | DIASTOLIC BLOOD PRESSURE: 80 MMHG | RESPIRATION RATE: 18 BRPM | WEIGHT: 218.25 LBS | HEART RATE: 94 BPM | OXYGEN SATURATION: 97 % | TEMPERATURE: 98 F | BODY MASS INDEX: 40.16 KG/M2

## 2024-08-14 DIAGNOSIS — J44.9 CHRONIC OBSTRUCTIVE PULMONARY DISEASE, UNSPECIFIED COPD TYPE: ICD-10-CM

## 2024-08-14 DIAGNOSIS — G62.9 PERIPHERAL POLYNEUROPATHY: ICD-10-CM

## 2024-08-14 DIAGNOSIS — E66.01 CLASS 2 SEVERE OBESITY WITH BODY MASS INDEX (BMI) OF 35 TO 39.9 WITH SERIOUS COMORBIDITY: ICD-10-CM

## 2024-08-14 DIAGNOSIS — G47.33 OSA ON CPAP: ICD-10-CM

## 2024-08-14 DIAGNOSIS — E11.65 TYPE 2 DIABETES MELLITUS WITH HYPERGLYCEMIA, WITHOUT LONG-TERM CURRENT USE OF INSULIN: ICD-10-CM

## 2024-08-14 DIAGNOSIS — E11.9 DIABETIC EYE EXAM: Primary | ICD-10-CM

## 2024-08-14 DIAGNOSIS — I70.0 THORACIC AORTIC ATHEROSCLEROSIS: ICD-10-CM

## 2024-08-14 DIAGNOSIS — E66.01 SEVERE OBESITY (BMI 35.0-39.9) WITH COMORBIDITY: ICD-10-CM

## 2024-08-14 DIAGNOSIS — R26.9 ABNORMALITY OF GAIT AND MOBILITY: ICD-10-CM

## 2024-08-14 DIAGNOSIS — Z00.00 ENCOUNTER FOR PREVENTIVE HEALTH EXAMINATION: Primary | ICD-10-CM

## 2024-08-14 DIAGNOSIS — E27.8 ADRENAL NODULE: ICD-10-CM

## 2024-08-14 DIAGNOSIS — K75.81 NASH (NONALCOHOLIC STEATOHEPATITIS): ICD-10-CM

## 2024-08-14 DIAGNOSIS — Z01.00 DIABETIC EYE EXAM: Primary | ICD-10-CM

## 2024-08-14 DIAGNOSIS — J43.2 CENTRILOBULAR EMPHYSEMA: ICD-10-CM

## 2024-08-14 DIAGNOSIS — E78.2 MIXED HYPERLIPIDEMIA: ICD-10-CM

## 2024-08-14 DIAGNOSIS — R91.1 PULMONARY NODULE: ICD-10-CM

## 2024-08-14 DIAGNOSIS — J41.8 MIXED SIMPLE AND MUCOPURULENT CHRONIC BRONCHITIS: ICD-10-CM

## 2024-08-14 DIAGNOSIS — I10 HYPERTENSION, UNSPECIFIED TYPE: ICD-10-CM

## 2024-08-14 DIAGNOSIS — L40.9 PSORIASIS: ICD-10-CM

## 2024-08-14 DIAGNOSIS — M85.89 OSTEOPENIA OF MULTIPLE SITES: ICD-10-CM

## 2024-08-14 DIAGNOSIS — Z74.09 OTHER REDUCED MOBILITY: ICD-10-CM

## 2024-08-14 PROBLEM — R60.9 EDEMA: Status: RESOLVED | Noted: 2020-11-10 | Resolved: 2024-08-14

## 2024-08-14 PROBLEM — R93.89 ABNORMAL CHEST CT: Status: RESOLVED | Noted: 2020-03-05 | Resolved: 2024-08-14

## 2024-08-14 PROBLEM — M25.552 LEFT HIP PAIN: Status: RESOLVED | Noted: 2024-03-20 | Resolved: 2024-08-14

## 2024-08-14 PROBLEM — H53.8 BLURRED VISION: Status: RESOLVED | Noted: 2024-04-17 | Resolved: 2024-08-14

## 2024-08-14 PROBLEM — E27.9 ADRENAL NODULE: Status: ACTIVE | Noted: 2024-08-14

## 2024-08-14 PROBLEM — R55 SYNCOPE: Status: RESOLVED | Noted: 2024-04-24 | Resolved: 2024-08-14

## 2024-08-14 PROBLEM — S39.012A LUMBOSACRAL STRAIN: Status: RESOLVED | Noted: 2024-03-20 | Resolved: 2024-08-14

## 2024-08-14 PROBLEM — J01.81 OTHER ACUTE RECURRENT SINUSITIS: Status: RESOLVED | Noted: 2021-07-06 | Resolved: 2024-08-14

## 2024-08-14 PROBLEM — R25.2 NOCTURNAL MUSCLE CRAMPS: Status: RESOLVED | Noted: 2021-04-05 | Resolved: 2024-08-14

## 2024-08-14 PROBLEM — F32.A DEPRESSION: Status: RESOLVED | Noted: 2020-11-10 | Resolved: 2024-08-14

## 2024-08-14 PROBLEM — L30.4 INTERTRIGO: Status: RESOLVED | Noted: 2020-03-19 | Resolved: 2024-08-14

## 2024-08-14 PROBLEM — S46.912A LEFT SHOULDER STRAIN: Status: RESOLVED | Noted: 2019-11-18 | Resolved: 2024-08-14

## 2024-08-14 PROBLEM — R60.0 LOCALIZED EDEMA: Status: RESOLVED | Noted: 2022-04-11 | Resolved: 2024-08-14

## 2024-08-14 PROBLEM — R45.4 ANGER: Status: RESOLVED | Noted: 2018-05-15 | Resolved: 2024-08-14

## 2024-08-14 PROBLEM — G44.209 ACUTE NON INTRACTABLE TENSION-TYPE HEADACHE: Status: RESOLVED | Noted: 2024-04-17 | Resolved: 2024-08-14

## 2024-08-14 PROBLEM — J40 BRONCHITIS: Status: RESOLVED | Noted: 2017-11-09 | Resolved: 2024-08-14

## 2024-08-14 PROCEDURE — 3061F NEG MICROALBUMINURIA REV: CPT | Mod: CPTII,S$GLB,, | Performed by: NURSE PRACTITIONER

## 2024-08-14 PROCEDURE — 3044F HG A1C LEVEL LT 7.0%: CPT | Mod: CPTII,S$GLB,, | Performed by: NURSE PRACTITIONER

## 2024-08-14 PROCEDURE — G0439 PPPS, SUBSEQ VISIT: HCPCS | Mod: S$GLB,,, | Performed by: NURSE PRACTITIONER

## 2024-08-14 PROCEDURE — 3066F NEPHROPATHY DOC TX: CPT | Mod: CPTII,S$GLB,, | Performed by: NURSE PRACTITIONER

## 2024-08-14 PROCEDURE — 2025F 7 FLD RTA PHOTO W/O RTNOPTHY: CPT | Mod: CPTII,S$GLB,, | Performed by: OPTOMETRIST

## 2024-08-14 PROCEDURE — 3074F SYST BP LT 130 MM HG: CPT | Mod: CPTII,S$GLB,, | Performed by: NURSE PRACTITIONER

## 2024-08-14 PROCEDURE — 1101F PT FALLS ASSESS-DOCD LE1/YR: CPT | Mod: CPTII,S$GLB,, | Performed by: NURSE PRACTITIONER

## 2024-08-14 PROCEDURE — 1125F AMNT PAIN NOTED PAIN PRSNT: CPT | Mod: CPTII,S$GLB,, | Performed by: NURSE PRACTITIONER

## 2024-08-14 PROCEDURE — 92228 IMG RTA DETC/MNTR DS PHY/QHP: CPT | Mod: 26,S$GLB,, | Performed by: OPTOMETRIST

## 2024-08-14 PROCEDURE — 1158F ADVNC CARE PLAN TLK DOCD: CPT | Mod: CPTII,S$GLB,, | Performed by: NURSE PRACTITIONER

## 2024-08-14 PROCEDURE — 3288F FALL RISK ASSESSMENT DOCD: CPT | Mod: CPTII,S$GLB,, | Performed by: NURSE PRACTITIONER

## 2024-08-14 PROCEDURE — 99999 PR PBB SHADOW E&M-EST. PATIENT-LVL V: CPT | Mod: PBBFAC,,, | Performed by: NURSE PRACTITIONER

## 2024-08-14 PROCEDURE — 3079F DIAST BP 80-89 MM HG: CPT | Mod: CPTII,S$GLB,, | Performed by: NURSE PRACTITIONER

## 2024-08-14 PROCEDURE — 4010F ACE/ARB THERAPY RXD/TAKEN: CPT | Mod: CPTII,S$GLB,, | Performed by: NURSE PRACTITIONER

## 2024-08-14 NOTE — PROGRESS NOTES
Magdalena Acevedo is a 69 y.o. female here for a diabetic eye screening with non-dilated fundus photos per Barbara Velázquez NP.    Patient cooperative?: Yes  Small pupils?: No  Last eye exam: ndka    For exam results, see Encounter Report.

## 2024-08-14 NOTE — PROGRESS NOTES
"  Magdalena Acevedo presented for a  Medicare AWV and comprehensive Health Risk Assessment today. The following components were reviewed and updated:    Medical history  Family History  Social history  Allergies and Current Medications  Health Risk Assessment  Health Maintenance  Care Team         ** See Completed Assessments for Annual Wellness Visit within the encounter summary.**         The following assessments were completed:  Living Situation  CAGE  Depression Screening  Timed Get Up and Go  Whisper Test  Cognitive Function Screening  Nutrition Screening  ADL Screening  PAQ Screening        Opioid documentation:      Patient does not have a current opioid prescription.        Vitals:    08/14/24 1006   BP: 124/80   BP Location: Right arm   Patient Position: Sitting   BP Method: Medium (Manual)   Pulse: 94   Resp: 18   Temp: 97.6 °F (36.4 °C)   SpO2: 97%   Weight: 99 kg (218 lb 4.1 oz)   Height: 5' 2" (1.575 m)     Body mass index is 39.92 kg/m².  Physical Exam  Constitutional:       Appearance: She is well-developed.   HENT:      Head: Normocephalic.      Mouth/Throat:      Pharynx: Uvula midline.   Eyes:      Conjunctiva/sclera: Conjunctivae normal.   Cardiovascular:      Rate and Rhythm: Normal rate and regular rhythm.      Pulses: Normal pulses.           Radial pulses are 2+ on the right side and 2+ on the left side.      Heart sounds: Normal heart sounds. No murmur heard.  Pulmonary:      Effort: Pulmonary effort is normal.      Breath sounds: Normal breath sounds. No wheezing.   Lymphadenopathy:      Cervical: No cervical adenopathy.   Skin:     General: Skin is warm and dry.      Findings: No rash.   Neurological:      Mental Status: She is alert and oriented to person, place, and time.               Diagnoses and health risks identified today and associated recommendations/orders:    1. BMI 39.0-39.9,adult  Weight loss dietary modification.      2. Class 2 severe obesity with body mass index (BMI) of 35 " to 39.9 with serious comorbidity   Weight loss dietary modification.  Minimization of carbohydrates regular exercise.                                             3. Type 2 diabetes mellitus with hyperglycemia, without long-term current use of insulin  Lab Results   Component Value Date    HGBA1C 6.4 (H) 03/15/2024     Continue current regimen.  - Diabetic Eye Screening Photo; Future    4. Peripheral polyneuropathy  Stable.  Continue current regimen.    5. Mixed simple and mucopurulent chronic bronchitis  Continue breztri.  Symptoms well controlled.    6. Chronic obstructive pulmonary disease, unspecified COPD type  Well-controlled on current regimen.    7. Severe obesity (BMI 35.0-39.9) with comorbidity  Dietary and behavioral modifications at this time. Weight loss would be beneficial. Recommended low carbohydrate diet, regular exercise 30 minutes 3 days week.      8. Centrilobular emphysema  Great job on quitting smoking.  Symptomatic control.    9. Mixed hyperlipidemia  Continue atorvastatin.  Controlled per most recent lab work.    10. Osteopenia of multiple sites  Calcium vitamin-D supplementation.    11. Hypertension, unspecified type  Continue antihypertensive regimen.  Stable.    12. Pulmonary nodule  As managed per PCP.    13. Adrenal nodule  Stable.    14. FOZIA on CPAP  Recommendations on CPAP usage.    15. ARMIJO (nonalcoholic steatohepatitis)  Weight loss dietary modification regular exercise.    16. Thoracic aortic atherosclerosis  Continue atorvastatin.    17. Psoriasis  Stable.    18. Abnormality of gait and mobility  Stable.    19. Other reduced mobility  Stable.    20. Encounter for preventive health examination  Repeat annually.      Provided Magdalena with a 5-10 year written screening schedule and personal prevention plan. Recommendations were developed using the USPSTF age appropriate recommendations. Education, counseling, and referrals were provided as needed. After Visit Summary printed and given  to patient which includes a list of additional screenings\tests needed.    Follow up for yearly Enhanced Annual Wellness Exam.    Barbara Velázquez, KERRY  I offered to discuss advanced care planning, including how to pick a person who would make decisions for you if you were unable to make them for yourself, called a health care power of , and what kind of decisions you might make such as use of life sustaining treatments such as ventilators and tube feeding when faced with a life limiting illness recorded on a living will that they will need to know. (How you want to be cared for as you near the end of your natural life)     X Patient is interested in learning more about how to make advanced directives.  I provided them paperwork and offered to discuss this with them.

## 2024-08-14 NOTE — PATIENT INSTRUCTIONS
Counseling and Referral of Other Preventative  (Italic type indicates deductible and co-insurance are waived)    Patient Name: Magdalena Acevedo  Today's Date: 8/14/2024    Health Maintenance       Date Due Completion Date    Eye Exam 06/15/2024 6/15/2023    Override on 9/1/2020: Done    Mammogram 08/30/2024 8/30/2023    Shingles Vaccine (1 of 2) 04/24/2025 (Originally 2/28/2005) ---    COVID-19 Vaccine (4 - 2023-24 season) 04/24/2025 (Originally 9/1/2023) 12/1/2021    Influenza Vaccine (1) 09/01/2024 9/20/2023    Hemoglobin A1c 09/15/2024 3/15/2024    Diabetes Urine Screening 03/15/2025 3/15/2024    Lipid Panel 03/15/2025 3/15/2024    Foot Exam 03/20/2025 3/20/2024    LDCT Lung Screen 03/27/2025 3/27/2024    High Dose Statin 08/14/2025 8/14/2024    DEXA Scan 03/27/2027 3/27/2024    Colorectal Cancer Screening 07/20/2030 7/20/2020    TETANUS VACCINE 06/03/2034 6/3/2024        Orders Placed This Encounter   Procedures    Diabetic Eye Screening Photo     The following information is provided to all patients.  This information is to help you find resources for any of the problems found today that may be affecting your health:                  Living healthy guide: www.[facility].[place].gov      Understanding Diabetes: www.diabetes.org      Eating healthy: www.cdc.gov/healthyweight      CDC home safety checklist: www.cdc.gov/steadi/patient.html      Agency on Aging: www.goea.[place].[facility]      Alcoholics anonymous (AA): www.aa.org      Physical Activity: www.elise.nih.gov/ck4llbu      Tobacco use: www.quitwithusla.org         Counseling and Referral of Other Preventative  (Italic type indicates deductible and co-insurance are waived)    Patient Name: Magdalena Acevedo  Today's Date: 8/14/2024    Health Maintenance       Date Due Completion Date    Eye Exam 06/15/2024 6/15/2023    Override on 9/1/2020: Done    Mammogram 08/30/2024 8/30/2023    Shingles Vaccine (1 of 2) 04/24/2025 (Originally 2/28/2005) ---    COVID-19 Vaccine (4 -  2023-24 season) 04/24/2025 (Originally 9/1/2023) 12/1/2021    Influenza Vaccine (1) 09/01/2024 9/20/2023    Hemoglobin A1c 09/15/2024 3/15/2024    Diabetes Urine Screening 03/15/2025 3/15/2024    Lipid Panel 03/15/2025 3/15/2024    Foot Exam 03/20/2025 3/20/2024    LDCT Lung Screen 03/27/2025 3/27/2024    High Dose Statin 08/14/2025 8/14/2024    DEXA Scan 03/27/2027 3/27/2024    Colorectal Cancer Screening 07/20/2030 7/20/2020    TETANUS VACCINE 06/03/2034 6/3/2024        Orders Placed This Encounter   Procedures    Diabetic Eye Screening Photo     The following information is provided to all patients.  This information is to help you find resources for any of the problems found today that may be affecting your health:                  Living healthy guide: www.[facility].[place].[facility]      Understanding Diabetes: www.diabetes.org      Eating healthy: www.cdc.gov/healthyweight      CDC home safety checklist: www.cdc.gov/steadi/patient.html      Agency on Aging: www.goea.[place].[facility]      Alcoholics anonymous (AA): www.aa.org      Physical Activity: www.elise.nih.gov/ug7jgrd      Tobacco use: www.quitwithusla.org

## 2024-08-26 RX ORDER — TRAZODONE HYDROCHLORIDE 50 MG/1
50 TABLET ORAL NIGHTLY
Qty: 90 TABLET | Refills: 3 | Status: SHIPPED | OUTPATIENT
Start: 2024-08-26

## 2024-08-26 NOTE — TELEPHONE ENCOUNTER
No care due was identified.  [facility] Embedded Care Due Messages. Reference number: [id number].   8/26/2024 12:36:01 PM CDT

## 2024-08-27 NOTE — TELEPHONE ENCOUNTER
Refill Decision Note   Magdalena Acevedo  is requesting a refill authorization.  Brief Assessment and Rationale for Refill:  Approve     Medication Therapy Plan:         Comments:     Note composed:10:24 PM 08/26/2024

## 2024-08-31 NOTE — TELEPHONE ENCOUNTER
No care due was identified.  [facility] Embedded Care Due Messages. Reference number: [id number].   8/31/2024 11:28:44 AM CDT

## 2024-09-01 RX ORDER — BUPROPION HYDROCHLORIDE 150 MG/1
150 TABLET ORAL
Qty: 90 TABLET | Refills: 3 | Status: SHIPPED | OUTPATIENT
Start: 2024-09-01

## 2024-09-01 NOTE — TELEPHONE ENCOUNTER
Refill Routing Note   Medication(s) are not appropriate for processing by [facility] for the following reason(s):        Required vitals abnormal  08/29/24 (!) 141/67   08/15/24 (!) 171/81       OR action(s):  Defer        Medication Therapy Plan: 08/29/24 (!) 141/67 08/15/24 (!) 171/81      Appointments  past 12m or future 3m with PCP    Date Provider   Last Visit   6/5/2024 Jaime William MD   Next Visit   12/9/2024 Jaime William MD   ED visits in past 90 days: 0        Note composed:8:56 AM 09/01/2024

## 2024-09-04 ENCOUNTER — HOSPITAL ENCOUNTER (OUTPATIENT)
Dept: RADIOLOGY | Facility: HOSPITAL | Age: 69
Discharge: HOME | End: 2024-09-04
Attending: FAMILY MEDICINE
Payer: MEDICARE

## 2024-09-04 DIAGNOSIS — Z12.31 BREAST CANCER SCREENING BY MAMMOGRAM: ICD-10-CM

## 2024-09-04 PROCEDURE — 77067 SCR MAMMO BI INCL CAD: CPT | Mod: TC

## 2024-09-04 PROCEDURE — 77067 SCR MAMMO BI INCL CAD: CPT | Mod: 26,,, | Performed by: RADIOLOGY

## 2024-09-04 PROCEDURE — 77063 BREAST TOMOSYNTHESIS BI: CPT | Mod: 26,,, | Performed by: RADIOLOGY

## 2024-09-12 ENCOUNTER — TELEPHONE (OUTPATIENT)
Dept: PAIN MEDICINE | Facility: CLINIC | Age: 69
End: 2024-09-12
Payer: MEDICARE

## 2024-09-12 ENCOUNTER — TELEPHONE (OUTPATIENT)
Dept: ORTHOPEDICS | Facility: CLINIC | Age: 69
End: 2024-09-12
Payer: MEDICARE

## 2024-09-12 NOTE — TELEPHONE ENCOUNTER
----- Message from Erma Troncoso LPN sent at 9/12/2024  1:21 PM CDT -----  Regarding: FW: Appt  Contact: pt [phone]    ----- Message -----  From: Mayo Gonzalez  Sent: 9/12/2024   9:25 AM CDT  To: [facility] Staff  Subject: Appt                                             Pt is requesting call back to reschedule appt, pt was rescheduled for 9/11 to 10/9 with AIDAN Santana, pt does not want to see this provider would like to be seen by Dr. Oropeza, please call pt @[phone]

## 2024-09-12 NOTE — TELEPHONE ENCOUNTER
Spoke with patient. Confirmed with her that she did receive a time to arrive today. Stated she just took Tylenol for a headache, asking if she would be able to proceed with her procedure today. Informed she would be ok to proceed as scheduled. Verbalized understanding. No further issues discussed.

## 2024-09-12 NOTE — TELEPHONE ENCOUNTER
----- Message from Florian GARCÍA Route sent at 9/12/2024  9:17 AM CDT -----  Regarding: Procedure  Contact: [phone]  Pt is calling in ref to  a procedure scheduled on today not sure about arrival time. there are 2 different appts in system one is passed already. Pt says no one reached out to her. She also says she has a headache asking can she take Tylenol. She was told to hold off on certain medications. Patient Requesting Call Back @  [phone]

## 2024-09-13 ENCOUNTER — TELEPHONE (OUTPATIENT)
Dept: PRIMARY CARE CLINIC | Facility: CLINIC | Age: 69
End: 2024-09-13
Payer: MEDICARE

## 2024-09-13 NOTE — TELEPHONE ENCOUNTER
"----- Message from Romero Rapp sent at 9/9/2024 11:01 AM CDT -----  Regarding: Patient Swelling Feet  Contact: Pt [phone]  [email or web address]     Patient is calling for Medical Advice regarding: Patient called to report her feet have been swelling since last week. They "go down" at night while she is sleeping. She said they are like balloons.    How long has patient had these symptoms: A Week    Pharmacy name and phone#:    Patient wants a call back or thru [facility]: Call    Comments:    Please advise patient replies from provider may take up to 48 hours.  "

## 2024-09-19 ENCOUNTER — PATIENT MESSAGE (OUTPATIENT)
Dept: PRIMARY CARE CLINIC | Facility: CLINIC | Age: 69
End: 2024-09-19
Payer: MEDICARE

## 2024-10-10 ENCOUNTER — TELEPHONE (OUTPATIENT)
Dept: NEUROLOGY | Facility: CLINIC | Age: 69
End: 2024-10-10
Payer: MEDICARE

## 2024-10-10 NOTE — PROGRESS NOTES
Name: Magdalena Acevedo  MRN: [id number]   CSN: [id number]      Date: 10/10/2024    Referring physician:  Self, Aaareferral  No address on file    Chief Complaint: tremors       Interval History:  - was rec'd to f/u for headaches, EEG and tremors   - discussed that I do not treat headaches but happy to refer to the headache team    - had an episode in April, felt like the room was spinning, had to hold onto things to get to the couch to lay down   - dozed off and then had a headache   - no nausea accompanied by episode of vertigo    - not accompanied by confusion   - no loss of bowel or bladder function during this episode   - a few weeks ago had another episode where she had the sensation of the room spinning and accompanied by nausea   - lasted less than a minute   - not necessarily whenever she turns to one side   - had brain MRI in May 2024, has not seen ENT  - continues to have tremors, mostly on the R side   - when I saw her 3 years ago, rec'd low dose trial of primidone -- she does not recall this   - uses albuterol inhaler as needed   - not taking b12          From June 2024  70 yo WF in for 3 mo appt -- in for follow-up possible syncopal episode versus seizure 04/12/2024-- no further episode-- headaches still getting them but not as severe as they were.  Pt did not see neurologist --canceled appt x 3 --EEG not done    CT scan of the lumbar spine showed-- spondylosis with spinal stenosis last 2 lumbar vertebra with anterior listhesis lumbar spine grade 1 also noted to have a stone in the right kidney with some possible inflammation chronic at the ureteropelvic junction. Patient is totally asymptomatic for any type of renal problems or  problems.   Patient going to rehab for her back.   Eating well-- +BM-- ambulation-- legs get weak and back starts burning  Hx tremor --HA-- and syncopal episode versus seizure-- needs to see neurologist to evaluate all 3 and get EEG to rule out seizures   Hypertension blood  pressure 130/76 on lisinopril and Lasix   Hyperlipidemia on atorvastatin   Diabetes on glipizide glucose doing well    Anxiety depression on Wellbutrin   COPD has ProAirand b sed rate was elevated at 4reztri -- doing pretty well  Psorisis-- same as always   Patient did see neurosurgeon but told could do anything that she can do at [facility] over here-- had hope that she might consider epidural steroid injections if they were appropriate   Patient  states also had had right knee injected and was doing fairly well starting to get sore again but too soon for another injection   Last lab-- CBCs CMP within normal limits except for increased white count 14.8--- sed rate 49-- CRP 17.9 both slightly elevated but rheumatoid factor JUVENAL negative-- needs follow-up with orthopedist needs neurology appointment       From 2021 Magdalena Acevedo is a R HANDED 69 y.o. female with a medical issues significant for cervical DDD, HTN, HLD, DMII, COPD, CKDIII, RLS and GERD who presents for PD evaluation. Accompanied by her grandson. Noticed tremors a year ago, started in her right hand. Left hand started shaking within the past 6 months. Notices it both at rest and action. Difficult to eat, handwriting is shaky. Does not consume EtOH. Endorses difficulty with balance, fell in the front yard about a year ago. Possibly tripped in a hole in the front yard. Two other falls in the past year, denies feeling dizzy or lightheaded prior to falling. Mostly feels stiffness in her legs. Endorses feeling slow, a little more difficult to get out of bed and get dressed, gets out of breath really easily. Hx of copd, does not use supplemental O2. Does not check pulse ox at home but has been normal in clinic with PCP.   No history of kidney stones or glaucoma. Takes gabapentin in the evenings around 7pm, has not noticed that her tremor improves.      Family History: none     Neuroleptic Exposure: none     Nonmotor/Premotor ROS:  Anosmia:  normal  Dysarthria/Hypophonia: none   Dysphagia/Sialorrhea: none   Depression: on zoloft   Cognitive slowing: possible short term and long term deficits   Hallucinations: possibly sees things in her periphery   Urinary changes: + incontinence, x 2 years -- episodes everything, has not see a urologist   Constipation: none   Orthostasis: none   Falls: as above   Micrographia: none   Sleep issues:  -FOZIA: none  -RBD: none       Review of Systems:   Review of Systems   Constitutional:  Negative for chills, fever and malaise/fatigue.   HENT:  Negative for hearing loss.    Eyes:  Negative for blurred vision and double vision.   Respiratory:  Negative for cough, shortness of breath and stridor.    Cardiovascular:  Negative for chest pain and leg swelling.   Gastrointestinal:  Negative for constipation, diarrhea and nausea.   Genitourinary:  Positive for urgency. Negative for frequency.   Musculoskeletal:  Positive for falls.   Skin:  Negative for itching and rash.   Neurological:  Positive for tremors. Negative for dizziness, loss of consciousness and weakness.   Psychiatric/Behavioral:  Positive for memory loss. Negative for hallucinations.            Past Medical History: The patient  has a past medical history of Arthritis, Chronic bronchitis, COPD (chronic obstructive pulmonary disease), Coronary artery disease involving native coronary artery of native heart without angina pectoris (05/11/2022), Diabetes mellitus, Emphysema of lung, GERD (gastroesophageal reflux disease), Hyperlipidemia, Hypertension, Mild nonproliferative diabetic retinopathy of both eyes associated with type 2 diabetes mellitus (07/13/2023), Obesity, Pulmonary nodule, Sleep apnea, Syncope (04/24/2024), and Urinary incontinence.    Social History: The patient  reports that she quit smoking about 8 years ago. Her smoking use included cigarettes. She started smoking about 52 years ago. She has a 90 pack-year smoking history. She has quit using smokeless  tobacco. She reports that she does not drink alcohol and does not use drugs.    Family History: Their family history includes Breast cancer in her sister; Cancer in her mother, sister, and sister; Diabetes in her mother and sister; Heart disease in her father.    Allergies: Augmentin [amoxicillin-pot clavulanate]     Meds:   Current Outpatient Medications on File Prior to Visit   Medication Sig Dispense Refill    albuterol (PROAIR HFA) 90 mcg/actuation inhaler Inhale 2 puffs into the lungs every 4 (four) hours as needed for Wheezing or Shortness of Breath. Rescue 18 g 5    atorvastatin (LIPITOR) 20 MG tablet TAKE ONE TABLET BY MOUTH EVERY NIGHT AT BEDTIME 90 tablet 2    blood sugar diagnostic (TRUE METRIX GLUCOSE TEST STRIP) Strp 1 strip by Misc.(Non-Drug; Combo Route) route once daily. 100 strip 3    blood sugar diagnostic Strp To check BG 2 times daily, to use with insurance preferred meter 100 strip 11    blood-glucose meter (TRUE METRIX AIR GLUCOSE METER) kit Use as instructed 1 each 0    BREZTRI AEROSPHERE 160-9-4.8 mcg/actuation HFAA Inhale 2 puffs into the lungs 2 (two) times a day. 10.7 g 5    buPROPion (WELLBUTRIN XL) 150 MG TB24 tablet TAKE ONE TABLET BY MOUTH ONCE DAILY 90 tablet 3    butalbital-acetaminophen-caffeine -40 mg (FIORICET, ESGIC) -40 mg per tablet 1 p.o. b.i.d. p.r.n. headaches 30 tablet 1    furosemide (LASIX) 40 MG tablet TAKE 1 TABLET BY MOUTH IN THE MORNING AS NEEDED ANKLE WITH EDEMA ONLY 90 tablet 3    gabapentin (NEURONTIN) 300 MG capsule TAKE ONE CAPSULE BY MOUTH TWICE A DAY FOR RESTLESS LEG SYNDROME. MAY INCREASE TO 3 TIMES DAILY 90 capsule 5    glipiZIDE (GLUCOTROL) 5 MG tablet TAKE 2 TABLETS (10 MG TOTAL) BY MOUTH ONCE DAILY AND 1 TABLET (5 MG TOTAL) EVERY EVENING. 270 tablet 1    ibuprofen (ADVIL,MOTRIN) 600 MG tablet Take 1 tablet (600 mg total) by mouth every 6 (six) hours as needed for Pain. 100 tablet 5    lancets 33 gauge Misc 1 lancet by Misc.(Non-Drug; Combo  Route) route once daily. 100 each 3    lancets Mis To check BG 2 times daily, to use with insurance preferred meter 100 each 11    lisinopriL (PRINIVIL,ZESTRIL) 40 MG tablet Take 1 tablet (40 mg total) by mouth once daily. 90 tablet 3    potassium chloride (MICRO-K) 10 MEQ CpSR TAKE 1 CAPSULE BY MOUTH IN THE MORNING WITH LASIX ONLY 90 capsule 3    tiZANidine (ZANAFLEX) 4 MG tablet Take one tablet by mouth every night at bedtime as needed for muscle spasms 30 tablet 0    traZODone (DESYREL) 50 MG tablet TAKE 1 TABLET BY MOUTH EVERY EVENING. 90 tablet 3     No current facility-administered medications on file prior to visit.       Exam:  LMP  (LMP Unknown)     Constitutional  Well-developed, well-nourished, appears stated age   Ophthalmoscopic  No papilledema with no hemorrhages or exudates bilaterally   Cardiovascular  Radial pulses 2+ and symmetric, no LE edema bilaterally   Neurological    * Mental status  MOCA =      - Orientation  Oriented to person, place, time, and situation     - Memory   Intact recent and remote     - Attention/concentration  Attentive, vigilant during exam     - Language  Naming & repetition intact, +2-step commands     - Fund of knowledge  Aware of current events     - Executive  Well-organized thoughts     - Other     * Cranial nerves       - CN II  PERRL, visual fields full to confrontation     - CN III, IV, VI  Extraocular movements full, normal pursuits and saccades     - CN V  Sensation V1 - V3 intact     - CN VII  Face strong and symmetric bilaterally     - CN VIII  Hearing intact bilaterally     - CN IX, X  Palate raises midline and symmetric     - CN XI  SCM and trapezius 5/5 bilaterally     - CN XII  Tongue midline   * Motor  Muscle bulk normal, strength 5/5 throughout   * Sensory   Intact to temperature, decreased vibratory sense bilateral LE    * Coordination  No dysmetria with finger-to-nose or heel-to-shin   * Gait  See below.   * Deep tendon reflexes  3+  RUE, slightly less  brisk on the L    R pabon    Babinski downgoing bilaterally   * Specialized movement exam  no hypophonic speech.    Appropriately animated    No cogwheel rigidity.     Mild bradykinesia with finger taps    No other dystonia, chorea, athetosis, myoclonus, or tics.   No motor impersistence.  Mild intermittent resting tremor of R hand    postural tremor R > L   b/l action tremor    wide-based gait, cautious    decreased arm swing b/l    R hand tremor during gait eval- -- high frequency, low amplitude      Laboratory/Radiological:  - Results:  Admission on 09/26/2024, Discharged on 09/26/2024   Component Date Value Ref Range Status    POCT Glucose 09/26/2024 136 (H)  70 - 110 mg/dL Final   Admission on 09/12/2024, Discharged on 09/12/2024   Component Date Value Ref Range Status    POCT Glucose 09/12/2024 130 (H)  70 - 110 mg/dL Final   Admission on 08/29/2024, Discharged on 08/29/2024   Component Date Value Ref Range Status    POCT Glucose 08/29/2024 117 (H)  70 - 110 mg/dL Final   Admission on 08/15/2024, Discharged on 08/15/2024   Component Date Value Ref Range Status    POCT Glucose 08/15/2024 90  70 - 110 mg/dL Final   Admission on 07/18/2024, Discharged on 07/18/2024   Component Date Value Ref Range Status    POCT Glucose 07/18/2024 142 (H)  70 - 110 mg/dL Final       - Independent review of images:    Diagnoses:              Assessment//Plan:   Tremor   Resting R hand tremor  B/l postural and action tremor, R > L   No cogwheel rigidity, no bradykinesia   Trial of low dose primidone -- cautious use in setting of COPD  Propranolol not an option 2/2 COPD and DMII         2. Imbalance   R pabon    Brisk RUE > L   Rec'd cervical MRI   - labs       3. Vertigo   - episode of vertigo, reviewed brain Mri -- no infarcts in posterior circulation   - rec'd CTA head   - ent referral to rule out peripheral causes of vertiginous episodes        Call with lab results       The patient has a documented history of HTN, obesity,  DMII, hyperlipidemia and/or hypercholesteremia with long-term complications such as cerebrovascular disease, peripheral vascular disease, and/or aortic atherosclerosis. Collectively these risk factors may contribute to cerebral atherosclerosis, and cerebral hypoperfusion compounded neurocognitive disorder. Rec'd maximizing cerebrovascular-related medical therapy, including but not limited to cholesterol medications and antiplatelet agents. Rec'd controlling vascular risk factors like hypertension, hyperlipidemia, and Diabetes SBP<130, LDL<100, and A1C<7.0. Rec'd optimizing lifestyle choices, including a heart-healthy diet (e.g., Mediterranean or DASH), increased cardiovascular exercise (goal 150 minutes of moderate-intensity per week), and staying cognitively and socially active.        Orders Placed This Encounter   Procedures    CTA Head    MRI Cervical Spine Without Contrast    Vitamin B12 Deficiency Panel    Vitamin B1    Copper, Serum    Zinc    Creatinine, serum    Ambulatory referral/consult to ENT           Follow up: in 3 months     This is a patient with a serious and complex neurologic diagnosis whose overall, ongoing care is being managed and monitored by me and our Neurology clinic.   As such, since 2024,  is the appropriate add-on code to accompany the other E/M billing for this visit.      Collaborating Physician, Dr. Storey, was available during today's encounter. Any change to plan along with cosign to appear in the EMR.       I spent 55 minutes with the patient, reviewing past encounters, labs and imaging.          Divya Nevarez PA-C   [facility]  Department of Neurology  Movement Disorders

## 2024-10-11 ENCOUNTER — LAB VISIT (OUTPATIENT)
Dept: LAB | Facility: HOSPITAL | Age: 69
End: 2024-10-11
Payer: MEDICARE

## 2024-10-11 ENCOUNTER — OFFICE VISIT (OUTPATIENT)
Dept: NEUROLOGY | Facility: CLINIC | Age: 69
End: 2024-10-11
Payer: MEDICARE

## 2024-10-11 DIAGNOSIS — R26.89 IMBALANCE: ICD-10-CM

## 2024-10-11 DIAGNOSIS — E66.01 SEVERE OBESITY (BMI 35.0-39.9) WITH COMORBIDITY: ICD-10-CM

## 2024-10-11 DIAGNOSIS — R42 VERTIGO: ICD-10-CM

## 2024-10-11 DIAGNOSIS — I25.10 CORONARY ARTERY DISEASE INVOLVING NATIVE CORONARY ARTERY OF NATIVE HEART WITHOUT ANGINA PECTORIS: ICD-10-CM

## 2024-10-11 DIAGNOSIS — E78.2 MIXED HYPERLIPIDEMIA: ICD-10-CM

## 2024-10-11 DIAGNOSIS — E11.65 TYPE 2 DIABETES MELLITUS WITH HYPERGLYCEMIA, WITHOUT LONG-TERM CURRENT USE OF INSULIN: ICD-10-CM

## 2024-10-11 DIAGNOSIS — J44.9 CHRONIC OBSTRUCTIVE PULMONARY DISEASE, UNSPECIFIED COPD TYPE: ICD-10-CM

## 2024-10-11 DIAGNOSIS — R42 DIZZINESS AND GIDDINESS: ICD-10-CM

## 2024-10-11 DIAGNOSIS — I10 HYPERTENSION, UNSPECIFIED TYPE: ICD-10-CM

## 2024-10-11 DIAGNOSIS — J41.8 MIXED SIMPLE AND MUCOPURULENT CHRONIC BRONCHITIS: ICD-10-CM

## 2024-10-11 DIAGNOSIS — I70.0 THORACIC AORTIC ATHEROSCLEROSIS: ICD-10-CM

## 2024-10-11 DIAGNOSIS — M48.02 CERVICAL SPINAL STENOSIS: ICD-10-CM

## 2024-10-11 DIAGNOSIS — R25.1 TREMOR: Primary | ICD-10-CM

## 2024-10-11 DIAGNOSIS — J43.2 CENTRILOBULAR EMPHYSEMA: ICD-10-CM

## 2024-10-11 LAB
CREAT SERPL-MCNC: 0.9 MG/DL (ref 0.5–1.4)
EST. GFR  (NO RACE VARIABLE): >60 ML/MIN/1.73 M^2

## 2024-10-11 PROCEDURE — 82525 ASSAY OF COPPER: CPT | Performed by: PHYSICIAN ASSISTANT

## 2024-10-11 PROCEDURE — 84630 ASSAY OF ZINC: CPT | Performed by: PHYSICIAN ASSISTANT

## 2024-10-11 PROCEDURE — 99999 PR PBB SHADOW E&M-EST. PATIENT-LVL IV: CPT | Mod: PBBFAC,,, | Performed by: PHYSICIAN ASSISTANT

## 2024-10-11 PROCEDURE — 84425 ASSAY OF VITAMIN B-1: CPT | Performed by: PHYSICIAN ASSISTANT

## 2024-10-11 PROCEDURE — 82607 VITAMIN B-12: CPT | Performed by: PHYSICIAN ASSISTANT

## 2024-10-11 PROCEDURE — 82565 ASSAY OF CREATININE: CPT | Performed by: PHYSICIAN ASSISTANT

## 2024-10-11 PROCEDURE — 36415 COLL VENOUS BLD VENIPUNCTURE: CPT | Performed by: PHYSICIAN ASSISTANT

## 2024-10-11 PROCEDURE — 83921 ORGANIC ACID SINGLE QUANT: CPT | Performed by: PHYSICIAN ASSISTANT

## 2024-10-11 RX ORDER — PRIMIDONE 50 MG/1
50 TABLET ORAL 2 TIMES DAILY
Qty: 60 TABLET | Refills: 11 | Status: SHIPPED | OUTPATIENT
Start: 2024-10-11 | End: 2025-10-11

## 2024-10-11 NOTE — PATIENT INSTRUCTIONS
Primidone 50 mg titration    Week 1: Take 1/2 tab in PM  Week 2: Take 1/2 tab in AM and 1/2 tab in PM  Week 3: Take 1/2 tab in AM and 1 tab in PM  Week 4: Take 1 tab in AM and 1 tab in PM    If adequately improved, can stop and maintain at any level of the titration.  Call me with an update anytime.

## 2024-10-12 LAB — VIT B12 SERPL-MCNC: 232 NG/L (ref 180–914)

## 2024-10-13 DIAGNOSIS — E11.65 TYPE 2 DIABETES MELLITUS WITH HYPERGLYCEMIA, WITHOUT LONG-TERM CURRENT USE OF INSULIN: ICD-10-CM

## 2024-10-13 NOTE — TELEPHONE ENCOUNTER
Care Due:                  Date            Visit Type   Department     Provider  --------------------------------------------------------------------------------                                EP -                              PRIMARY [facility]  Last Visit: 06-      CARE ([facility])   PRIMARY C[facility]   [facility]  Next Visit: 12-      CARE ([facility])   PRIMARY CARE   Jaime William                                                            Last  Test          Frequency    Reason                     Performed    Due Date  --------------------------------------------------------------------------------    HBA1C.......  6 months...  glipiZIDE................  03-   09-    [facility] Embedded Care Due Messages. Reference number: [id number].   10/13/2024 11:33:43 AM CDT

## 2024-10-14 RX ORDER — GLIPIZIDE 5 MG/1
TABLET ORAL
Qty: 270 TABLET | Refills: 2 | Status: SHIPPED | OUTPATIENT
Start: 2024-10-14

## 2024-10-14 NOTE — TELEPHONE ENCOUNTER
Refill Routing Note   Medication(s) are not appropriate for processing by [facility] for the following reason(s):        Required labs outdated    [facility] action(s):  Defer   Requires labs : Yes             Appointments  past 12m or future 3m with PCP    Date Provider   Last Visit   6/5/2024 Jaime William MD   Next Visit   12/9/2024 Jaime William MD   ED visits in past 90 days: 0        Note composed:7:52 PM 10/13/2024

## 2024-10-15 ENCOUNTER — PATIENT MESSAGE (OUTPATIENT)
Dept: NEUROLOGY | Facility: CLINIC | Age: 69
End: 2024-10-15
Payer: MEDICARE

## 2024-10-15 LAB
COPPER SERPL-MCNC: 998 UG/L (ref 810–1990)
METHYLMALONATE SERPL-SCNC: 0.22 NMOL/ML
VIT B1 BLD-MCNC: 86 UG/L (ref 38–122)

## 2024-10-16 LAB — ZINC SERPL-MCNC: 112 UG/DL (ref 60–130)

## 2024-10-29 ENCOUNTER — OFFICE VISIT (OUTPATIENT)
Dept: PRIMARY CARE CLINIC | Facility: CLINIC | Age: 69
End: 2024-10-29
Payer: MEDICARE

## 2024-10-29 VITALS
HEART RATE: 83 BPM | BODY MASS INDEX: 39.94 KG/M2 | WEIGHT: 217.06 LBS | RESPIRATION RATE: 18 BRPM | HEIGHT: 62 IN | SYSTOLIC BLOOD PRESSURE: 136 MMHG | DIASTOLIC BLOOD PRESSURE: 86 MMHG | OXYGEN SATURATION: 96 %

## 2024-10-29 DIAGNOSIS — G89.4 CHRONIC PAIN SYNDROME: Primary | ICD-10-CM

## 2024-10-29 DIAGNOSIS — G47.33 OSA ON CPAP: ICD-10-CM

## 2024-10-29 DIAGNOSIS — E66.01 SEVERE OBESITY (BMI 35.0-39.9) WITH COMORBIDITY: ICD-10-CM

## 2024-10-29 DIAGNOSIS — E11.65 TYPE 2 DIABETES MELLITUS WITH HYPERGLYCEMIA, WITHOUT LONG-TERM CURRENT USE OF INSULIN: ICD-10-CM

## 2024-10-29 DIAGNOSIS — G44.229 CHRONIC TENSION-TYPE HEADACHE, NOT INTRACTABLE: ICD-10-CM

## 2024-10-29 DIAGNOSIS — K75.81 NASH (NONALCOHOLIC STEATOHEPATITIS): ICD-10-CM

## 2024-10-29 DIAGNOSIS — M51.369 DEGENERATION OF INTERVERTEBRAL DISC OF LUMBAR REGION, UNSPECIFIED WHETHER PAIN PRESENT: ICD-10-CM

## 2024-10-29 DIAGNOSIS — E78.49 OTHER HYPERLIPIDEMIA: ICD-10-CM

## 2024-10-29 DIAGNOSIS — L29.9 PRURITUS, UNSPECIFIED: ICD-10-CM

## 2024-10-29 DIAGNOSIS — R91.1 PULMONARY NODULE: ICD-10-CM

## 2024-10-29 DIAGNOSIS — K21.9 GASTROESOPHAGEAL REFLUX DISEASE WITHOUT ESOPHAGITIS: ICD-10-CM

## 2024-10-29 DIAGNOSIS — L40.9 PSORIASIS: ICD-10-CM

## 2024-10-29 DIAGNOSIS — J43.2 CENTRILOBULAR EMPHYSEMA: ICD-10-CM

## 2024-10-29 DIAGNOSIS — G25.81 RESTLESS LEGS SYNDROME: ICD-10-CM

## 2024-10-29 DIAGNOSIS — I10 HYPERTENSION, UNSPECIFIED TYPE: ICD-10-CM

## 2024-10-29 PROBLEM — G89.29 CHRONIC NONINTRACTABLE HEADACHE: Status: ACTIVE | Noted: 2024-10-29

## 2024-10-29 PROBLEM — R51.9 CHRONIC NONINTRACTABLE HEADACHE: Status: ACTIVE | Noted: 2024-10-29

## 2024-10-29 PROCEDURE — 99214 OFFICE O/P EST MOD 30 MIN: CPT | Mod: S$GLB,,, | Performed by: FAMILY MEDICINE

## 2024-10-29 PROCEDURE — 3288F FALL RISK ASSESSMENT DOCD: CPT | Mod: CPTII,S$GLB,, | Performed by: FAMILY MEDICINE

## 2024-10-29 PROCEDURE — 3008F BODY MASS INDEX DOCD: CPT | Mod: CPTII,S$GLB,, | Performed by: FAMILY MEDICINE

## 2024-10-29 PROCEDURE — 4010F ACE/ARB THERAPY RXD/TAKEN: CPT | Mod: CPTII,S$GLB,, | Performed by: FAMILY MEDICINE

## 2024-10-29 PROCEDURE — 3075F SYST BP GE 130 - 139MM HG: CPT | Mod: CPTII,S$GLB,, | Performed by: FAMILY MEDICINE

## 2024-10-29 PROCEDURE — 1126F AMNT PAIN NOTED NONE PRSNT: CPT | Mod: CPTII,S$GLB,, | Performed by: FAMILY MEDICINE

## 2024-10-29 PROCEDURE — 3044F HG A1C LEVEL LT 7.0%: CPT | Mod: CPTII,S$GLB,, | Performed by: FAMILY MEDICINE

## 2024-10-29 PROCEDURE — 1101F PT FALLS ASSESS-DOCD LE1/YR: CPT | Mod: CPTII,S$GLB,, | Performed by: FAMILY MEDICINE

## 2024-10-29 PROCEDURE — 3079F DIAST BP 80-89 MM HG: CPT | Mod: CPTII,S$GLB,, | Performed by: FAMILY MEDICINE

## 2024-10-29 PROCEDURE — 99999 PR PBB SHADOW E&M-EST. PATIENT-LVL IV: CPT | Mod: PBBFAC,,, | Performed by: FAMILY MEDICINE

## 2024-10-29 PROCEDURE — 3061F NEG MICROALBUMINURIA REV: CPT | Mod: CPTII,S$GLB,, | Performed by: FAMILY MEDICINE

## 2024-10-29 PROCEDURE — 3066F NEPHROPATHY DOC TX: CPT | Mod: CPTII,S$GLB,, | Performed by: FAMILY MEDICINE

## 2024-10-29 PROCEDURE — 1159F MED LIST DOCD IN RCRD: CPT | Mod: CPTII,S$GLB,, | Performed by: FAMILY MEDICINE

## 2024-10-29 RX ORDER — MELOXICAM 7.5 MG/1
TABLET ORAL
Qty: 60 TABLET | Refills: 5 | Status: SHIPPED | OUTPATIENT
Start: 2024-10-29

## 2024-10-29 RX ORDER — BUTALBITAL, ACETAMINOPHEN AND CAFFEINE 50; 325; 40 MG/1; MG/1; MG/1
TABLET ORAL
Qty: 30 TABLET | Refills: 5 | Status: SHIPPED | OUTPATIENT
Start: 2024-10-29

## 2024-11-12 ENCOUNTER — TELEPHONE (OUTPATIENT)
Dept: PAIN MEDICINE | Facility: CLINIC | Age: 69
End: 2024-11-12
Payer: MEDICARE

## 2024-11-12 ENCOUNTER — TELEPHONE (OUTPATIENT)
Dept: NEUROLOGY | Facility: CLINIC | Age: 69
End: 2024-11-12
Payer: MEDICARE

## 2024-11-12 NOTE — TELEPHONE ENCOUNTER
Called patient to discuss imaging. Narrowing in the spinal  column without signal change in the spinal cord. Discussed nsgy referral. Low b12, rec'd supplementing with 1000 mcg of oral b12 daily.

## 2024-11-12 NOTE — TELEPHONE ENCOUNTER
----- Message from [facility] sent at 11/12/2024 12:34 PM CST -----  Regarding: Results  Contact: pt  [phone]  Pt is requesting provider to review MRI results done 11/6, ordered by MARTINEZ LOVE, please call pt @[phone]

## 2024-11-12 NOTE — TELEPHONE ENCOUNTER
Callback message left. Discuss scheduling a clinic visit with provider to go over the results for possible planning for further treatment options.

## 2024-11-13 ENCOUNTER — OFFICE VISIT (OUTPATIENT)
Dept: ORTHOPEDICS | Facility: CLINIC | Age: 69
End: 2024-11-13
Payer: MEDICARE

## 2024-11-13 ENCOUNTER — TELEPHONE (OUTPATIENT)
Dept: PAIN MEDICINE | Facility: CLINIC | Age: 69
End: 2024-11-13
Payer: MEDICARE

## 2024-11-13 VITALS
WEIGHT: 217.25 LBS | SYSTOLIC BLOOD PRESSURE: 138 MMHG | DIASTOLIC BLOOD PRESSURE: 77 MMHG | BODY MASS INDEX: 39.74 KG/M2 | HEART RATE: 66 BPM

## 2024-11-13 DIAGNOSIS — G89.29 CHRONIC PAIN OF RIGHT KNEE: Primary | ICD-10-CM

## 2024-11-13 DIAGNOSIS — M25.561 CHRONIC PAIN OF RIGHT KNEE: Primary | ICD-10-CM

## 2024-11-13 PROCEDURE — 4010F ACE/ARB THERAPY RXD/TAKEN: CPT | Mod: CPTII,S$GLB,, | Performed by: ORTHOPAEDIC SURGERY

## 2024-11-13 PROCEDURE — 99999 PR PBB SHADOW E&M-EST. PATIENT-LVL IV: CPT | Mod: PBBFAC,,, | Performed by: ORTHOPAEDIC SURGERY

## 2024-11-13 PROCEDURE — 3061F NEG MICROALBUMINURIA REV: CPT | Mod: CPTII,S$GLB,, | Performed by: ORTHOPAEDIC SURGERY

## 2024-11-13 PROCEDURE — 3008F BODY MASS INDEX DOCD: CPT | Mod: CPTII,S$GLB,, | Performed by: ORTHOPAEDIC SURGERY

## 2024-11-13 PROCEDURE — 3075F SYST BP GE 130 - 139MM HG: CPT | Mod: CPTII,S$GLB,, | Performed by: ORTHOPAEDIC SURGERY

## 2024-11-13 PROCEDURE — 1159F MED LIST DOCD IN RCRD: CPT | Mod: CPTII,S$GLB,, | Performed by: ORTHOPAEDIC SURGERY

## 2024-11-13 PROCEDURE — 1125F AMNT PAIN NOTED PAIN PRSNT: CPT | Mod: CPTII,S$GLB,, | Performed by: ORTHOPAEDIC SURGERY

## 2024-11-13 PROCEDURE — 3078F DIAST BP <80 MM HG: CPT | Mod: CPTII,S$GLB,, | Performed by: ORTHOPAEDIC SURGERY

## 2024-11-13 PROCEDURE — 3044F HG A1C LEVEL LT 7.0%: CPT | Mod: CPTII,S$GLB,, | Performed by: ORTHOPAEDIC SURGERY

## 2024-11-13 PROCEDURE — 20610 DRAIN/INJ JOINT/BURSA W/O US: CPT | Mod: RT,S$GLB,, | Performed by: ORTHOPAEDIC SURGERY

## 2024-11-13 PROCEDURE — 99214 OFFICE O/P EST MOD 30 MIN: CPT | Mod: 25,S$GLB,, | Performed by: ORTHOPAEDIC SURGERY

## 2024-11-13 PROCEDURE — 3066F NEPHROPATHY DOC TX: CPT | Mod: CPTII,S$GLB,, | Performed by: ORTHOPAEDIC SURGERY

## 2024-11-13 RX ORDER — LANOLIN ALCOHOL/MO/W.PET/CERES
1000 CREAM (GRAM) TOPICAL DAILY
COMMUNITY

## 2024-11-13 RX ORDER — TRIAMCINOLONE ACETONIDE 40 MG/ML
40 INJECTION, SUSPENSION INTRA-ARTICULAR; INTRAMUSCULAR
Status: DISCONTINUED | OUTPATIENT
Start: 2024-11-13 | End: 2024-11-13 | Stop reason: HOSPADM

## 2024-11-13 RX ADMIN — TRIAMCINOLONE ACETONIDE 40 MG: 40 INJECTION, SUSPENSION INTRA-ARTICULAR; INTRAMUSCULAR at 11:11

## 2024-11-13 NOTE — PROGRESS NOTES
Patient ID: Magdalena Acevedo is a 69 y.o. female    CC: right knee pain      History of Present Illness:    Magdalena Acevedo presents to clinic for re-evaluation of right knee pain.  She has chronic right knee pain and chronic DJD of the right knee.  She has had steroid injections in the past.  Most recently by us in July of this year.  She had about 6 weeks of relief.  She then had another injection by an outside surgeon about 2-1/2 months ago which did give her relief for a few weeks.  She is here to discuss further treatment options.  She is inquiring about gel injections.  Not yet ready for TKA.    Smoking: past  Hx of DVT/PE: no    PAST MEDICAL HISTORY:   Past Medical History:   Diagnosis Date    Arthritis     Chronic bronchitis     COPD (chronic obstructive pulmonary disease)     Coronary artery disease involving native coronary artery of native heart without angina pectoris 05/11/2022    Diabetes mellitus     Emphysema of lung     GERD (gastroesophageal reflux disease)     Hyperlipidemia     Hypertension     Mild nonproliferative diabetic retinopathy of both eyes associated with type 2 diabetes mellitus 07/13/2023    Obesity     Pulmonary nodule     Sleep apnea     Syncope 04/24/2024    Urinary incontinence      PAST SURGICAL HISTORY:   Past Surgical History:   Procedure Laterality Date    APPENDECTOMY      CHOLECYSTECTOMY      COLONOSCOPY N/A 07/20/2020    Procedure: COLONOSCOPY;  Surgeon: Anny Acevedo MD;  Location: [facility] ENDO;  Service: Endoscopy;  Laterality: N/A;    COLONOSCOPY W/ BIOPSIES  07/20/2020    colonoscopy w/ biopsy per  7/20/2020    INJECTION OF ANESTHETIC AGENT AROUND MEDIAL BRANCH NERVES INNERVATING LUMBAR FACET JOINT Bilateral 08/15/2024    Procedure: Block-nerve-medial branch-lumbar---BILATERAL L3, L4, L5;  Surgeon: Dane Al Jr., MD;  Location: [facility] PAIN MGMT;  Service: Pain Management;  Laterality: Bilateral;    INJECTION OF ANESTHETIC AGENT AROUND MEDIAL  BRANCH NERVES INNERVATING LUMBAR FACET JOINT Bilateral 2024    Procedure: Block-nerve-medial branch-lumbar #2---BILATERAL L3, L4, L5;  Surgeon: Dane Al Jr., MD;  Location: [facility] PAIN MGMT;  Service: Pain Management;  Laterality: Bilateral;    INJECTION, SACROILIAC JOINT Left 2024    Procedure: INJECTION,SACROILIAC JOINT;  Surgeon: Dane Al Jr., MD;  Location: [facility] PAIN MGMT;  Service: Pain Management;  Laterality: Left;    NASAL SEPTOPLASTY Bilateral 2022    Procedure: SEPTOPLASTY, NOSE;  Surgeon: Karri Berrios MD;  Location: [facility] OR;  Service: ENT;  Laterality: Bilateral;  DR. HUNT CASE IS 1 HOUR    RADIOFREQUENCY ABLATION Right 2024    Procedure: Radiofrequency Ablation---RIGHT L3, L4, L5;  Surgeon: Dane Al Jr., MD;  Location: [facility] PAIN MGMT;  Service: Pain Management;  Laterality: Right;    RADIOFREQUENCY ABLATION Left 2024    Procedure: Radiofrequency Ablation---LEFT L3, L4, L5;  Surgeon: Dane Al Jr., MD;  Location: [facility] PAIN MGMT;  Service: Pain Management;  Laterality: Left;    RADIOFREQUENCY ABLATION, NERVE, SPINAL, LUMBOSACRAL Left 2024    L3-5    Radiofrequency Ablation---RIGHT L3, L4, L5 (Right)   Right 2024    SACROILIAC JOINT INJECTION Left 2024    sinoplasty      TUBAL LIGATION       FAMILY HISTORY:   Family History   Problem Relation Name Age of Onset    Cancer Mother      Diabetes Mother      Heart disease Father      Cancer Sister      Diabetes Sister      Cancer Sister      Breast cancer Sister       SOCIAL HISTORY:   Social History     Occupational History    Not on file   Tobacco Use    Smoking status: Former     Current packs/day: 0.00     Average packs/day: 2.0 packs/day for 45.0 years (90.0 ttl pk-yrs)     Types: Cigarettes     Start date: 1972     Quit date: 2016     Years since quittin.2    Smokeless tobacco: Former   Substance and Sexual Activity    Alcohol use: No    Drug use:  No    Sexual activity: Yes        MEDICATIONS:   Current Outpatient Medications:     albuterol (PROAIR HFA) 90 mcg/actuation inhaler, Inhale 2 puffs into the lungs every 4 (four) hours as needed for Wheezing or Shortness of Breath. Rescue, Disp: 18 g, Rfl: 5    atorvastatin (LIPITOR) 20 MG tablet, TAKE ONE TABLET BY MOUTH EVERY NIGHT AT BEDTIME, Disp: 90 tablet, Rfl: 2    blood sugar diagnostic (TRUE METRIX GLUCOSE TEST STRIP) Strp, 1 strip by Misc.(Non-Drug; Combo Route) route once daily., Disp: 100 strip, Rfl: 3    blood sugar diagnostic Strp, To check BG 2 times daily, to use with insurance preferred meter, Disp: 100 strip, Rfl: 11    blood-glucose meter (TRUE METRIX AIR GLUCOSE METER) kit, Use as instructed, Disp: 1 each, Rfl: 0    BREZTRI AEROSPHERE 160-9-4.8 mcg/actuation HFAA, Inhale 2 puffs into the lungs 2 (two) times a day., Disp: 10.7 g, Rfl: 5    buPROPion (WELLBUTRIN XL) 150 MG TB24 tablet, TAKE ONE TABLET BY MOUTH ONCE DAILY, Disp: 90 tablet, Rfl: 3    butalbital-acetaminophen-caffeine -40 mg (FIORICET, ESGIC) -40 mg per tablet, 1 p.o. q.d. p.r.n., Disp: 30 tablet, Rfl: 5    cyanocobalamin (VITAMIN B-12) 1000 MCG tablet, Take 1,000 mcg by mouth once daily., Disp: , Rfl:     furosemide (LASIX) 40 MG tablet, TAKE 1 TABLET BY MOUTH IN THE MORNING AS NEEDED ANKLE WITH EDEMA ONLY, Disp: 90 tablet, Rfl: 3    gabapentin (NEURONTIN) 300 MG capsule, TAKE ONE CAPSULE BY MOUTH TWICE A DAY FOR RESTLESS LEG SYNDROME. MAY INCREASE TO 3 TIMES DAILY, Disp: 90 capsule, Rfl: 5    glipiZIDE (GLUCOTROL) 5 MG tablet, TAKE 2 TABLETS (10 MG TOTAL) BY MOUTH ONCE DAILY AND 1 TABLET (5 MG TOTAL) EVERY EVENING., Disp: 270 tablet, Rfl: 2    ibuprofen (ADVIL,MOTRIN) 600 MG tablet, Take 1 tablet (600 mg total) by mouth every 6 (six) hours as needed for Pain., Disp: 100 tablet, Rfl: 5    lancets 33 gauge Misc, 1 lancet by Misc.(Non-Drug; Combo Route) route once daily., Disp: 100 each, Rfl: 3    lancets Misc, To check BG  2 times daily, to use with insurance preferred meter, Disp: 100 each, Rfl: 11    lisinopriL (PRINIVIL,ZESTRIL) 40 MG tablet, Take 1 tablet (40 mg total) by mouth once daily., Disp: 90 tablet, Rfl: 3    meloxicam (MOBIC) 7.5 MG tablet, One p.o. b.i.d. for joint pain--no other NSAIDs--can take with Tylenol, Disp: 60 tablet, Rfl: 5    potassium chloride (MICRO-K) 10 MEQ CpSR, TAKE 1 CAPSULE BY MOUTH IN THE MORNING WITH LASIX ONLY, Disp: 90 capsule, Rfl: 3    primidone (MYSOLINE) 50 MG Tab, Take 1 tablet (50 mg total) by mouth 2 (two) times a day., Disp: 60 tablet, Rfl: 11    tiZANidine (ZANAFLEX) 4 MG tablet, Take one tablet by mouth every night at bedtime as needed for muscle spasms, Disp: 30 tablet, Rfl: 0    traZODone (DESYREL) 50 MG tablet, TAKE 1 TABLET BY MOUTH EVERY EVENING., Disp: 90 tablet, Rfl: 3  ALLERGIES:   Review of patient's allergies indicates:   Allergen Reactions    Augmentin [amoxicillin-pot clavulanate] Hives and Rash         Physical Exam     Vitals:    11/13/24 1107   BP: 138/77   Pulse: 66     Alert and oriented to person, place and time. No acute distress. Well-groomed, not ill appearing. Pupils round and reactive, normal respiratory effort, no audible wheezing.     GENERAL:  A well-developed, well-nourished 69 y.o. female who is alert and       oriented in no acute distress.      Gait: She  walks with a normal gait.                   EXTREMITIES:  Examination of lower extremities reveals there is no visible mass or deformity.    Right knee:      ROM 5-130                          Ligamentously stable to varus/valgus stress.                          Anterior and posterior drawers negative.                          No pain over pes bursa.                          No warmth                          No erythema                          Effusion Yes                          medial joint line tenderness                          Negative Patellofemoral grind/crepitus     The skin over both lower  extremities is normal and unremarkable.  She has a  painless range of motion of the hips and ankles bilaterally.   Sensation is intact in both lower extremities.    There are no motor deficits in the lower extremities bilaterally.   Pedal pulses are palpable distally bilaterally.    She has no calf tenderness to palpation nor edema.    Imaging:     X-Ray: I have reviewed all pertinent results/findings and my personal findings are:  Moderate tricompartmental right knee DJD with mild varus deformity.  Subchondral sclerosis.  Kellgren Pancho stage III.    Assessment & Plan    Chronic pain of right knee      We have discussed a variety of treatment options including medications, injections, physical therapy and other alternative treatments including surgery. I also explained the indications, risks and benefits of surgery as well as the morbidity and mortality associated with surgery.  Patient's pain is refractory HEP, conservative management, and NSAIDs. Pt would like to proceed with visco-supplementation.    Medical Necessity for viscosupplementation use: After thorough evaluation of the patient, I have determined that viscosupplementation treatment is medically necessary. The patient has painful degenerative joint disease (DJD) of the knee(s) with failure of conservative treatments including lifestyle modifications and rehabilitation exercises. Oral analgesics including NSAIDs have not adequately controlled the patient's symptoms. There is radiographic evidence of Kellgren-Pancho grade II (or greater) osteoarthritic (OA) changes, or if lack of radiographic evidence, there is arthroscopic or other evidence of chondrosis of the knee(s).     I made the decision to obtain old records of the patient including previous notes and imaging. I independently reviewed and interpreted lab results today as well as prior imaging.      1. Pre-authorization placed for Visco injection right knee.  Right knee steroid injection  Yes today.  2. Ice compress to the affected area 2-3x a day for 15-20 minutes as needed for pain management.  3. NSAIDs twice daily PRN for pain management.   4. RTC to see me for viscosupplementation injections.    All of the patient's questions were answered and the patient will contact us if they have any questions or concerns in the interim.

## 2024-11-13 NOTE — TELEPHONE ENCOUNTER
----- Message from Florian sent at 11/12/2024  4:57 PM CST -----  Regarding: Missed Call  Contact: [phone]  Pt is calling in ref to a missed call from [facility] to schedule an appt to discuss results of MRI. [place] pt would like [facility] and sooner.  Patient Requesting Call Back @ [phone]

## 2024-11-13 NOTE — TELEPHONE ENCOUNTER
Spoke with patient. Stated that the pain to her neck really wasn't eased by the RFA procedures recently done. Confirmed she recently did an MRI of her neck. Asking for provider to review the imaging. She was informed that she would be better served making an appointment with the provider to discuss the imaging result and go over further treatment options. Appointment offered for tomorrow morning and patient accepted. No further issues discussed.

## 2024-11-13 NOTE — PROCEDURES
Large Joint Aspiration/Injection: R knee joint    Date/Time: 11/13/2024 11:15 AM    Performed by: Adi Anand MD  Authorized by: Adi Anand MD    Consent Done?:  Yes (Verbal)  Indications:  Pain and arthritis  Site marked: the procedure site was marked    Timeout: prior to procedure the correct patient, procedure, and site was verified    Local anesthetic:  Lidocaine spray and lidocaine 1% without epinephrine  Anesthetic total (ml):  3      Details:  Needle Size:  22 G  Approach:  Anterolateral  Location:  Knee  Site:  R knee joint  Medications:  40 mg triamcinolone acetonide 40 mg/mL  Patient tolerance:  Patient tolerated the procedure well with no immediate complications

## 2024-11-14 ENCOUNTER — OFFICE VISIT (OUTPATIENT)
Dept: PAIN MEDICINE | Facility: CLINIC | Age: 69
End: 2024-11-14
Payer: MEDICARE

## 2024-11-14 VITALS — WEIGHT: 217.63 LBS | BODY MASS INDEX: 39.8 KG/M2 | HEART RATE: 80 BPM

## 2024-11-14 DIAGNOSIS — M47.812 CERVICAL SPONDYLOSIS: ICD-10-CM

## 2024-11-14 DIAGNOSIS — M50.30 DDD (DEGENERATIVE DISC DISEASE), CERVICAL: Primary | ICD-10-CM

## 2024-11-14 DIAGNOSIS — M47.816 LUMBAR SPONDYLOSIS: ICD-10-CM

## 2024-11-14 PROCEDURE — 3066F NEPHROPATHY DOC TX: CPT | Mod: CPTII,S$GLB,, | Performed by: PAIN MEDICINE

## 2024-11-14 PROCEDURE — 3288F FALL RISK ASSESSMENT DOCD: CPT | Mod: CPTII,S$GLB,, | Performed by: PAIN MEDICINE

## 2024-11-14 PROCEDURE — 1101F PT FALLS ASSESS-DOCD LE1/YR: CPT | Mod: CPTII,S$GLB,, | Performed by: PAIN MEDICINE

## 2024-11-14 PROCEDURE — 99999 PR PBB SHADOW E&M-EST. PATIENT-LVL III: CPT | Mod: PBBFAC,,, | Performed by: PAIN MEDICINE

## 2024-11-14 PROCEDURE — 3044F HG A1C LEVEL LT 7.0%: CPT | Mod: CPTII,S$GLB,, | Performed by: PAIN MEDICINE

## 2024-11-14 PROCEDURE — 4010F ACE/ARB THERAPY RXD/TAKEN: CPT | Mod: CPTII,S$GLB,, | Performed by: PAIN MEDICINE

## 2024-11-14 PROCEDURE — 1125F AMNT PAIN NOTED PAIN PRSNT: CPT | Mod: CPTII,S$GLB,, | Performed by: PAIN MEDICINE

## 2024-11-14 PROCEDURE — 3008F BODY MASS INDEX DOCD: CPT | Mod: CPTII,S$GLB,, | Performed by: PAIN MEDICINE

## 2024-11-14 PROCEDURE — 99213 OFFICE O/P EST LOW 20 MIN: CPT | Mod: S$GLB,,, | Performed by: PAIN MEDICINE

## 2024-11-14 PROCEDURE — 1159F MED LIST DOCD IN RCRD: CPT | Mod: CPTII,S$GLB,, | Performed by: PAIN MEDICINE

## 2024-11-14 PROCEDURE — 1160F RVW MEDS BY RX/DR IN RCRD: CPT | Mod: CPTII,S$GLB,, | Performed by: PAIN MEDICINE

## 2024-11-14 PROCEDURE — 3061F NEG MICROALBUMINURIA REV: CPT | Mod: CPTII,S$GLB,, | Performed by: PAIN MEDICINE

## 2024-11-15 NOTE — TELEPHONE ENCOUNTER
----- Message from Giorgio sent at 11/15/2024  4:23 PM CST -----  Contact: [phone]@patient  Rgood afternoon patient would like to request the doc call in a refill for her blood sugar diagnostic (TRUE METRIX GLUCOSE TEST STRIP) Strp to [facility] [phone] or fax [phone]. Please call patient to advise [phone]

## 2024-11-15 NOTE — TELEPHONE ENCOUNTER
----- Message from Jojo sent at 11/14/2024 11:31 AM CST -----  Contact: [phone]  [email or web address]     Patient is calling for Medical Advice regarding:pt is calling she was seen on 10/29 Dr. Marshall was to call her in a medication called Kvng.  [facility] do not have this prescription.  Please call her back and advise.    How long has patient had these symptoms:    Pharmacy name and phone#:  [id number] - [place] - [address]  [address]  [place] [address]  Phone: [phone] Fax: [phone]       Patient wants a call back or thru [facility]:callback    Comments:    Please advise patient replies from provider may take up to 48 hours.

## 2024-11-15 NOTE — PROGRESS NOTES
Subjective:     Patient ID: Magdalena Acevedo is a 69 y.o. female    Chief Complaint: Follow-up (MRI result review )      Referred by: No ref. provider found      HPI:    Interval History (11/14/24):  She returns today for follow up.  She reports that bilateral lumbar radiofrequency ablations have not been helpful for the bilateral low back pain.  She denies any relief whatsoever.  She does continue to states she got good relief from medial branch blocks.  She denies any changes in the quality or location of her low back pain.  She denies any new or worsening lumbar symptoms.  She does report having chronic upper cervical neck pain.  Symptoms are bilateral and radiate into the head bilaterally.  She denies any pain radiating to the arms.  She denies any associated numbness, tingling, weakness, bowel bladder dysfunction.  Reports associated stiffness.      Interval History PA (07/30/2024):  Patient returns to clinic for follow up of chronic lower back pain.  Patient is s/p left sacroiliac joint steroid injection performed on 07/18/2024.  Patient does note some mild improvement specifically with pain when standing up from a seated position or getting out of bed.  States no longer having a sharp pain over left lower lumbosacral region as she was before.  However the majority of her pain is mostly unchanged.  She continues to note significant pain/symptoms located across her lower lumbar paraspinal region.  Denies significant radiation into her lower extremities.  Denies any numbness, tingling, weakness, bowel or bladder dysfunction.  Patient's pain is constant, worsened with activity.  Describes as a constant achy pain across her lower lumbar.  This pain is worsened with standing or walking for prolonged periods.    Initial Encounter (6/20/24):  Magdalena Acevedo is a 69 y.o. female who presents today with chronic left-sided low back pain.  This pain has been present for years.  No specific inciting events or  injuries noted.  Pain is located the left lower lumbar/lumbosacral region.  Pain does not radiate.  She denies any associated numbness, tingling, weakness, bowel bladder dysfunction.  Pain is constant and worsened with activity.  Of note, patient does have chronic right knee pain.  She is followed by Orthopedic surgery for this problem.  Also, patient has a history of psoriasis.  She has not currently under any treatment for this and has not seen a provider regarding her psoriasis in years.   This pain is described in detail below.    Physical Therapy:  Yes.  Not effective.    Non-pharmacologic Treatment:  Rest helps         TENS?  No    Pain Medications:         Currently taking:  Flexeril, Tylenol, gabapentin, meloxicam, tizanidine    Has tried in the past:  Lidocaine patches, ibuprofen    Has not tried:  Opioids, TCAs, SNRIs    Blood thinners:  None    Interventional Therapies:    07/18/2024 - left sacroiliac joint steroid injection - mild relief of left lower lumbosacral pain  9/12/24 - right L3, L4, L5 RFA - no significant relief noted   9/26/24 - left L3, L4, L5 RFA - no significant relief noted    Relevant Surgeries:  None    Affecting sleep?  Yes    Affecting daily activities? yes    Depressive symptoms? No          SI/HI? No    Work status: Retired    Pain Scores:    Best:       3/10  Worst:     10/10  Usually:   5/10  Today:    7/10    Pain Disability Index  Family/Home Responsibilities:: 8  Recreation:: 4  Social Activity:: 6  Occupation:: 6  Sexual Behavior:: 0  Self Care:: 5  Life-Support Activities:: 0  Pain Disability Index (PDI): 29    Review of Systems   Constitutional:  Negative for activity change, appetite change, chills, fatigue, fever and unexpected weight change.   HENT:  Negative for hearing loss.    Eyes:  Negative for visual disturbance.   Respiratory:  Negative for chest tightness and shortness of breath.    Cardiovascular:  Negative for chest pain.   Gastrointestinal:  Negative for  abdominal pain, constipation, diarrhea, nausea and vomiting.   Genitourinary:  Negative for difficulty urinating.   Musculoskeletal:  Positive for arthralgias, back pain, gait problem, myalgias, neck pain and neck stiffness.   Skin:  Positive for rash.   Neurological:  Positive for headaches. Negative for dizziness, weakness, light-headedness and numbness.   Psychiatric/Behavioral:  Positive for sleep disturbance. Negative for hallucinations and suicidal ideas. The patient is not nervous/anxious.        Past Medical History:   Diagnosis Date    Arthritis     Chronic bronchitis     COPD (chronic obstructive pulmonary disease)     Coronary artery disease involving native coronary artery of native heart without angina pectoris 05/11/2022    Diabetes mellitus     Emphysema of lung     GERD (gastroesophageal reflux disease)     Hyperlipidemia     Hypertension     Mild nonproliferative diabetic retinopathy of both eyes associated with type 2 diabetes mellitus 07/13/2023    Obesity     Pulmonary nodule     Sleep apnea     Syncope 04/24/2024    Urinary incontinence        Past Surgical History:   Procedure Laterality Date    APPENDECTOMY      CHOLECYSTECTOMY      COLONOSCOPY N/A 07/20/2020    Procedure: COLONOSCOPY;  Surgeon: Anny Acevedo MD;  Location: [facility] ENDO;  Service: Endoscopy;  Laterality: N/A;    COLONOSCOPY W/ BIOPSIES  07/20/2020    colonoscopy w/ biopsy per  7/20/2020    INJECTION OF ANESTHETIC AGENT AROUND MEDIAL BRANCH NERVES INNERVATING LUMBAR FACET JOINT Bilateral 08/15/2024    Procedure: Block-nerve-medial branch-lumbar---BILATERAL L3, L4, L5;  Surgeon: Dane Al Jr., MD;  Location: [facility] PAIN MGMT;  Service: Pain Management;  Laterality: Bilateral;    INJECTION OF ANESTHETIC AGENT AROUND MEDIAL BRANCH NERVES INNERVATING LUMBAR FACET JOINT Bilateral 08/29/2024    Procedure: Block-nerve-medial branch-lumbar #2---BILATERAL L3, L4, L5;  Surgeon: Dane Al Jr., MD;  Location:  [facility] PAIN MGMT;  Service: Pain Management;  Laterality: Bilateral;    INJECTION, SACROILIAC JOINT Left 2024    Procedure: INJECTION,SACROILIAC JOINT;  Surgeon: Dane Al Jr., MD;  Location: [facility] PAIN MGMT;  Service: Pain Management;  Laterality: Left;    NASAL SEPTOPLASTY Bilateral 2022    Procedure: SEPTOPLASTY, NOSE;  Surgeon: Karri Berrios MD;  Location: [facility] OR;  Service: ENT;  Laterality: Bilateral;  DR. HUNT CASE IS 1 HOUR    RADIOFREQUENCY ABLATION Right 2024    Procedure: Radiofrequency Ablation---RIGHT L3, L4, L5;  Surgeon: Dane Al Jr., MD;  Location: [facility] PAIN MGMT;  Service: Pain Management;  Laterality: Right;    RADIOFREQUENCY ABLATION Left 2024    Procedure: Radiofrequency Ablation---LEFT L3, L4, L5;  Surgeon: Dane Al Jr., MD;  Location: [facility] PAIN MGMT;  Service: Pain Management;  Laterality: Left;    RADIOFREQUENCY ABLATION, NERVE, SPINAL, LUMBOSACRAL Left 2024    L3-5    Radiofrequency Ablation---RIGHT L3, L4, L5 (Right)   Right 2024    SACROILIAC JOINT INJECTION Left 2024    sinoplasty      TUBAL LIGATION         Social History     Socioeconomic History    Marital status:    Tobacco Use    Smoking status: Former     Current packs/day: 0.00     Average packs/day: 2.0 packs/day for 45.0 years (90.0 ttl pk-yrs)     Types: Cigarettes     Start date: 1972     Quit date: 2016     Years since quittin.2    Smokeless tobacco: Former   Substance and Sexual Activity    Alcohol use: No    Drug use: No    Sexual activity: Yes     Social Drivers of Health     Financial Resource Strain: Medium Risk (10/8/2024)    Overall Financial Resource Strain (CARDIA)     Difficulty of Paying Living Expenses: Somewhat hard   Food Insecurity: Food Insecurity Present (10/8/2024)    Hunger Vital Sign     Worried About Running Out of Food in the Last Year: Sometimes true     Ran Out of Food in the Last Year: Sometimes true    Transportation Needs: No Transportation Needs (8/14/2024)    PRAPARE - Transportation     Lack of Transportation (Medical): No     Lack of Transportation (Non-Medical): No   Physical Activity: Inactive (10/8/2024)    Exercise Vital Sign     Days of Exercise per Week: 0 days     Minutes of Exercise per Session: 40 min   Stress: No Stress Concern Present (10/8/2024)    [employer] - Occupational Stress Questionnaire     Feeling of Stress : Not at all   Housing Stability: Low Risk  (10/8/2024)    Housing Stability Vital Sign     Unable to Pay for Housing in the Last Year: No     Homeless in the Last Year: No       Review of patient's allergies indicates:   Allergen Reactions    Augmentin [amoxicillin-pot clavulanate] Hives and Rash       Current Outpatient Medications on File Prior to Visit   Medication Sig Dispense Refill    albuterol (PROAIR HFA) 90 mcg/actuation inhaler Inhale 2 puffs into the lungs every 4 (four) hours as needed for Wheezing or Shortness of Breath. Rescue 18 g 5    atorvastatin (LIPITOR) 20 MG tablet TAKE ONE TABLET BY MOUTH EVERY NIGHT AT BEDTIME 90 tablet 2    blood sugar diagnostic (TRUE METRIX GLUCOSE TEST STRIP) Strp 1 strip by Misc.(Non-Drug; Combo Route) route once daily. 100 strip 3    blood sugar diagnostic Strp To check BG 2 times daily, to use with insurance preferred meter 100 strip 11    blood-glucose meter (TRUE METRIX AIR GLUCOSE METER) kit Use as instructed 1 each 0    BREZTRI AEROSPHERE 160-9-4.8 mcg/actuation HFAA Inhale 2 puffs into the lungs 2 (two) times a day. 10.7 g 5    buPROPion (WELLBUTRIN XL) 150 MG TB24 tablet TAKE ONE TABLET BY MOUTH ONCE DAILY 90 tablet 3    butalbital-acetaminophen-caffeine -40 mg (FIORICET, ESGIC) -40 mg per tablet 1 p.o. q.d. p.r.n. 30 tablet 5    cyanocobalamin (VITAMIN B-12) 1000 MCG tablet Take 1,000 mcg by mouth once daily.      furosemide (LASIX) 40 MG tablet TAKE 1 TABLET BY MOUTH IN THE MORNING AS  NEEDED ANKLE WITH EDEMA ONLY 90 tablet 3    gabapentin (NEURONTIN) 300 MG capsule TAKE ONE CAPSULE BY MOUTH TWICE A DAY FOR RESTLESS LEG SYNDROME. MAY INCREASE TO 3 TIMES DAILY 90 capsule 5    glipiZIDE (GLUCOTROL) 5 MG tablet TAKE 2 TABLETS (10 MG TOTAL) BY MOUTH ONCE DAILY AND 1 TABLET (5 MG TOTAL) EVERY EVENING. 270 tablet 2    lancets 33 gauge Misc 1 lancet by Misc.(Non-Drug; Combo Route) route once daily. 100 each 3    lancets Misc To check BG 2 times daily, to use with insurance preferred meter 100 each 11    lisinopriL (PRINIVIL,ZESTRIL) 40 MG tablet Take 1 tablet (40 mg total) by mouth once daily. 90 tablet 3    meloxicam (MOBIC) 7.5 MG tablet One p.o. b.i.d. for joint pain--no other NSAIDs--can take with Tylenol 60 tablet 5    potassium chloride (MICRO-K) 10 MEQ CpSR TAKE 1 CAPSULE BY MOUTH IN THE MORNING WITH LASIX ONLY 90 capsule 3    traZODone (DESYREL) 50 MG tablet TAKE 1 TABLET BY MOUTH EVERY EVENING. 90 tablet 3    tiZANidine (ZANAFLEX) 4 MG tablet Take one tablet by mouth every night at bedtime as needed for muscle spasms (Patient not taking: Reported on 11/14/2024) 30 tablet 0    [DISCONTINUED] ibuprofen (ADVIL,MOTRIN) 600 MG tablet Take 1 tablet (600 mg total) by mouth every 6 (six) hours as needed for Pain. 100 tablet 5    [DISCONTINUED] primidone (MYSOLINE) 50 MG Tab Take 1 tablet (50 mg total) by mouth 2 (two) times a day. 60 tablet 11     No current facility-administered medications on file prior to visit.       Objective:      Pulse 80   Wt 98.7 kg (217 lb 9.5 oz)   LMP  (LMP Unknown)   BMI 39.80 kg/m²     Exam:  GEN:  Well developed, well nourished.  No acute distress.  Normal pain behavior.  HEENT:  No trauma.  Mucous membranes moist.  Nares patent bilaterally.  PSYCH: Normal affect. Thought content appropriate.  CHEST:  Breathing symmetric.  No audible wheezing.  ABD: Soft, non-distended.  SKIN:  Warm, pink, dry.  No rash on exposed areas.    EXT:  No cyanosis, clubbing, or edema.  No  color change or changes in nail or hair growth.  NEURO/MUSCULOSKELETAL:  Fully alert, oriented, and appropriate. Speech normal rasheed. No cranial nerve deficits.   Gait:  Normal.  5/5 motor strength throughout upper extremities.   Sensory:  No sensory deficit in the upper extremities.   Reflexes:  2 + and symmetric throughout.  Absent An's bilaterally.  C-Spine:  Limited ROM with pain on extension more than flexion.  Positive facet loading bilaterally.  Negative Spurling's bilaterally.    Positive TTP over midline cervical spine and bilateral upper cervical paraspinal muscles        Imaging:      Narrative & Impression    EXAMINATION:  MRI CERVICAL SPINE WITHOUT CONTRAST     CLINICAL HISTORY:  Ataxia, nontraumatic, cervical pathology suspected; Spinal stenosis, cervical region     TECHNIQUE:  Multiplanar, multisequence MR images of the cervical spine were performed without the administration of contrast.     COMPARISON:  10/23/2017     FINDINGS:  Alignment: Normal.     Vertebrae: Normal marrow signal. No fracture.     Discs: Disc space narrowing present C5-C6.     Cord: Normal.     Skull base and craniocervical junction: Normal.     Degenerative findings:     C2-C3: There is no focal disc herniation.No significant central canal narrowing.  No significant neural foraminal narrowing.     C3-C4: There is no focal disc herniation.No significant central canal narrowing.  No significant neural foraminal narrowing.     C4-C5: Posterior marginal osteophytic disc spur complex.There is no focal disc herniation.No significant central canal narrowing.  No significant neural foraminal narrowing.     C5-C6: Posterior marginal osteophytic disc spur complex effaces the anterior thecal sac margins moderate in degree without cord displacementor focal alteration in cord signal intensity.   Moderate central canal narrowing.  Severe LEFT and moderate RIGHT neural foraminal narrowing.     C6-C7: Posterior marginal osteophytic disc  spur complex.There is no focal disc herniation.No significant central canal narrowing.  No significant neural foraminal narrowing.     C7-T1: There is no focal disc herniation.No significant central canal narrowing.  No significant neural foraminal narrowing.     Paraspinal muscles & soft tissues: Unremarkable.     Impression:     Cervical spondylosis most evident C4-C5 through C6-C7 most evident C5-C6 with posterior marginal osteophytic disc spur complex resulting in moderate central and severe LEFT moderate RIGHT neural foraminal encroachment.        Electronically signed by:Jose Eduardo Britt MD  Date:                                            11/06/2024  Time:                                           08:52         Narrative & Impression    EXAMINATION:  CT LUMBAR SPINE WITHOUT CONTRAST     CLINICAL HISTORY:  Spinal stenosis, lumbar;  Strain of muscle, fascia and tendon of lower back, subsequent encounter     TECHNIQUE:  Low-dose axial, sagittal and coronal reformations are obtained through the lumbar spine.  Contrast was not administered.     COMPARISON:  07/29/2021     FINDINGS:  There is no fracture or bone destruction.  There is grade 1 anterolisthesis of L4 on L5 and L5 on S1.  Otherwise, sagittal alignment is maintained.  Mild intervertebral disc space narrowing at L5-S1.  The remaining intervertebral discs and vertebral body heights are maintained.  There is lumbarization of the sacrum with a S1-S2 disc.     T12-L1: Mild disc bulge with left paracentral protrusion.  No significant central canal stenosis or neural foraminal narrowing.     L1-2: No significant central canal stenosis or neural foraminal narrowing.     L2-3: Mild broad-based disc bulge.  No significant central canal stenosis or neural foraminal narrowing.     L3-4: Mild diffuse disc bulge with mild facet arthropathy.  No significant central canal stenosis or neural foraminal narrowing.     L4-5: Moderate facet arthropathy contributing to  minimal grade 1 anterolisthesis with uncovering of the disc.  No significant central canal stenosis or neural foraminal narrowing.     L5-S1: Severe facet arthropathy right greater than left.  Grade 1 anterolisthesis with uncovering of the disc.  No significant central canal stenosis or neural foraminal narrowing.     Review of the retroperitoneal soft tissues shows a small adrenal nodule on the left likely an adenoma.  There is dilatation of the right renal pelvis with multiple stones possibly relating to chronic UPJ obstruction.  There are aortic calcifications.     There are symmetric degenerative changes of the sacroiliac joints.     Impression:     Lumbar spondylosis primarily in the form of moderate/severe facet arthropathy at the lower 2 lumbar levels contributing to grade 1 anterolisthesis.  No high-grade central canal stenosis or neural foraminal narrowing.  Specific details at each level discussed above.     Right-sided renal stones with dilatation of the right renal pelvis may relate to chronic UPJ obstruction.        Electronically signed by:Satish Marcus MD  Date:                                            05/20/2024  Time:                                           17:16       Assessment:       Encounter Diagnoses   Name Primary?    DDD (degenerative disc disease), cervical Yes    Cervical spondylosis     Lumbar spondylosis      Plan:       Magdalena was seen today for follow-up.    Diagnoses and all orders for this visit:    DDD (degenerative disc disease), cervical    Cervical spondylosis    Lumbar spondylosis      Magdalena Acevedo is a 69 y.o. female with chronic left-sided low back pain.  Exact etiology is unclear.  Does seem consistent with left sacroiliac joint dysfunction.  Can not rule out lower lumbar facet mediated pain either.  Not having overt radicular symptoms.  Does have significant degeneration of the lower lumbar facets bilaterally as well as degenerative changes of both sacroiliac  joints.  Mild relief with left sacroiliac joint steroid injection.  No relief from bilateral lumbar radiofrequency ablations.  Also having chronic midline/bilateral upper cervical neck pain associated headaches.  Does have significant degenerative disc disease and spondylosis of the cervical spine.  Some associated spinal stenosis though not having overt cervical radicular symptoms at this time.  Some transient left-sided radicular symptoms in the past.  Neck pain most likely related to upper cervical facet degeneration.    Pertinent imaging studies reviewed by me. Imaging results were discussed with patient.  Stressed the importance of maintaining regular home exercise program and being mindful of how they use their back throughout the day.  Patient expressed understanding and agreement.  We discussed performing cervical interventional procedures.  Would consider bilateral C3, C4 medial branch and 3rd occipital nerve blocks/RFA.  May also consider cervical epidural steroid injection.  However, patient states that these are fairly costly procedures for her and she is not financially ready to proceed.  Return to clinic as needed.  Can consider additional interventional procedures in the future if patient is ready.        This note was created by combination of typed  and M-Modal dictation.  Transcription and phonetic errors may be present.  If there are any questions, please contact me.

## 2024-11-15 NOTE — TELEPHONE ENCOUNTER
No care due was identified.  [facility] Embedded Care Due Messages. Reference number: [id number].   11/15/2024 4:00:58 PM CST

## 2024-11-16 ENCOUNTER — NURSE TRIAGE (OUTPATIENT)
Dept: ADMINISTRATIVE | Facility: CLINIC | Age: 69
End: 2024-11-16
Payer: MEDICARE

## 2024-11-16 NOTE — TELEPHONE ENCOUNTER
Patient was seen in Dr William's office and a prescription was sent for Fioricet. The transmission to pharmacy failed. Called [facility] and read prescription to pharmacist. Prescription will be filled. Updated patient .Instructed to call back with additional questions or worsening of symptoms. Patient verbalized understanding.       Reason for Disposition   [1] Prescription prescribed recently is not at pharmacy AND [2] triager has access to patient's EMR AND [3] prescription is recorded in the EMR    Protocols used: Medication Refill and Renewal Call-A-AH

## 2024-11-18 RX ORDER — BUTALBITAL, ACETAMINOPHEN AND CAFFEINE 50; 325; 40 MG/1; MG/1; MG/1
TABLET ORAL
Qty: 30 TABLET | Refills: 5 | Status: SHIPPED | OUTPATIENT
Start: 2024-11-18

## 2024-11-25 ENCOUNTER — TELEPHONE (OUTPATIENT)
Dept: PRIMARY CARE CLINIC | Facility: CLINIC | Age: 69
End: 2024-11-25
Payer: MEDICARE

## 2024-11-25 DIAGNOSIS — E11.42 TYPE 2 DIABETES MELLITUS WITH DIABETIC POLYNEUROPATHY, WITHOUT LONG-TERM CURRENT USE OF INSULIN: ICD-10-CM

## 2024-11-25 NOTE — TELEPHONE ENCOUNTER
----- Message from Liv sent at 11/19/2024 12:50 PM CST -----  Contact: [phone]  [email or web address]     Patient is calling for Medical Advice regarding:diabetic supplies     How long has patient had these symptoms:    Pharmacy name and phone#:    Patient wants a call back or thru [facility]:call back     Comments:  She is asking if this has been sent in for her supplies to [facility]   Please advise patient replies from provider may take up to 48 hours.

## 2024-11-25 NOTE — TELEPHONE ENCOUNTER
----- Message from Ary sent at 11/25/2024  2:19 PM CST -----  Contact: Joanna/REINA/ [phone]   FYI: Joanna with [facility] is calling if fax has been received for patient c-pap supplies, THAT WAS FAXED ON nOV 14, 2024 please call @ [phone] to confirm. Fax number ix [phone] and ref # [id number]

## 2024-12-06 ENCOUNTER — OFFICE VISIT (OUTPATIENT)
Dept: OTOLARYNGOLOGY | Facility: CLINIC | Age: 69
End: 2024-12-06
Payer: MEDICARE

## 2024-12-06 ENCOUNTER — CLINICAL SUPPORT (OUTPATIENT)
Dept: AUDIOLOGY | Facility: CLINIC | Age: 69
End: 2024-12-06
Payer: MEDICARE

## 2024-12-06 DIAGNOSIS — J32.3 SINUSITIS CHRONIC, SPHENOIDAL: ICD-10-CM

## 2024-12-06 DIAGNOSIS — R42 RECURRENT VERTIGO: ICD-10-CM

## 2024-12-06 DIAGNOSIS — K21.9 LARYNGOPHARYNGEAL REFLUX (LPR): Primary | ICD-10-CM

## 2024-12-06 DIAGNOSIS — H90.3 SENSORINEURAL HEARING LOSS (SNHL) OF BOTH EARS: Primary | ICD-10-CM

## 2024-12-06 DIAGNOSIS — H93.13 TINNITUS OF BOTH EARS: ICD-10-CM

## 2024-12-06 PROCEDURE — 99999 PR PBB SHADOW E&M-EST. PATIENT-LVL III: CPT | Mod: PBBFAC,,, | Performed by: OTOLARYNGOLOGY

## 2024-12-06 PROCEDURE — 99999 PR PBB SHADOW E&M-EST. PATIENT-LVL I: CPT | Mod: PBBFAC,,,

## 2024-12-06 RX ORDER — OMEPRAZOLE 40 MG/1
40 CAPSULE, DELAYED RELEASE ORAL DAILY
Qty: 90 CAPSULE | Refills: 3 | Status: SHIPPED | OUTPATIENT
Start: 2024-12-06 | End: 2025-12-06

## 2024-12-06 RX ORDER — SULFAMETHOXAZOLE AND TRIMETHOPRIM 800; 160 MG/1; MG/1
1 TABLET ORAL 2 TIMES DAILY
Qty: 20 TABLET | Refills: 0 | Status: SHIPPED | OUTPATIENT
Start: 2024-12-06 | End: 2024-12-16

## 2024-12-06 NOTE — PROGRESS NOTES
Ear, Nose, & Throat  Otolaryngology - Head & Neck Surgery    Summary of Visit:  Magdalena Acevedo is a kind patient who was self-referred for Headache and Dizziness      Subjective:     Chief Complaint:   Chief Complaint   Patient presents with    Headache    Dizziness       Magdalena Acevedo is a 69 y.o. female who was self-referred for headache and dizziness.  She reports the abrupt onset of vertigo approximately 2 months ago.  Symptoms lasted for several minutes and were quite disconcerting with associated nausea.  She denies any aural fullness, hearing loss, tinnitus.  She does have a history of chronic suboccipital headaches for the last several months.  MRIs have been performed along with CTA angiogram, some of which have demonstrated left sphenoid sinusitis.  She also notes frequent sensation of postnasal drainage as well as dysphonia.  She has a gravelly character to her voice.  She was formerly a smoker for 45 years, having quit in 2016.,.    Past Medical History  Active Ambulatory Problems     Diagnosis Date Noted    DDD (degenerative disc disease), cervical 10/13/2017    Chronic intractable headache 10/13/2017    Hypertension 10/13/2017    Psoriasis 10/13/2017    Urinary incontinence 05/15/2018    Hyperlipidemia 05/22/2018    Severe obesity (BMI 35.0-39.9) with comorbidity 03/02/2020    Leukocytosis 03/04/2020    Pulmonary nodule 03/04/2020    Type 2 diabetes mellitus with hyperglycemia, without long-term current use of insulin 03/05/2020    Chronic obstructive pulmonary disease 03/05/2020    History of smoking greater than 50 pack years 03/09/2020    Hepatitis B core antibody positive 07/06/2020    Restless legs syndrome 07/06/2020    Gastroesophageal reflux disease 11/10/2020    Tremor 05/27/2021    Peripheral polyneuropathy 05/27/2021    Falls 07/06/2021    Weakness of both lower extremities 07/06/2021    Personal history of nicotine dependence 12/08/2021    Age-related cataract of both eyes  12/23/2021    Deviated nasal septum 02/08/2022    FOZIA on CPAP 02/18/2022    NDIAYE (dyspnea on exertion) 02/18/2022    Coronary artery disease involving native coronary artery of native heart without angina pectoris 05/11/2022    Thoracic aortic atherosclerosis 05/11/2022    Mild nonproliferative diabetic retinopathy of both eyes associated with type 2 diabetes mellitus 07/13/2023    Chronic pain of right knee 03/20/2024    Current mild episode of major depressive disorder without prior episode 03/20/2024    DDD (degenerative disc disease), lumbar 06/20/2024    Lumbar spondylosis 06/20/2024    Mixed simple and mucopurulent chronic bronchitis 08/14/2024    Centrilobular emphysema 08/14/2024    Osteopenia of multiple sites 08/14/2024    Adrenal nodule 08/14/2024    ARMIJO (nonalcoholic steatohepatitis) 08/14/2024    Chronic pain syndrome 10/29/2024    Chronic nonintractable headache 10/29/2024     Resolved Ambulatory Problems     Diagnosis Date Noted    Obesity 10/13/2017    Bronchitis 11/09/2017    History of DVT (deep vein thrombosis) 05/14/2018    Type 2 diabetes mellitus without complication, without long-term current use of insulin 05/14/2018    Overweight 05/14/2018    Anger 05/15/2018    Borderline diabetes 05/22/2018    Left tennis elbow 11/18/2019    Left shoulder strain 11/18/2019    Left upper quadrant pain 02/04/2020    Chronic renal insufficiency, stage III (moderate) 03/05/2020    Acute hypoxemic respiratory failure 03/06/2020    Abnormal chest CT 03/05/2020    Intertrigo 03/19/2020    Edema 11/10/2020    Depression 11/10/2020    Nocturnal muscle cramps 04/05/2021    Other acute recurrent sinusitis 07/06/2021    Localized edema 04/11/2022    Left hip pain 03/20/2024    Lumbosacral strain 03/20/2024    Acute non intractable tension-type headache 04/17/2024    Blurred vision 04/17/2024    Syncope 04/24/2024     Past Medical History:   Diagnosis Date    Arthritis     Chronic bronchitis     COPD (chronic  obstructive pulmonary disease)     Diabetes mellitus     Emphysema of lung     GERD (gastroesophageal reflux disease)     Sleep apnea        Past Surgical History  She has a past surgical history that includes Tubal ligation; Cholecystectomy; sinoplasty; Colonoscopy w/ biopsies (07/20/2020); Colonoscopy (N/A, 07/20/2020); Nasal septoplasty (Bilateral, 02/08/2022); Appendectomy; Sacroiliac joint injection (Left, 07/18/2024); injection, sacroiliac joint (Left, 07/18/2024); Injection of anesthetic agent around medial branch nerves innervating lumbar facet joint (Bilateral, 08/15/2024); Injection of anesthetic agent around medial branch nerves innervating lumbar facet joint (Bilateral, 08/29/2024); Radiofrequency Ablation---RIGHT L3, L4, L5 (Right)   (Right, 09/12/2024); Radiofrequency ablation (Right, 09/12/2024); radiofrequency ablation, nerve, spinal, lumbosacral (Left, 09/26/2024); and Radiofrequency ablation (Left, 9/26/2024).    Past Surgical History:   Procedure Laterality Date    APPENDECTOMY      CHOLECYSTECTOMY      COLONOSCOPY N/A 07/20/2020    Procedure: COLONOSCOPY;  Surgeon: Anny Acevedo MD;  Location: [facility];  Service: Endoscopy;  Laterality: N/A;    COLONOSCOPY W/ BIOPSIES  07/20/2020    colonoscopy w/ biopsy per  7/20/2020    INJECTION OF ANESTHETIC AGENT AROUND MEDIAL BRANCH NERVES INNERVATING LUMBAR FACET JOINT Bilateral 08/15/2024    Procedure: Block-nerve-medial branch-lumbar---BILATERAL L3, L4, L5;  Surgeon: Dane lA Jr., MD;  Location: [facility];  Service: Pain Management;  Laterality: Bilateral;    INJECTION OF ANESTHETIC AGENT AROUND MEDIAL BRANCH NERVES INNERVATING LUMBAR FACET JOINT Bilateral 08/29/2024    Procedure: Block-nerve-medial branch-lumbar #2---BILATERAL L3, L4, L5;  Surgeon: Dane Al Jr., MD;  Location: [facility];  Service: Pain Management;  Laterality: Bilateral;    INJECTION, SACROILIAC JOINT Left 07/18/2024    Procedure:  INJECTION,SACROILIAC JOINT;  Surgeon: Dane Al Jr., MD;  Location: [facility];  Service: Pain Management;  Laterality: Left;    NASAL SEPTOPLASTY Bilateral 02/08/2022    Procedure: SEPTOPLASTY, NOSE;  Surgeon: Karri Berrios MD;  Location: [facility] OR;  Service: ENT;  Laterality: Bilateral;  DR. HUNT CASE IS 1 HOUR    RADIOFREQUENCY ABLATION Right 09/12/2024    Procedure: Radiofrequency Ablation---RIGHT L3, L4, L5;  Surgeon: Dane Al Jr., MD;  Location: [facility];  Service: Pain Management;  Laterality: Right;    RADIOFREQUENCY ABLATION Left 9/26/2024    Procedure: Radiofrequency Ablation---LEFT L3, L4, L5;  Surgeon: Dane Al Jr., MD;  Location: [facility];  Service: Pain Management;  Laterality: Left;    RADIOFREQUENCY ABLATION, NERVE, SPINAL, LUMBOSACRAL Left 09/26/2024    L3-5    Radiofrequency Ablation---RIGHT L3, L4, L5 (Right)   Right 09/12/2024    SACROILIAC JOINT INJECTION Left 07/18/2024    sinoplasty      TUBAL LIGATION          Family History  Her family history includes Breast cancer in her sister; Cancer in her mother, sister, and sister; Diabetes in her mother and sister; Heart disease in her father.    Social History  She reports that she quit smoking about 8 years ago. Her smoking use included cigarettes. She started smoking about 52 years ago. She has a 90 pack-year smoking history. She has quit using smokeless tobacco. She reports that she does not drink alcohol and does not use drugs.    Allergies  She is allergic to augmentin [amoxicillin-pot clavulanate].    Medications  She has a current medication list which includes the following prescription(s): albuterol, atorvastatin, blood sugar diagnostic, blood sugar diagnostic, blood-glucose meter, breztri aerosphere, bupropion, butalbital-acetaminophen-caffeine -40 mg, cyanocobalamin, furosemide, gabapentin, glipizide, lancets, lancets, lisinopril, meloxicam, omeprazole, potassium chloride,  sulfamethoxazole-trimethoprim 800-160mg, and trazodone.    ROS:  Pertinent positive and negative review of systems as noted in HPI.     Objective:     LMP  (LMP Unknown)    General Appearance:   Awake, Alert and Oriented. NAD. Appropriate affect and appearance      Neuro:   Spontaneous eye opening, appropriate verbal responses, follows commands  Pupils equal, round & brisk. EOMI, no proptosis, no spontaneous nystagmus  Face is symmetric, HB I, non-edematous and SILT bilaterally  Vision grossly intact, Hearing grossly intact  IRENE, normal tone     Head and Face:   skin is intact, facial movement symmetric     Ears:  Periauricular regions non-erythematous, non-fluctuanct non-tender  Pinna normal bilaterally, no skin lesions  EACs patent and without drainage bilaterally   Tympanic membranes are normal in appearance bilaterally.  No middle ear effusion noted bilaterally.    Nose:   External nose is symmetric, no skin lesions  Septum midline, No inferior turbinate hypertrophy, No polyps or rhinorrhea     OC/OP:  Tongue midline on extension, non-edematous, soft  No labial, buccal, oral tongue or floor of mouth lesions  Soft palate symmetric, midline and without lesions or masses, tonsils symmetric  No masses or lesions of the visualized oropharynx     Neck:  Neck is symmetric, non-edematous, non-erythematous  Trachea is midline and easily palpable,  No palpable adenopathy or masses in levels I-VI     Glands:  Parotid and submandibular glands are symmetric and non-tender.   No thyroid nodules or masses, non-tender      Respiratory:  Normal work of breathing, no accessory muscle use, no stridor     Voice:  Normal vocal quality, volume, prosody and articulation     Vestibular:   Spontaneous Nystagmus: none   Smooth Pursuit: grossly unremarkable   Saccades: grossly unremarkable     Gaze-Evoked Nystagmus: None   Head-Thrust: DNT   Gait: Normal    Darrius-Hallpike: Normal bilaterally    Post-Head Shake: No Nystagmus    Fukuda Step  Test: Normal  Neuro/Psychiatric:     Affect: Normal   Cognition: Appropriate  Cerebellar   Alternating Finger to Nose: DNT   Rapid Alternating Movements: DNT   Scanning Speech: Not Present  Proprioception   Romberg: DNT  Respiratory: No increased WOB, no stridor    Data Review:       AUDIO        Procedures:   Flexible Fiberoptic Laryngoscopy     Indication:  Dysphonia    Flexible fiberoptic laryngoscopy was performed to further evaluate the larynx, nasopharynx and hypopharynx.  She did not tolerate mirror examination.  Informed consent was obtained prior to proceeding.  The nasal cavity was decongested with topical 1% phenylephrine and anesthetized with 4% lidocaine.  A flexible fiberoptic laryngoscope was then advanced into the patients nasal cavity.    Findings:      Nasopharynx:  no nasopharyngeal mass   Base of Tongue:  minimal lingual tonsils   Hypopharynx:  moderate post-cricoid edema   no hypopharyngeal mass   Larynx:  normal epiglottis   normal false vocal folds     normal true vocal folds   normal vocal fold mobility   moderate arytenoid erythema   no laryngeal mass     The patient tolerated the procedure well.     Assessment:     1. Laryngopharyngeal reflux (LPR)    2. Sinusitis chronic, sphenoidal        Plan:     I had a long discussion with the patient regarding her condition and the further workup and management options.  No obvious etiology for her vertigo is noted.  However, she does have findings consistent with chronic sphenoid sinusitis which may account for her headache and indirectly be involved with her vertigo.  We will treat her with a course of antibiotics for this.  She also has findings consistent with laryngopharyngeal reflux which seems to be accounting for her dysphonia.  Omeprazole was initiated.  Follow up in several weeks to assess her response to both of these treatments as well as reassess the vertigo as needed.    No orders of the defined types were placed in this encounter.      Medications Ordered This Encounter   Medications    omeprazole (PRILOSEC) 40 MG capsule    sulfamethoxazole-trimethoprim 800-160mg (BACTRIM DS) 800-160 mg Tab      Problem List Items Addressed This Visit    None  Visit Diagnoses       Laryngopharyngeal reflux (LPR)    -  Primary    Sinusitis chronic, sphenoidal

## 2024-12-06 NOTE — PROGRESS NOTES
Magdalena Acevedo, a 69 y.o. female, was seen today in the clinic for an audiologic evaluation. Ms. Acevedo reported episodic vertigo that occurs when bending over. She noted symptoms last for approximately a minute. She reported intermittent tinnitus in both ears. Patient denied otalgia and aural fullness.    Tympanometry revealed Type A tympanogram in the right ear and Type A tympanogram in the left ear. Audiogram results revealed mild to moderate sensorineural hearing loss (SNHL) in the right ear and mild to moderate SNHL in the left ear.  A significant asymmetry between ears was noted from 1645-5902 Hz with the left ear being better than the right. Speech reception thresholds were noted at 35 dBHL in the right ear and 35 dBHL in the left ear.  Speech discrimination scores were 100% in the right ear and 100% in the left ear.    Recommendations:  Otologic evaluation  Hearing aid consultation following medical clearance  Annual audiogram or sooner if change is perceived

## 2024-12-09 ENCOUNTER — OFFICE VISIT (OUTPATIENT)
Dept: PRIMARY CARE CLINIC | Facility: CLINIC | Age: 69
End: 2024-12-09
Payer: MEDICARE

## 2024-12-09 VITALS
RESPIRATION RATE: 18 BRPM | WEIGHT: 217.38 LBS | HEIGHT: 62 IN | SYSTOLIC BLOOD PRESSURE: 138 MMHG | BODY MASS INDEX: 40 KG/M2 | DIASTOLIC BLOOD PRESSURE: 88 MMHG | HEART RATE: 88 BPM | OXYGEN SATURATION: 97 %

## 2024-12-09 DIAGNOSIS — G62.9 PERIPHERAL POLYNEUROPATHY: ICD-10-CM

## 2024-12-09 DIAGNOSIS — E66.01 SEVERE OBESITY (BMI 35.0-39.9) WITH COMORBIDITY: ICD-10-CM

## 2024-12-09 DIAGNOSIS — M50.30 DDD (DEGENERATIVE DISC DISEASE), CERVICAL: ICD-10-CM

## 2024-12-09 DIAGNOSIS — K21.9 GASTROESOPHAGEAL REFLUX DISEASE WITHOUT ESOPHAGITIS: ICD-10-CM

## 2024-12-09 DIAGNOSIS — M51.369 DEGENERATION OF INTERVERTEBRAL DISC OF LUMBAR REGION WITHOUT DISCOGENIC BACK PAIN OR LOWER EXTREMITY PAIN: ICD-10-CM

## 2024-12-09 DIAGNOSIS — J43.2 CENTRILOBULAR EMPHYSEMA: ICD-10-CM

## 2024-12-09 DIAGNOSIS — I10 HYPERTENSION, UNSPECIFIED TYPE: Primary | ICD-10-CM

## 2024-12-09 DIAGNOSIS — E78.00 PURE HYPERCHOLESTEROLEMIA: ICD-10-CM

## 2024-12-09 DIAGNOSIS — G89.4 CHRONIC PAIN SYNDROME: ICD-10-CM

## 2024-12-09 DIAGNOSIS — R25.1 TREMOR: ICD-10-CM

## 2024-12-09 DIAGNOSIS — Z79.899 ENCOUNTER FOR LONG-TERM CURRENT USE OF MEDICATION: ICD-10-CM

## 2024-12-09 DIAGNOSIS — L40.9 PSORIASIS: ICD-10-CM

## 2024-12-09 PROCEDURE — 3008F BODY MASS INDEX DOCD: CPT | Mod: CPTII,S$GLB,, | Performed by: FAMILY MEDICINE

## 2024-12-09 PROCEDURE — 4010F ACE/ARB THERAPY RXD/TAKEN: CPT | Mod: CPTII,S$GLB,, | Performed by: FAMILY MEDICINE

## 2024-12-09 PROCEDURE — 3066F NEPHROPATHY DOC TX: CPT | Mod: CPTII,S$GLB,, | Performed by: FAMILY MEDICINE

## 2024-12-09 PROCEDURE — 1126F AMNT PAIN NOTED NONE PRSNT: CPT | Mod: CPTII,S$GLB,, | Performed by: FAMILY MEDICINE

## 2024-12-09 PROCEDURE — 3061F NEG MICROALBUMINURIA REV: CPT | Mod: CPTII,S$GLB,, | Performed by: FAMILY MEDICINE

## 2024-12-09 PROCEDURE — 3288F FALL RISK ASSESSMENT DOCD: CPT | Mod: CPTII,S$GLB,, | Performed by: FAMILY MEDICINE

## 2024-12-09 PROCEDURE — 99999 PR PBB SHADOW E&M-EST. PATIENT-LVL IV: CPT | Mod: PBBFAC,,, | Performed by: FAMILY MEDICINE

## 2024-12-09 PROCEDURE — 1159F MED LIST DOCD IN RCRD: CPT | Mod: CPTII,S$GLB,, | Performed by: FAMILY MEDICINE

## 2024-12-09 PROCEDURE — 99214 OFFICE O/P EST MOD 30 MIN: CPT | Mod: S$GLB,,, | Performed by: FAMILY MEDICINE

## 2024-12-09 PROCEDURE — 1101F PT FALLS ASSESS-DOCD LE1/YR: CPT | Mod: CPTII,S$GLB,, | Performed by: FAMILY MEDICINE

## 2024-12-09 PROCEDURE — 3075F SYST BP GE 130 - 139MM HG: CPT | Mod: CPTII,S$GLB,, | Performed by: FAMILY MEDICINE

## 2024-12-09 PROCEDURE — 3079F DIAST BP 80-89 MM HG: CPT | Mod: CPTII,S$GLB,, | Performed by: FAMILY MEDICINE

## 2024-12-09 PROCEDURE — 3044F HG A1C LEVEL LT 7.0%: CPT | Mod: CPTII,S$GLB,, | Performed by: FAMILY MEDICINE

## 2024-12-09 RX ORDER — SERTRALINE HYDROCHLORIDE 50 MG/1
50 TABLET, FILM COATED ORAL DAILY
Qty: 30 TABLET | Refills: 11 | Status: SHIPPED | OUTPATIENT
Start: 2024-12-09 | End: 2025-12-09

## 2024-12-09 NOTE — PROGRESS NOTES
Subjective:       Patient ID: Magdalena Acevedo is a 69 y.o. female.    Chief Complaint: HPI:  69-year-old white female in for six-month checkup  --eating-well + BM ambulating well   Type 2 diabetes glipizide---has not been having to taken glucose 90 this morning  Hypertension blood pressure 138/88 on lisinopril 40 Lasix 40  GERD on Prilosec  COPD with emphysema on albuterol and Breztri  Insomnia on trazodone  Hyperlipidemia on atorvastatin  Anxiety on Wellbutrin  Headache on Fioricet--no relief so back on Tylenol  Psoriasis--back on Oteslka   Neuropathy patient on gabapentin meloxicam  Peripheral edema on Lasix 40 mg  Depression---on Wellbutrin with minimal relief  Lab CBCs CMP lipids T4 TSH all basically normal except for hemoglobin A1c 5.9 triglyceride 218                 ROS:  No significant change except for history of present illness  Skin: + psoriasis--scalp--back --popliteal area --scabs--left elbow--left ankle   HEENT: +  headache see HPI , no  ocular pain,+ blurred vision,not sure  diplopia eyes in different directions ,no  epistaxis,hoarseness change in voice,  No thyroid trouble    Lung: No pneumonia, asthma, Tb, wheezing, SOB, no smoking history of bronchitis/COPD--occasional bronchospasm on ProAir and Breztri-does okay as long as does not exert herself  Heart: No chest pain, no ankle edema since on Lasix,no  MI, mendy murmur, +hypertension blood pressure,+  Hyperlipidemia---no stent bypass arrhythmia  Abdomen: No nausea, vomiting, diarrhea, constipation, ulcers, hepatitis,  melena, hematochezia, hematemesis history cholecystectomy  : no UTI, renal disease, stones + urinary incontinence with coughing same   GYN last mammogram last year Pap smear over 20 years  MS: no fractures, O/A, lupus, rheumatoid, gout left shoulder pain  + DDD cervical spine acts up on off takes Tylenol  Neuro: No dizziness, LOC,??? Seizures vs syncopal episodes  + tremor   +diabetes Glu averages mainly in the 120-160  range, no anemia, +anxiety, + depression upset gets angry patient is on Zoloft but not using lorazepam or Ativan doing okay --anxiety trying to get money to replace root   , 1 child, unemployed,, lives alone    Objective:   Physical Exam:   General: Well nourished, well developed, no acute distress + morbid obesity  Skin: + Dry scaly skin --lumbosacral area--popliteal area--scalp--scabs due to abrasions left elbow--left lower leg ulcedr- just above the ankle both 2 x 2 cm with good eschar no erythema or discharge or swelling  HEENT: Eyes PERRLA, EOM intact, nose clear , throat nonerythema ears TMs clear  NECK: Supple, no bruits, No JVD, no nodes  Lungs: Clear, no rales, rhonchi, nowheezing   Heart: Regular rate and rhythm, no murmurs, gallops, or rubs  Abdomen: flat, bowel sounds positive, no tenderness, or organomegaly   MS:  Tenderness especially left acromioclavicular joint and especially the left upper trapezius --hx cervical DDD --occas knee issues     Neuro: Alert, CN intact, oriented X 3 tremor of the hands bilaterally right greater than left--slight right hand weakness--with hand grasps--some weakness with opposition thumb index thumb 5th digit some weakness flexion extension of the forearm---has slightly unsteady gait--falls with heel-toe walking--unable to do Romberg unable to put heels together  Extremities: No cyanosis, clubbing, occasional peripheral edema especially in the afternoons in the calf areas        Assessment:       1. Hypertension, unspecified type    2. Pure hypercholesterolemia    3. Gastroesophageal reflux disease without esophagitis    4. Degeneration of intervertebral disc of lumbar region without discogenic back pain or lower extremity pain    5. DDD (degenerative disc disease), cervical    6. Chronic pain syndrome    7. Centrilobular emphysema    8. Tremor    9. Severe obesity (BMI 35.0-39.9) with comorbidity    10. Psoriasis    11. Peripheral polyneuropathy    12. Encounter for  long-term current use of medication                    Plan:       Hypertension, unspecified type  -     CBC Auto Differential; Future; Expected date: 06/09/2025  -     Comprehensive Metabolic Panel; Future; Expected date: 06/09/2025    Pure hypercholesterolemia  -     Lipid Panel; Future; Expected date: 06/09/2025  -     Hemoglobin A1C; Future; Expected date: 06/09/2025    Gastroesophageal reflux disease without esophagitis    Degeneration of intervertebral disc of lumbar region without discogenic back pain or lower extremity pain    DDD (degenerative disc disease), cervical    Chronic pain syndrome    Centrilobular emphysema    Tremor    Severe obesity (BMI 35.0-39.9) with comorbidity    Psoriasis    Peripheral polyneuropathy    Encounter for long-term current use of medication  -     Hemoglobin A1C; Future; Expected date: 06/09/2025    Other orders  -     sertraline (ZOLOFT) 50 MG tablet; Take 1 tablet (50 mg total) by mouth once daily.  Dispense: 30 tablet; Refill: 11                     Main Reason for Visit-  Depression---no relief with Wellbutrin---will try Zoloft 50 mg---fails to improve will try Cymbalta 30 mg   Neurology --saw patient for headaches ordered CT scan MRI--referred patient to ENT and audiology---will place on Fioricet--may benefit from Imitrex  Psoriasis--scalp/lower back/popliteal area--on ortezla   History of a tremor patient was seen by neurologist in the past told it was nothing wrong with her tremors gotten progressively worse has difficulty holding a cup of coffee pouring a cup of coffee--treated with primidone--had to stop may too sleepy Needs to rediscuss with Neurology   Osteoarthritis right knee had knee injected was doing well starting to get sore again but too soon for another  steroid injection  History of diabetes Glu 94--off glipizide --HGB A1C 5.9   COPD on albuterol and breztri  Hypertension blood pressure 136/86--on lisinopril 40 mg--Lasix 40 mg  Hyperlipidemia on  atorvastatin 20 mg  GERD omeprazole  Anxiety depression on Wellbutrin  Abrasion left elbow and left ankle patient continue topical antibiotics--was seen in urgent care given antibiotics to take by mouth--both appear to be healing well  Chronic pain syndrome no relief with ibuprofen--will change to Mobic 7.5 b.i.d. patient also on gabapentin--sees [facility] but costing to much --continue gabapentin  Lab CBCs CMP lipid hemoglobin A1c in 6 mon r

## 2024-12-17 ENCOUNTER — TELEPHONE (OUTPATIENT)
Dept: PRIMARY CARE CLINIC | Facility: CLINIC | Age: 69
End: 2024-12-17
Payer: MEDICARE

## 2024-12-17 NOTE — TELEPHONE ENCOUNTER
A prior auth has been started for breztri , if it is denied or cost is still high a message will be sent to pcp for alternative . Pt informed and vu

## 2024-12-17 NOTE — TELEPHONE ENCOUNTER
----- Message from Liv sent at 12/17/2024  8:25 AM CST -----  Contact: [phone]  .[email or web address]     Patient is calling for Medical Advice regarding:received a letter in regards to her inhaler Breztri     How long has patient had these symptoms:    Pharmacy name and phone#:    Patient wants a call back or thru [facility]:call back     Comments:  She states it is telling her she will have to pay full price for the medicine and she states it is over 800 and she is needing to see if there is an alternative please advise   Please advise patient replies from provider may take up to 48 hours.   normal...

## 2024-12-23 ENCOUNTER — TELEPHONE (OUTPATIENT)
Dept: PRIMARY CARE CLINIC | Facility: CLINIC | Age: 69
End: 2024-12-23
Payer: MEDICARE

## 2024-12-23 DIAGNOSIS — J44.9 CHRONIC OBSTRUCTIVE PULMONARY DISEASE, UNSPECIFIED COPD TYPE: Primary | ICD-10-CM

## 2024-12-23 NOTE — TELEPHONE ENCOUNTER
Pt would like like an update on getting a replacement for BREZTRI AEROSPHERE 160-9-4.8 mcg/actuation HFAA because the cost is now $801 and she cannot afford that.

## 2024-12-23 NOTE — TELEPHONE ENCOUNTER
----- Message from Sheela sent at 12/23/2024  1:13 PM CST -----  Contact: [phone]  Pt would like like an update on getting a replacement for BREZTRI AEROSPHERE 160-9-4.8 mcg/actuation HFAA because the cost is now 801$ and she cannot afford that. Please call pt. Thanks    ;  [id number] [place] - [address]  [address] [place] [address]  Phone: [phone] Fax: [phone]

## 2024-12-24 RX ORDER — FLUTICASONE FUROATE, UMECLIDINIUM BROMIDE AND VILANTEROL TRIFENATATE 100; 62.5; 25 UG/1; UG/1; UG/1
1 POWDER RESPIRATORY (INHALATION) DAILY
Qty: 60 EACH | Refills: 0 | Status: SHIPPED | OUTPATIENT
Start: 2024-12-24

## 2024-12-24 NOTE — TELEPHONE ENCOUNTER
Called and informed the pt that new medication was sent to her pharmacy. Pt stated understanding.

## 2024-12-26 RX ORDER — LISINOPRIL 40 MG/1
40 TABLET ORAL
Qty: 90 TABLET | Refills: 1 | Status: SHIPPED | OUTPATIENT
Start: 2024-12-26

## 2024-12-26 NOTE — TELEPHONE ENCOUNTER
Refill Decision Note   Magdalena Acevedo  is requesting a refill authorization.  Brief Assessment and Rationale for Refill:  Approve     Medication Therapy Plan:        Comments:     Note composed:8:47 AM 12/26/2024

## 2024-12-26 NOTE — TELEPHONE ENCOUNTER
No care due was identified.  Health [facility] Embedded Care Due Messages. Reference number: [id number].   12/25/2024 9:12:36 PM CST

## 2024-12-29 ENCOUNTER — TELEPHONE (OUTPATIENT)
Dept: PRIMARY CARE CLINIC | Facility: CLINIC | Age: 69
End: 2024-12-29
Payer: MEDICARE

## 2024-12-29 DIAGNOSIS — G47.33 OSA ON CPAP: Primary | ICD-10-CM

## 2024-12-30 NOTE — TELEPHONE ENCOUNTER
----- Message from Vero sent at 2024  2:18 PM CST -----  Regardinnd requested  Contact: Paul/[phone]  Requesting an RX refill or new RX.    Is this a refill or new RX: Refill 1    RX name and strength (copy/paste from chart):  [facility]     Is this a 30 day or 90 day RX:     Pharmacy name and phone # (copy/paste from chart):  [facility]    The doctors have asked that we provide their patients with the following 2 reminders -- prescription refills can take up to 72 hours, and a friendly reminder that in the future you can use your [facility] account to request refills:     Paul stated that first requested was sent on 24 and 24. Please call and advise.

## 2025-01-03 ENCOUNTER — PATIENT MESSAGE (OUTPATIENT)
Dept: DIABETES | Facility: CLINIC | Age: 70
End: 2025-01-03
Payer: MEDICARE

## 2025-01-03 DIAGNOSIS — J44.9 CHRONIC OBSTRUCTIVE PULMONARY DISEASE, UNSPECIFIED COPD TYPE: ICD-10-CM

## 2025-01-03 NOTE — TELEPHONE ENCOUNTER
No care due was identified.  Health [facility] Embedded Care Due Messages. Reference number: [id number].   1/03/2025 10:36:38 AM CST

## 2025-01-03 NOTE — TELEPHONE ENCOUNTER
----- Message from Siria sent at 1/3/2025 10:12 AM CST -----  Contact: PATIENT   Requesting an RX refill or new RX.    Is this a refill or new RX:     RX name and strength fluticasone-umeclidin-vilanter (TRELEGY ELLIPTA) 100-62.5-25 mcg DsDv    Is this a 30 day or 90 day RX:     Pharmacy name and phone   [id number] [place] - [address]  [address] [place] [address]  Phone: [phone] Fax: [phone]

## 2025-01-05 RX ORDER — FLUTICASONE FUROATE, UMECLIDINIUM BROMIDE AND VILANTEROL TRIFENATATE 100; 62.5; 25 UG/1; UG/1; UG/1
1 POWDER RESPIRATORY (INHALATION) DAILY
Qty: 60 EACH | Refills: 5 | Status: SHIPPED | OUTPATIENT
Start: 2025-01-05

## 2025-01-24 ENCOUNTER — TELEPHONE (OUTPATIENT)
Dept: PRIMARY CARE CLINIC | Facility: CLINIC | Age: 70
End: 2025-01-24
Payer: MEDICARE

## 2025-01-24 NOTE — TELEPHONE ENCOUNTER
----- Message from Gretel sent at 1/24/2025 12:40 PM CST -----  Contact: Self/[phone]  .[email or web address]     Patient is calling for Medical Advice regarding: medication too expensive       Pharmacy name and phone#: [id number] [place] - [address]  [address]  [place] [address]  Phone: [phone] Fax: [phone]       Patient wants a call back or thru [facility]: call back     Comments: pt said that she is calling in regards to the fluticasone-umeclidin-vilanter (TRELEGY ELLIPTA) 100-62.5-25 mcg DsDv is too expensive($300) and she needs a cheaper alternative she pt has about 6 days left of the current inhaler she h as. Please advise     Please advise patient replies from provider may take up to 48 hours.

## 2025-01-24 NOTE — TELEPHONE ENCOUNTER
pt said that she is calling in regards to the fluticasone-umeclidin-vilanter (TRELEGY ELLIPTA) 100-62.5-25 mcg DsDv is too expensive($300) and she needs a cheaper alternative she pt has about 6 days left of the current inhaler

## 2025-01-27 ENCOUNTER — TELEPHONE (OUTPATIENT)
Dept: PRIMARY CARE CLINIC | Facility: CLINIC | Age: 70
End: 2025-01-27
Payer: MEDICARE

## 2025-01-27 NOTE — TELEPHONE ENCOUNTER
----- Message from Ary sent at 1/27/2025 11:35 AM CST -----  Contact: [phone]  Requesting an RX refill or new RX.    Is this a refill or new RX: new     RX name and strength   symbicort inhaler    Is this a 30 day or 90 day RX:     Pharmacy name and phone #  [id number] [place]  [address]   Phone: [phone]  Fax: [phone]          The doctors have asked that we provide their patients with the following 2 reminders -- prescription refills can take up to 72 hours, and a friendly reminder that in the future you can use your [facility] account to request refills: patient is out of her inhalers

## 2025-01-28 ENCOUNTER — TELEPHONE (OUTPATIENT)
Dept: PRIMARY CARE CLINIC | Facility: CLINIC | Age: 70
End: 2025-01-28
Payer: MEDICARE

## 2025-01-28 RX ORDER — BUDESONIDE AND FORMOTEROL FUMARATE DIHYDRATE 160; 4.5 UG/1; UG/1
2 AEROSOL RESPIRATORY (INHALATION) EVERY 12 HOURS
Qty: 10.2 G | Refills: 5 | Status: SHIPPED | OUTPATIENT
Start: 2025-01-28 | End: 2025-01-30

## 2025-01-28 NOTE — TELEPHONE ENCOUNTER
----- Message from Vero sent at 1/28/2025  1:03 PM CST -----  Contact: [phone]  Requesting an RX refill or new RX.     Is this a refill or new RX: new 1     RX name and strength   symbicort inhaler     Is this a 30 day or 90 day RX:      Pharmacy name and phone #  [id number] [place] - [address]       Phone:[phone]  Fax:[phone]              The doctors have asked that we provide their patients with the following 2 reminders -- prescription refills can take up to 72 hours, and a friendly reminder that in the future you can use your [facility] account to request refills: patient is out of her inhalers      Patient called, want to know why Rx has not been sent to the pharmacy . Patient stated that she talked to the nurse on Thursday, and still rx has not been sent.

## 2025-01-29 ENCOUNTER — TELEPHONE (OUTPATIENT)
Dept: PRIMARY CARE CLINIC | Facility: CLINIC | Age: 70
End: 2025-01-29
Payer: MEDICARE

## 2025-01-29 NOTE — TELEPHONE ENCOUNTER
Care Due:                  Date            Visit Type   Department     Provider  --------------------------------------------------------------------------------                                EP -                              PRIMARY [facility]  Last Visit: 12-      CARE ([facility])   PRIMARY CARE   Jaime William                              EP - [facility]IMARY [facility]  Next Visit: 06-      CARE ([facility])   PRIMARY CARE   Jaime William                                                            Last  Test          Frequency    Reason                     Performed    Due Date  --------------------------------------------------------------------------------    CMP.........  12 months..  atorvastatin, lisinopriL,   04- 04-                             meloxicam................    [employer] Embedded Care Due Messages. Reference number: [id number].   1/29/2025 9:25:11 AM CST

## 2025-01-29 NOTE — TELEPHONE ENCOUNTER
Refill Routing Note   Medication(s) are not appropriate for processing by [facility] for the following reason(s):        New or recently adjusted medication  Med affordability: See alternatives  All Pharmacy Suggested Alternatives:   fluticasone-salmeterol 250-50 mcg/dose (WIXELA INHUB) 250-50 mcg/dose diskus inhaler  fluticasone furoate-vilanteroL (BREO ELLIPTA) 100-25 mcg/dose diskus inhaler  fluticasone-salmeterol diskus inhaler 250-50 mcg       [facility] action(s):  Route     Requires labs : Yes      Medication Therapy Plan: Pharmacy comment: Alternative Requested:THE PRESCRIBED MEDICATION IS NOT COVERED BY INSURANCE. PLEASE CONSIDER CHANGING TO ONE OF THE SUGGESTED COVERED ALTERNATIVES.    Pharmacist review requested: Yes     Appointments  past 12m or future 3m with PCP    Date Provider   Last Visit   12/9/2024 Jaime William MD   Next Visit   6/9/2025 Jaime William MD   ED visits in past 90 days: 0        Note composed:10:27 AM 01/29/2025

## 2025-01-29 NOTE — TELEPHONE ENCOUNTER
Refill Routing Note   Medication(s) are not appropriate for processing by [facility] for the following reason(s):        New or recently adjusted medication  Med affordability    [facility] action(s):  Route     Requires labs : Yes      Medication Therapy Plan: Symbicort inhaler not covered by [facility]; Pharmacy requesting change to Wixela; Pended order and routed to PCP    Pharmacist review requested: Yes   Extended chart review required: Yes     Appointments  past 12m or future 3m with PCP    Date Provider   Last Visit   12/9/2024 Jaime William MD   Next Visit   6/9/2025 Jaime William MD   ED visits in past 90 days: 0        Note composed:10:49 AM 01/29/2025

## 2025-01-29 NOTE — TELEPHONE ENCOUNTER
----- Message from Beatriz sent at 1/29/2025  9:08 AM CST -----  Contact: [phone]  .1MEDICALADVICE     Patient is calling for Medical Advice regarding: update on refill on Symbicort inhaler, pharmacy has not received anything, pt is running out    Patient wants a call back or thru [facility]: call    Comments:    Please advise patient replies from provider may take up to 48 hours.

## 2025-01-30 RX ORDER — FLUTICASONE PROPIONATE AND SALMETEROL 250; 50 UG/1; UG/1
1 POWDER RESPIRATORY (INHALATION) 2 TIMES DAILY
Qty: 60 EACH | Refills: 5 | Status: SHIPPED | OUTPATIENT
Start: 2025-01-30

## 2025-01-31 DIAGNOSIS — Z79.4 TYPE 2 DIABETES MELLITUS WITHOUT COMPLICATION, WITH LONG-TERM CURRENT USE OF INSULIN: ICD-10-CM

## 2025-01-31 DIAGNOSIS — K21.9 GASTROESOPHAGEAL REFLUX DISEASE, UNSPECIFIED WHETHER ESOPHAGITIS PRESENT: ICD-10-CM

## 2025-01-31 DIAGNOSIS — E11.9 TYPE 2 DIABETES MELLITUS WITHOUT COMPLICATION, WITH LONG-TERM CURRENT USE OF INSULIN: ICD-10-CM

## 2025-01-31 RX ORDER — POTASSIUM CHLORIDE 750 MG/1
CAPSULE, EXTENDED RELEASE ORAL
Qty: 90 CAPSULE | Refills: 3 | Status: SHIPPED | OUTPATIENT
Start: 2025-01-31

## 2025-01-31 RX ORDER — FUROSEMIDE 40 MG/1
TABLET ORAL
Qty: 90 TABLET | Refills: 3 | Status: SHIPPED | OUTPATIENT
Start: 2025-01-31

## 2025-01-31 NOTE — TELEPHONE ENCOUNTER
Refill Routing Note   Medication(s) are not appropriate for processing by [facility] for the following reason(s):        Outside of protocol    [facility] action(s):  Route      Medication Therapy Plan: PRN USE OF THESE MEDICATIONS IS OUTSIDE OF PROTOCAL      Appointments  past 12m or future 3m with PCP    Date Provider   Last Visit   12/9/2024 Jaime William MD   Next Visit   6/9/2025 Jaime William MD   ED visits in past 90 days: 0        Note composed:11:06 AM 01/31/2025

## 2025-01-31 NOTE — TELEPHONE ENCOUNTER
No care due was identified.  [facility] Embedded Care Due Messages. Reference number: [id number].   1/31/2025 9:22:04 AM CST

## 2025-02-03 RX ORDER — BUDESONIDE AND FORMOTEROL FUMARATE DIHYDRATE 160; 4.5 UG/1; UG/1
2 AEROSOL RESPIRATORY (INHALATION) EVERY 12 HOURS
Qty: 10.2 G | Refills: 5 | Status: SHIPPED | OUTPATIENT
Start: 2025-02-03 | End: 2026-02-03

## 2025-02-11 DIAGNOSIS — E11.9 TYPE 2 DIABETES MELLITUS WITHOUT COMPLICATION, WITH LONG-TERM CURRENT USE OF INSULIN: Primary | ICD-10-CM

## 2025-02-11 DIAGNOSIS — Z79.4 TYPE 2 DIABETES MELLITUS WITHOUT COMPLICATION, WITH LONG-TERM CURRENT USE OF INSULIN: Primary | ICD-10-CM

## 2025-02-11 NOTE — TELEPHONE ENCOUNTER
No care due was identified.  [facility] Embedded Care Due Messages. Reference number: [id number].   2/11/2025 9:57:02 AM CST

## 2025-02-11 NOTE — TELEPHONE ENCOUNTER
Refill Decision Note   Magdalena Acevedo  is requesting a refill authorization.  Brief Assessment and Rationale for Refill:  Approve     Medication Therapy Plan:         Comments:     Note composed:10:29 AM 02/11/2025

## 2025-02-11 NOTE — TELEPHONE ENCOUNTER
----- Message from Beatriz sent at 2/10/2025 11:26 AM CST -----  Contact: [phone]  .1MEDICALADVICE     Patient is calling for Medical Advice regarding: needs blood sugar diagnostic (TRUE METRIX GLUCOSE TEST STRIP) Strp sent to new pharmacy  How long has patient had these symptoms:    Pharmacy name and phone#:  [facility] - [place], [place] - [address]  [address]  [address] [address]  Phone: [phone] Fax: [phone]      Patient wants a call back or thru [facility] call    Comments:    Please advise patient replies from provider may take up to 48 hours.

## 2025-02-12 ENCOUNTER — OFFICE VISIT (OUTPATIENT)
Dept: ORTHOPEDICS | Facility: CLINIC | Age: 70
End: 2025-02-12
Payer: MEDICARE

## 2025-02-12 VITALS
BODY MASS INDEX: 38.67 KG/M2 | WEIGHT: 211.44 LBS | SYSTOLIC BLOOD PRESSURE: 153 MMHG | HEART RATE: 89 BPM | DIASTOLIC BLOOD PRESSURE: 88 MMHG

## 2025-02-12 DIAGNOSIS — G89.29 CHRONIC PAIN OF RIGHT KNEE: Primary | ICD-10-CM

## 2025-02-12 DIAGNOSIS — M25.561 CHRONIC PAIN OF RIGHT KNEE: Primary | ICD-10-CM

## 2025-02-12 PROCEDURE — 99999 PR PBB SHADOW E&M-EST. PATIENT-LVL III: CPT | Mod: PBBFAC,,, | Performed by: ORTHOPAEDIC SURGERY

## 2025-02-12 PROCEDURE — 20610 DRAIN/INJ JOINT/BURSA W/O US: CPT | Mod: RT,S$GLB,, | Performed by: ORTHOPAEDIC SURGERY

## 2025-02-12 PROCEDURE — 99499 UNLISTED E&M SERVICE: CPT | Mod: 25,S$GLB,, | Performed by: ORTHOPAEDIC SURGERY

## 2025-02-12 NOTE — PROCEDURES
Large Joint Aspiration/Injection: R knee    Date/Time: 2/12/2025 9:45 AM    Performed by: Adi Anand MD  Authorized by: Adi Anand MD    Consent Done?:  Yes (Verbal)  Indications:  Arthritis  Site marked: the procedure site was marked    Timeout: prior to procedure the correct patient, procedure, and site was verified      Local anesthesia used?: Yes    Local anesthetic:  Lidocaine spray  Anesthetic total (ml):  3      Details:  Needle Size:  22 G  Approach:  Anterolateral  Location:  Knee  Site:  R knee  Medications:  60 mg hyaluronate sodium, stabilized (DUROLANE) 60 mg/3 mL  Patient tolerance:  Patient tolerated the procedure well with no immediate complications

## 2025-02-18 DIAGNOSIS — Z79.4 TYPE 2 DIABETES MELLITUS WITHOUT COMPLICATION, WITH LONG-TERM CURRENT USE OF INSULIN: ICD-10-CM

## 2025-02-18 DIAGNOSIS — E11.9 TYPE 2 DIABETES MELLITUS WITHOUT COMPLICATION, WITH LONG-TERM CURRENT USE OF INSULIN: ICD-10-CM

## 2025-02-18 NOTE — TELEPHONE ENCOUNTER
No care due was identified.  [facility] Embedded Care Due Messages. Reference number: [id number].   2/18/2025 3:41:55 PM CST

## 2025-02-19 NOTE — TELEPHONE ENCOUNTER
Refill Decision Note   Magdalena Acevedo  is requesting a refill authorization.  Brief Assessment and Rationale for Refill:  Quick Discontinue     Medication Therapy Plan: Receipt confirmed by pharmacy (2/11/2025 10:29 AM CST)      Comments:     Note composed:9:41 PM 02/18/2025

## 2025-02-24 DIAGNOSIS — Z79.4 TYPE 2 DIABETES MELLITUS WITHOUT COMPLICATION, WITH LONG-TERM CURRENT USE OF INSULIN: ICD-10-CM

## 2025-02-24 DIAGNOSIS — E11.9 TYPE 2 DIABETES MELLITUS WITHOUT COMPLICATION, WITH LONG-TERM CURRENT USE OF INSULIN: ICD-10-CM

## 2025-02-24 RX ORDER — DEXTROSE 4 G
TABLET,CHEWABLE ORAL
Qty: 1 EACH | Refills: 0 | OUTPATIENT
Start: 2025-02-24

## 2025-02-24 NOTE — TELEPHONE ENCOUNTER
No care due was identified.  [facility] Embedded Care Due Messages. Reference number: [id number].   2/24/2025 9:52:01 AM CST

## 2025-02-25 NOTE — TELEPHONE ENCOUNTER
Refill Decision Note   Magdalena Acevedo  is requesting a refill authorization.  Brief Assessment and Rationale for Refill:  Quick Discontinue     Medication Therapy Plan:    Pharmacy is requesting new scripts for the following medications without required information, (sig/ frequency/qty/etc)      Medication Reconciliation Completed: No     Comments: Pharmacies have been requesting medications for patients without required information, (sig, frequency, qty, etc.). In addition, requests are sent for medication(s) pt. are currently not taking, and medications patients have never taken.    We have spoken to the pharmacies about these request types and advised their teams previously that we are unable to assess these New Script requests and require all details for these requests. This is a known issue and has been reported.     Note composed:9:59 PM 02/24/2025

## 2025-03-21 ENCOUNTER — TELEPHONE (OUTPATIENT)
Dept: PRIMARY CARE CLINIC | Facility: CLINIC | Age: 70
End: 2025-03-21
Payer: MEDICARE

## 2025-03-21 DIAGNOSIS — R91.1 SOLITARY PULMONARY NODULE: ICD-10-CM

## 2025-03-21 DIAGNOSIS — R91.1 PULMONARY NODULE: Primary | ICD-10-CM

## 2025-03-21 NOTE — TELEPHONE ENCOUNTER
----- Message from Lizzy sent at 3/21/2025  4:06 PM CDT -----  .1MEDICALADVICE Patient is calling for Medical Advice regarding: pt called and said that she received a letter stating that it was time to schedule her low dose ct lung scan. Please call and advise. How long has patient had these symptoms:Pharmacy name and phone#:Patient wants a call back or [facility]:Comments:Please advise patient replies from provider may take up to 48 hours.

## 2025-04-10 NOTE — TELEPHONE ENCOUNTER
Refill Routing Note   Medication(s) are not appropriate for processing by [facility] for the following reason(s):        Outside of protocol    [facility] action(s):  Route   Requires labs : Yes             Appointments  past 12m or future 3m with PCP    Date Provider   Last Visit   12/9/2024 Jaime William MD   Next Visit   6/9/2025 Jaime William MD   ED visits in past 90 days: 0        Note composed:10:09 AM 04/10/2025

## 2025-04-10 NOTE — TELEPHONE ENCOUNTER
Care Due:                  Date            Visit Type   Department     Provider  --------------------------------------------------------------------------------                                EP -                              [facility]IMARY [facility]  Last Visit: 12-      CARE ([facility])   PRIMARY CARE   Jaime William                              EP - [facility]IMARY [facility]  Next Visit: 06-      CARE ([facility])   PRIMARY CARE   Jaime William                                                            Last  Test          Frequency    Reason                     Performed    Due Date  --------------------------------------------------------------------------------    CMP.........  12 months..  atorvastatin, lisinopriL,   04- 04-                             meloxicam................    HBA1C.......  6 months...  glipiZIDE................  12- 06-    [facility] Embedded Care Due Messages. Reference number: [id number].   4/10/2025 12:18:47 AM CDT   normal shape/ROM intact/strength intact

## 2025-04-13 RX ORDER — MELOXICAM 7.5 MG/1
TABLET ORAL
Qty: 60 TABLET | Refills: 5 | Status: SHIPPED | OUTPATIENT
Start: 2025-04-13

## 2025-04-30 DIAGNOSIS — E11.9 TYPE 2 DIABETES MELLITUS WITHOUT COMPLICATION: ICD-10-CM

## 2025-05-14 ENCOUNTER — OFFICE VISIT (OUTPATIENT)
Dept: ORTHOPEDICS | Facility: CLINIC | Age: 70
End: 2025-05-14
Payer: MEDICARE

## 2025-05-14 VITALS
DIASTOLIC BLOOD PRESSURE: 85 MMHG | WEIGHT: 207.25 LBS | SYSTOLIC BLOOD PRESSURE: 145 MMHG | BODY MASS INDEX: 37.9 KG/M2 | HEART RATE: 101 BPM

## 2025-05-14 DIAGNOSIS — G89.29 CHRONIC PAIN OF RIGHT KNEE: Primary | ICD-10-CM

## 2025-05-14 DIAGNOSIS — M17.11 ARTHRITIS OF RIGHT KNEE: ICD-10-CM

## 2025-05-14 DIAGNOSIS — M25.561 CHRONIC PAIN OF RIGHT KNEE: Primary | ICD-10-CM

## 2025-05-14 PROCEDURE — 3079F DIAST BP 80-89 MM HG: CPT | Mod: CPTII,S$GLB,, | Performed by: ORTHOPAEDIC SURGERY

## 2025-05-14 PROCEDURE — 1125F AMNT PAIN NOTED PAIN PRSNT: CPT | Mod: CPTII,S$GLB,, | Performed by: ORTHOPAEDIC SURGERY

## 2025-05-14 PROCEDURE — 4010F ACE/ARB THERAPY RXD/TAKEN: CPT | Mod: CPTII,S$GLB,, | Performed by: ORTHOPAEDIC SURGERY

## 2025-05-14 PROCEDURE — 3077F SYST BP >= 140 MM HG: CPT | Mod: CPTII,S$GLB,, | Performed by: ORTHOPAEDIC SURGERY

## 2025-05-14 PROCEDURE — 99999 PR PBB SHADOW E&M-EST. PATIENT-LVL III: CPT | Mod: PBBFAC,,, | Performed by: ORTHOPAEDIC SURGERY

## 2025-05-14 PROCEDURE — 1159F MED LIST DOCD IN RCRD: CPT | Mod: CPTII,S$GLB,, | Performed by: ORTHOPAEDIC SURGERY

## 2025-05-14 PROCEDURE — 99214 OFFICE O/P EST MOD 30 MIN: CPT | Mod: S$GLB,,, | Performed by: ORTHOPAEDIC SURGERY

## 2025-05-14 PROCEDURE — 3008F BODY MASS INDEX DOCD: CPT | Mod: CPTII,S$GLB,, | Performed by: ORTHOPAEDIC SURGERY

## 2025-05-14 NOTE — PROGRESS NOTES
Patient ID: Magdalena Acevedo is a 70 y.o. female    CC: right knee pain      History of Present Illness:    Mgadalena Acevedo presents to clinic for right knee pain. Patient denies known ARY.  Reports chronic right knee pain for several months now.  We have done multiple injections to the knee.  The 1st knee injection was the most beneficial.  A few months ago we tried a gel injection to the right knee.  She states this did not help.  Lately has been having more frequent pain that is interfering with ADLs.  She is interested in total knee replacement.    PAST MEDICAL HISTORY:   Past Medical History:   Diagnosis Date    Arthritis     Chronic bronchitis     COPD (chronic obstructive pulmonary disease)     Coronary artery disease involving native coronary artery of native heart without angina pectoris 05/11/2022    Diabetes mellitus     Emphysema of lung     GERD (gastroesophageal reflux disease)     Hyperlipidemia     Hypertension     Mild nonproliferative diabetic retinopathy of both eyes associated with type 2 diabetes mellitus 07/13/2023    Obesity     Pulmonary nodule     Sleep apnea     Syncope 04/24/2024    Urinary incontinence      PAST SURGICAL HISTORY:   Past Surgical History:   Procedure Laterality Date    APPENDECTOMY      CHOLECYSTECTOMY      COLONOSCOPY N/A 07/20/2020    Procedure: COLONOSCOPY;  Surgeon: Anny Acevedo MD;  Location: [facility] ENDO;  Service: Endoscopy;  Laterality: N/A;    COLONOSCOPY W/ BIOPSIES  07/20/2020    colonoscopy w/ biopsy per  7/20/2020    INJECTION OF ANESTHETIC AGENT AROUND MEDIAL BRANCH NERVES INNERVATING LUMBAR FACET JOINT Bilateral 08/15/2024    Procedure: Block-nerve-medial branch-lumbar---BILATERAL L3, L4, L5;  Surgeon: Dane Al Jr., MD;  Location: [facility] PAIN [facility];  Service: Pain Management;  Laterality: Bilateral;    INJECTION OF ANESTHETIC AGENT AROUND MEDIAL BRANCH NERVES INNERVATING LUMBAR FACET JOINT Bilateral 08/29/2024    Procedure:  Block-nerve-medial branch-lumbar #2---BILATERAL L3, L4, L5;  Surgeon: Dane Al Jr., MD;  Location: [facility] PAIN [facility];  Service: Pain Management;  Laterality: Bilateral;    INJECTION, SACROILIAC JOINT Left 2024    Procedure: INJECTION,SACROILIAC JOINT;  Surgeon: Dane Al Jr., MD;  Location: [facility] PAIN [facility];  Service: Pain Management;  Laterality: Left;    NASAL SEPTOPLASTY Bilateral 2022    Procedure: SEPTOPLASTY, NOSE;  Surgeon: Karri Berrios MD;  Location: [facility] OR;  Service: ENT;  Laterality: Bilateral;  DR. HUNT CASE IS 1 HOUR    RADIOFREQUENCY ABLATION Right 2024    Procedure: Radiofrequency Ablation---RIGHT L3, L4, L5;  Surgeon: Dane Al Jr., MD;  Location: [facility] PAIN [facility];  Service: Pain Management;  Laterality: Right;    RADIOFREQUENCY ABLATION Left 2024    Procedure: Radiofrequency Ablation---LEFT L3, L4, L5;  Surgeon: Dane Al Jr., MD;  Location: [facility] PAIN [facility];  Service: Pain Management;  Laterality: Left;    RADIOFREQUENCY ABLATION, NERVE, SPINAL, LUMBOSACRAL Left 2024    L3-5    Radiofrequency Ablation---RIGHT L3, L4, L5 (Right)   Right 2024    SACROILIAC JOINT INJECTION Left 2024    sinoplasty      TUBAL LIGATION       FAMILY HISTORY:   Family History   Problem Relation Name Age of Onset    Cancer Mother      Diabetes Mother      Heart disease Father      Cancer Sister      Diabetes Sister      Cancer Sister      Breast cancer Sister       SOCIAL HISTORY:   Social History     Occupational History    Not on file   Tobacco Use    Smoking status: Former     Current packs/day: 0.00     Average packs/day: 2.0 packs/day for 45.0 years (90.0 ttl pk-yrs)     Types: Cigarettes     Start date: 1972     Quit date: 2016     Years since quittin.7    Smokeless tobacco: Former   Substance and Sexual Activity    Alcohol use: No    Drug use: No    Sexual activity: Yes        MEDICATIONS:   Current Outpatient Medications:      albuterol (PROAIR HFA) 90 mcg/actuation inhaler, Inhale 2 puffs into the lungs every 4 (four) hours as needed for Wheezing or Shortness of Breath. Rescue, Disp: 18 g, Rfl: 5    atorvastatin (LIPITOR) 20 MG tablet, TAKE ONE TABLET BY MOUTH EVERY NIGHT AT BEDTIME, Disp: 90 tablet, Rfl: 2    blood sugar diagnostic (TRUE METRIX GLUCOSE TEST STRIP) Strp, 1 strip by Misc.(Non-Drug; Combo Route) route once daily., Disp: 100 strip, Rfl: 3    blood-glucose meter (TRUE METRIX AIR GLUCOSE METER) kit, Use as instructed, Disp: 1 each, Rfl: 0    budesonide-formoterol 160-4.5 mcg (SYMBICORT) 160-4.5 mcg/actuation HFAA, Inhale 2 puffs into the lungs every 12 (twelve) hours. Controller, Disp: 10.2 g, Rfl: 5    buPROPion (WELLBUTRIN XL) 150 MG TB24 tablet, TAKE ONE TABLET BY MOUTH ONCE DAILY, Disp: 90 tablet, Rfl: 3    butalbital-acetaminophen-caffeine -40 mg (FIORICET, ESGIC) -40 mg per tablet, 1 p.o. q.d. p.r.n., Disp: 30 tablet, Rfl: 5    cyanocobalamin (VITAMIN B-12) 1000 MCG tablet, Take 1,000 mcg by mouth once daily., Disp: , Rfl:     fluticasone-salmeterol diskus inhaler 250-50 mcg, Inhale 1 puff into the lungs 2 (two) times a day., Disp: 60 each, Rfl: 5    furosemide (LASIX) 40 MG tablet, TAKE 1 TABLET BY MOUTH IN THE MORNING AS NEEDED ANKLE WITH EDEMA ONLY, Disp: 90 tablet, Rfl: 3    gabapentin (NEURONTIN) 300 MG capsule, TAKE ONE CAPSULE BY MOUTH TWICE A DAY FOR RESTLESS LEG SYNDROME. MAY INCREASE TO 3 TIMES DAILY, Disp: 90 capsule, Rfl: 5    glipiZIDE (GLUCOTROL) 5 MG tablet, TAKE 2 TABLETS (10 MG TOTAL) BY MOUTH ONCE DAILY AND 1 TABLET (5 MG TOTAL) EVERY EVENING., Disp: 270 tablet, Rfl: 2    lancets 33 gauge Misc, 1 lancet by Misc.(Non-Drug; Combo Route) route once daily., Disp: 100 each, Rfl: 3    lancets Misc, To check BG 2 times daily, to use with insurance preferred meter, Disp: 100 each, Rfl: 11    lisinopriL (PRINIVIL,ZESTRIL) 40 MG tablet, TAKE 1 TABLET BY MOUTH EVERY DAY, Disp: 90 tablet, Rfl: 1     meloxicam (MOBIC) 7.5 MG tablet, TAKE ONE BY MOUTH TWICE A DAY FOR JOINT PAIN--NO OTHER NSAIDS--CAN TAKE WITH TYLENOL, Disp: 60 tablet, Rfl: 5    omeprazole (PRILOSEC) 40 MG capsule, Take 1 capsule (40 mg total) by mouth once daily., Disp: 90 capsule, Rfl: 3    potassium chloride (MICRO-K) 10 MEQ CpSR, TAKE 1 CAPSULE BY MOUTH IN THE MORNING WITH LASIX ONLY, Disp: 90 capsule, Rfl: 3    sertraline (ZOLOFT) 50 MG tablet, Take 1 tablet (50 mg total) by mouth once daily., Disp: 30 tablet, Rfl: 11    traZODone (DESYREL) 50 MG tablet, TAKE 1 TABLET BY MOUTH EVERY EVENING., Disp: 90 tablet, Rfl: 3  ALLERGIES:   Review of patient's allergies indicates:   Allergen Reactions    Augmentin [amoxicillin-pot clavulanate] Hives and Rash         Physical Exam     Vitals:    05/14/25 1513   BP: (!) 145/85   Pulse: 101     Alert and oriented to person, place and time. No acute distress. Well-groomed, not ill appearing. Pupils round and reactive, normal respiratory effort, no audible wheezing.     GENERAL:  A well-developed, well-nourished 70 y.o. female who is alert and       oriented in no acute distress.      Gait: She  walks with a normal gait.                   EXTREMITIES:  Examination of lower extremities reveals there is no visible mass or deformity.    Right knee:      ROM 5-130                          Ligamentously stable to varus/valgus stress.                          Anterior and posterior drawers negative.                          No pain over pes bursa.                          No warmth                          No erythema                          Effusion Yes                          medial joint line tenderness                          Negative Patellofemoral grind/crepitus     The skin over both lower extremities is normal and unremarkable.  She has a  painless range of motion of the hips and ankles bilaterally.   Sensation is intact in both lower extremities.    There are no motor deficits in the lower extremities  bilaterally.   Pedal pulses are palpable distally bilaterally.    She has no calf tenderness to palpation nor edema.    Imaging:     X-Ray: I have reviewed all pertinent results/findings and my personal findings are:  Moderate tricompartmental right knee DJD with mild varus deformity.  Subchondral sclerosis.     Assessment & Plan    Chronic pain of right knee    Arthritis of right knee       Magdalena Acevedo is a 70 y.o. female who has radiographic and clinical evidence of right knee DJD.     At this point the patient has failed extensive non-operative and conservative treatment with physician guided home exercise program, injections, weight loss/activity modification and NSAIDs/OTC medications. We discussed multiple options including non-operative and operative treatments. Non-operatively we discussed continuation of injections, visco supplementation, HEP and formal PT.  Operatively we discussed total knee replacement. We discussed the pros and cons of surgery in detail as well as the risks and benefits of surgery, time required for recovery, need for physical therapy post-operatively and expectations long term. We discussed specifically the risks of loosening, instability, dislocation, superficial and deep periprosthetic joint infection, arthrofibrosis and DVT/PE. After extensive discussion with the patient and shared decision making, they have elected to undergo joint replacement to improve function and pain.  She will need medical clearance by her PCP.  She will be registered to go to [facility].  She would like surgery in the summer, next available.  Can see Jessica for preoperative appointment and sign consents    _________________________________________________________________

## 2025-05-27 ENCOUNTER — RESULTS FOLLOW-UP (OUTPATIENT)
Dept: PRIMARY CARE CLINIC | Facility: CLINIC | Age: 70
End: 2025-05-27
Payer: MEDICARE

## 2025-05-27 PROCEDURE — 82043 UR ALBUMIN QUANTITATIVE: CPT | Performed by: FAMILY MEDICINE

## 2025-05-28 DIAGNOSIS — I10 HYPERTENSION, UNSPECIFIED TYPE: ICD-10-CM

## 2025-05-28 RX ORDER — ATORVASTATIN CALCIUM 20 MG/1
20 TABLET, FILM COATED ORAL NIGHTLY
Qty: 90 TABLET | Refills: 1 | Status: SHIPPED | OUTPATIENT
Start: 2025-05-28

## 2025-05-28 NOTE — TELEPHONE ENCOUNTER
No care due was identified.  [facility] Embedded Care Due Messages. Reference number: [id number].   5/28/2025 10:57:10 AM CDT

## 2025-05-28 NOTE — TELEPHONE ENCOUNTER
Refill Decision Note   Magdalena Acevedo  is requesting a refill authorization.  Brief Assessment and Rationale for Refill:  Approve     Medication Therapy Plan:         Comments:     Note composed:12:23 PM 05/28/2025

## 2025-06-09 ENCOUNTER — OFFICE VISIT (OUTPATIENT)
Dept: PRIMARY CARE CLINIC | Facility: CLINIC | Age: 70
End: 2025-06-09
Payer: MEDICARE

## 2025-06-09 VITALS
BODY MASS INDEX: 38.68 KG/M2 | DIASTOLIC BLOOD PRESSURE: 88 MMHG | SYSTOLIC BLOOD PRESSURE: 132 MMHG | HEART RATE: 88 BPM | HEIGHT: 62 IN | RESPIRATION RATE: 18 BRPM | WEIGHT: 210.19 LBS | OXYGEN SATURATION: 96 % | TEMPERATURE: 98 F

## 2025-06-09 DIAGNOSIS — M51.360 DEGENERATION OF INTERVERTEBRAL DISC OF LUMBAR REGION WITH DISCOGENIC BACK PAIN: ICD-10-CM

## 2025-06-09 DIAGNOSIS — E11.9 TYPE 2 DIABETES MELLITUS WITHOUT COMPLICATION, WITH LONG-TERM CURRENT USE OF INSULIN: ICD-10-CM

## 2025-06-09 DIAGNOSIS — M25.561 CHRONIC PAIN OF RIGHT KNEE: Primary | ICD-10-CM

## 2025-06-09 DIAGNOSIS — I25.10 CORONARY ARTERY DISEASE INVOLVING NATIVE CORONARY ARTERY OF NATIVE HEART WITHOUT ANGINA PECTORIS: ICD-10-CM

## 2025-06-09 DIAGNOSIS — J44.0 CHRONIC OBSTRUCTIVE PULMONARY DISEASE WITH ACUTE LOWER RESPIRATORY INFECTION: ICD-10-CM

## 2025-06-09 DIAGNOSIS — M26.651 ARTHROPATHY OF RIGHT TEMPOROMANDIBULAR JOINT: ICD-10-CM

## 2025-06-09 DIAGNOSIS — G44.229 CHRONIC TENSION-TYPE HEADACHE, NOT INTRACTABLE: ICD-10-CM

## 2025-06-09 DIAGNOSIS — E78.00 PURE HYPERCHOLESTEROLEMIA: ICD-10-CM

## 2025-06-09 DIAGNOSIS — M50.30 DDD (DEGENERATIVE DISC DISEASE), CERVICAL: ICD-10-CM

## 2025-06-09 DIAGNOSIS — K21.9 GASTROESOPHAGEAL REFLUX DISEASE WITHOUT ESOPHAGITIS: ICD-10-CM

## 2025-06-09 DIAGNOSIS — Z79.4 TYPE 2 DIABETES MELLITUS WITHOUT COMPLICATION, WITH LONG-TERM CURRENT USE OF INSULIN: ICD-10-CM

## 2025-06-09 DIAGNOSIS — G89.29 CHRONIC PAIN OF RIGHT KNEE: Primary | ICD-10-CM

## 2025-06-09 DIAGNOSIS — R82.3 HEMOGLOBINURIA: ICD-10-CM

## 2025-06-09 DIAGNOSIS — E11.42 TYPE 2 DIABETES MELLITUS WITH DIABETIC POLYNEUROPATHY, WITHOUT LONG-TERM CURRENT USE OF INSULIN: ICD-10-CM

## 2025-06-09 DIAGNOSIS — I10 HYPERTENSION, UNSPECIFIED TYPE: ICD-10-CM

## 2025-06-09 DIAGNOSIS — E66.01 SEVERE OBESITY (BMI 35.0-39.9) WITH COMORBIDITY: ICD-10-CM

## 2025-06-09 PROBLEM — H25.9 AGE-RELATED CATARACT OF BOTH EYES: Status: RESOLVED | Noted: 2021-12-23 | Resolved: 2025-06-09

## 2025-06-09 PROBLEM — R29.6 FALLS: Status: RESOLVED | Noted: 2021-07-06 | Resolved: 2025-06-09

## 2025-06-09 PROBLEM — R51.9 CHRONIC INTRACTABLE HEADACHE: Status: RESOLVED | Noted: 2017-10-13 | Resolved: 2025-06-09

## 2025-06-09 PROCEDURE — 3079F DIAST BP 80-89 MM HG: CPT | Mod: CPTII,S$GLB,, | Performed by: FAMILY MEDICINE

## 2025-06-09 PROCEDURE — 1101F PT FALLS ASSESS-DOCD LE1/YR: CPT | Mod: CPTII,S$GLB,, | Performed by: FAMILY MEDICINE

## 2025-06-09 PROCEDURE — 3288F FALL RISK ASSESSMENT DOCD: CPT | Mod: CPTII,S$GLB,, | Performed by: FAMILY MEDICINE

## 2025-06-09 PROCEDURE — 3066F NEPHROPATHY DOC TX: CPT | Mod: CPTII,S$GLB,, | Performed by: FAMILY MEDICINE

## 2025-06-09 PROCEDURE — 4010F ACE/ARB THERAPY RXD/TAKEN: CPT | Mod: CPTII,S$GLB,, | Performed by: FAMILY MEDICINE

## 2025-06-09 PROCEDURE — 3075F SYST BP GE 130 - 139MM HG: CPT | Mod: CPTII,S$GLB,, | Performed by: FAMILY MEDICINE

## 2025-06-09 PROCEDURE — 3044F HG A1C LEVEL LT 7.0%: CPT | Mod: CPTII,S$GLB,, | Performed by: FAMILY MEDICINE

## 2025-06-09 PROCEDURE — 99214 OFFICE O/P EST MOD 30 MIN: CPT | Mod: S$GLB,,, | Performed by: FAMILY MEDICINE

## 2025-06-09 PROCEDURE — 1126F AMNT PAIN NOTED NONE PRSNT: CPT | Mod: CPTII,S$GLB,, | Performed by: FAMILY MEDICINE

## 2025-06-09 PROCEDURE — 99999 PR PBB SHADOW E&M-EST. PATIENT-LVL IV: CPT | Mod: PBBFAC,,, | Performed by: FAMILY MEDICINE

## 2025-06-09 PROCEDURE — 3060F POS MICROALBUMINURIA REV: CPT | Mod: CPTII,S$GLB,, | Performed by: FAMILY MEDICINE

## 2025-06-09 PROCEDURE — 1159F MED LIST DOCD IN RCRD: CPT | Mod: CPTII,S$GLB,, | Performed by: FAMILY MEDICINE

## 2025-06-09 PROCEDURE — 3008F BODY MASS INDEX DOCD: CPT | Mod: CPTII,S$GLB,, | Performed by: FAMILY MEDICINE

## 2025-06-09 RX ORDER — TRAZODONE HYDROCHLORIDE 50 MG/1
50 TABLET ORAL NIGHTLY
Qty: 90 TABLET | Refills: 3 | Status: SHIPPED | OUTPATIENT
Start: 2025-06-09

## 2025-06-09 NOTE — PROGRESS NOTES
Subjective:       Patient ID: Magdalena Acevedo is a 70 y.o. female.    Chief Complaint: HPI 71 yo WFN in for preop clearance right knee surgery--has appt 6- to [facility].  Problems with right knee for over year--has gotten cortisone shots--pain from the moment she wakes up 2 when she goes to sleep--when she is able to sleep.    Past medical history  Surgery appendectomy cholecystectomy tubal ligation sinuplasty bilateral cataract surgery  Hospitalization type 2 diabetes/hypertension/GERD/COPD/insomnia/hyperlipidemia/anxiety/headaches/psoriasis/neuropathy/peripheral edema/depression/DDD lumbar spine-chronic pain syndrome/tremor/peripheral neuropathy/ARMIJO/osteopenia/obstructive sleep apnea on CPAP history of tobacco abuse in the past over 50 pack years/urinary incontinence  Medications see sheet  Allergies Augmentin      Type 2 diabetes glipizide---has not been having to taken glucose 90 this morning  Hypertension blood pressure 138/88 on lisinopril 40 Lasix 40  GERD on Prilosec  COPD with emphysema on albuterol and Breztri  Insomnia on trazodone  Hyperlipidemia on atorvastatin  Anxiety on Wellbutrin  Headache on Fioricet--no relief so back on Tylenol  Psoriasis--back on Oteslka   Neuropathy patient on gabapentin meloxicam  Peripheral edema on Lasix 40 mg  Depression---on Wellbutrin with minimal relief    Social history--= smoking none did have history of smoking in the past 50 pack-year quit 10 years ago--ETOH--none----1 child--lives alone--sister said she will help --grandson possibly     Family history--mother sister diabetes---sister hypertension--father MI---mother colon cancer---sister  or breast cancer--another sister colon cancer  --mother and older sister thyroid disea          ROS:    Skin: + psoriasis--scalp--back --popliteal area --scabs--left elbow--left ankle   HEENT: +  headache see HPI , no  ocular pain,+ blurred vision,not sure  diplopia eyes in different directions ,no   epistaxis,hoarseness change in voice,  No thyroid trouble    Lung: No pneumonia, asthma, Tb, wheezing, SOB, no smoking history of bronchitis/COPD--occasional bronchospasm on ProAir FOZIA with CPAP--Breztri --Trelegy --last restila   Heart: No chest pain, no ankle edema since on Lasix,no  MI, mendy murmur, +hypertension blood pressure,+  Hyperlipidemia---no stent bypass arrhythmia  Abdomen: No nausea, vomiting, diarrhea, constipation, ulcers, hepatitis,  melena, hematochezia, hematemesis history cholecystectomy  : no UTI, renal disease, stones + urinary incontinence with coughing same   GYN last mammogram last year Pap smear over 20 years  MS: no fractures, O/A, lupus, rheumatoid, gout left shoulder pain  + DDD cervical spine acts up on off takes Tylenol  Neuro: No dizziness, LOC,??? Seizures vs syncopal episodes  + tremor   +diabetes Glu averages mainly in the 120-160 range, no anemia, +anxiety, + depression upset gets angry patient is on Zoloft but not using lorazepam or Ativan doing okay --anxiety trying to get money to replace root   , 1 child, unemployed,, lives alone    Objective:   Physical Exam:   General: Well nourished, well developed, no acute distress + morbid obesity  Skin: + Dry scaly skin --generalized--elbows--ankle--back--due to psoriasis  HEENT: Eyes PERRLA, EOM intact, nose clear , throat nonerythema ears TMs clear  NECK: Supple, no bruits, No JVD, no nodes  Lungs: Clear, no rales, rhonchi, nowheezing   Heart: Regular rate and rhythm, no murmurs, gallops, or rubs  Abdomen: flat, bowel sounds positive, no tenderness, or organomegaly   MS:  Pain in the right knee--unable to fully extend left knee--difficulty going from sitting to standing position---has to stand and wait a few minutes before walking because right leg gets weak---walks with a stiff right leg--able squat custodial down hard to arise--patient needs walker stays will be given 1 after surgery    Neuro: Alert, CN intact, oriented X 3  tremor of the hands bilaterally right greater than left--slight right hand weakness--with hand grasps--some weakness with opposition thumb index thumb 5th digit some weakness flexion extension of the forearm---has slightly unsteady gait--falls with heel-toe walking--unable to do Romberg unable to put heels together  Extremities: No cyanosis, clubbing, occasional peripheral edema especially in the afternoons in the calf areas        Assessment:       1. Chronic pain of right knee    2. Type 2 diabetes mellitus without complication, with long-term current use of insulin    3. Chronic obstructive pulmonary disease with acute lower respiratory infection    4. Chronic tension-type headache, not intractable    5. Coronary artery disease involving native coronary artery of native heart without angina pectoris    6. DDD (degenerative disc disease), cervical    7. Degeneration of intervertebral disc of lumbar region with discogenic back pain    8. Gastroesophageal reflux disease without esophagitis    9. Hypertension, unspecified type    10. Pure hypercholesterolemia    11. Type 2 diabetes mellitus with diabetic polyneuropathy, without long-term current use of insulin    12. 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 Arthropathy of right temporomandibular joint                    Plan:       Chronic pain of right knee    Type 2 diabetes mellitus without complication, with long-term current use of insulin  -     Foot Exam Performed  -     CBC Auto Differential; Future; Expected date: 06/09/2025  -     Comprehensive Metabolic Panel; Future; Expected date: 06/09/2025  -     Lipid Panel; Future; Expected date: 06/09/2025  -     EKG 12-lead; Future  -     X-Ray Chest PA And Lateral; Future; Expected date: 06/09/2025  -     Protime-INR; Future; Expected date: 06/09/2025  -     APTT; Future; Expected date: 06/09/2025    Chronic obstructive pulmonary disease with acute lower respiratory  infection    Chronic tension-type headache, not intractable    Coronary artery disease involving native coronary artery of native heart without angina pectoris    DDD (degenerative disc disease), cervical    Degeneration of intervertebral disc of lumbar region with discogenic back pain    Gastroesophageal reflux disease without esophagitis    Hypertension, unspecified type    Pure hypercholesterolemia    Type 2 diabetes mellitus with diabetic polyneuropathy, without long-term current use of insulin  -     Hemoglobin A1C; Future; Expected date: 06/09/2025    Severe obesity (BMI 35.0-39.9) with comorbidity    Hemoglobinuria  -     Protime-INR; Future; Expected date: 06/09/2025    Arthropathy of right temporomandibular joint  -     APTT; Future; Expected date: 06/09/2025    Other orders  -     traZODone (DESYREL) 50 MG tablet; Take 1 tablet (50 mg total) by mouth every evening.  Dispense: 90 tablet; Refill: 3                     Main Reason for Visit-  Main Reason for Visit----preop clearance for right knee surgery---has appointment 05/20/2025 to have surgery scheduled  Lab will be ordered patient is medically cleared if lab is okay  On the medical issues  Depression---no relief with Wellbutrin---will try Zoloft 50 mg---fails to improve will try Cymbalta 30 mg   Neurology --saw patient for headaches ordered CT scan MRI--referred patient to ENT and audiology---will place on Fioricet--may benefit from Imitrex  Psoriasis--scalp/lower back/popliteal area--on ortezla   History of a tremor patient was seen by neurologist in the past told it was nothing wrong with her tremors gotten progressively worse has difficulty holding a cup of coffee pouring a cup of coffee--treated with primidone--had to stop may too sleepy Needs to rediscuss with Neurology   Osteoarthritis right knee had knee injected was doing well starting to get sore again but too soon for another  steroid injection  History of diabetes Glu 94--off glipizide --HGB  A1C 5.9   COPD on albuterol and breztri  Hypertension blood pressure 136/86--on lisinopril 40 mg--Lasix 40 mg  Hyperlipidemia on atorvastatin 20 mg  GERD omeprazole  Anxiety depression on Wellbutrin  Abrasion left elbow and left ankle patient continue topical antibiotics--was seen in urgent care given antibiotics to take by mouth--both appear to be healing well  Chronic pain syndrome no relief with ibuprofen--will change to Mobic 7.5 b.i.d. patient also on gabapentin--sees [facility] but costing to much --continue gabapentin  Lab CBCs CMP lipid hemoglobin A1c PT PTT INR chest Xray EKG

## 2025-06-20 ENCOUNTER — OFFICE VISIT (OUTPATIENT)
Dept: ORTHOPEDICS | Facility: CLINIC | Age: 70
End: 2025-06-20
Payer: MEDICARE

## 2025-06-20 DIAGNOSIS — M25.561 CHRONIC PAIN OF RIGHT KNEE: ICD-10-CM

## 2025-06-20 DIAGNOSIS — M17.11 ARTHRITIS OF RIGHT KNEE: Primary | ICD-10-CM

## 2025-06-20 DIAGNOSIS — G89.29 CHRONIC PAIN OF RIGHT KNEE: ICD-10-CM

## 2025-06-20 PROCEDURE — 99999 PR PBB SHADOW E&M-EST. PATIENT-LVL III: CPT | Mod: PBBFAC,,,

## 2025-06-20 RX ORDER — LOPERAMIDE HYDROCHLORIDE 2 MG/1
4 CAPSULE ORAL ONCE
OUTPATIENT
Start: 2025-06-20 | End: 2025-06-20

## 2025-06-20 RX ORDER — ASPIRIN 81 MG/1
81 TABLET ORAL 2 TIMES DAILY
OUTPATIENT
Start: 2025-06-20

## 2025-06-20 RX ORDER — FAMOTIDINE 20 MG/1
20 TABLET, FILM COATED ORAL 2 TIMES DAILY
OUTPATIENT
Start: 2025-06-20

## 2025-06-20 RX ORDER — SODIUM CHLORIDE 0.9 % (FLUSH) 0.9 %
10 SYRINGE (ML) INJECTION
OUTPATIENT
Start: 2025-06-20

## 2025-06-20 RX ORDER — OXYCODONE HYDROCHLORIDE 10 MG/1
10 TABLET ORAL
Refills: 0 | OUTPATIENT
Start: 2025-06-20

## 2025-06-20 RX ORDER — SODIUM CHLORIDE 9 MG/ML
INJECTION, SOLUTION INTRAVENOUS
OUTPATIENT
Start: 2025-06-20

## 2025-06-20 RX ORDER — SODIUM CHLORIDE 9 MG/ML
INJECTION, SOLUTION INTRAVENOUS CONTINUOUS
OUTPATIENT
Start: 2025-06-20 | End: 2025-06-21

## 2025-06-20 RX ORDER — LIDOCAINE HYDROCHLORIDE 10 MG/ML
1 INJECTION, SOLUTION EPIDURAL; INFILTRATION; INTRACAUDAL; PERINEURAL
OUTPATIENT
Start: 2025-06-20

## 2025-06-20 RX ORDER — METHOCARBAMOL 750 MG/1
750 TABLET, FILM COATED ORAL 3 TIMES DAILY
OUTPATIENT
Start: 2025-06-20

## 2025-06-20 RX ORDER — MUPIROCIN 20 MG/G
1 OINTMENT TOPICAL
OUTPATIENT
Start: 2025-06-20

## 2025-06-20 RX ORDER — ACETAMINOPHEN 500 MG
1000 TABLET ORAL EVERY 6 HOURS
OUTPATIENT
Start: 2025-06-20

## 2025-06-20 RX ORDER — AMOXICILLIN 250 MG
1 CAPSULE ORAL 2 TIMES DAILY
OUTPATIENT
Start: 2025-06-20

## 2025-06-20 RX ORDER — FENTANYL CITRATE 50 UG/ML
25 INJECTION, SOLUTION INTRAMUSCULAR; INTRAVENOUS
Refills: 0 | OUTPATIENT
Start: 2025-06-20 | End: 2025-06-21

## 2025-06-20 RX ORDER — ONDANSETRON HYDROCHLORIDE 2 MG/ML
4 INJECTION, SOLUTION INTRAVENOUS EVERY 8 HOURS PRN
OUTPATIENT
Start: 2025-06-20

## 2025-06-20 RX ORDER — NALOXONE HCL 0.4 MG/ML
0.02 VIAL (ML) INJECTION
OUTPATIENT
Start: 2025-06-20

## 2025-06-20 RX ORDER — PREGABALIN 75 MG/1
75 CAPSULE ORAL NIGHTLY
OUTPATIENT
Start: 2025-06-20

## 2025-06-20 RX ORDER — POLYETHYLENE GLYCOL 3350 17 G/17G
17 POWDER, FOR SOLUTION ORAL DAILY
OUTPATIENT
Start: 2025-06-20

## 2025-06-20 RX ORDER — OXYCODONE HYDROCHLORIDE 5 MG/1
5 TABLET ORAL
Refills: 0 | OUTPATIENT
Start: 2025-06-20

## 2025-06-20 RX ORDER — MIDAZOLAM HYDROCHLORIDE 1 MG/ML
0.5 INJECTION, SOLUTION INTRAMUSCULAR; INTRAVENOUS
OUTPATIENT
Start: 2025-06-20 | End: 2025-06-21

## 2025-06-20 RX ORDER — PROCHLORPERAZINE EDISYLATE 5 MG/ML
5 INJECTION INTRAMUSCULAR; INTRAVENOUS EVERY 6 HOURS PRN
OUTPATIENT
Start: 2025-06-20

## 2025-06-20 NOTE — H&P
Patient ID: Magdalena Acevedo is a 70 y.o. female    CC: right knee pain    History of Present Illness:    Magdalena Acevedo presents to clinic for right knee pain. Patient denies known ARY.  Reports chronic right knee pain for several months now.  We have done multiple injections to the knee.  The 1st knee injection was the most beneficial.  A few months ago we tried a gel injection to the right knee.  She states this did not help.  Lately has been having more frequent pain that is interfering with ADLs.  She is interested in total knee replacement.    ____________________________________________________________________    Interval history 6/20/2025 : Patient returns today for follow up of right knee pain.  Her sister is able to stay with her postoperatively.  She has gotten cleared by primary care pending labs.  She has completed boot camp.  Ready to set up total knee arthroplasty.        PAST MEDICAL HISTORY:   Past Medical History:   Diagnosis Date    Arthritis     Chronic bronchitis     COPD (chronic obstructive pulmonary disease)     Coronary artery disease involving native coronary artery of native heart without angina pectoris 05/11/2022    Diabetes mellitus     Emphysema of lung     GERD (gastroesophageal reflux disease)     Hyperlipidemia     Hypertension     Mild nonproliferative diabetic retinopathy of both eyes associated with type 2 diabetes mellitus 07/13/2023    Obesity     Pulmonary nodule     Sleep apnea     Syncope 04/24/2024    Urinary incontinence      PAST SURGICAL HISTORY:   Past Surgical History:   Procedure Laterality Date    APPENDECTOMY      CHOLECYSTECTOMY      COLONOSCOPY N/A 07/20/2020    Procedure: COLONOSCOPY;  Surgeon: Anny Acevedo MD;  Location: [facility];  Service: Endoscopy;  Laterality: N/A;    COLONOSCOPY W/ BIOPSIES  07/20/2020    colonoscopy w/ biopsy per  7/20/2020    INJECTION OF ANESTHETIC AGENT AROUND MEDIAL BRANCH NERVES INNERVATING LUMBAR FACET JOINT  Bilateral 08/15/2024    Procedure: Block-nerve-medial branch-lumbar---BILATERAL L3, L4, L5;  Surgeon: Dane Al Jr., MD;  Location: [facility];  Service: Pain Management;  Laterality: Bilateral;    INJECTION OF ANESTHETIC AGENT AROUND MEDIAL BRANCH NERVES INNERVATING LUMBAR FACET JOINT Bilateral 08/29/2024    Procedure: Block-nerve-medial branch-lumbar #2---BILATERAL L3, L4, L5;  Surgeon: Dane Al Jr., MD;  Location: [facility];  Service: Pain Management;  Laterality: Bilateral;    INJECTION, SACROILIAC JOINT Left 07/18/2024    Procedure: INJECTION,SACROILIAC JOINT;  Surgeon: Dane Al Jr., MD;  Location: [facility];  Service: Pain Management;  Laterality: Left;    NASAL SEPTOPLASTY Bilateral 02/08/2022    Procedure: SEPTOPLASTY, NOSE;  Surgeon: Karri Berrios MD;  Location: [facility] OR;  Service: ENT;  Laterality: Bilateral;  DR. HUNT CASE IS 1 HOUR    RADIOFREQUENCY ABLATION Right 09/12/2024    Procedure: Radiofrequency Ablation---RIGHT L3, L4, L5;  Surgeon: Dane Al Jr., MD;  Location: [facility];  Service: Pain Management;  Laterality: Right;    RADIOFREQUENCY ABLATION Left 9/26/2024    Procedure: Radiofrequency Ablation---LEFT L3, L4, L5;  Surgeon: Dane Al Jr., MD;  Location: [facility];  Service: Pain Management;  Laterality: Left;    RADIOFREQUENCY ABLATION, NERVE, SPINAL, LUMBOSACRAL Left 09/26/2024    L3-5    Radiofrequency Ablation---RIGHT L3, L4, L5 (Right)   Right 09/12/2024    SACROILIAC JOINT INJECTION Left 07/18/2024    sinoplasty      TUBAL LIGATION       FAMILY HISTORY:   Family History   Problem Relation Name Age of Onset    Cancer Mother      Diabetes Mother      Heart disease Father      Cancer Sister      Diabetes Sister      Cancer Sister      Breast cancer Sister       SOCIAL HISTORY:   Social History     Occupational History    Not on file   Tobacco Use    Smoking status: Former     Current packs/day: 0.00      Average packs/day: 2.0 packs/day for 45.0 years (90.0 ttl pk-yrs)     Types: Cigarettes     Start date: 1972     Quit date: 2016     Years since quittin.8    Smokeless tobacco: Former   Substance and Sexual Activity    Alcohol use: No    Drug use: No    Sexual activity: Yes        MEDICATIONS:   Current Outpatient Medications:     albuterol (PROAIR HFA) 90 mcg/actuation inhaler, Inhale 2 puffs into the lungs every 4 (four) hours as needed for Wheezing or Shortness of Breath. Rescue, Disp: 18 g, Rfl: 5    atorvastatin (LIPITOR) 20 MG tablet, TAKE ONE TABLET BY MOUTH EVERY NIGHT AT BEDTIME, Disp: 90 tablet, Rfl: 1    blood sugar diagnostic (TRUE METRIX GLUCOSE TEST STRIP) Strp, 1 strip by Misc.(Non-Drug; Combo Route) route once daily., Disp: 100 strip, Rfl: 3    blood-glucose meter (TRUE METRIX AIR GLUCOSE METER) kit, Use as instructed, Disp: 1 each, Rfl: 0    budesonide-formoterol 160-4.5 mcg (SYMBICORT) 160-4.5 mcg/actuation HFAA, Inhale 2 puffs into the lungs every 12 (twelve) hours. Controller, Disp: 10.2 g, Rfl: 5    buPROPion (WELLBUTRIN XL) 150 MG TB24 tablet, TAKE ONE TABLET BY MOUTH ONCE DAILY, Disp: 90 tablet, Rfl: 3    butalbital-acetaminophen-caffeine -40 mg (FIORICET, ESGIC) -40 mg per tablet, 1 p.o. q.d. p.r.n., Disp: 30 tablet, Rfl: 5    cyanocobalamin (VITAMIN B-12) 1000 MCG tablet, Take 1,000 mcg by mouth once daily., Disp: , Rfl:     fluticasone-salmeterol diskus inhaler 250-50 mcg, Inhale 1 puff into the lungs 2 (two) times a day., Disp: 60 each, Rfl: 5    furosemide (LASIX) 40 MG tablet, TAKE 1 TABLET BY MOUTH IN THE MORNING AS NEEDED ANKLE WITH EDEMA ONLY, Disp: 90 tablet, Rfl: 3    gabapentin (NEURONTIN) 300 MG capsule, TAKE ONE CAPSULE BY MOUTH TWICE A DAY FOR RESTLESS LEG SYNDROME. MAY INCREASE TO 3 TIMES DAILY, Disp: 90 capsule, Rfl: 5    glipiZIDE (GLUCOTROL) 5 MG tablet, TAKE 2 TABLETS (10 MG TOTAL) BY MOUTH ONCE DAILY AND 1 TABLET (5 MG TOTAL) EVERY EVENING., Disp:  270 tablet, Rfl: 2    lancets 33 gauge Misc, 1 lancet by Misc.(Non-Drug; Combo Route) route once daily., Disp: 100 each, Rfl: 3    lancets Misc, To check BG 2 times daily, to use with insurance preferred meter, Disp: 100 each, Rfl: 11    lisinopriL (PRINIVIL,ZESTRIL) 40 MG tablet, TAKE 1 TABLET BY MOUTH EVERY DAY, Disp: 90 tablet, Rfl: 1    meloxicam (MOBIC) 7.5 MG tablet, TAKE ONE BY MOUTH TWICE A DAY FOR JOINT PAIN--NO OTHER NSAIDS--CAN TAKE WITH TYLENOL, Disp: 60 tablet, Rfl: 5    omeprazole (PRILOSEC) 40 MG capsule, Take 1 capsule (40 mg total) by mouth once daily., Disp: 90 capsule, Rfl: 3    potassium chloride (MICRO-K) 10 MEQ CpSR, TAKE 1 CAPSULE BY MOUTH IN THE MORNING WITH LASIX ONLY, Disp: 90 capsule, Rfl: 3    sertraline (ZOLOFT) 50 MG tablet, Take 1 tablet (50 mg total) by mouth once daily., Disp: 30 tablet, Rfl: 11    traZODone (DESYREL) 50 MG tablet, Take 1 tablet (50 mg total) by mouth every evening., Disp: 90 tablet, Rfl: 3  ALLERGIES:   Review of patient's allergies indicates:   Allergen Reactions    Augmentin [amoxicillin-pot clavulanate] Hives and Rash         Physical Exam     There were no vitals filed for this visit.    Alert and oriented to person, place and time. No acute distress. Well-groomed, not ill appearing. Pupils round and reactive, normal respiratory effort, no audible wheezing.     GENERAL:  A well-developed, well-nourished 70 y.o. female who is alert and       oriented in no acute distress.      Gait: She  walks with a normal gait.                   EXTREMITIES:  Examination of lower extremities reveals there is no visible mass or deformity.    Right knee:      ROM 5-130                          Ligamentously stable to varus/valgus stress.                          Anterior and posterior drawers negative.                          No pain over pes bursa.                          No warmth                          No erythema                          Effusion Yes                           medial joint line tenderness                          Negative Patellofemoral grind/crepitus     The skin over both lower extremities is normal and unremarkable.  She has a  painless range of motion of the hips and ankles bilaterally.   Sensation is intact in both lower extremities.    There are no motor deficits in the lower extremities bilaterally.   Pedal pulses are palpable distally bilaterally.    She has no calf tenderness to palpation nor edema.    Imaging:     X-Ray: I have reviewed all pertinent results/findings and my personal findings are:  Moderate tricompartmental right knee DJD with mild varus deformity.  Subchondral sclerosis.     Assessment & Plan    Arthritis of right knee  -     WALKER FOR HOME USE  -     Ambulatory referral/consult to Home Health; Future; Expected date: 06/21/2025  -     Ambulatory Referral/Consult to Physical Therapy; Future; Expected date: 06/27/2025  -     Case Request Operating Room: ARTHROPLASTY, KNEE, TOTAL; Standing  -     Vital signs; Standing  -     Perform Zhao Agitation Sedation Scale (RASS); Standing  -     Insert peripheral IV; Standing  -     Clip and Prep Right Knee; Standing  -     FOOT COMPRESSION PUMP; Standing  -     Place foot compression device; Standing  -     Document RASS; Standing  -     Chlorhexidine (CHG) 2% Wipes; Standing  -     Ortho Pathway Patient; Standing  -     Notify Physician - Potential Need of Opioid Reversal; Standing  -     Notify Anesthesiologist if patient on home insulin pump; Standing  -     Vital signs - notify MD; Standing  -     Diet NPO; Standing  -     Monitor EtCO2; Standing  -     Oxygen Continuous; Standing  -     Transfer patient; Standing  -     Vital signs; Standing  -     Assess pain scale; Standing  -     Bromage scale; Standing  -     Perform Zhao Agitation Sedation Scale (RASS); Standing  -     Nursing to set up ETCO2 module on pump; Standing  -     Pulse Oximetry Continuous; Standing  -     END TIDAL CO2  MONITOR Q4H; Standing  -     Catheter site:  Adductor Canal; Standing  -     Perineural catheter intact and infusion started; Standing  -     Neurovascular checks:  RLE; Standing  -     Intake and output; Standing  -     Cooling device to site continuously through surgical dressing; Standing  -     Foot pump at all times while in bed or chair; Standing  -     Dressing to be changed by physician only; Standing  -     Notify Physician/Vital Signs Parameters; Standing  -     Notify MD if hemoglobin is less than 8.0; Standing  -     Notify Physician - Potential Need of Opioid Reversal; Standing  -     Incentive spirometry; Standing  -     Oxygen Continuous; Standing  -     PT evaluate and treat; Standing  -     Perform POSS; Standing  -     Nursing communication; Standing  -     Nursing communication; Standing  -     Nursing communication; Standing  -     CPAP QHS; Standing  -     Vital signs; Standing  -     Vital signs; Standing  -     Cardiac Monitoring - Adult; Standing  -     Inpatient consult to Respiratory Care; Standing  -     Inpatient consult to Respiratory Care; Standing  -     Neurovascular checks:  RLE; Standing  -     Walker at bedside; Standing  -     Discontinue IV; Standing  -     Activity as tolerated  -     Sponge bath only until clinic visit  -     Keep surgical extremity elevated when not ambulating  -     Lifting restrictions  -     Weight bearing as tolerated  -     No driving, operating heavy equipment or signing legal documents while taking pain medication  -     Leave dressing on - Keep it clean, dry, and intact until clinic visit  -     On POD1 remove ace wrap and cotton padding. Leave Aquacel bandage on until 2  week post op visit in clinic  -     Call MD for:  temperature >100.4  -     Call MD for:  persistent nausea and vomiting  -     Call MD for:  severe uncontrolled pain  -     Call MD for:  difficulty breathing, headache or visual disturbances  -     Call MD for:  redness, tenderness,  or signs of infection (pain, swelling, redness, odor or green/yellow discharge around incision site)  -     Call MD for:  hives  -     Call MD for:  persistent dizziness or light-headedness  -     Call MD for:  extreme fatigue  -     Place in Outpatient; Standing  -     Diet Adult Regular; Standing  -     Place BLAIRE hose; Standing  -     Place sequential compression device; Standing  -     X-Ray Knee 1 or 2 View Right; Standing  -     Diet Adult Regular; Standing  -     DISCHARGE PATIENT When criteria are met: xray, PT, pain controlled with oral medication; Standing    Chronic pain of right knee  -     WALKER FOR HOME USE  -     Ambulatory referral/consult to Home Health; Future; Expected date: 06/21/2025  -     Ambulatory Referral/Consult to Physical Therapy; Future; Expected date: 06/27/2025  -     Case Request Operating Room: ARTHROPLASTY, KNEE, TOTAL; Standing  -     Vital signs; Standing  -     Perform Zhao Agitation Sedation Scale (RASS); Standing  -     Insert peripheral IV; Standing  -     Clip and Prep Right Knee; Standing  -     FOOT COMPRESSION PUMP; Standing  -     Place foot compression device; Standing  -     Document RASS; Standing  -     Chlorhexidine (CHG) 2% Wipes; Standing  -     Ortho Pathway Patient; Standing  -     Notify Physician - Potential Need of Opioid Reversal; Standing  -     Notify Anesthesiologist if patient on home insulin pump; Standing  -     Vital signs - notify MD; Standing  -     Diet NPO; Standing  -     Monitor EtCO2; Standing  -     Oxygen Continuous; Standing  -     Transfer patient; Standing  -     Vital signs; Standing  -     Assess pain scale; Standing  -     Bromage scale; Standing  -     Perform Zhao Agitation Sedation Scale (RASS); Standing  -     Nursing to set up ETCO2 module on pump; Standing  -     Pulse Oximetry Continuous; Standing  -     END TIDAL CO2 MONITOR Q4H; Standing  -     Catheter site:  Adductor Canal; Standing  -     Perineural catheter intact  and infusion started; Standing  -     Neurovascular checks:  RLE; Standing  -     Intake and output; Standing  -     Cooling device to site continuously through surgical dressing; Standing  -     Foot pump at all times while in bed or chair; Standing  -     Dressing to be changed by physician only; Standing  -     Notify Physician/Vital Signs Parameters; Standing  -     Notify MD if hemoglobin is less than 8.0; Standing  -     Notify Physician - Potential Need of Opioid Reversal; Standing  -     Incentive spirometry; Standing  -     Oxygen Continuous; Standing  -     PT evaluate and treat; Standing  -     Perform POSS; Standing  -     Nursing communication; Standing  -     Nursing communication; Standing  -     Nursing communication; Standing  -     CPAP QHS; Standing  -     Vital signs; Standing  -     Vital signs; Standing  -     Cardiac Monitoring - Adult; Standing  -     Inpatient consult to Respiratory Care; Standing  -     Inpatient consult to Respiratory Care; Standing  -     Neurovascular checks:  RLE; Standing  -     Walker at bedside; Standing  -     Discontinue IV; Standing  -     Activity as tolerated  -     Sponge bath only until clinic visit  -     Keep surgical extremity elevated when not ambulating  -     Lifting restrictions  -     Weight bearing as tolerated  -     No driving, operating heavy equipment or signing legal documents while taking pain medication  -     Leave dressing on - Keep it clean, dry, and intact until clinic visit  -     On POD1 remove ace wrap and cotton padding. Leave Aquacel bandage on until 2  week post op visit in clinic  -     Call MD for:  temperature >100.4  -     Call MD for:  persistent nausea and vomiting  -     Call MD for:  severe uncontrolled pain  -     Call MD for:  difficulty breathing, headache or visual disturbances  -     Call MD for:  redness, tenderness, or signs of infection (pain, swelling, redness, odor or green/yellow discharge around incision site)  -      Call MD for:  hives  -     Call MD for:  persistent dizziness or light-headedness  -     Call MD for:  extreme fatigue  -     Place in Outpatient; Standing  -     Diet Adult Regular; Standing  -     Place BLAIRE hose; Standing  -     Place sequential compression device; Standing  -     X-Ray Knee 1 or 2 View Right; Standing  -     Diet Adult Regular; Standing  -     DISCHARGE PATIENT When criteria are met: xray, PT, pain controlled with oral medication; Standing    Other orders  -     0.9% NaCl infusion  -     LIDOcaine (PF) 10 mg/ml (1%) injection 10 mg  -     mupirocin 2 % ointment 1 g  -     midazolam injection 0.5 mg  -     fentaNYL 50 mcg/mL injection 25 mcg; Refill: 0  -     CARLEEN US Nerve Block; Standing  -     Weight bearing As Tolerated; Standing  -     0.9% NaCl infusion  -     sodium chloride 0.9% flush 10 mL  -     IP VTE HIGH RISK PATIENT; Standing  -     acetaminophen tablet 1,000 mg  -     methocarbamoL tablet 750 mg  -     oxyCODONE immediate release tablet 5 mg; Refill: 0  -     oxyCODONE immediate release tablet Tab 10 mg; Refill: 0  -     naloxone 0.4 mg/mL injection 0.02 mg  -     ondansetron injection 4 mg  -     prochlorperazine injection Soln 5 mg  -     Up in chair; Standing  -     aspirin EC tablet 81 mg  -     senna-docusate 8.6-50 mg per tablet 1 tablet  -     polyethylene glycol packet 17 g  -     famotidine tablet 20 mg  -     pregabalin capsule 75 mg  -     ceFAZolin 2 g in D5W 50 mL IVPB (MB+)  -     tranexamic acid (CYKLOKAPRON) 1,000 mg in 0.9% NaCl 100 mL IVPB (MB+)  -     ceFAZolin 2 g in D5W 50 mL IVPB (MB+)  -     loperamide capsule 4 mg         Magdalena Acevedo is a 70 y.o. female who has radiographic and clinical evidence of right knee DJD.      At this point the patient has failed extensive non-operative and conservative treatment with physician guided home exercise program, injections, weight loss/activity modification and NSAIDs/OTC medications. We discussed multiple  options including non-operative and operative treatments. Non-operatively we discussed continuation of injections and or visco supplementation, HEP and formal PT.  Operatively we discussed right TKA. We discussed the pros and cons of surgery in detail as well as the risks and benefits of surgery, time required for recovery, need for physical therapy post-operatively and expectations long term. We discussed specifically the risks of loosening, instability, dislocation, superficial and deep periprosthetic joint infection, arthrofibrosis and DVT/PE. After extensive discussion with the patient and shared decision making, they have elected to undergo joint replacement to improve function and pain.  We discussed she is at increased risk for surgery secondary to diabetes and obesity.  Instructed on strict glucose control postoperatively.     _________________________________________________________________        Magdalena Acevedo will be scheduled for Right total knee arthroplasty.         Post-Op Medications to be prescribed:   Percocet 5/325mg Take 1-2 tablets every 4-6 hours PRN pain #28  Zofran 4mg oral disintegrating tablets every 8 hours PRN nausea/vomiting   ASA 81mg BID x 4 weeks  Motrin 600 mg TID PRN   Doxycycline 100 mg bid x 7 days     Walker required: The mobility limitations from surgery cannot be sufficiently resolved by the use of a cane. Patient' functional mobility deficit can be sufficiently resolved with the use of a rolling walker or walker. Patient mobility limitation significantly impairs their ability to participate in one or more activities of daily living. The use of a walker or rolling walker will significantly improve the patient ability to participate in MRADLS and the patient will use it on a regular basis in the home.     Medical Clearance: Yes, cleared 6/9 Stewart pending labs  Hx of DVT,PE, anesthetic complications: No  Pre-operative Ancef and Tranexamic Acid  Joint BootCamp- done      Additional notes/concerns:  None    Follow up: 2 weeks post op  Xray needed: 1 view right knee

## 2025-06-20 NOTE — PROGRESS NOTES
Patient ID: Magdalena Acevedo is a 70 y.o. female    CC: right knee pain    History of Present Illness:    Magdalena Acevedo presents to clinic for right knee pain. Patient denies known ARY.  Reports chronic right knee pain for several months now.  We have done multiple injections to the knee.  The 1st knee injection was the most beneficial.  A few months ago we tried a gel injection to the right knee.  She states this did not help.  Lately has been having more frequent pain that is interfering with ADLs.  She is interested in total knee replacement.    ____________________________________________________________________    Interval history 6/20/2025 : Patient returns today for follow up of right knee pain.  Her sister is able to stay with her postoperatively.  She has gotten cleared by primary care pending labs.  She has completed boot camp.  Ready to set up total knee arthroplasty.        PAST MEDICAL HISTORY:   Past Medical History:   Diagnosis Date    Arthritis     Chronic bronchitis     COPD (chronic obstructive pulmonary disease)     Coronary artery disease involving native coronary artery of native heart without angina pectoris 05/11/2022    Diabetes mellitus     Emphysema of lung     GERD (gastroesophageal reflux disease)     Hyperlipidemia     Hypertension     Mild nonproliferative diabetic retinopathy of both eyes associated with type 2 diabetes mellitus 07/13/2023    Obesity     Pulmonary nodule     Sleep apnea     Syncope 04/24/2024    Urinary incontinence      PAST SURGICAL HISTORY:   Past Surgical History:   Procedure Laterality Date    APPENDECTOMY      CHOLECYSTECTOMY      COLONOSCOPY N/A 07/20/2020    Procedure: COLONOSCOPY;  Surgeon: Anny Acevedo MD;  Location: [facility];  Service: Endoscopy;  Laterality: N/A;    COLONOSCOPY W/ BIOPSIES  07/20/2020    colonoscopy w/ biopsy per  7/20/2020    INJECTION OF ANESTHETIC AGENT AROUND MEDIAL BRANCH NERVES INNERVATING LUMBAR FACET JOINT  Bilateral 08/15/2024    Procedure: Block-nerve-medial branch-lumbar---BILATERAL L3, L4, L5;  Surgeon: Dane Al Jr., MD;  Location: [facility];  Service: Pain Management;  Laterality: Bilateral;    INJECTION OF ANESTHETIC AGENT AROUND MEDIAL BRANCH NERVES INNERVATING LUMBAR FACET JOINT Bilateral 08/29/2024    Procedure: Block-nerve-medial branch-lumbar #2---BILATERAL L3, L4, L5;  Surgeon: Dane Al Jr., MD;  Location: [facility];  Service: Pain Management;  Laterality: Bilateral;    INJECTION, SACROILIAC JOINT Left 07/18/2024    Procedure: INJECTION,SACROILIAC JOINT;  Surgeon: Dane Al Jr., MD;  Location: [facility];  Service: Pain Management;  Laterality: Left;    NASAL SEPTOPLASTY Bilateral 02/08/2022    Procedure: SEPTOPLASTY, NOSE;  Surgeon: Karri Berrios MD;  Location: [facility] [place], operated by [facility];  Service: ENT;  Laterality: Bilateral;  DR. HUNT CASE IS 1 HOUR    RADIOFREQUENCY ABLATION Right 09/12/2024    Procedure: Radiofrequency Ablation---RIGHT L3, L4, L5;  Surgeon: Dane Al Jr., MD;  Location: [facility];  Service: Pain Management;  Laterality: Right;    RADIOFREQUENCY ABLATION Left 9/26/2024    Procedure: Radiofrequency Ablation---LEFT L3, L4, L5;  Surgeon: Dane Al Jr., MD;  Location: [facility];  Service: Pain Management;  Laterality: Left;    RADIOFREQUENCY ABLATION, NERVE, SPINAL, LUMBOSACRAL Left 09/26/2024    L3-5    Radiofrequency Ablation---RIGHT L3, L4, L5 (Right)   Right 09/12/2024    SACROILIAC JOINT INJECTION Left 07/18/2024    sinoplasty      TUBAL LIGATION       FAMILY HISTORY:   Family History   Problem Relation Name Age of Onset    Cancer Mother      Diabetes Mother      Heart disease Father      Cancer Sister      Diabetes Sister      Cancer Sister      Breast cancer Sister       SOCIAL HISTORY:   Social History     Occupational History    Not on file   Tobacco Use    Smoking status: Former     Current packs/day: 0.00      Average packs/day: 2.0 packs/day for 45.0 years (90.0 ttl pk-yrs)     Types: Cigarettes     Start date: 1972     Quit date: 2016     Years since quittin.8    Smokeless tobacco: Former   Substance and Sexual Activity    Alcohol use: No    Drug use: No    Sexual activity: Yes        MEDICATIONS:   Current Outpatient Medications:     albuterol (PROAIR HFA) 90 mcg/actuation inhaler, Inhale 2 puffs into the lungs every 4 (four) hours as needed for Wheezing or Shortness of Breath. Rescue, Disp: 18 g, Rfl: 5    atorvastatin (LIPITOR) 20 MG tablet, TAKE ONE TABLET BY MOUTH EVERY NIGHT AT BEDTIME, Disp: 90 tablet, Rfl: 1    blood sugar diagnostic (TRUE METRIX GLUCOSE TEST STRIP) Strp, 1 strip by Misc.(Non-Drug; Combo Route) route once daily., Disp: 100 strip, Rfl: 3    blood-glucose meter (TRUE METRIX AIR GLUCOSE METER) kit, Use as instructed, Disp: 1 each, Rfl: 0    budesonide-formoterol 160-4.5 mcg (SYMBICORT) 160-4.5 mcg/actuation HFAA, Inhale 2 puffs into the lungs every 12 (twelve) hours. Controller, Disp: 10.2 g, Rfl: 5    buPROPion (WELLBUTRIN XL) 150 MG TB24 tablet, TAKE ONE TABLET BY MOUTH ONCE DAILY, Disp: 90 tablet, Rfl: 3    butalbital-acetaminophen-caffeine -40 mg (FIORICET, ESGIC) -40 mg per tablet, 1 p.o. q.d. p.r.n., Disp: 30 tablet, Rfl: 5    cyanocobalamin (VITAMIN B-12) 1000 MCG tablet, Take 1,000 mcg by mouth once daily., Disp: , Rfl:     fluticasone-salmeterol diskus inhaler 250-50 mcg, Inhale 1 puff into the lungs 2 (two) times a day., Disp: 60 each, Rfl: 5    furosemide (LASIX) 40 MG tablet, TAKE 1 TABLET BY MOUTH IN THE MORNING AS NEEDED ANKLE WITH EDEMA ONLY, Disp: 90 tablet, Rfl: 3    gabapentin (NEURONTIN) 300 MG capsule, TAKE ONE CAPSULE BY MOUTH TWICE A DAY FOR RESTLESS LEG SYNDROME. MAY INCREASE TO 3 TIMES DAILY, Disp: 90 capsule, Rfl: 5    glipiZIDE (GLUCOTROL) 5 MG tablet, TAKE 2 TABLETS (10 MG TOTAL) BY MOUTH ONCE DAILY AND 1 TABLET (5 MG TOTAL) EVERY EVENING., Disp:  270 tablet, Rfl: 2    lancets 33 gauge Misc, 1 lancet by Misc.(Non-Drug; Combo Route) route once daily., Disp: 100 each, Rfl: 3    lancets Misc, To check BG 2 times daily, to use with insurance preferred meter, Disp: 100 each, Rfl: 11    lisinopriL (PRINIVIL,ZESTRIL) 40 MG tablet, TAKE 1 TABLET BY MOUTH EVERY DAY, Disp: 90 tablet, Rfl: 1    meloxicam (MOBIC) 7.5 MG tablet, TAKE ONE BY MOUTH TWICE A DAY FOR JOINT PAIN--NO OTHER NSAIDS--CAN TAKE WITH TYLENOL, Disp: 60 tablet, Rfl: 5    omeprazole (PRILOSEC) 40 MG capsule, Take 1 capsule (40 mg total) by mouth once daily., Disp: 90 capsule, Rfl: 3    potassium chloride (MICRO-K) 10 MEQ CpSR, TAKE 1 CAPSULE BY MOUTH IN THE MORNING WITH LASIX ONLY, Disp: 90 capsule, Rfl: 3    sertraline (ZOLOFT) 50 MG tablet, Take 1 tablet (50 mg total) by mouth once daily., Disp: 30 tablet, Rfl: 11    traZODone (DESYREL) 50 MG tablet, Take 1 tablet (50 mg total) by mouth every evening., Disp: 90 tablet, Rfl: 3  ALLERGIES:   Review of patient's allergies indicates:   Allergen Reactions    Augmentin [amoxicillin-pot clavulanate] Hives and Rash         Physical Exam     There were no vitals filed for this visit.    Alert and oriented to person, place and time. No acute distress. Well-groomed, not ill appearing. Pupils round and reactive, normal respiratory effort, no audible wheezing.     GENERAL:  A well-developed, well-nourished 70 y.o. female who is alert and       oriented in no acute distress.      Gait: She  walks with a normal gait.                   EXTREMITIES:  Examination of lower extremities reveals there is no visible mass or deformity.    Right knee:      ROM 5-130                          Ligamentously stable to varus/valgus stress.                          Anterior and posterior drawers negative.                          No pain over pes bursa.                          No warmth                          No erythema                          Effusion Yes                           medial joint line tenderness                          Negative Patellofemoral grind/crepitus     The skin over both lower extremities is normal and unremarkable.  She has a  painless range of motion of the hips and ankles bilaterally.   Sensation is intact in both lower extremities.    There are no motor deficits in the lower extremities bilaterally.   Pedal pulses are palpable distally bilaterally.    She has no calf tenderness to palpation nor edema.    Imaging:     X-Ray: I have reviewed all pertinent results/findings and my personal findings are:  Moderate tricompartmental right knee DJD with mild varus deformity.  Subchondral sclerosis.     Assessment & Plan    Arthritis of right knee  -     WALKER FOR HOME USE  -     Ambulatory referral/consult to Home Health; Future; Expected date: 06/21/2025  -     Ambulatory Referral/Consult to Physical Therapy; Future; Expected date: 06/27/2025  -     Case Request Operating Room: ARTHROPLASTY, KNEE, TOTAL; Standing  -     Vital signs; Standing  -     Perform Zhao Agitation Sedation Scale (RASS); Standing  -     Insert peripheral IV; Standing  -     Clip and Prep Right Knee; Standing  -     FOOT COMPRESSION PUMP; Standing  -     Place foot compression device; Standing  -     Document RASS; Standing  -     Chlorhexidine (CHG) 2% Wipes; Standing  -     Ortho Pathway Patient; Standing  -     Notify Physician - Potential Need of Opioid Reversal; Standing  -     Notify Anesthesiologist if patient on home insulin pump; Standing  -     Vital signs - notify MD; Standing  -     Diet NPO; Standing  -     Monitor EtCO2; Standing  -     Oxygen Continuous; Standing  -     Transfer patient; Standing  -     Vital signs; Standing  -     Assess pain scale; Standing  -     Bromage scale; Standing  -     Perform Zhao Agitation Sedation Scale (RASS); Standing  -     Nursing to set up ETCO2 module on pump; Standing  -     Pulse Oximetry Continuous; Standing  -     END TIDAL CO2  MONITOR Q4H; Standing  -     Catheter site:  Adductor Canal; Standing  -     Perineural catheter intact and infusion started; Standing  -     Neurovascular checks:  RLE; Standing  -     Intake and output; Standing  -     Cooling device to site continuously through surgical dressing; Standing  -     Foot pump at all times while in bed or chair; Standing  -     Dressing to be changed by physician only; Standing  -     Notify Physician/Vital Signs Parameters; Standing  -     Notify MD if hemoglobin is less than 8.0; Standing  -     Notify Physician - Potential Need of Opioid Reversal; Standing  -     Incentive spirometry; Standing  -     Oxygen Continuous; Standing  -     PT evaluate and treat; Standing  -     Perform POSS; Standing  -     Nursing communication; Standing  -     Nursing communication; Standing  -     Nursing communication; Standing  -     CPAP QHS; Standing  -     Vital signs; Standing  -     Vital signs; Standing  -     Cardiac Monitoring - Adult; Standing  -     Inpatient consult to Respiratory Care; Standing  -     Inpatient consult to Respiratory Care; Standing  -     Neurovascular checks:  RLE; Standing  -     Walker at bedside; Standing  -     Discontinue IV; Standing  -     Activity as tolerated  -     Sponge bath only until clinic visit  -     Keep surgical extremity elevated when not ambulating  -     Lifting restrictions  -     Weight bearing as tolerated  -     No driving, operating heavy equipment or signing legal documents while taking pain medication  -     Leave dressing on - Keep it clean, dry, and intact until clinic visit  -     On POD1 remove ace wrap and cotton padding. Leave Aquacel bandage on until 2  week post op visit in clinic  -     Call MD for:  temperature >100.4  -     Call MD for:  persistent nausea and vomiting  -     Call MD for:  severe uncontrolled pain  -     Call MD for:  difficulty breathing, headache or visual disturbances  -     Call MD for:  redness, tenderness,  or signs of infection (pain, swelling, redness, odor or green/yellow discharge around incision site)  -     Call MD for:  hives  -     Call MD for:  persistent dizziness or light-headedness  -     Call MD for:  extreme fatigue  -     Place in Outpatient; Standing  -     Diet Adult Regular; Standing  -     Place BLAIRE hose; Standing  -     Place sequential compression device; Standing  -     X-Ray Knee 1 or 2 View Right; Standing  -     Diet Adult Regular; Standing  -     DISCHARGE PATIENT When criteria are met: xray, PT, pain controlled with oral medication; Standing    Chronic pain of right knee  -     WALKER FOR HOME USE  -     Ambulatory referral/consult to Home Health; Future; Expected date: 06/21/2025  -     Ambulatory Referral/Consult to Physical Therapy; Future; Expected date: 06/27/2025  -     Case Request Operating Room: ARTHROPLASTY, KNEE, TOTAL; Standing  -     Vital signs; Standing  -     Perform Zhao Agitation Sedation Scale (RASS); Standing  -     Insert peripheral IV; Standing  -     Clip and Prep Right Knee; Standing  -     FOOT COMPRESSION PUMP; Standing  -     Place foot compression device; Standing  -     Document RASS; Standing  -     Chlorhexidine (CHG) 2% Wipes; Standing  -     Ortho Pathway Patient; Standing  -     Notify Physician - Potential Need of Opioid Reversal; Standing  -     Notify Anesthesiologist if patient on home insulin pump; Standing  -     Vital signs - notify MD; Standing  -     Diet NPO; Standing  -     Monitor EtCO2; Standing  -     Oxygen Continuous; Standing  -     Transfer patient; Standing  -     Vital signs; Standing  -     Assess pain scale; Standing  -     Bromage scale; Standing  -     Perform Zhao Agitation Sedation Scale (RASS); Standing  -     Nursing to set up ETCO2 module on pump; Standing  -     Pulse Oximetry Continuous; Standing  -     END TIDAL CO2 MONITOR Q4H; Standing  -     Catheter site:  Adductor Canal; Standing  -     Perineural catheter intact  and infusion started; Standing  -     Neurovascular checks:  RLE; Standing  -     Intake and output; Standing  -     Cooling device to site continuously through surgical dressing; Standing  -     Foot pump at all times while in bed or chair; Standing  -     Dressing to be changed by physician only; Standing  -     Notify Physician/Vital Signs Parameters; Standing  -     Notify MD if hemoglobin is less than 8.0; Standing  -     Notify Physician - Potential Need of Opioid Reversal; Standing  -     Incentive spirometry; Standing  -     Oxygen Continuous; Standing  -     PT evaluate and treat; Standing  -     Perform POSS; Standing  -     Nursing communication; Standing  -     Nursing communication; Standing  -     Nursing communication; Standing  -     CPAP QHS; Standing  -     Vital signs; Standing  -     Vital signs; Standing  -     Cardiac Monitoring - Adult; Standing  -     Inpatient consult to Respiratory Care; Standing  -     Inpatient consult to Respiratory Care; Standing  -     Neurovascular checks:  RLE; Standing  -     Walker at bedside; Standing  -     Discontinue IV; Standing  -     Activity as tolerated  -     Sponge bath only until clinic visit  -     Keep surgical extremity elevated when not ambulating  -     Lifting restrictions  -     Weight bearing as tolerated  -     No driving, operating heavy equipment or signing legal documents while taking pain medication  -     Leave dressing on - Keep it clean, dry, and intact until clinic visit  -     On POD1 remove ace wrap and cotton padding. Leave Aquacel bandage on until 2  week post op visit in clinic  -     Call MD for:  temperature >100.4  -     Call MD for:  persistent nausea and vomiting  -     Call MD for:  severe uncontrolled pain  -     Call MD for:  difficulty breathing, headache or visual disturbances  -     Call MD for:  redness, tenderness, or signs of infection (pain, swelling, redness, odor or green/yellow discharge around incision site)  -      Call MD for:  hives  -     Call MD for:  persistent dizziness or light-headedness  -     Call MD for:  extreme fatigue  -     Place in Outpatient; Standing  -     Diet Adult Regular; Standing  -     Place BLAIRE hose; Standing  -     Place sequential compression device; Standing  -     X-Ray Knee 1 or 2 View Right; Standing  -     Diet Adult Regular; Standing  -     DISCHARGE PATIENT When criteria are met: xray, PT, pain controlled with oral medication; Standing    Other orders  -     0.9% NaCl infusion  -     LIDOcaine (PF) 10 mg/ml (1%) injection 10 mg  -     mupirocin 2 % ointment 1 g  -     midazolam injection 0.5 mg  -     fentaNYL 50 mcg/mL injection 25 mcg; Refill: 0  -     CARLEEN US Nerve Block; Standing  -     Weight bearing As Tolerated; Standing  -     0.9% NaCl infusion  -     sodium chloride 0.9% flush 10 mL  -     IP VTE HIGH RISK PATIENT; Standing  -     acetaminophen tablet 1,000 mg  -     methocarbamoL tablet 750 mg  -     oxyCODONE immediate release tablet 5 mg; Refill: 0  -     oxyCODONE immediate release tablet Tab 10 mg; Refill: 0  -     naloxone 0.4 mg/mL injection 0.02 mg  -     ondansetron injection 4 mg  -     prochlorperazine injection Soln 5 mg  -     Up in chair; Standing  -     aspirin EC tablet 81 mg  -     senna-docusate 8.6-50 mg per tablet 1 tablet  -     polyethylene glycol packet 17 g  -     famotidine tablet 20 mg  -     pregabalin capsule 75 mg  -     ceFAZolin 2 g in D5W 50 mL IVPB (MB+)  -     tranexamic acid (CYKLOKAPRON) 1,000 mg in 0.9% NaCl 100 mL IVPB (MB+)  -     ceFAZolin 2 g in D5W 50 mL IVPB (MB+)  -     loperamide capsule 4 mg         Magdalena Acevedo is a 70 y.o. female who has radiographic and clinical evidence of right knee DJD.      At this point the patient has failed extensive non-operative and conservative treatment with physician guided home exercise program, injections, weight loss/activity modification and NSAIDs/OTC medications. We discussed multiple  options including non-operative and operative treatments. Non-operatively we discussed continuation of injections and or visco supplementation, HEP and formal PT.  Operatively we discussed right TKA. We discussed the pros and cons of surgery in detail as well as the risks and benefits of surgery, time required for recovery, need for physical therapy post-operatively and expectations long term. We discussed specifically the risks of loosening, instability, dislocation, superficial and deep periprosthetic joint infection, arthrofibrosis and DVT/PE. After extensive discussion with the patient and shared decision making, they have elected to undergo joint replacement to improve function and pain.  We discussed she is at increased risk for surgery secondary to diabetes and obesity.  Instructed on strict glucose control postoperatively.     _________________________________________________________________        Magdalena Acevedo will be scheduled for Right total knee arthroplasty.         Post-Op Medications to be prescribed:   Percocet 5/325mg Take 1-2 tablets every 4-6 hours PRN pain #28  Zofran 4mg oral disintegrating tablets every 8 hours PRN nausea/vomiting   ASA 81mg BID x 4 weeks  Motrin 600 mg TID PRN   Doxycycline 100 mg bid x 7 days     Walker required: The mobility limitations from surgery cannot be sufficiently resolved by the use of a cane. Patient' functional mobility deficit can be sufficiently resolved with the use of a rolling walker or walker. Patient mobility limitation significantly impairs their ability to participate in one or more activities of daily living. The use of a walker or rolling walker will significantly improve the patient ability to participate in MRADLS and the patient will use it on a regular basis in the home.     Medical Clearance: Yes, cleared 6/9 Stewart pending labs  Hx of DVT,PE, anesthetic complications: No  Pre-operative Ancef and Tranexamic Acid  Joint BootCamp- done      Additional notes/concerns:  None    Follow up: 2 weeks post op  Xray needed: 1 view right knee

## 2025-06-24 ENCOUNTER — RESULTS FOLLOW-UP (OUTPATIENT)
Dept: PRIMARY CARE CLINIC | Facility: CLINIC | Age: 70
End: 2025-06-24
Payer: MEDICARE

## 2025-06-24 ENCOUNTER — TELEPHONE (OUTPATIENT)
Dept: ORTHOPEDICS | Facility: CLINIC | Age: 70
End: 2025-06-24
Payer: MEDICARE

## 2025-06-24 NOTE — TELEPHONE ENCOUNTER
Spoke with patient. Pt advise that no need for xrays before surgery, but she needs blood work before surgery to be cleared. Pt verbalized understanding.

## 2025-06-27 ENCOUNTER — TELEPHONE (OUTPATIENT)
Dept: ORTHOPEDICS | Facility: CLINIC | Age: 70
End: 2025-06-27
Payer: MEDICARE

## 2025-06-27 NOTE — TELEPHONE ENCOUNTER
Call patient to see if she had any last minute surgery questions.  Discussed time frame of surgery.  Reviewed NPO after midnight.  All questions answered.

## 2025-06-30 DIAGNOSIS — M17.11 ARTHRITIS OF RIGHT KNEE: Primary | ICD-10-CM

## 2025-06-30 DIAGNOSIS — G89.18 ACUTE POST-OPERATIVE PAIN: ICD-10-CM

## 2025-06-30 RX ORDER — ONDANSETRON 4 MG/1
4 TABLET, ORALLY DISINTEGRATING ORAL 2 TIMES DAILY
Qty: 30 TABLET | Refills: 0 | Status: SHIPPED | OUTPATIENT
Start: 2025-06-30

## 2025-06-30 RX ORDER — DOXYCYCLINE 100 MG/1
100 CAPSULE ORAL 2 TIMES DAILY
Qty: 14 CAPSULE | Refills: 0 | Status: SHIPPED | OUTPATIENT
Start: 2025-06-30

## 2025-06-30 RX ORDER — ASPIRIN 81 MG/1
81 TABLET ORAL 2 TIMES DAILY
Qty: 60 TABLET | Refills: 0 | Status: SHIPPED | OUTPATIENT
Start: 2025-06-30 | End: 2026-06-30

## 2025-06-30 RX ORDER — IBUPROFEN 600 MG/1
600 TABLET, FILM COATED ORAL 3 TIMES DAILY
Qty: 60 TABLET | Refills: 1 | Status: SHIPPED | OUTPATIENT
Start: 2025-06-30

## 2025-06-30 RX ORDER — OXYCODONE AND ACETAMINOPHEN 5; 325 MG/1; MG/1
1 TABLET ORAL EVERY 4 HOURS PRN
Qty: 28 EACH | Refills: 0 | Status: SHIPPED | OUTPATIENT
Start: 2025-06-30

## 2025-07-01 ENCOUNTER — TELEPHONE (OUTPATIENT)
Dept: ORTHOPEDICS | Facility: CLINIC | Age: 70
End: 2025-07-01
Payer: MEDICARE

## 2025-07-06 DIAGNOSIS — G62.9 PERIPHERAL POLYNEUROPATHY: ICD-10-CM

## 2025-07-06 NOTE — TELEPHONE ENCOUNTER
No care due was identified.  [facility] Embedded Care Due Messages. Reference number: [id number].   7/06/2025 6:59:31 AM CDT

## 2025-07-07 ENCOUNTER — TELEPHONE (OUTPATIENT)
Dept: ORTHOPEDICS | Facility: CLINIC | Age: 70
End: 2025-07-07
Payer: MEDICARE

## 2025-07-08 RX ORDER — GABAPENTIN 300 MG/1
CAPSULE ORAL
Qty: 90 CAPSULE | Refills: 5 | Status: SHIPPED | OUTPATIENT
Start: 2025-07-08

## 2025-07-09 RX ORDER — FLUTICASONE PROPIONATE AND SALMETEROL 250; 50 UG/1; UG/1
1 POWDER RESPIRATORY (INHALATION) 2 TIMES DAILY
Qty: 180 EACH | Refills: 3 | Status: SHIPPED | OUTPATIENT
Start: 2025-07-09

## 2025-07-09 NOTE — TELEPHONE ENCOUNTER
Refill Routing Note   Medication(s) are not appropriate for processing by [facility] for the following reason(s):        Required vitals abnormal    [facility] action(s):  Defer             Appointments  past 12m or future 3m with PCP    Date Provider   Last Visit   6/9/2025 Jaime William MD   Next Visit   12/9/2025 Jaime William MD   ED visits in past 90 days: 0        Note composed:12:42 PM 07/09/2025

## 2025-07-09 NOTE — TELEPHONE ENCOUNTER
No care due was identified.  [facility] Embedded Care Due Messages. Reference number: [id number].   7/09/2025 12:26:53 PM CDT

## 2025-07-09 NOTE — TELEPHONE ENCOUNTER
Refill Routing Note   Medication(s) are not appropriate for processing by [facility] for the following reason(s):        Drug-disease interaction    [facility] action(s):  Defer        Medication Therapy Plan: Drug-Disease: WIXELA INHUB and Chronic obstructive pulmonary disease with acute lower respiratory infection; DEFER    Pharmacist review requested: Yes     Appointments  past 12m or future 3m with PCP    Date Provider   Last Visit   6/9/2025 Jaime William MD   Next Visit   12/9/2025 Jaime William MD   ED visits in past 90 days: 0        Note composed:9:32 AM 07/09/2025

## 2025-07-09 NOTE — TELEPHONE ENCOUNTER
No care due was identified.  [facility] Embedded Care Due Messages. Reference number: [id number].   7/09/2025 12:18:29 AM CDT

## 2025-07-09 NOTE — TELEPHONE ENCOUNTER
Refill Decision Note   Magdalena Acevedo  is requesting a refill authorization.  Brief Assessment and Rationale for Refill:  Approve     Medication Therapy Plan:         Pharmacist review requested: Yes   Extended chart review required: Yes   Comments:     Note composed:9:53 AM 07/09/2025

## 2025-07-10 RX ORDER — LISINOPRIL 40 MG/1
40 TABLET ORAL
Qty: 90 TABLET | Refills: 3 | Status: SHIPPED | OUTPATIENT
Start: 2025-07-10

## 2025-07-14 DIAGNOSIS — Z96.651 S/P TOTAL KNEE ARTHROPLASTY, RIGHT: Primary | ICD-10-CM

## 2025-07-15 ENCOUNTER — OFFICE VISIT (OUTPATIENT)
Dept: ORTHOPEDICS | Facility: CLINIC | Age: 70
End: 2025-07-15
Payer: MEDICARE

## 2025-07-15 VITALS
RESPIRATION RATE: 18 BRPM | DIASTOLIC BLOOD PRESSURE: 80 MMHG | HEART RATE: 69 BPM | SYSTOLIC BLOOD PRESSURE: 141 MMHG | OXYGEN SATURATION: 97 %

## 2025-07-15 DIAGNOSIS — G62.9 PERIPHERAL POLYNEUROPATHY: ICD-10-CM

## 2025-07-15 DIAGNOSIS — Z96.651 S/P TKR (TOTAL KNEE REPLACEMENT) USING CEMENT, RIGHT: Primary | ICD-10-CM

## 2025-07-15 PROCEDURE — 99999 PR PBB SHADOW E&M-EST. PATIENT-LVL IV: CPT | Mod: PBBFAC,,,

## 2025-07-15 PROCEDURE — 1125F AMNT PAIN NOTED PAIN PRSNT: CPT | Mod: CPTII,S$GLB,,

## 2025-07-15 PROCEDURE — 3079F DIAST BP 80-89 MM HG: CPT | Mod: CPTII,S$GLB,,

## 2025-07-15 PROCEDURE — 3060F POS MICROALBUMINURIA REV: CPT | Mod: CPTII,S$GLB,,

## 2025-07-15 PROCEDURE — 1101F PT FALLS ASSESS-DOCD LE1/YR: CPT | Mod: CPTII,S$GLB,,

## 2025-07-15 PROCEDURE — 3044F HG A1C LEVEL LT 7.0%: CPT | Mod: CPTII,S$GLB,,

## 2025-07-15 PROCEDURE — 3077F SYST BP >= 140 MM HG: CPT | Mod: CPTII,S$GLB,,

## 2025-07-15 PROCEDURE — 3288F FALL RISK ASSESSMENT DOCD: CPT | Mod: CPTII,S$GLB,,

## 2025-07-15 PROCEDURE — 99024 POSTOP FOLLOW-UP VISIT: CPT | Mod: S$GLB,,,

## 2025-07-15 PROCEDURE — 4010F ACE/ARB THERAPY RXD/TAKEN: CPT | Mod: CPTII,S$GLB,,

## 2025-07-15 PROCEDURE — 1159F MED LIST DOCD IN RCRD: CPT | Mod: CPTII,S$GLB,,

## 2025-07-15 PROCEDURE — 3066F NEPHROPATHY DOC TX: CPT | Mod: CPTII,S$GLB,,

## 2025-07-15 RX ORDER — GABAPENTIN 300 MG/1
CAPSULE ORAL
Qty: 90 CAPSULE | Refills: 5 | OUTPATIENT
Start: 2025-07-15

## 2025-07-15 NOTE — PROGRESS NOTES
Post-op Note    HPI    Magdalena Acevedo is here 2 weeks s/p the following procedure:     DOS: 6/30/2025  Right total knee arthroplasty    Overall doing well. Pain controlled on current regimen. She currently has home health and is transition to outpatient Physical Therapy. Denies any chest pain or shortness of breathe. Denies any drainage from the incision. Denies any fevers, chills or paresthesias. Completed doxycycline.     DVT Prophylaxis: asa bid x 4 weeks      Physical Exam:     Patient is alert and oriented no acute distress.   Assistive Device: wheelchair    Right knee: aquacel removed, Incision(s) are well healed.  Scant bleeding with aquacel removal to distal incision. There is no evidence of dehiscence.  There is no induration erythema or signs of infection.  Appropriate soft tissue swelling.  Compartments are soft and compressible.  Warm well-perfused extremity.  Multiple skin changes secondary to psoriasis/eczema    Imaging:     I have personally reviewed the following imaging and these are an interpretation of my findings:     X-Ray: I have reviewed all pertinent results/findings and my personal findings are:  Status post right total knee arthroplasty in good alignment without evidence of hardware failure or complication    Assessment    Magdalena Acevedo is 2 weeks Post-op     Plan:    Overall doing as expected.  We discussed expectations of surgery and postoperative course.     Pain: Continued postoperative pain regimen -- ibuprofen/Tylenol p.r.n.  DVT prophylaxis: cont asa bid x 2 weeks  PT/OT: Okay to start PT, weight bearing as tolerated, no restrictions    In 24 hours, you may shower without covering your incision.  Do not submerge your incision for another 2 weeks.  If steri strips applied, they can get wet, remove in 48-72 hours if not falling off on their own. Do not submerge incision for another 2 weeks. You may start to do a scar massage with vitamin-E oil/cocoa butter to your  incisions. Please inform the clinic if you experience any bleeding or discharge, warmth, or redness.       Follow-up: 4 weeks   X-rays next visit: 1 v right knee

## 2025-07-15 NOTE — TELEPHONE ENCOUNTER
No care due was identified.  [facility] Embedded Care Due Messages. Reference number: [id number].   7/15/2025 11:50:16 AM CDT

## 2025-07-15 NOTE — TELEPHONE ENCOUNTER
Refill Decision Note   Magdalena Acevedo  is requesting a refill authorization.  Brief Assessment and Rationale for Refill:  Quick Discontinue     Medication Therapy Plan:  90+5  Receipt confirmed by pharmacy (7/8/2025 10:28 PM CDT)      Comments:     Note composed:12:42 PM 07/15/2025             Appointments     Last Visit   6/9/2025 Jaime William MD   Next Visit   12/9/2025 Jaime William MD

## 2025-07-15 NOTE — TELEPHONE ENCOUNTER
Copied from CRM #[id number]. Topic: Medications - Medication Question  >> Jul 15, 2025 11:43 AM Berta wrote:  Type: Rx Clarification/ Additional Information/ Questions    Medication: gabapentin (NEURONTIN) 300 MG capsule    What questions do you have about the medication, if any? Calling because she is requesting something else, this medication does not help her sleep at night. Please call her. Thanks.    What information is needed?    Pharmacy number:    Pharmacy Contact Name and phone number:     Comments:

## 2025-07-21 ENCOUNTER — TELEPHONE (OUTPATIENT)
Dept: PRIMARY CARE CLINIC | Facility: CLINIC | Age: 70
End: 2025-07-21
Payer: MEDICARE

## 2025-07-21 NOTE — TELEPHONE ENCOUNTER
Copied from CRM #[id number]. Topic: General Inquiry - Return Call  >> Jul 21, 2025  9:09 AM Berta wrote:  Patient is returning a phone call.    Who left a message for the patient: Junior    Does patient know what this is regarding:  Medication    Would you like a call back, or a response through your [facility] portal?:   Call back    Best call back number: [phone]    Comments: Missed your call, please call her back. Thanks.

## 2025-07-21 NOTE — TELEPHONE ENCOUNTER
Copied from CRM #[id number]. Topic: Medications - Medication Question  >> Jul 21, 2025  8:58 AM Liv wrote:  Type: Rx Clarification/ Additional Information/ Questions    Medication:something other than gabapentin     What questions do you have about the medication, if any? She is asking for something different foe the restless leg     What information is needed?    Pharmacy number:  [facility]/pharmacy #[id number] - [place], [place] - [address]  [address]  [place] [address]  Phone: [phone] Fax: [phone]       Pharmacy Contact Name and phone number:     Comments:  Pt at [phone]

## 2025-07-22 ENCOUNTER — OFFICE VISIT (OUTPATIENT)
Dept: PRIMARY CARE CLINIC | Facility: CLINIC | Age: 70
End: 2025-07-22
Payer: MEDICARE

## 2025-07-22 VITALS
OXYGEN SATURATION: 98 % | HEART RATE: 78 BPM | SYSTOLIC BLOOD PRESSURE: 144 MMHG | DIASTOLIC BLOOD PRESSURE: 80 MMHG | TEMPERATURE: 98 F | HEIGHT: 62 IN | BODY MASS INDEX: 39.05 KG/M2 | RESPIRATION RATE: 16 BRPM | WEIGHT: 212.19 LBS

## 2025-07-22 DIAGNOSIS — Z96.651 HISTORY OF TOTAL RIGHT KNEE REPLACEMENT: ICD-10-CM

## 2025-07-22 DIAGNOSIS — K75.81 NASH (NONALCOHOLIC STEATOHEPATITIS): ICD-10-CM

## 2025-07-22 DIAGNOSIS — N39.41 URGE INCONTINENCE OF URINE: ICD-10-CM

## 2025-07-22 DIAGNOSIS — Z87.891 HISTORY OF SMOKING GREATER THAN 50 PACK YEARS: ICD-10-CM

## 2025-07-22 DIAGNOSIS — E11.65 TYPE 2 DIABETES MELLITUS WITH HYPERGLYCEMIA, WITHOUT LONG-TERM CURRENT USE OF INSULIN: ICD-10-CM

## 2025-07-22 DIAGNOSIS — G47.33 OSA ON CPAP: ICD-10-CM

## 2025-07-22 DIAGNOSIS — G25.81 RESTLESS LEG SYNDROME: Primary | ICD-10-CM

## 2025-07-22 DIAGNOSIS — J44.9 CHRONIC OBSTRUCTIVE PULMONARY DISEASE, UNSPECIFIED COPD TYPE: ICD-10-CM

## 2025-07-22 DIAGNOSIS — R91.1 PULMONARY NODULE: ICD-10-CM

## 2025-07-22 DIAGNOSIS — E66.01 SEVERE OBESITY (BMI 35.0-39.9) WITH COMORBIDITY: ICD-10-CM

## 2025-07-22 DIAGNOSIS — Z87.891 PERSONAL HISTORY OF NICOTINE DEPENDENCE: ICD-10-CM

## 2025-07-22 DIAGNOSIS — M51.369 DEGENERATION OF INTERVERTEBRAL DISC OF LUMBAR REGION, UNSPECIFIED WHETHER PAIN PRESENT: ICD-10-CM

## 2025-07-22 DIAGNOSIS — K21.9 GASTROESOPHAGEAL REFLUX DISEASE WITHOUT ESOPHAGITIS: ICD-10-CM

## 2025-07-22 DIAGNOSIS — R29.898 WEAKNESS OF BOTH LOWER EXTREMITIES: ICD-10-CM

## 2025-07-22 PROCEDURE — 3288F FALL RISK ASSESSMENT DOCD: CPT | Mod: CPTII,S$GLB,, | Performed by: FAMILY MEDICINE

## 2025-07-22 PROCEDURE — 99999 PR PBB SHADOW E&M-EST. PATIENT-LVL V: CPT | Mod: PBBFAC,,, | Performed by: FAMILY MEDICINE

## 2025-07-22 PROCEDURE — 3066F NEPHROPATHY DOC TX: CPT | Mod: CPTII,S$GLB,, | Performed by: FAMILY MEDICINE

## 2025-07-22 PROCEDURE — 3079F DIAST BP 80-89 MM HG: CPT | Mod: CPTII,S$GLB,, | Performed by: FAMILY MEDICINE

## 2025-07-22 PROCEDURE — 4010F ACE/ARB THERAPY RXD/TAKEN: CPT | Mod: CPTII,S$GLB,, | Performed by: FAMILY MEDICINE

## 2025-07-22 PROCEDURE — 1101F PT FALLS ASSESS-DOCD LE1/YR: CPT | Mod: CPTII,S$GLB,, | Performed by: FAMILY MEDICINE

## 2025-07-22 PROCEDURE — 3077F SYST BP >= 140 MM HG: CPT | Mod: CPTII,S$GLB,, | Performed by: FAMILY MEDICINE

## 2025-07-22 PROCEDURE — 3008F BODY MASS INDEX DOCD: CPT | Mod: CPTII,S$GLB,, | Performed by: FAMILY MEDICINE

## 2025-07-22 PROCEDURE — 1126F AMNT PAIN NOTED NONE PRSNT: CPT | Mod: CPTII,S$GLB,, | Performed by: FAMILY MEDICINE

## 2025-07-22 PROCEDURE — 1159F MED LIST DOCD IN RCRD: CPT | Mod: CPTII,S$GLB,, | Performed by: FAMILY MEDICINE

## 2025-07-22 PROCEDURE — 3060F POS MICROALBUMINURIA REV: CPT | Mod: CPTII,S$GLB,, | Performed by: FAMILY MEDICINE

## 2025-07-22 PROCEDURE — 3044F HG A1C LEVEL LT 7.0%: CPT | Mod: CPTII,S$GLB,, | Performed by: FAMILY MEDICINE

## 2025-07-22 PROCEDURE — 99214 OFFICE O/P EST MOD 30 MIN: CPT | Mod: S$GLB,,, | Performed by: FAMILY MEDICINE

## 2025-07-22 RX ORDER — DIAZEPAM 2 MG/1
TABLET ORAL
Qty: 60 TABLET | Refills: 2 | Status: SHIPPED | OUTPATIENT
Start: 2025-07-22

## 2025-07-22 RX ORDER — METHOCARBAMOL 750 MG/1
TABLET, FILM COATED ORAL
Qty: 30 TABLET | Refills: 1 | Status: SHIPPED | OUTPATIENT
Start: 2025-07-22

## 2025-07-22 NOTE — PROGRESS NOTES
Subjective:       Patient ID: Magdalena Acevedo is a 70 y.o. female.    Chief Complaint: HPI 70-year-old white female in for leg weakness-restless leg syndrome  Has not slept x 36 hours --takes gabapentin--trazodone--another gabapentin---another trazodone--still can not sleep due to legs jumping--even take some Robitussin.  History of right knee replacement 2 weeks ago  On sertraline for depression  Hypertension on lisinopril 40 mg Lasix 40 blood pressure 144/80  GERD on Protonix  Hyperlipidemia on atorvastatin  Anxiety-no medications  Psoriasis was on [place]--insurance refused to cover it patient got tired of fighting the insurance company  Neuropathy on gabapentin meloxicam  Peripheral edema on Lasix  Depression on sertraline  Type 2 diabetes glipizide  Chronic bronchitis on albuterol inhaler Wixela  GERD on Prilosec  After right knee replacement went home same day       ROS:    Skin: + psoriasis--scalp--back --popliteal area --scabs--left elbow--left ankle   HEENT: +  headache see HPI , no  ocular pain,+ blurred vision,not sure  diplopia eyes in different directions ,no  epistaxis,hoarseness change in voice,  No thyroid trouble    Lung: No pneumonia, asthma, Tb, wheezing, SOB, no smoking history of bronchitis/COPD--occasional bronchospasm on ProAir FOZIA with CPAP--Breztri --Trelegy --last restila   Heart: No chest pain, no ankle edema since on Lasix,no  MI, mendy murmur, +hypertension blood pressure,+  Hyperlipidemia---no stent bypass arrhythmia  Abdomen: No nausea, vomiting, diarrhea, constipation, ulcers, hepatitis,  melena, hematochezia, hematemesis history cholecystectomy  : no UTI, renal disease, stones + urinary incontinence with coughing same   GYN last mammogram last year Pap smear over 20 years  MS: no fractures, O/A, lupus, rheumatoid, gout left shoulder pain  + DDD cervical spine acts up on off takes Tylenol  Neuro: No dizziness, LOC,??? Seizures vs syncopal episodes  + tremor   +diabetes Glu  averages mainly in the 120-160 range, no anemia, +anxiety, + depression upset gets angry patient is on Zoloft but not using lorazepam or Ativan doing okay --anxiety trying to get money to replace root   , 1 child, unemployed,, lives alone    Objective:   Physical Exam:   General: Well nourished, well developed, no acute distress + morbid obesity  Skin: + Dry scaly skin --generalized--elbows--ankle--back--due to psoriasis  HEENT: Eyes PERRLA, EOM intact, nose clear , throat nonerythema ears TMs clear  NECK: Supple, no bruits, No JVD, no nodes  Lungs: Clear, no rales, rhonchi, nowheezing   Heart: Regular rate and rhythm, no murmurs, gallops, or rubs  Abdomen: flat, bowel sounds positive, no tenderness, or organomegaly   MS:  Pain in the right knee--unable to fully extend left knee--difficulty going from sitting to standing position---has to stand and wait a few minutes before walking because right leg gets weak---walks with a stiff right leg--able squat shelter down hard to arise--patient needs walker stays will be given 1 after surgery    Neuro: Alert, CN intact, oriented X 3 tremor of the hands bilaterally right greater than left--slight right hand weakness--with hand grasps--some weakness with opposition thumb index thumb 5th digit some weakness flexion extension of the forearm---has slightly unsteady gait--falls with heel-toe walking--unable to do Romberg unable to put heels together  Extremities: No cyanosis, clubbing, occasional peripheral edema especially in the afternoons in the calf areas        Assessment:       1. Restless leg syndrome    2. Type 2 diabetes mellitus with hyperglycemia, without long-term current use of insulin    3. Pulmonary nodule    4. History of total right knee replacement    5. Urge incontinence of urine    6. Weakness of both lower extremities    7. Severe obesity (BMI 35.0-39.9) with comorbidity    8. Personal history of nicotine dependence    9. FOZIA on CPAP    10. ARMIJO  (nonalcoholic steatohepatitis)    11. History of smoking greater than 50 pack years    12. Gastroesophageal reflux disease without esophagitis    13. Degeneration of intervertebral disc of lumbar region, unspecified whether pain present    14. Chronic obstructive pulmonary disease, unspecified COPD type                      Plan:       Restless leg syndrome  -     diazePAM (VALIUM) 2 MG tablet; 1 po qhs prn leg pain or cramps --needed can increase to 2 q.h.s.  Dispense: 60 tablet; Refill: 2    Type 2 diabetes mellitus with hyperglycemia, without long-term current use of insulin    Pulmonary nodule    History of total right knee replacement    Urge incontinence of urine    Weakness of both lower extremities    Severe obesity (BMI 35.0-39.9) with comorbidity    Personal history of nicotine dependence    FOZIA on CPAP    ARMIJO (nonalcoholic steatohepatitis)    History of smoking greater than 50 pack years    Gastroesophageal reflux disease without esophagitis    Degeneration of intervertebral disc of lumbar region, unspecified whether pain present    Chronic obstructive pulmonary disease, unspecified COPD type    Other orders  -     methocarbamoL (ROBAXIN) 750 MG Tab; One p.o. q.h.s. p.r.n. muscle spasm  Dispense: 30 tablet; Refill: 1                       Main Reason for Visit-  Status post right knee replacement--walks with a limp still in physical therapy---still with some swelling of the right lower leg  Restless leg syndrome---tried gabapentin trazodone no relief will try Valium 2 mg 1-2 p.o. q.h.s. for muscle spasm could add Robaxin 751 p.o. q.h.s. for muscle spasm--if not better will consider Repatha  Other medical issues  Depression---no relief with Wellbutrin---will try Zoloft 50 mg---fails to improve will try Cymbalta 30 mg   Neurology --saw patient for headaches ordered CT scan MRI--referred patient to ENT and audiology---will place on Fioricet--may benefit from Imitrex  Psoriasis--scalp/lower back/popliteal  area--on [facility]   History of a tremor patient was seen by neurologist in the past told it was nothing wrong with her tremors gotten progressively worse has difficulty holding a cup of coffee pouring a cup of coffee--treated with primidone--had to stop may too sleepy Needs to rediscuss with Neurology   Osteoarthritis right knee had knee injected was doing well starting to get sore again but too soon for another  steroid injection  History of diabetes Glu 94--off glipizide --HGB A1C 5.9   COPD on albuterol and breztri  Hypertension blood pressure 136/86--on lisinopril 40 mg--Lasix 40 mg  Hyperlipidemia on atorvastatin 20 mg  GERD omeprazole  Anxiety depression on Wellbutrin  Abrasion left elbow and left ankle patient continue topical antibiotics--was seen in urgent care given antibiotics to take by mouth--both appear to be healing well  Chronic pain syndrome no relief with ibuprofen--will change to Mobic 7.5 b.i.d. patient also on gabapentin--sees [facility] but costing to much --continue gabapentin  Lab CBCs CMP lipid hemoglobin A1c PT PTT INR chest Xray EKG

## 2025-07-28 DIAGNOSIS — Z00.00 ENCOUNTER FOR MEDICARE ANNUAL WELLNESS EXAM: ICD-10-CM

## 2025-08-11 DIAGNOSIS — Z96.651 S/P TOTAL KNEE ARTHROPLASTY, RIGHT: Primary | ICD-10-CM

## 2025-08-12 ENCOUNTER — OFFICE VISIT (OUTPATIENT)
Dept: ORTHOPEDICS | Facility: CLINIC | Age: 70
End: 2025-08-12
Payer: MEDICARE

## 2025-08-12 VITALS
HEART RATE: 87 BPM | DIASTOLIC BLOOD PRESSURE: 95 MMHG | BODY MASS INDEX: 39.02 KG/M2 | SYSTOLIC BLOOD PRESSURE: 145 MMHG | WEIGHT: 212.06 LBS | HEIGHT: 62 IN

## 2025-08-12 DIAGNOSIS — Z96.651 S/P TOTAL KNEE ARTHROPLASTY, RIGHT: Primary | ICD-10-CM

## 2025-08-12 PROCEDURE — 3044F HG A1C LEVEL LT 7.0%: CPT | Mod: CPTII,S$GLB,,

## 2025-08-12 PROCEDURE — 3066F NEPHROPATHY DOC TX: CPT | Mod: CPTII,S$GLB,,

## 2025-08-12 PROCEDURE — 99024 POSTOP FOLLOW-UP VISIT: CPT | Mod: S$GLB,,,

## 2025-08-12 PROCEDURE — 99999 PR PBB SHADOW E&M-EST. PATIENT-LVL IV: CPT | Mod: PBBFAC,,,

## 2025-08-12 PROCEDURE — 3077F SYST BP >= 140 MM HG: CPT | Mod: CPTII,S$GLB,,

## 2025-08-12 PROCEDURE — 1160F RVW MEDS BY RX/DR IN RCRD: CPT | Mod: CPTII,S$GLB,,

## 2025-08-12 PROCEDURE — 3060F POS MICROALBUMINURIA REV: CPT | Mod: CPTII,S$GLB,,

## 2025-08-12 PROCEDURE — 1159F MED LIST DOCD IN RCRD: CPT | Mod: CPTII,S$GLB,,

## 2025-08-12 PROCEDURE — 1125F AMNT PAIN NOTED PAIN PRSNT: CPT | Mod: CPTII,S$GLB,,

## 2025-08-12 PROCEDURE — 3080F DIAST BP >= 90 MM HG: CPT | Mod: CPTII,S$GLB,,

## 2025-08-12 PROCEDURE — 4010F ACE/ARB THERAPY RXD/TAKEN: CPT | Mod: CPTII,S$GLB,,

## 2025-08-18 RX ORDER — METHOCARBAMOL 750 MG/1
TABLET, FILM COATED ORAL
Qty: 30 TABLET | Refills: 1 | Status: SHIPPED | OUTPATIENT
Start: 2025-08-18

## 2025-08-26 ENCOUNTER — EXTERNAL HOME HEALTH (OUTPATIENT)
Dept: HOME HEALTH SERVICES | Facility: HOSPITAL | Age: 70
End: 2025-08-26
Payer: MEDICARE

## 2025-08-29 ENCOUNTER — TELEPHONE (OUTPATIENT)
Dept: PRIMARY CARE CLINIC | Facility: CLINIC | Age: 70
End: 2025-08-29
Payer: MEDICARE

## 2025-08-29 DIAGNOSIS — Z12.31 SCREENING MAMMOGRAM, ENCOUNTER FOR: Primary | ICD-10-CM

## 2025-09-02 ENCOUNTER — TELEPHONE (OUTPATIENT)
Dept: PRIMARY CARE CLINIC | Facility: CLINIC | Age: 70
End: 2025-09-02
Payer: MEDICARE

## 2025-09-02 DIAGNOSIS — G47.33 OSA ON CPAP: Primary | ICD-10-CM

## (undated) DEVICE — SEE L#120831

## (undated) DEVICE — TIP YANKAUERS BULB NO VENT

## (undated) DEVICE — SOL PVP-I SCRUB 7.5% 4OZ

## (undated) DEVICE — POSITIONER IV ARMBOARD FOAM

## (undated) DEVICE — CONTAINER SPECIMEN OR STER 4OZ

## (undated) DEVICE — SUT 4/0 18IN CHROMIC GUT PS

## (undated) DEVICE — BLADE TRICUT ROTATABLE 4MM

## (undated) DEVICE — TOWEL OR DISP STRL BLUE 2/PK

## (undated) DEVICE — SOL NACL 0.9% INJ 500ML BG

## (undated) DEVICE — POSITIONER HEAD DONUT 9IN FOAM

## (undated) DEVICE — SEE MEDLINE ITEM 157110

## (undated) DEVICE — GLOVE BIOGEL SKINSENSE PI 8.0

## (undated) DEVICE — SEE MEDLINE ITEM 156934

## (undated) DEVICE — BLADE INFERIOR TURBINATE 2MM

## (undated) DEVICE — GLOVE BIOGEL 7.5

## (undated) DEVICE — SUT PLN GUT 4-0 SC-1SC-1 1

## (undated) DEVICE — SKIN MARKER DEVON 160

## (undated) DEVICE — BLADE SCALP OPHTL RND TIP